# Patient Record
Sex: MALE | Race: BLACK OR AFRICAN AMERICAN | NOT HISPANIC OR LATINO | Employment: OTHER | URBAN - METROPOLITAN AREA
[De-identification: names, ages, dates, MRNs, and addresses within clinical notes are randomized per-mention and may not be internally consistent; named-entity substitution may affect disease eponyms.]

---

## 2018-07-30 PROBLEM — Z86.2 HISTORY OF ITP: Status: ACTIVE | Noted: 2017-04-10

## 2018-07-30 PROBLEM — M54.12 CERVICAL RADICULITIS: Status: ACTIVE | Noted: 2017-12-14

## 2018-07-31 ENCOUNTER — LAB VISIT (OUTPATIENT)
Dept: LAB | Facility: HOSPITAL | Age: 58
End: 2018-07-31
Attending: FAMILY MEDICINE
Payer: COMMERCIAL

## 2018-07-31 ENCOUNTER — OFFICE VISIT (OUTPATIENT)
Dept: INTERNAL MEDICINE | Facility: CLINIC | Age: 58
End: 2018-07-31
Payer: COMMERCIAL

## 2018-07-31 VITALS
SYSTOLIC BLOOD PRESSURE: 116 MMHG | WEIGHT: 138.69 LBS | OXYGEN SATURATION: 97 % | DIASTOLIC BLOOD PRESSURE: 84 MMHG | BODY MASS INDEX: 21.08 KG/M2 | TEMPERATURE: 96 F | RESPIRATION RATE: 18 BRPM | HEART RATE: 77 BPM

## 2018-07-31 DIAGNOSIS — E87.5 SERUM POTASSIUM ELEVATED: ICD-10-CM

## 2018-07-31 DIAGNOSIS — F17.200 SMOKING: ICD-10-CM

## 2018-07-31 DIAGNOSIS — K21.9 GASTROESOPHAGEAL REFLUX DISEASE, ESOPHAGITIS PRESENCE NOT SPECIFIED: Primary | ICD-10-CM

## 2018-07-31 DIAGNOSIS — Z12.2 ENCOUNTER FOR SCREENING FOR LUNG CANCER: ICD-10-CM

## 2018-07-31 DIAGNOSIS — J06.9 UPPER RESPIRATORY TRACT INFECTION, UNSPECIFIED TYPE: ICD-10-CM

## 2018-07-31 DIAGNOSIS — Z13.220 SCREENING FOR HYPERLIPIDEMIA: ICD-10-CM

## 2018-07-31 DIAGNOSIS — Z12.9 SCREENING FOR CANCER: ICD-10-CM

## 2018-07-31 DIAGNOSIS — G47.00 INSOMNIA, UNSPECIFIED TYPE: ICD-10-CM

## 2018-07-31 DIAGNOSIS — Z79.899 HIGH RISK MEDICATION USE: ICD-10-CM

## 2018-07-31 DIAGNOSIS — R25.2 MUSCLE CRAMPS: ICD-10-CM

## 2018-07-31 DIAGNOSIS — R63.4 WEIGHT LOSS: ICD-10-CM

## 2018-07-31 DIAGNOSIS — D69.3 CHRONIC ITP (IDIOPATHIC THROMBOCYTOPENIA): ICD-10-CM

## 2018-07-31 DIAGNOSIS — Z12.11 SCREENING FOR COLON CANCER: ICD-10-CM

## 2018-07-31 LAB
ALBUMIN SERPL BCP-MCNC: 4.2 G/DL
ALP SERPL-CCNC: 52 U/L
ALT SERPL W/O P-5'-P-CCNC: 23 U/L
ANION GAP SERPL CALC-SCNC: 7 MMOL/L
AST SERPL-CCNC: 24 U/L
BILIRUB SERPL-MCNC: 1.1 MG/DL
BUN SERPL-MCNC: 14 MG/DL
CALCIUM SERPL-MCNC: 10.2 MG/DL
CHLORIDE SERPL-SCNC: 106 MMOL/L
CHOLEST SERPL-MCNC: 215 MG/DL
CHOLEST/HDLC SERPL: 3.2 {RATIO}
CO2 SERPL-SCNC: 29 MMOL/L
CREAT SERPL-MCNC: 0.9 MG/DL
EST. GFR  (AFRICAN AMERICAN): >60 ML/MIN/1.73 M^2
EST. GFR  (NON AFRICAN AMERICAN): >60 ML/MIN/1.73 M^2
GLUCOSE SERPL-MCNC: 100 MG/DL
HDLC SERPL-MCNC: 67 MG/DL
HDLC SERPL: 31.2 %
LDLC SERPL CALC-MCNC: 125.4 MG/DL
NONHDLC SERPL-MCNC: 148 MG/DL
POTASSIUM SERPL-SCNC: 5.6 MMOL/L
PROT SERPL-MCNC: 7.8 G/DL
SODIUM SERPL-SCNC: 142 MMOL/L
TRIGL SERPL-MCNC: 113 MG/DL

## 2018-07-31 PROCEDURE — 36415 COLL VENOUS BLD VENIPUNCTURE: CPT

## 2018-07-31 PROCEDURE — 80061 LIPID PANEL: CPT

## 2018-07-31 PROCEDURE — 99204 OFFICE O/P NEW MOD 45 MIN: CPT | Mod: S$GLB,,, | Performed by: FAMILY MEDICINE

## 2018-07-31 PROCEDURE — 99999 PR PBB SHADOW E&M-EST. PATIENT-LVL V: CPT | Mod: PBBFAC,,, | Performed by: FAMILY MEDICINE

## 2018-07-31 PROCEDURE — 80053 COMPREHEN METABOLIC PANEL: CPT

## 2018-07-31 PROCEDURE — 99215 OFFICE O/P EST HI 40 MIN: CPT | Mod: PBBFAC | Performed by: FAMILY MEDICINE

## 2018-07-31 RX ORDER — ZOLPIDEM TARTRATE 5 MG/1
5 TABLET ORAL NIGHTLY PRN
COMMUNITY
End: 2019-08-28 | Stop reason: SDUPTHER

## 2018-07-31 RX ORDER — SODIUM, POTASSIUM,MAG SULFATES 17.5-3.13G
1 SOLUTION, RECONSTITUTED, ORAL ORAL DAILY
Qty: 1 KIT | Refills: 0 | Status: SHIPPED | OUTPATIENT
Start: 2018-07-31 | End: 2018-08-02

## 2018-07-31 RX ORDER — OXYCODONE AND ACETAMINOPHEN 10; 325 MG/1; MG/1
1 TABLET ORAL EVERY 4 HOURS PRN
COMMUNITY
End: 2021-08-02

## 2018-07-31 NOTE — PROGRESS NOTES
Subjective:       Patient ID: Jaxson Burch Jr. is a 58 y.o. male.    Chief Complaint: Sinusitis (pressure in head blowing nose); Heartburn; and Migraine (right side)    HPI     57 yo M    Presents to [facility] care.    Currently losing weight - non-intentional  Has night sweats - not nightly - randomly - for years.    PMH:    Chronic ITP - Looking to return back to Dr. Salazar.  Chronic Pain - Neck / Back - disc problems - sees pain Mngt - on              Opioid  Insomnia - takes Ambien - 2.5 years.      Previously seen at [facility] due to insurance change - but looking to return.    Has had insurance status changes.    Currently following Dr. Huber - sometimes wait is too long.     Takes Percocet -   Dr. Rodas - Pain Mngt      Reports having sinus issues.  2 weeks duration  Feels starts in lymph nodes.  Runny nose -   Seems like its trying to improve.  Coughing - can feel cold in his check  Having some headache.  No sinus pressure  Was having chills a few days ago.  Hasn't felt himself  No shortness of breath    Reports having indigestion issues -  Took rolaids - didn't help.  Started taking prilosec - seems to help him.    Did have episode of having hard time getting fluids down.     Has been smoking since age 16  Smokes 1/2 pack a day.  Pack a day and 1/2 for most of life.  Calculated 31.5 smoking pack years    Colonoscopy -         Review of Systems   Constitutional: Positive for chills and unexpected weight change. Negative for fever.   HENT: Positive for congestion. Negative for sore throat.    Eyes: Negative for pain and visual disturbance.   Respiratory: Positive for cough. Negative for shortness of breath.    Cardiovascular: Negative for chest pain and palpitations.   Gastrointestinal: Negative for constipation and diarrhea.   Genitourinary: Negative for difficulty urinating and dysuria.   Musculoskeletal: Positive for arthralgias and back pain. Negative for gait problem.   Neurological: Negative  for dizziness and light-headedness.   Psychiatric/Behavioral: Positive for sleep disturbance. Negative for confusion.       Objective:       Vitals:    07/31/18 0936   BP: 116/84   Pulse: 77   Resp: 18   Temp: 96 °F (35.6 °C)       Physical Exam   Constitutional: He is oriented to person, place, and time. He appears well-developed and well-nourished. No distress.   HENT:   Head: Normocephalic and atraumatic.   Right Ear: Hearing, tympanic membrane, external ear and ear canal normal.   Left Ear: Hearing, tympanic membrane, external ear and ear canal normal.   Nose: Nose normal. Right sinus exhibits no maxillary sinus tenderness and no frontal sinus tenderness. Left sinus exhibits no maxillary sinus tenderness and no frontal sinus tenderness.   Mouth/Throat: Uvula is midline, oropharynx is clear and moist and mucous membranes are normal.   Eyes: Conjunctivae are normal. Right eye exhibits no discharge. Left eye exhibits no discharge.   Neck: Trachea normal, normal range of motion and full passive range of motion without pain.   Cardiovascular: Normal rate, regular rhythm, normal heart sounds and intact distal pulses.    Pulmonary/Chest: Effort normal and breath sounds normal. No respiratory distress. He has no decreased breath sounds. He has no wheezes.   Abdominal: Soft. Normal appearance and bowel sounds are normal. He exhibits no distension and no mass. There is no tenderness. There is no guarding. No hernia.   Musculoskeletal: Normal range of motion. He exhibits no edema or deformity.   Lymphadenopathy:     He has no cervical adenopathy.   Neurological: He is alert and oriented to person, place, and time.   Skin: Skin is warm, dry and intact. No rash noted. No erythema. No pallor.   Psychiatric: He has a normal mood and affect. His speech is normal and behavior is normal. Thought content normal.       Assessment:       1. Gastroesophageal reflux disease, esophagitis presence not specified    2. Upper respiratory  tract infection, unspecified type    3. Weight loss    4. Screening for colon cancer    5. Encounter for screening for lung cancer    6. Screening for cancer    7. Smoking    8. Serum potassium elevated    9. Muscle cramps    10. Screening for hyperlipidemia    11. Chronic ITP (idiopathic thrombocytopenia)    12. Insomnia, unspecified type    13. High risk medication use        Plan:   Gastroesophageal reflux disease  Has had benefit with Prilosec  Encouraged continued use.  Extensive education reviewed with online resources imaging.    Upper respiratory tract infection, unspecified type  I suspect this is a viral illness.  Physical examination and vitals wnl.  No abx prescribed  Discussed cases in which they would be appropriate - failure to resolve or worsening after this period of improvement.      Smoking  Since age 16  cessation course ordered  Low dose CT scan given over 56 with a 31.5 pack year history  Evy given weight loss and night sweats    Colonoscopy to be obtained    Elevated K   patient reports cramping  Noted 6/18 K was 5.4  Checking that today.     Weight loss  Cancer screening    Chronic ITP  Looking to switch back to [facility] - Dr Salazar  Will set up for an appt.    High risk medication  Opioid and ambien    Insomnia  Discussed my concern with his daily use of ambien for 2.5 years.  Discussed underlying causes of sleep  Encouraged activity  Discussed I avoid prescribing for long term use.     F/u in 3 months.    Spent roughly 45 minutes with patient    Follow-up in about 3 months (around 10/31/2018).

## 2018-08-01 ENCOUNTER — TELEPHONE (OUTPATIENT)
Dept: INTERNAL MEDICINE | Facility: CLINIC | Age: 58
End: 2018-08-01

## 2018-08-01 ENCOUNTER — HOSPITAL ENCOUNTER (OUTPATIENT)
Dept: RADIOLOGY | Facility: HOSPITAL | Age: 58
Discharge: HOME | End: 2018-08-01
Attending: FAMILY MEDICINE
Payer: COMMERCIAL

## 2018-08-01 DIAGNOSIS — Z12.9 SCREENING FOR CANCER: ICD-10-CM

## 2018-08-01 DIAGNOSIS — E87.5 HYPERKALEMIA: Primary | ICD-10-CM

## 2018-08-01 RX ORDER — ATORVASTATIN CALCIUM 20 MG/1
20 TABLET, FILM COATED ORAL DAILY
Qty: 90 TABLET | Refills: 0 | Status: SHIPPED | OUTPATIENT
Start: 2018-08-01 | End: 2020-10-06

## 2018-08-01 NOTE — TELEPHONE ENCOUNTER
----- Message from Lizett Mcleod sent at 8/1/2018 12:06 PM CDT -----  Contact: pt  The pt request a return call, no additional info given, can be reached at [phone]///thxMW

## 2018-08-01 NOTE — TELEPHONE ENCOUNTER
----- Message from Phil Coyle MD sent at 8/1/2018 10:59 AM CDT -----  Please call and schedule a cmp for tomorrow or Friday.

## 2018-08-02 ENCOUNTER — TELEPHONE (OUTPATIENT)
Dept: SMOKING CESSATION | Facility: CLINIC | Age: 58
End: 2018-08-02

## 2018-08-02 ENCOUNTER — TELEPHONE (OUTPATIENT)
Dept: INTERNAL MEDICINE | Facility: CLINIC | Age: 58
End: 2018-08-02

## 2018-08-02 NOTE — TELEPHONE ENCOUNTER
Patient called to state that something has come up and he is unable to make his scheduled appointment for tonight. He states that he will call back on Monday to schedule. Return contact information provided.

## 2018-08-02 NOTE — TELEPHONE ENCOUNTER
----- Message from Sahara Valencia sent at 8/2/2018 10:08 AM CDT -----  Contact: Jaxson herron at [phone] regarding pt wanting to let Dr. Coyle office know that he had to reschedule ultrasound

## 2018-08-02 NOTE — TELEPHONE ENCOUNTER
Called to let us know that he was scheduled at the wrong place for CT Scan.  Will reschedule when payment is put back on his card. /sl/

## 2018-08-07 ENCOUNTER — HOSPITAL ENCOUNTER (OUTPATIENT)
Dept: RADIOLOGY | Facility: HOSPITAL | Age: 58
Discharge: HOME | End: 2018-08-07
Attending: FAMILY MEDICINE
Payer: COMMERCIAL

## 2018-08-07 ENCOUNTER — OFFICE VISIT (OUTPATIENT)
Dept: INTERNAL MEDICINE | Facility: CLINIC | Age: 58
End: 2018-08-07
Payer: COMMERCIAL

## 2018-08-07 ENCOUNTER — TELEPHONE (OUTPATIENT)
Dept: INTERNAL MEDICINE | Facility: CLINIC | Age: 58
End: 2018-08-07

## 2018-08-07 VITALS
OXYGEN SATURATION: 99 % | BODY MASS INDEX: 21.72 KG/M2 | DIASTOLIC BLOOD PRESSURE: 78 MMHG | WEIGHT: 142.88 LBS | SYSTOLIC BLOOD PRESSURE: 118 MMHG | RESPIRATION RATE: 18 BRPM | TEMPERATURE: 97 F | HEART RATE: 93 BPM

## 2018-08-07 DIAGNOSIS — E87.5 HYPERKALEMIA: ICD-10-CM

## 2018-08-07 DIAGNOSIS — S40.021A HEMATOMA OF ARM, RIGHT, INITIAL ENCOUNTER: Primary | ICD-10-CM

## 2018-08-07 DIAGNOSIS — D69.3 CHRONIC ITP (IDIOPATHIC THROMBOCYTOPENIA): ICD-10-CM

## 2018-08-07 PROCEDURE — G0297 LDCT FOR LUNG CA SCREEN: HCPCS | Mod: TC,PO

## 2018-08-07 PROCEDURE — 99213 OFFICE O/P EST LOW 20 MIN: CPT | Mod: PBBFAC,25 | Performed by: FAMILY MEDICINE

## 2018-08-07 PROCEDURE — 99999 PR PBB SHADOW E&M-EST. PATIENT-LVL III: CPT | Mod: PBBFAC,,, | Performed by: FAMILY MEDICINE

## 2018-08-07 PROCEDURE — 99214 OFFICE O/P EST MOD 30 MIN: CPT | Mod: S$GLB,,, | Performed by: FAMILY MEDICINE

## 2018-08-07 NOTE — TELEPHONE ENCOUNTER
----- Message from Edelmira Rueda sent at 8/7/2018 10:15 AM CDT -----  Pt needs call back rg lab results, will elaborate..[phone]

## 2018-08-07 NOTE — PROGRESS NOTES
Subjective:       Patient ID: Jaxson Burch Jr. is a 58 y.o. male.    Chief Complaint: red bump after blood draw    PCP: Dr. Coyle    Patient presents to clinic today reporting red bump on his arm after blood draw a few days ago.       Review of Systems   Constitutional: Negative for chills, fatigue, fever and unexpected weight change.   Eyes: Negative for visual disturbance.   Respiratory: Negative for shortness of breath.    Cardiovascular: Negative for chest pain.   Musculoskeletal: Negative for myalgias.   Neurological: Negative for headaches.       Objective:      Physical Exam   Constitutional: He is oriented to person, place, and time. He appears well-developed and well-nourished. No distress.   HENT:   Head: Normocephalic and atraumatic.   Eyes: Conjunctivae and EOM are normal. Pupils are equal, round, and reactive to light. No scleral icterus.   Pulmonary/Chest: Effort normal.   Neurological: He is alert and oriented to person, place, and time. No cranial nerve deficit. Gait normal.   Skin:        0.5 cm round bruise noted in right antecubital fossa, no mass, nontender   Psychiatric: He has a normal mood and affect.   Vitals reviewed.      Assessment:       1. Hematoma of arm, right, initial encounter    2. Chronic ITP (idiopathic thrombocytopenia)    3. Hyperkalemia        Plan:     Problem List Items Addressed This Visit     Chronic ITP (idiopathic thrombocytopenia)    Current Assessment & Plan     Denies bleeding other than recent hematoma after venupuncture; followed by Hem/Onc         Hyperkalemia    Current Assessment & Plan     Follow up lab per Dr. Coyle is pending; patient reports he d/c OTC vitamin with potassium           Other Visit Diagnoses     Hematoma of arm, right, initial encounter    -  Primary    from recent venupuncture, resolving, reassured patient

## 2018-08-07 NOTE — PATIENT INSTRUCTIONS
Hematoma  A hematoma is a collection of blood trapped outside of a blood vessel. It is what we think of as a bruise or a contusion. It is usually seen under the skin as a black and blue spot on your arm or leg, or a bump on your head after an injury. It can be almost anywhere on or in your body. It can also occur in an internal organ where it can be more serious.  A hematoma is caused by an injury with damage to small blood vessels. This causes blood to leak into the tissues. Blood forms a pocket under the skin that swells and looks like a purplish patch.  Gradually the blood in the hematoma is absorbed back into the body. The swelling and pain of the hematoma will go away. This takes from 1 to 4 weeks, depending on the size of the hematoma. The skin over the hematoma may turn bluish then brown and yellow as the blood is dissolved and absorbed. Usually, this only takes a couple of weeks but can last months.  Home care  · Limit motion of the joints near the hematoma. If the hematoma is large and painful, avoid sports and other vigorous physical activity until the swelling and pain goes away.  · Apply an ice pack (ice cubes in a plastic bag, wrapped in a thin towel or a frozen bag of peas) over the injured area for 20 minutes every 1 to 2 hours the first day. Continue with ice packs 3 to 4 times a day for the next 2 days. Continue the use of ice packs for relief of pain and swelling as needed.  · If you need anything for pain, you can take acetaminophen, unless you were given a different pain medicine to use. Talk with your doctor before using this medicine if you have chronic liver or kidney disease. Also talk with your doctor if you have had a stomach ulcer or digestive tract bleeding, or are taking blood-thinner medicines.  Follow-up care  Follow up with your healthcare provider, or as advised. If X-rays or a CT scan were done, you will be notified if there is a change in the reading, especially if it affects  treatment.  When to seek medical advice  Call your healthcare provider right away f any of the following occur:  · Redness around the hematoma  · Increase in pain or warmth in the hematoma  · Increase in size of the hematoma  · Fever of 100.4ºF (38ºC) or higher, or as directed by your healthcare provider  · If the hematoma is on the arm or leg, watch for:  ¨ Increased swelling or pain in the extremity  ¨ Numbness or tingling or blue color of the hand or foot  Date Last Reviewed: 11/5/2015 © 2000-2017 [employer]. [address] [place] [address]. All rights reserved. This information is not intended as a substitute for professional medical care. Always follow your healthcare professional's instructions.

## 2018-08-08 ENCOUNTER — TELEPHONE (OUTPATIENT)
Dept: INTERNAL MEDICINE | Facility: CLINIC | Age: 58
End: 2018-08-08

## 2018-08-08 NOTE — TELEPHONE ENCOUNTER
----- Message from Oleg Munoz sent at 8/8/2018  3:36 PM CDT -----  Contact: Pt   Pt requested his labs that was done on 7- be compared to labs that was done on 8-7-2018 with regards to cholesterol and potassium being up or down callback number is..[phone] (home)

## 2018-08-08 NOTE — TELEPHONE ENCOUNTER
Let patient know that Dr Corbett have not had a chance to review his results. As soon as she does I will call him and let him know.  He wants Dr Corbett to got back to the results from July and compare them to the current ones. /niharika/

## 2018-08-08 NOTE — TELEPHONE ENCOUNTER
----- Message from Jaxson Wright sent at 8/8/2018  2:05 PM CDT -----  Contact: pt   Pt calling to get test results would like callback.       ..[phone] (qmwa)

## 2018-08-09 ENCOUNTER — DOCUMENTATION ONLY (OUTPATIENT)
Dept: ENDOSCOPY | Facility: HOSPITAL | Age: 58
End: 2018-08-09

## 2018-08-09 NOTE — TELEPHONE ENCOUNTER
----- Message from Jaxson Wright sent at 8/9/2018  1:40 PM CDT -----  Contact: pt   Pt would like callback to discuss results of recent labwork.       ..[phone] (csxw) '

## 2018-08-09 NOTE — TELEPHONE ENCOUNTER
Called patient to inform him that both Dr Coyle and Dr Corbett is out of the office today and will be back next week. /sl/

## 2018-08-09 NOTE — TELEPHONE ENCOUNTER
Potassium is variable, and sometime even the lab draw can affect the number.  This is why we monitor it over time.  His current level is in normal range.  He is not on any medication that affects potassium currently.  If he follows a normal diet, he should be fine.     The cholesterol is reviewed.    His has elevated total cholesterol, goal is 200.  His HDL or good cholesterol is high, which is a good thing to have.    However with his smoking, he has a risk factor. Lipitor was started, and we plan to recheck his cholesterol within 3 months.    Dr. Corbett reviewed labs as well.

## 2018-08-13 ENCOUNTER — TELEPHONE (OUTPATIENT)
Dept: SMOKING CESSATION | Facility: CLINIC | Age: 58
End: 2018-08-13

## 2018-08-13 ENCOUNTER — INITIAL CONSULT (OUTPATIENT)
Dept: HEMATOLOGY/ONCOLOGY | Facility: CLINIC | Age: 58
End: 2018-08-13
Payer: COMMERCIAL

## 2018-08-13 VITALS
OXYGEN SATURATION: 98 % | HEIGHT: 67 IN | SYSTOLIC BLOOD PRESSURE: 123 MMHG | TEMPERATURE: 98 F | WEIGHT: 140.44 LBS | HEART RATE: 81 BPM | BODY MASS INDEX: 22.04 KG/M2 | DIASTOLIC BLOOD PRESSURE: 84 MMHG

## 2018-08-13 DIAGNOSIS — D69.3 CHRONIC ITP (IDIOPATHIC THROMBOCYTOPENIA): Primary | ICD-10-CM

## 2018-08-13 PROCEDURE — 99203 OFFICE O/P NEW LOW 30 MIN: CPT | Mod: S$GLB,,, | Performed by: INTERNAL MEDICINE

## 2018-08-13 PROCEDURE — 99213 OFFICE O/P EST LOW 20 MIN: CPT | Mod: PBBFAC | Performed by: INTERNAL MEDICINE

## 2018-08-13 PROCEDURE — 99999 PR PBB SHADOW E&M-EST. PATIENT-LVL III: CPT | Mod: PBBFAC,,, | Performed by: INTERNAL MEDICINE

## 2018-08-13 NOTE — PROGRESS NOTES
Reason for visit: Follow up for ITP    HPI:   The patient is a 58-year-old  male who presents to the hematology oncology clinic today to discuss further evaluation and management recommendations for immune mediated thrombocytopenia.  The patient had previously been followed by Dr. Marija Patel and Dr. Deven Norris with hematology oncology. The patient reports that he was diagnosed with immune mediated thrombocytopenia in 2012 when he noticed bruising on his lower extremities and sought medical attention for this. The patient reports that he did not tolerate high doses of prednisone very well. He has since been treated with chronic infusions of IVIG with good response.  He denies any melena, hematochezia, hematemesis, hemoptysis, hematuria or epistaxis. He denies any fever, chills or night sweats. He denies any bowel or urinary complaints.  The patient has completed 4 doses of weekly Rituxan on 2013 and tolerated this well. He denies any chest pain or shortness of breath. He denies any nausea or vomiting. I have reviewed all of his interval clinical history in EPIC.   His medical insurance now allows him to follow up back with me at [facility]. He has not required any treatment of his chronic ITP in the interim.    PAST MEDICAL HISTORY:   1. ITP  2. H. Pylori positive gastritis    SURGICAL HISTORY:   Denies    FAMILY HISTORY: The patient's mother  from complications related to pancreatic cancer at the age of 67. His brother  from complications related to lung cancer in his 40s. A paternal uncle  from complications related to throat cancer. A maternal uncle  from complications related to lung cancer. He denies any immediate family members with bleeding/clotting disorders.    SOCIAL HISTORY: He reports a 63+ pack year smoking history and is a current smoker. He states that he has moderated alcohol consumption. He has never used any recreational drugs. He works as a  . He is  and has 7 children. He lives in [place].    ALLERGIES: [NKDA]    MEDICATIONS: [Medcard has been reviewed and/or reconciled.]    REVIEW OF SYSTEMS:   GENERAL: [No fevers, chills or sweats. No fatigue, weight loss or loss of appetite.]  HEENT: [No blurred vision, tinnitus, nasal discharge, sorethroat or dysphagia.]  HEART: [No chest pain, palpitations or shortness of breath.]   LUNGS: [No cough, hemoptysis or breathing problems.]  ABDOMEN: [No abdominal pain, nausea, vomiting, diarrhea, constipation or melena.]  GENITOURINARY: [No dysuria, bleeding or malodorous discharge.]  NEURO: [No headache, dizziness or vertigo.]  HEMATOLOGY: [No easy bruising, spontaneous bleeding or blood clots in the past].  MUSCULOSKELETAL: [No arthralgias, myalgias or bone pains.]  SKIN: [No rashes or skin lesions.]  PSYCHIATRY: [No depression or anxiety.]    PHYSICAL EXAMINATION:   VS: Reviewed on nurse's notes.  APPEARANCE: The patient is a well-groomed  male who appears in no acute distress.   HEENT: No scleral icterus. Both external auditory canals clear. No oral ulcers, lesions. Throat clear  HEAD: No sinus tenderness.  NECK: Supple. No palpable lymphadenopathy. Thyroid non-tender, no palpable masses  CHEST: Breath sounds clear bilaterally. No rales. No rhonchi. Unlabored respirations.  CARDIOVASCULAR: Normal S1, S2. Normal rate. Regular rhythm.  ABDOMEN: Bowel sounds slightly hyperactive. No tenderness. No abdominal distention. No hepatomegaly. No splenomegaly.  LYMPHATIC: No palpable supraclavicular, axillary nodes  EXTREMITIES: No clubbing, cyanosis, edema  SKIN: No lesions. No petechiae. No ecchymoses. No induration or nodules  NEUROLOGIC: No focal findings. Alert & Oriented x 3. Mood appropriate to affect    LABS:   Reviewed    IMAGING:  Reviewed    IMPRESSION:  1. Immune mediated thrombocytopenia    PLAN:  1. I discussed the results of his recent labwork in detail with the patient. His  platelet count is overall stable. No indication for treatment. All other blood counts are unremarkable.  2. I have discussed with the patient that he should seek immediate medical attention for any signs/symptoms of bleeding. He expressed understanding of this.  3. I have recommended holding off on additional Rituxan at this time for treatment of his ITP because of the stability of his platelet count.  The patient has had an excellent response to this when given in 2013 and has been in remission with regard to his ITP since that time. Risks/benefits, and side effects were discussed in detail and he is agreeable to proceed with this when indicated/scheduled.      Followup in 3 months with labs. He knows to call sooner for any new problems or questions.    Rusty Salazar MD

## 2018-08-13 NOTE — LETTER
August 13, 2018      Phil Coyle MD  [address] [facility] [address]           [place] - Hematology Oncology  [address] [facility] [address]  Phone: [phone]  Fax: [phone]          Patient: Jaxson Burch Jr.   MR Number: [id number]   YOB: 1960   Date of Visit: 8/13/2018       Dear Dr. Phil Coyle:    Thank you for referring Jaxson Burch to me for evaluation. Attached you will find relevant portions of my assessment and plan of care.    If you have questions, please do not hesitate to call me. I look forward to following Jaxson Burch along with you.    Sincerely,    Rusty Salazar MD    Enclosure  CC:  No Recipients    If you would like to receive this communication electronically, please contact [email or web address] or [phone] to request more information on [employer] Link access.    For providers and/or their staff who would like to refer a patient to [facility], please contact us through our one-stop-shop provider referral line, [facility], at [phone].    If you feel you have received this communication in error or would no longer like to receive these types of communications, please e-mail [email or web address]

## 2018-08-13 NOTE — TELEPHONE ENCOUNTER
Attempted to contact patient regarding missed sessions, Left message with name Wilton Quinonez and return phone number [phone].

## 2018-08-15 ENCOUNTER — TELEPHONE (OUTPATIENT)
Dept: INTERNAL MEDICINE | Facility: CLINIC | Age: 58
End: 2018-08-15

## 2018-08-15 NOTE — TELEPHONE ENCOUNTER
Called patient to inform him that I will send a message to Dr Coyle to put in an order for the colonoscopy.  /sl/

## 2018-08-15 NOTE — TELEPHONE ENCOUNTER
----- Message from Serenity Calix sent at 8/15/2018  9:42 AM CDT -----  Pt at [phone]//states is calling regarding having a colonoscopy done//he and Dr Coyle had discussed this at  his last appt//please call//wily/[facility]

## 2018-08-20 DIAGNOSIS — Z12.11 COLON CANCER SCREENING: Primary | ICD-10-CM

## 2018-08-20 RX ORDER — SODIUM, POTASSIUM,MAG SULFATES 17.5-3.13G
1 SOLUTION, RECONSTITUTED, ORAL ORAL DAILY
Qty: 1 KIT | Refills: 0 | Status: SHIPPED | OUTPATIENT
Start: 2018-08-20 | End: 2018-08-22

## 2018-08-22 ENCOUNTER — DOCUMENTATION ONLY (OUTPATIENT)
Dept: ENDOSCOPY | Facility: HOSPITAL | Age: 58
End: 2018-08-22

## 2018-08-22 NOTE — PROGRESS NOTES
8.22.2018 Per [employer], Person pressed touch tone key to speak with an endoscopy . Scheduled and instructions mailed.

## 2018-10-01 ENCOUNTER — DOCUMENTATION ONLY (OUTPATIENT)
Dept: ENDOSCOPY | Facility: HOSPITAL | Age: 58
End: 2018-10-01

## 2018-10-01 NOTE — PROGRESS NOTES
10/01/18 Pt called to reschedule colonoscopy, now scheduled for 11/29/18. Already has Suprep. New instructions mailed.

## 2018-10-12 ENCOUNTER — LAB VISIT (OUTPATIENT)
Dept: LAB | Facility: HOSPITAL | Age: 58
End: 2018-10-12
Attending: INTERNAL MEDICINE
Payer: COMMERCIAL

## 2018-10-12 DIAGNOSIS — D69.3 CHRONIC ITP (IDIOPATHIC THROMBOCYTOPENIA): ICD-10-CM

## 2018-10-12 LAB
ALBUMIN SERPL BCP-MCNC: 3.9 G/DL
ALP SERPL-CCNC: 45 U/L
ALT SERPL W/O P-5'-P-CCNC: 13 U/L
ANION GAP SERPL CALC-SCNC: 8 MMOL/L
AST SERPL-CCNC: 22 U/L
BASOPHILS # BLD AUTO: 0.06 K/UL
BASOPHILS NFR BLD: 0.9 %
BILIRUB SERPL-MCNC: 0.8 MG/DL
BUN SERPL-MCNC: 9 MG/DL
CALCIUM SERPL-MCNC: 9.2 MG/DL
CHLORIDE SERPL-SCNC: 106 MMOL/L
CO2 SERPL-SCNC: 27 MMOL/L
CREAT SERPL-MCNC: 1 MG/DL
DIFFERENTIAL METHOD: ABNORMAL
EOSINOPHIL # BLD AUTO: 0.1 K/UL
EOSINOPHIL NFR BLD: 1.9 %
ERYTHROCYTE [DISTWIDTH] IN BLOOD BY AUTOMATED COUNT: 12.6 %
EST. GFR  (AFRICAN AMERICAN): >60 ML/MIN/1.73 M^2
EST. GFR  (NON AFRICAN AMERICAN): >60 ML/MIN/1.73 M^2
GLUCOSE SERPL-MCNC: 103 MG/DL
HCT VFR BLD AUTO: 42.6 %
HGB BLD-MCNC: 14.3 G/DL
LYMPHOCYTES # BLD AUTO: 1.6 K/UL
LYMPHOCYTES NFR BLD: 25.5 %
MCH RBC QN AUTO: 32.2 PG
MCHC RBC AUTO-ENTMCNC: 33.6 G/DL
MCV RBC AUTO: 96 FL
MONOCYTES # BLD AUTO: 0.6 K/UL
MONOCYTES NFR BLD: 9 %
NEUTROPHILS # BLD AUTO: 4 K/UL
NEUTROPHILS NFR BLD: 62.7 %
PLATELET # BLD AUTO: 71 K/UL
PMV BLD AUTO: 9.4 FL
POTASSIUM SERPL-SCNC: 4.3 MMOL/L
PROT SERPL-MCNC: 6.8 G/DL
RBC # BLD AUTO: 4.44 M/UL
SODIUM SERPL-SCNC: 141 MMOL/L
WBC # BLD AUTO: 6.35 K/UL

## 2018-10-12 PROCEDURE — 85025 COMPLETE CBC W/AUTO DIFF WBC: CPT

## 2018-10-12 PROCEDURE — 80053 COMPREHEN METABOLIC PANEL: CPT

## 2018-10-12 PROCEDURE — 36415 COLL VENOUS BLD VENIPUNCTURE: CPT

## 2018-10-30 ENCOUNTER — OFFICE VISIT (OUTPATIENT)
Dept: INTERNAL MEDICINE | Facility: CLINIC | Age: 58
End: 2018-10-30
Payer: MEDICARE

## 2018-10-30 VITALS
OXYGEN SATURATION: 98 % | TEMPERATURE: 97 F | DIASTOLIC BLOOD PRESSURE: 80 MMHG | WEIGHT: 142.88 LBS | RESPIRATION RATE: 20 BRPM | SYSTOLIC BLOOD PRESSURE: 122 MMHG | BODY MASS INDEX: 22.43 KG/M2 | HEIGHT: 67 IN | HEART RATE: 80 BPM

## 2018-10-30 DIAGNOSIS — Z91.89 10 YEAR RISK OF MI OR STROKE 7.5% OR GREATER: Primary | ICD-10-CM

## 2018-10-30 DIAGNOSIS — E78.5 HYPERLIPIDEMIA, UNSPECIFIED HYPERLIPIDEMIA TYPE: ICD-10-CM

## 2018-10-30 DIAGNOSIS — G89.29 OTHER CHRONIC PAIN: ICD-10-CM

## 2018-10-30 DIAGNOSIS — G47.00 INSOMNIA, UNSPECIFIED TYPE: ICD-10-CM

## 2018-10-30 PROCEDURE — 99999 PR PBB SHADOW E&M-EST. PATIENT-LVL III: CPT | Mod: PBBFAC,,, | Performed by: FAMILY MEDICINE

## 2018-10-30 PROCEDURE — 99213 OFFICE O/P EST LOW 20 MIN: CPT | Mod: S$GLB,,, | Performed by: FAMILY MEDICINE

## 2018-10-30 PROCEDURE — 99213 OFFICE O/P EST LOW 20 MIN: CPT | Mod: PBBFAC | Performed by: FAMILY MEDICINE

## 2018-10-30 RX ORDER — TRAZODONE HYDROCHLORIDE 50 MG/1
50 TABLET ORAL NIGHTLY
Qty: 90 TABLET | Refills: 0 | Status: SHIPPED | OUTPATIENT
Start: 2018-10-30 | End: 2019-11-01

## 2018-10-30 RX ORDER — TADALAFIL 20 MG/1
20 TABLET ORAL DAILY PRN
COMMUNITY
Start: 2016-07-06 | End: 2019-08-07

## 2018-10-30 NOTE — PROGRESS NOTES
Subjective:       Patient ID: Jaxson Burch Jr. is a 58 y.o. male.    Chief Complaint: Follow-up (3MO F/U)    HPI       57 yo M    Chronic ITP  Chronic Pain - sees Pain Mngt - neck / back pain  Insomnia -   GERD  Smoking  ED  Hip Replacement R -       Still smoking  Not ready to quit yet  At last visit CT  Negative Screening CT - repeat in 1 year - 8/7/18    Colonoscopy schedule for next month some time.     Patient stopped taking statin  Did not like way make him feel.    On Ambien  Takes nightly  Has been on ambien for about a year.        Review of Systems   Constitutional: Negative for activity change and unexpected weight change.   HENT: Negative for hearing loss, rhinorrhea and trouble swallowing.    Eyes: Negative for discharge and visual disturbance.   Respiratory: Negative for chest tightness and wheezing.    Cardiovascular: Negative for chest pain and palpitations.   Gastrointestinal: Negative for blood in stool, constipation, diarrhea and vomiting.   Endocrine: Negative for polydipsia and polyuria.   Genitourinary: Negative for difficulty urinating, hematuria and urgency.   Musculoskeletal: Positive for arthralgias. Negative for joint swelling and neck pain.   Neurological: Negative for weakness and headaches.   Psychiatric/Behavioral: Negative for confusion and dysphoric mood.       Objective:       Vitals:    10/30/18 0949   BP: 122/80   Pulse: 80   Resp: 20   Temp: 96.8 °F (36 °C)       Physical Exam   Constitutional: He is oriented to person, place, and time. He appears well-developed and well-nourished. No distress.   HENT:   Head: Normocephalic and atraumatic.   Right Ear: Hearing, tympanic membrane, external ear and ear canal normal.   Left Ear: Hearing, tympanic membrane, external ear and ear canal normal.   Nose: Nose normal. Right sinus exhibits no maxillary sinus tenderness and no frontal sinus tenderness. Left sinus exhibits no maxillary sinus tenderness and no frontal sinus tenderness.    Mouth/Throat: Uvula is midline, oropharynx is clear and moist and mucous membranes are normal.   Eyes: Conjunctivae are normal. Right eye exhibits no discharge. Left eye exhibits no discharge.   Neck: Trachea normal, normal range of motion and full passive range of motion without pain.   Cardiovascular: Normal rate, regular rhythm, normal heart sounds and intact distal pulses.   Pulmonary/Chest: Effort normal and breath sounds normal. No respiratory distress. He has no decreased breath sounds. He has no wheezes.   Abdominal: Soft. Normal appearance and bowel sounds are normal. He exhibits no distension and no mass. There is no tenderness. There is no guarding. No hernia.   Musculoskeletal: Normal range of motion. He exhibits no edema or deformity.   Lymphadenopathy:     He has no cervical adenopathy.   Neurological: He is alert and oriented to person, place, and time.   Skin: Skin is warm, dry and intact. No rash noted. No erythema. No pallor.   Psychiatric: He has a normal mood and affect. His speech is normal and behavior is normal. Thought content normal.       Assessment:       1. 10 year risk of MI or stroke 7.5% or greater    2. Hyperlipidemia, unspecified hyperlipidemia type    3. Insomnia, unspecified type    4. Other chronic pain        Plan:   10 year risk of MI or stroke 7.5% or greater    Risk higher given he is a smoker    Hyperlipidemia, unspecified hyperlipidemia type    Started on Lipitor  Should be on such until stops smoking.  Patient will start again     Insomnia  Ambien - nightly. Long term use  Discussed risk  Discussed sleep hygiene.  Discussed high risk  Will try trazodone as assess for sleep  Sleep hygiene recommended.    Chronic Pain  On opioid  Follows pain mgnt  On disability 2'2 pain    Disability  2'2' to joint pain / OA      F/u in 3 months    Had Flu shot at [facility]    No Follow-up on file.

## 2018-11-19 ENCOUNTER — LAB VISIT (OUTPATIENT)
Dept: LAB | Facility: HOSPITAL | Age: 58
End: 2018-11-19
Attending: INTERNAL MEDICINE
Payer: COMMERCIAL

## 2018-11-19 DIAGNOSIS — D69.3 CHRONIC ITP (IDIOPATHIC THROMBOCYTOPENIA): Primary | ICD-10-CM

## 2018-11-19 DIAGNOSIS — D69.3 CHRONIC ITP (IDIOPATHIC THROMBOCYTOPENIA): ICD-10-CM

## 2018-11-19 LAB
ALBUMIN SERPL BCP-MCNC: 4 G/DL
ALP SERPL-CCNC: 46 U/L
ALT SERPL W/O P-5'-P-CCNC: 11 U/L
ANION GAP SERPL CALC-SCNC: 7 MMOL/L
AST SERPL-CCNC: 25 U/L
BASOPHILS # BLD AUTO: 0.05 K/UL
BASOPHILS NFR BLD: 0.6 %
BILIRUB SERPL-MCNC: 0.9 MG/DL
BUN SERPL-MCNC: 13 MG/DL
CALCIUM SERPL-MCNC: 9.1 MG/DL
CHLORIDE SERPL-SCNC: 105 MMOL/L
CO2 SERPL-SCNC: 27 MMOL/L
CREAT SERPL-MCNC: 1 MG/DL
DIFFERENTIAL METHOD: ABNORMAL
EOSINOPHIL # BLD AUTO: 0.2 K/UL
EOSINOPHIL NFR BLD: 2.5 %
ERYTHROCYTE [DISTWIDTH] IN BLOOD BY AUTOMATED COUNT: 12.7 %
EST. GFR  (AFRICAN AMERICAN): >60 ML/MIN/1.73 M^2
EST. GFR  (NON AFRICAN AMERICAN): >60 ML/MIN/1.73 M^2
GLUCOSE SERPL-MCNC: 108 MG/DL
HCT VFR BLD AUTO: 45.3 %
HGB BLD-MCNC: 15.1 G/DL
LYMPHOCYTES # BLD AUTO: 2 K/UL
LYMPHOCYTES NFR BLD: 25 %
MCH RBC QN AUTO: 32.1 PG
MCHC RBC AUTO-ENTMCNC: 33.3 G/DL
MCV RBC AUTO: 96 FL
MONOCYTES # BLD AUTO: 0.6 K/UL
MONOCYTES NFR BLD: 7 %
NEUTROPHILS # BLD AUTO: 5.2 K/UL
NEUTROPHILS NFR BLD: 64.9 %
PLATELET # BLD AUTO: 401 K/UL
PMV BLD AUTO: 9.2 FL
POTASSIUM SERPL-SCNC: 4.5 MMOL/L
PROT SERPL-MCNC: 7.3 G/DL
RBC # BLD AUTO: 4.7 M/UL
SODIUM SERPL-SCNC: 139 MMOL/L
WBC # BLD AUTO: 7.96 K/UL

## 2018-11-19 PROCEDURE — 36415 COLL VENOUS BLD VENIPUNCTURE: CPT

## 2018-11-19 PROCEDURE — 80053 COMPREHEN METABOLIC PANEL: CPT

## 2018-11-19 PROCEDURE — 85025 COMPLETE CBC W/AUTO DIFF WBC: CPT

## 2018-11-29 ENCOUNTER — HOSPITAL ENCOUNTER (OUTPATIENT)
Facility: HOSPITAL | Age: 58
Discharge: HOME | End: 2018-11-29
Attending: SURGERY | Admitting: SURGERY
Payer: COMMERCIAL

## 2018-11-29 ENCOUNTER — ANESTHESIA EVENT (OUTPATIENT)
Dept: ENDOSCOPY | Facility: HOSPITAL | Age: 58
End: 2018-11-29
Payer: COMMERCIAL

## 2018-11-29 ENCOUNTER — ANESTHESIA (OUTPATIENT)
Dept: ENDOSCOPY | Facility: HOSPITAL | Age: 58
End: 2018-11-29
Payer: COMMERCIAL

## 2018-11-29 VITALS
HEART RATE: 67 BPM | OXYGEN SATURATION: 100 % | RESPIRATION RATE: 18 BRPM | SYSTOLIC BLOOD PRESSURE: 114 MMHG | DIASTOLIC BLOOD PRESSURE: 76 MMHG | TEMPERATURE: 98 F | WEIGHT: 140 LBS | BODY MASS INDEX: 21.97 KG/M2 | HEIGHT: 67 IN

## 2018-11-29 DIAGNOSIS — Z12.11 COLON CANCER SCREENING: ICD-10-CM

## 2018-11-29 PROCEDURE — 45380 COLONOSCOPY AND BIOPSY: CPT | Performed by: SURGERY

## 2018-11-29 PROCEDURE — 88305 TISSUE EXAM BY PATHOLOGIST: CPT | Mod: 26,,, | Performed by: PATHOLOGY

## 2018-11-29 PROCEDURE — 63600175 PHARM REV CODE 636 W HCPCS: Performed by: NURSE ANESTHETIST, CERTIFIED REGISTERED

## 2018-11-29 PROCEDURE — 37000009 HC ANESTHESIA EA ADD 15 MINS: Performed by: SURGERY

## 2018-11-29 PROCEDURE — 45380 COLONOSCOPY AND BIOPSY: CPT | Mod: 33,,, | Performed by: SURGERY

## 2018-11-29 PROCEDURE — 27201012 HC FORCEPS, HOT/COLD, DISP: Performed by: SURGERY

## 2018-11-29 PROCEDURE — 25000003 PHARM REV CODE 250: Performed by: NURSE ANESTHETIST, CERTIFIED REGISTERED

## 2018-11-29 PROCEDURE — 25000003 PHARM REV CODE 250: Performed by: SURGERY

## 2018-11-29 PROCEDURE — 88305 TISSUE EXAM BY PATHOLOGIST: CPT | Performed by: PATHOLOGY

## 2018-11-29 PROCEDURE — 37000008 HC ANESTHESIA 1ST 15 MINUTES: Performed by: SURGERY

## 2018-11-29 RX ORDER — SODIUM CHLORIDE 0.9 % (FLUSH) 0.9 %
3 SYRINGE (ML) INJECTION
Status: DISCONTINUED | OUTPATIENT
Start: 2018-11-29 | End: 2018-11-29 | Stop reason: HOSPADM

## 2018-11-29 RX ORDER — SODIUM CHLORIDE, SODIUM LACTATE, POTASSIUM CHLORIDE, CALCIUM CHLORIDE 600; 310; 30; 20 MG/100ML; MG/100ML; MG/100ML; MG/100ML
INJECTION, SOLUTION INTRAVENOUS CONTINUOUS PRN
Status: DISCONTINUED | OUTPATIENT
Start: 2018-11-29 | End: 2018-11-29

## 2018-11-29 RX ORDER — LIDOCAINE HCL/PF 100 MG/5ML
SYRINGE (ML) INTRAVENOUS
Status: DISCONTINUED | OUTPATIENT
Start: 2018-11-29 | End: 2018-11-29

## 2018-11-29 RX ORDER — PROPOFOL 10 MG/ML
INJECTION, EMULSION INTRAVENOUS
Status: DISCONTINUED | OUTPATIENT
Start: 2018-11-29 | End: 2018-11-29

## 2018-11-29 RX ORDER — SODIUM CHLORIDE, SODIUM LACTATE, POTASSIUM CHLORIDE, CALCIUM CHLORIDE 600; 310; 30; 20 MG/100ML; MG/100ML; MG/100ML; MG/100ML
INJECTION, SOLUTION INTRAVENOUS CONTINUOUS
Status: DISCONTINUED | OUTPATIENT
Start: 2018-11-29 | End: 2018-11-29 | Stop reason: HOSPADM

## 2018-11-29 RX ADMIN — PROPOFOL 20 MG: 10 INJECTION, EMULSION INTRAVENOUS at 10:11

## 2018-11-29 RX ADMIN — SODIUM CHLORIDE, SODIUM LACTATE, POTASSIUM CHLORIDE, AND CALCIUM CHLORIDE: .6; .31; .03; .02 INJECTION, SOLUTION INTRAVENOUS at 09:11

## 2018-11-29 RX ADMIN — PROPOFOL 100 MG: 10 INJECTION, EMULSION INTRAVENOUS at 10:11

## 2018-11-29 RX ADMIN — LIDOCAINE HYDROCHLORIDE 50 MG: 20 INJECTION, SOLUTION INTRAVENOUS at 10:11

## 2018-11-29 RX ADMIN — SODIUM CHLORIDE, SODIUM LACTATE, POTASSIUM CHLORIDE, AND CALCIUM CHLORIDE: .6; .31; .03; .02 INJECTION, SOLUTION INTRAVENOUS at 10:11

## 2018-11-29 NOTE — ANESTHESIA PREPROCEDURE EVALUATION
11/29/2018  Jaxson Burch Jr. is a 58 y.o., male.    Anesthesia Evaluation    I have reviewed the Patient Summary Reports.     I have reviewed the Medications.     Review of Systems  Anesthesia Hx:  No problems with previous Anesthesia  History of prior surgery of interest to airway management or planning: Previous anesthesia: General Denies Family Hx of Anesthesia complications.   Denies Personal Hx of Anesthesia complications.   Social:  Smoker    Hematology/Oncology:     Oncology Normal   Hematology Comments: ITP chronic   EENT/Dental:EENT/Dental Normal   Cardiovascular:     Normal sinus rhythm  Normal ECG  No previous ECGs available  Confirmed by BLAINE MINER MD ([id number]) on 9/21/2013 3:41:18 PM   Pulmonary:  Pulmonary Normal    Renal/:  Renal/ Normal     Hepatic/GI:   GERD, well controlled    Musculoskeletal:  Musculoskeletal Normal    Neurological:   Neuromuscular Disease,    Endocrine:  Endocrine Normal    Dermatological:  Skin Normal    Psych:  Psychiatric Normal           Physical Exam  General:  Well nourished    Airway/Jaw/Neck:  Airway Findings: Mouth Opening: Normal Tongue: Normal  General Airway Assessment: Adult  Mallampati: II  Improves to I with phonation.  TM Distance: Normal, at least 6 cm  Jaw/Neck Findings:  Neck ROM: Normal ROM      Dental:  Dental Findings: In tact         Mental Status:  Mental Status Findings:  Cooperative, Alert and Oriented         Anesthesia Plan  Type of Anesthesia, risks & benefits discussed:  Anesthesia Type:  MAC  Patient's Preference:   Intra-op Monitoring Plan:   Intra-op Monitoring Plan Comments:   Post Op Pain Control Plan:   Post Op Pain Control Plan Comments:   Induction:   IV  Beta Blocker:  Patient is not currently on a Beta-Blocker (No further documentation required).       Informed Consent: Patient understands risks and agrees with Anesthesia plan.   Questions answered. Anesthesia consent signed with patient.  ASA Score: 2     Day of Surgery Review of History & Physical: I have interviewed and examined the patient. I have reviewed the patient's H&P dated:  There are no significant changes.  H&P update referred to the provider.         Ready For Surgery From Anesthesia Perspective.

## 2018-11-29 NOTE — DISCHARGE INSTRUCTIONS
Understanding Colon and Rectal Polyps    The colon (also called the large intestine) is a muscular tube that forms the last part of the digestive tract. It absorbs water and stores food waste. The colon is about 4 to 6 feet long. The rectum is the last 6 inches of the colon. The colon and rectum have a smooth lining composed of millions of cells. Changes in these cells can lead to growths in the colon that can become cancerous and should be removed. Multiple tests are available to screen for colon cancer, but the colonoscopy is the most recommended test. During colonoscopy, these polyps can be removed. How often you need this test depends on many things including your condition, your family history, symptoms, and what the findings were at the previous colonoscopy.   When the colon lining changes  Changes that happen in the cells that line the colon or rectum can lead to growths called polyps. Over a period of years, polyps can turn cancerous. Removing polyps early may prevent cancer from ever forming.  Polyps  Polyps are fleshy clumps of tissue that form on the lining of the colon or rectum. Small polyps are usually benign (not cancerous). However, over time, cells in a polyp can change and become cancerous. Certain types of polyps known as adenomatous polyps are premalignant. The risk for invasive cancer increases with the size of the polyp and certain cell and gene features. This means that they can become cancerous if they're not removed. Hyperplastic polyps are benign. They can grow quite large and not turn cancerous.   Cancer  Almost all colorectal cancers start when polyp cells begin growing abnormally. As a cancerous tumor grows, it may involve more and more of the colon or rectum. In time, cancer can also grow beyond the colon or rectum and spread to nearby organs or to glands called lymph nodes. The cells can also travel to other parts of the body. This is known as metastasis. The earlier a cancerous  tumor is removed, the better the chance of preventing its spread.    Date Last Reviewed: 8/1/2016  © 0038-5766 [employer]. [address] [place] [address], [place] [address]. All rights reserved. This information is not intended as a substitute for professional medical care. Always follow your healthcare professional's instructions.        Colonoscopy     A camera attached to a flexible tube with a viewing lens is used to take video pictures.     Colonoscopy is a test to view the inside of your lower digestive tract (colon and rectum). Sometimes it can show the last part of the small intestine (ileum). During the test, small pieces of tissue may be removed for testing. This is called a biopsy. Small growths, such as polyps, may also be removed.   Why is colonoscopy done?  The test is done to help look for colon cancer. And it can help find the source of abdominal pain, bleeding, and changes in bowel habits. It may be needed once a year, depending on factors such as your:  · Age  · Health history  · Family health history  · Symptoms  · Results from any prior colonoscopy  Risks and possible complications  These include:  · Bleeding               · A puncture or tear in the colon   · Risks of anesthesia  · A cancer lesion not being seen  Getting ready   To prepare for the test:  · Talk with your healthcare provider about the risks of the test (see below). Also ask your healthcare provider about alternatives to the test.  · Tell your healthcare provider about any medicines you take. Also tell him or her about any health conditions you may have.  · Make sure your rectum and colon are empty for the test. Follow the diet and bowel prep instructions exactly. If you dont, the test may need to be rescheduled.  · Plan for a friend or family member to drive you home after the test.     Colonoscopy provides an inside view of the entire colon.     You may discuss the results with your doctor right away or at a future  visit.  During the test   The test is usually done in the hospital on an outpatient basis. This means you go home the same day. The procedure takes about 30 minutes. During that time:  · You are given relaxing (sedating) medicine through an IV line. You may be drowsy, or fully asleep.  · The healthcare provider will first give you a physical exam to check for anal and rectal problems.  · Then the anus is lubricated and the scope inserted.  · If you are awake, you may have a feeling similar to needing to have a bowel movement. You may also feel pressure as air is pumped into the colon. Its OK to pass gas during the procedure.  · Biopsy, polyp removal, or other treatments may be done during the test.  After the test   You may have gas right after the test. It can help to try to pass it to help prevent later bloating. Your healthcare provider may discuss the results with you right away. Or you may need to schedule a follow-up visit to talk about the results. After the test, you can go back to your normal eating and other activities. You may be tired from the sedation and need to rest for a few hours.  Date Last Reviewed: 11/1/2016  © 2605-5325 [employer]. [address] [place] [address], [place] [address]. All rights reserved. This information is not intended as a substitute for professional medical care. Always follow your healthcare professional's instructions.

## 2018-11-29 NOTE — [employer] PATIENT INSTRUCTIONS
Discharge Summary/Instructions after an Endoscopic Procedure  Patient Name: Jaxson Burch  Patient MRN: [id number]  Patient YOB: 1960 Thursday, November 29, 2018 Brett Townsend MD  RESTRICTIONS:  During your procedure today, you received medications for sedation.  These   medications may affect your judgment, balance and coordination.  Therefore,   for 24 hours, you have the following restrictions:   - DO NOT drive a car, operate machinery, make legal/financial decisions,   sign important papers or drink alcohol.    ACTIVITY:  Today: no heavy lifting, straining or running due to procedural   sedation/anesthesia.  The following day: return to full activity including work.  DIET:  Eat and drink normally unless instructed otherwise.     TREATMENT FOR COMMON SIDE EFFECTS:  - Mild abdominal pain, nausea, belching, bloating or excessive gas:  rest,   eat lightly and use a heating pad.  - Sore Throat: treat with throat lozenges and/or gargle with warm salt   water.  - Because air was used during the procedure, expelling large amounts of air   from your rectum or belching is normal.  - If a bowel prep was taken, you may not have a bowel movement for 1-3 days.    This is normal.  SYMPTOMS TO WATCH FOR AND REPORT TO YOUR PHYSICIAN:  1. Abdominal pain or bloating, other than gas cramps.  2. Chest pain.  3. Back pain.  4. Signs of infection such as: chills or fever occurring within 24 hours   after the procedure.  5. Rectal bleeding, which would show as bright red, maroon, or black stools.   (A tablespoon of blood from the rectum is not serious, especially if   hemorrhoids are present.)  6. Vomiting.  7. Weakness or dizziness.  GO DIRECTLY TO THE NEAREST EMERGENCY ROOM IF YOU HAVE ANY OF THE FOLLOWING:      Difficulty breathing              Chills and/or fever over 101 F   Persistent vomiting and/or vomiting blood   Severe abdominal pain   Severe chest pain   Black, tarry stools   Bleeding- more than one  tablespoon   Any other symptom or condition that you feel may need urgent attention  Your doctor recommends these additional instructions:  If any biopsies were taken, your doctors clinic will contact you in 1 to 2   weeks with any results.  - Patient has a contact number available for emergencies.  The signs and   symptoms of potential delayed complications were discussed with the   patient.  Return to normal activities tomorrow.  Written discharge   instructions were provided to the patient.   - Resume previous diet.   - Continue present medications.   - Await pathology results.   - Repeat colonoscopy in 5 years for surveillance.   - Return to referring physician as previously scheduled.   - Discharge patient to home.  For questions, problems or results please call your physician Brett Townsend MD at Work:  [phone]  If you have any questions about the above instructions, call the GI   department at [phone] or call the endoscopy unit at [phone]   from 7am until 3 pm.  [facility] [place], EMERGENCY ROOM PHONE NUMBER:   [phone]  IF A COMPLICATION OR EMERGENCY SITUATION ARISES AND YOU ARE UNABLE TO REACH   YOUR PHYSICIAN - GO DIRECTLY TO THE EMERGENCY ROOM.  I have read or have had read to me these discharge instructions for my   procedure and have received a written copy.  I understand these   instructions and will follow-up with my physician if I have any questions.     __________________________________       _____________________________________  Nurse Signature                                          Patient/Designated   Responsible Party Signature  Brett Townsend MD  11/29/2018 10:25:50 AM  This report has been verified and signed electronically.  [employer]

## 2018-11-29 NOTE — H&P
Endoscopy H&P    Procedure : Colonoscopy      asymptomatic screening exam and most recent endoscopic exam 9 years ago      Past Medical History:   Diagnosis Date    Arthritis     Gastritis     ITP (idiopathic thrombocytopenic purpura)        Past Surgical History:   Procedure Laterality Date    EGD (ESOPHAGOGASTRODUODENOSCOPY) N/A 8/2/2013    Performed by Roberto Chao MD at [facility]    JOINT REPLACEMENT Right     hip     No current facility-administered medications on file prior to encounter.      Current Outpatient Medications on File Prior to Encounter   Medication Sig Dispense Refill    atorvastatin (LIPITOR) 20 MG tablet Take 1 tablet (20 mg total) by mouth once daily. 90 tablet 0    indomethacin (INDOCIN) 50 MG capsule Take 50 mg by mouth 3 (three) times daily.      oxyCODONE-acetaminophen (PERCOCET)  mg per tablet Take 1 tablet by mouth every 4 (four) hours as needed for Pain.      zolpidem (AMBIEN) 5 MG Tab Take 5 mg by mouth nightly as needed.       Review of patient's allergies indicates:  No Known Allergies        Review of Systems -ROS:  GENERAL: No fever, chills, fatigability or weight loss.  CHEST: Denies BRAGA, cyanosis, wheezing, cough and sputum production.  CARDIOVASCULAR: Denies chest pain, PND, orthopnea or reduced exercise tolerance.   Musculoskeletal ROS: negative for - gait disturbance or joint pain  Neurological ROS: negative for - confusion or memory loss        Physical Exam:  General: well developed, well nourished, no distress  Head: normocephalic  Neck: supple, symmetrical, trachea midline  Lungs:  clear to auscultation bilaterally and normal respiratory effort  Heart: regular rate and rhythm, S1, S2 normal, no murmur, rub or gallop and regular rate and rhythm  Abdomen: soft, non-tender non-distented; bowel sounds normal; no masses,  no organomegaly  Extremities: no cyanosis or edema, or clubbing       Moderate Sedation (choice): Mallampati Score 1    ASA :  II    IMP: asymptomatic screening exam and most recent endoscopic exam 9 years ago    Plan: Colonoscopy with Moderate sedation.  I have explained the procedure including indications, alternatives, expected outcomes and potential complications. The patient appears to understand and gives informed consent. The patient is medically ready for surgery.

## 2018-11-29 NOTE — BRIEF OP NOTE
[facility] -   Brief Operative Note     SUMMARY     Surgery Date: 11/29/2018     Surgeon(s) and Role:     * Brett Townsend MD - Primary    Assisting Surgeon: None    Pre-op Diagnosis:  Colon cancer screening [Z12.11]    Post-op Diagnosis:  Post-Op Diagnosis Codes:     * Colon cancer screening [Z12.11]    Procedure(s) (LRB):  COLONOSCOPY (N/A)    Anesthesia: Choice    Description of the findings of the procedure: colonoscopy    Findings/Key Components: see op note    Estimated Blood Loss: * No values recorded between 11/29/2018 12:00 AM and 11/29/2018  9:53 AM *         Specimens:   Specimen (12h ago, onward)    None          Discharge Note    SUMMARY     Admit Date: 11/29/2018    Discharge Date and Time:  11/29/2018 9:53 AM    Hospital Course The patient underwent colonoscopy and was discharged post op    Final Diagnosis: Post-Op Diagnosis Codes:     * Colon cancer screening [Z12.11]    Disposition: Home or Self Care    Follow Up/Patient Instructions:     Medications:  Reconciled Home Medications:      Medication List      CONTINUE taking these medications    atorvastatin 20 MG tablet  Commonly known as:  LIPITOR  Take 1 tablet (20 mg total) by mouth once daily.     indomethacin 50 MG capsule  Commonly known as:  INDOCIN  Take 50 mg by mouth 3 (three) times daily.     PERCOCET  mg per tablet  Generic drug:  oxyCODONE-acetaminophen  Take 1 tablet by mouth every 4 (four) hours as needed for Pain.     tadalafil 20 MG Tab  Commonly known as:  CIALIS  Take 20 mg by mouth daily as needed.     traZODone 50 MG tablet  Commonly known as:  DESYREL  Take 1 tablet (50 mg total) by mouth every evening.     zolpidem 5 MG Tab  Commonly known as:  AMBIEN  Take 5 mg by mouth nightly as needed.          Discharge Procedure Orders   Notify your health care provider if you experience any of the following:  temperature >100.4     Notify your health care provider if you experience any of the following:  persistent  nausea and vomiting or diarrhea     Notify your health care provider if you experience any of the following:  severe uncontrolled pain     Notify your health care provider if you experience any of the following:  redness, tenderness, or signs of infection (pain, swelling, redness, odor or green/yellow discharge around incision site)     Notify your health care provider if you experience any of the following:  difficulty breathing or increased cough     Notify your health care provider if you experience any of the following:  severe persistent headache     Notify your health care provider if you experience any of the following:  worsening rash     Notify your health care provider if you experience any of the following:  persistent dizziness, light-headedness, or visual disturbances     Notify your health care provider if you experience any of the following:  increased confusion or weakness     Activity as tolerated     Follow-up Information     Brett Townsend MD.    Specialties:  General Surgery, Bariatrics  Contact information:  [address] [place] [address] DR Atul RIVERA [id number] [phone]

## 2018-12-06 ENCOUNTER — PATIENT MESSAGE (OUTPATIENT)
Dept: SURGERY | Facility: HOSPITAL | Age: 58
End: 2018-12-06

## 2018-12-27 NOTE — ANESTHESIA POSTPROCEDURE EVALUATION
"Anesthesia Post Evaluation    Patient: Jaxson Burch Jr.    Procedure(s) Performed: Procedure(s) (LRB):  COLONOSCOPY (N/A)    Final Anesthesia Type: MAC  Patient location during evaluation: PACU  Patient participation: Yes- Able to Participate  Level of consciousness: awake and alert  Post-procedure vital signs: reviewed and stable  Pain management: adequate  Airway patency: patent  PONV status at discharge: No PONV  Anesthetic complications: no      Cardiovascular status: stable  Respiratory status: room air  Hydration status: euvolemic  Follow-up not needed.        Visit Vitals  /76 (BP Location: Left arm, Patient Position: Lying)   Pulse 67   Temp 36.4 °C (97.5 °F) (Oral)   Resp 18   Ht 5' 7" (1.702 m)   Wt 63.5 kg (140 lb)   SpO2 100%   BMI 21.93 kg/m²       Pain/Boone Score: No Data Recorded      "

## 2019-02-14 ENCOUNTER — PATIENT MESSAGE (OUTPATIENT)
Dept: HEMATOLOGY/ONCOLOGY | Facility: CLINIC | Age: 59
End: 2019-02-14

## 2019-02-14 ENCOUNTER — LAB VISIT (OUTPATIENT)
Dept: LAB | Facility: HOSPITAL | Age: 59
End: 2019-02-14
Attending: INTERNAL MEDICINE
Payer: COMMERCIAL

## 2019-02-14 ENCOUNTER — OFFICE VISIT (OUTPATIENT)
Dept: HEMATOLOGY/ONCOLOGY | Facility: CLINIC | Age: 59
End: 2019-02-14
Payer: COMMERCIAL

## 2019-02-14 VITALS
TEMPERATURE: 98 F | HEIGHT: 67 IN | DIASTOLIC BLOOD PRESSURE: 80 MMHG | HEART RATE: 82 BPM | WEIGHT: 143.94 LBS | SYSTOLIC BLOOD PRESSURE: 123 MMHG | OXYGEN SATURATION: 98 % | BODY MASS INDEX: 22.59 KG/M2

## 2019-02-14 DIAGNOSIS — D69.3 CHRONIC ITP (IDIOPATHIC THROMBOCYTOPENIA): Primary | ICD-10-CM

## 2019-02-14 DIAGNOSIS — D69.3 CHRONIC ITP (IDIOPATHIC THROMBOCYTOPENIA): ICD-10-CM

## 2019-02-14 LAB
ALBUMIN SERPL BCP-MCNC: 4 G/DL
ALP SERPL-CCNC: 48 U/L
ALT SERPL W/O P-5'-P-CCNC: 13 U/L
ANION GAP SERPL CALC-SCNC: 8 MMOL/L
AST SERPL-CCNC: 25 U/L
BASOPHILS # BLD AUTO: 0.07 K/UL
BASOPHILS NFR BLD: 1 %
BILIRUB SERPL-MCNC: 0.7 MG/DL
BUN SERPL-MCNC: 10 MG/DL
CALCIUM SERPL-MCNC: 9.3 MG/DL
CHLORIDE SERPL-SCNC: 104 MMOL/L
CO2 SERPL-SCNC: 28 MMOL/L
CREAT SERPL-MCNC: 1 MG/DL
DIFFERENTIAL METHOD: ABNORMAL
EOSINOPHIL # BLD AUTO: 0.2 K/UL
EOSINOPHIL NFR BLD: 2.6 %
ERYTHROCYTE [DISTWIDTH] IN BLOOD BY AUTOMATED COUNT: 12.9 %
EST. GFR  (AFRICAN AMERICAN): >60 ML/MIN/1.73 M^2
EST. GFR  (NON AFRICAN AMERICAN): >60 ML/MIN/1.73 M^2
GLUCOSE SERPL-MCNC: 119 MG/DL
HCT VFR BLD AUTO: 42.2 %
HGB BLD-MCNC: 14.2 G/DL
LYMPHOCYTES # BLD AUTO: 2.7 K/UL
LYMPHOCYTES NFR BLD: 37.6 %
MCH RBC QN AUTO: 32.1 PG
MCHC RBC AUTO-ENTMCNC: 33.6 G/DL
MCV RBC AUTO: 95 FL
MONOCYTES # BLD AUTO: 0.5 K/UL
MONOCYTES NFR BLD: 7.1 %
NEUTROPHILS # BLD AUTO: 3.7 K/UL
NEUTROPHILS NFR BLD: 51.7 %
PLATELET # BLD AUTO: 22 K/UL
PLATELET BLD QL SMEAR: ABNORMAL
PMV BLD AUTO: 9.1 FL
POTASSIUM SERPL-SCNC: 3.8 MMOL/L
PROT SERPL-MCNC: 7 G/DL
RBC # BLD AUTO: 4.43 M/UL
SODIUM SERPL-SCNC: 140 MMOL/L
WBC # BLD AUTO: 7.18 K/UL

## 2019-02-14 PROCEDURE — 99214 OFFICE O/P EST MOD 30 MIN: CPT | Mod: S$GLB,,, | Performed by: INTERNAL MEDICINE

## 2019-02-14 PROCEDURE — 36415 COLL VENOUS BLD VENIPUNCTURE: CPT

## 2019-02-14 PROCEDURE — 99214 PR OFFICE/OUTPT VISIT, EST, LEVL IV, 30-39 MIN: ICD-10-PCS | Mod: S$GLB,,, | Performed by: INTERNAL MEDICINE

## 2019-02-14 PROCEDURE — 99999 PR PBB SHADOW E&M-EST. PATIENT-LVL III: CPT | Mod: PBBFAC,,, | Performed by: INTERNAL MEDICINE

## 2019-02-14 PROCEDURE — 80053 COMPREHEN METABOLIC PANEL: CPT

## 2019-02-14 PROCEDURE — 85025 COMPLETE CBC W/AUTO DIFF WBC: CPT

## 2019-02-14 PROCEDURE — 99999 PR PBB SHADOW E&M-EST. PATIENT-LVL III: ICD-10-PCS | Mod: PBBFAC,,, | Performed by: INTERNAL MEDICINE

## 2019-02-14 RX ORDER — MOMETASONE FUROATE 1 MG/G
OINTMENT TOPICAL
COMMUNITY
Start: 2019-02-11 | End: 2019-11-01

## 2019-02-14 NOTE — PROGRESS NOTES
Reason for visit: Follow up for ITP    HPI:   The patient is a 58-year-old  male who presents to the hematology oncology clinic today to discuss further evaluation and management recommendations for immune mediated thrombocytopenia.  The patient had previously been followed by Dr. Marija Patel and Dr. Deven Norris with hematology oncology. The patient reports that he was diagnosed with immune mediated thrombocytopenia in 2012 when he noticed bruising on his lower extremities and sought medical attention for this. The patient reports that he did not tolerate high doses of prednisone very well. He has since been treated with chronic infusions of IVIG with good response.  He denies any melena, hematochezia, hematemesis, hemoptysis, hematuria or epistaxis. He denies any fever, chills or night sweats. He denies any bowel or urinary complaints.  The patient has completed 4 doses of weekly Rituxan on 2013 and tolerated this well. He denies any chest pain or shortness of breath. He denies any nausea or vomiting. I have reviewed all of his interval clinical history in EPIC.   His medical insurance now allows him to follow up back with me at [facility]. He has not required any treatment of his chronic ITP in the interim.    PAST MEDICAL HISTORY:   1. ITP  2. H. Pylori positive gastritis    SURGICAL HISTORY:   Denies    FAMILY HISTORY: The patient's mother  from complications related to pancreatic cancer at the age of 67. His brother  from complications related to lung cancer in his 40s. A paternal uncle  from complications related to throat cancer. A maternal uncle  from complications related to lung cancer. He denies any immediate family members with bleeding/clotting disorders.    SOCIAL HISTORY: He reports a 63+ pack year smoking history and is a current smoker. He states that he has moderated alcohol consumption. He has never used any recreational drugs. He works as a  . He is  and has 7 children. He lives in [place].    ALLERGIES: [NKDA]    MEDICATIONS: [Medcard has been reviewed and/or reconciled.]    REVIEW OF SYSTEMS:   GENERAL: [No fevers, chills or sweats. No fatigue, weight loss or loss of appetite.]  HEENT: [No blurred vision, tinnitus, nasal discharge, sorethroat or dysphagia.]  HEART: [No chest pain, palpitations or shortness of breath.]   LUNGS: [No cough, hemoptysis or breathing problems.]  ABDOMEN: [No abdominal pain, nausea, vomiting, diarrhea, constipation or melena.]  GENITOURINARY: [No dysuria, bleeding or malodorous discharge.]  NEURO: [No headache, dizziness or vertigo.]  HEMATOLOGY: [No easy bruising, spontaneous bleeding or blood clots in the past].  MUSCULOSKELETAL: [No arthralgias, myalgias or bone pains.]  SKIN: [No rashes or skin lesions.]  PSYCHIATRY: [No depression or anxiety.]    PHYSICAL EXAMINATION:   VS: Reviewed on nurse's notes.  APPEARANCE: The patient is a well-groomed  male who appears in no acute distress.   HEENT: No scleral icterus. Both external auditory canals clear. No oral ulcers, lesions. Throat clear  HEAD: No sinus tenderness.  NECK: Supple. No palpable lymphadenopathy. Thyroid non-tender, no palpable masses  CHEST: Breath sounds clear bilaterally. No rales. No rhonchi. Unlabored respirations.  CARDIOVASCULAR: Normal S1, S2. Normal rate. Regular rhythm.  ABDOMEN: Bowel sounds slightly hyperactive. No tenderness. No abdominal distention. No hepatomegaly. No splenomegaly.  LYMPHATIC: No palpable supraclavicular, axillary nodes  EXTREMITIES: No clubbing, cyanosis, edema  SKIN: No lesions. No petechiae. No ecchymoses. No induration or nodules  NEUROLOGIC: No focal findings. Alert & Oriented x 3. Mood appropriate to affect    LABS:   Reviewed    IMAGING:  Reviewed    IMPRESSION:  1. Immune mediated thrombocytopenia    PLAN:  1.  I had a detailed discussion with the patient today with regard to his current  clinical situation.  Review of CBC from today shows interval significant decrease in his platelet count.  At this time I have recommended proceeding with 4 weekly doses of IV Rituxan for treatment of his acute on chronic ITP.  The patient had excellent response when this was last given in 2013.  Rationale for this was reviewed in detail and the patient expressed understanding and agreement.  The patient has previously not responded to corticosteroid therapy for his ITP.  He has also had complications related to avascular necrosis with corticosteroids in the past.  2. I have discussed with the patient that he should seek immediate medical attention for any signs/symptoms of bleeding. He expressed understanding of this.  He understands that he will most likely get IVIG as a bridge if he has any excessive bleeding before IV Rituxan can be administered as an outpatient.  He knows to present to the emergency room at [facility] [place] if this happens.  3.  I will check hepatitis and HIV serologies today.     Followup as above with labs for week 1/4 of IV Rituxan. He knows to call sooner for any new problems or questions.    Rusty Salazar MD

## 2019-02-22 ENCOUNTER — OFFICE VISIT (OUTPATIENT)
Dept: HEMATOLOGY/ONCOLOGY | Facility: CLINIC | Age: 59
End: 2019-02-22
Payer: COMMERCIAL

## 2019-02-22 ENCOUNTER — LAB VISIT (OUTPATIENT)
Dept: LAB | Facility: HOSPITAL | Age: 59
End: 2019-02-22
Attending: INTERNAL MEDICINE
Payer: COMMERCIAL

## 2019-02-22 VITALS
BODY MASS INDEX: 22.98 KG/M2 | RESPIRATION RATE: 18 BRPM | SYSTOLIC BLOOD PRESSURE: 120 MMHG | WEIGHT: 146.38 LBS | DIASTOLIC BLOOD PRESSURE: 80 MMHG | OXYGEN SATURATION: 100 % | HEART RATE: 82 BPM | TEMPERATURE: 98 F | HEIGHT: 67 IN

## 2019-02-22 DIAGNOSIS — D69.3 CHRONIC ITP (IDIOPATHIC THROMBOCYTOPENIA): Primary | ICD-10-CM

## 2019-02-22 DIAGNOSIS — D69.3 CHRONIC ITP (IDIOPATHIC THROMBOCYTOPENIA): ICD-10-CM

## 2019-02-22 LAB
ALBUMIN SERPL BCP-MCNC: 3.8 G/DL
ALP SERPL-CCNC: 50 U/L
ALT SERPL W/O P-5'-P-CCNC: 11 U/L
ANION GAP SERPL CALC-SCNC: 9 MMOL/L
AST SERPL-CCNC: 17 U/L
BASOPHILS # BLD AUTO: 0.03 K/UL
BASOPHILS NFR BLD: 0.2 %
BILIRUB SERPL-MCNC: 0.4 MG/DL
BUN SERPL-MCNC: 10 MG/DL
CALCIUM SERPL-MCNC: 9.6 MG/DL
CHLORIDE SERPL-SCNC: 108 MMOL/L
CO2 SERPL-SCNC: 24 MMOL/L
CREAT SERPL-MCNC: 0.9 MG/DL
DIFFERENTIAL METHOD: ABNORMAL
EOSINOPHIL # BLD AUTO: 0 K/UL
EOSINOPHIL NFR BLD: 0 %
ERYTHROCYTE [DISTWIDTH] IN BLOOD BY AUTOMATED COUNT: 12.7 %
EST. GFR  (AFRICAN AMERICAN): >60 ML/MIN/1.73 M^2
EST. GFR  (NON AFRICAN AMERICAN): >60 ML/MIN/1.73 M^2
GLUCOSE SERPL-MCNC: 141 MG/DL
HCT VFR BLD AUTO: 39.6 %
HGB BLD-MCNC: 13.1 G/DL
IMM GRANULOCYTES # BLD AUTO: 0.06 K/UL
IMM GRANULOCYTES NFR BLD AUTO: 0.5 %
LYMPHOCYTES # BLD AUTO: 1.3 K/UL
LYMPHOCYTES NFR BLD: 10.3 %
MCH RBC QN AUTO: 31.8 PG
MCHC RBC AUTO-ENTMCNC: 33.1 G/DL
MCV RBC AUTO: 96 FL
MONOCYTES # BLD AUTO: 0.7 K/UL
MONOCYTES NFR BLD: 5.5 %
NEUTROPHILS # BLD AUTO: 10.4 K/UL
NEUTROPHILS NFR BLD: 84 %
NRBC BLD-RTO: 0 /100 WBC
PLATELET # BLD AUTO: 362 K/UL
PMV BLD AUTO: 8.8 FL
POTASSIUM SERPL-SCNC: 5.1 MMOL/L
PROT SERPL-MCNC: 7.1 G/DL
RBC # BLD AUTO: 4.12 M/UL
SODIUM SERPL-SCNC: 141 MMOL/L
WBC # BLD AUTO: 12.4 K/UL

## 2019-02-22 PROCEDURE — 99999 PR PBB SHADOW E&M-EST. PATIENT-LVL III: ICD-10-PCS | Mod: PBBFAC,,, | Performed by: INTERNAL MEDICINE

## 2019-02-22 PROCEDURE — 36415 COLL VENOUS BLD VENIPUNCTURE: CPT

## 2019-02-22 PROCEDURE — 99999 PR PBB SHADOW E&M-EST. PATIENT-LVL III: CPT | Mod: PBBFAC,,, | Performed by: INTERNAL MEDICINE

## 2019-02-22 PROCEDURE — 80053 COMPREHEN METABOLIC PANEL: CPT

## 2019-02-22 PROCEDURE — 99214 OFFICE O/P EST MOD 30 MIN: CPT | Mod: S$GLB,,, | Performed by: INTERNAL MEDICINE

## 2019-02-22 PROCEDURE — 85025 COMPLETE CBC W/AUTO DIFF WBC: CPT

## 2019-02-22 PROCEDURE — 99214 PR OFFICE/OUTPT VISIT, EST, LEVL IV, 30-39 MIN: ICD-10-PCS | Mod: S$GLB,,, | Performed by: INTERNAL MEDICINE

## 2019-02-22 NOTE — PROGRESS NOTES
Reason for visit: Follow up for ITP    HPI:   The patient is a 58-year-old  male who presents to the hematology oncology clinic today to discuss further evaluation and management recommendations for immune mediated thrombocytopenia.  The patient had previously been followed by Dr. Marija Patel and Dr. Deven Norris with hematology oncology. The patient reports that he was diagnosed with immune mediated thrombocytopenia in 2012 when he noticed bruising on his lower extremities and sought medical attention for this. The patient reports that he did not tolerate high doses of prednisone very well. He has since been treated with chronic infusions of IVIG with good response.  He denies any melena, hematochezia, hematemesis, hemoptysis, hematuria or epistaxis. He denies any fever, chills or night sweats. He denies any bowel or urinary complaints.  The patient has completed 4 doses of weekly Rituxan on 2013 and tolerated this well. He denies any chest pain or shortness of breath. He denies any nausea or vomiting. I have reviewed all of his interval clinical history in EPIC.   His medical insurance now allows him to follow up back with me at [facility]. He has not required any treatment of his chronic ITP in the interim.    PAST MEDICAL HISTORY:   1. ITP  2. H. Pylori positive gastritis    SURGICAL HISTORY:   Denies    FAMILY HISTORY: The patient's mother  from complications related to pancreatic cancer at the age of 67. His brother  from complications related to lung cancer in his 40s. A paternal uncle  from complications related to throat cancer. A maternal uncle  from complications related to lung cancer. He denies any immediate family members with bleeding/clotting disorders.    SOCIAL HISTORY: He reports a 63+ pack year smoking history and is a current smoker. He states that he has moderated alcohol consumption. He has never used any recreational drugs. He works as a  . He is  and has 7 children. He lives in [place].    ALLERGIES: [NKDA]    MEDICATIONS: [Medcard has been reviewed and/or reconciled.]    REVIEW OF SYSTEMS:   GENERAL: [No fevers, chills or sweats. No fatigue, weight loss or loss of appetite.]  HEENT: [No blurred vision, tinnitus, nasal discharge, sorethroat or dysphagia.]  HEART: [No chest pain, palpitations or shortness of breath.]   LUNGS: [No cough, hemoptysis or breathing problems.]  ABDOMEN: [No abdominal pain, nausea, vomiting, diarrhea, constipation or melena.]  GENITOURINARY: [No dysuria, bleeding or malodorous discharge.]  NEURO: [No headache, dizziness or vertigo.]  HEMATOLOGY: [No easy bruising, spontaneous bleeding or blood clots in the past].  MUSCULOSKELETAL: [No arthralgias, myalgias or bone pains.]  SKIN: [No rashes or skin lesions.]  PSYCHIATRY: [No depression or anxiety.]    PHYSICAL EXAMINATION:   VS: Reviewed on nurse's notes.  APPEARANCE: The patient is a well-groomed  male who appears in no acute distress.   HEENT: No scleral icterus. Both external auditory canals clear. No oral ulcers, lesions. Throat clear  HEAD: No sinus tenderness.  NECK: Supple. No palpable lymphadenopathy. Thyroid non-tender, no palpable masses  CHEST: Breath sounds clear bilaterally. No rales. No rhonchi. Unlabored respirations.  CARDIOVASCULAR: Normal S1, S2. Normal rate. Regular rhythm.  ABDOMEN: Bowel sounds slightly hyperactive. No tenderness. No abdominal distention. No hepatomegaly. No splenomegaly.  LYMPHATIC: No palpable supraclavicular, axillary nodes  EXTREMITIES: No clubbing, cyanosis, edema  SKIN: No lesions. No petechiae. No ecchymoses. No induration or nodules  NEUROLOGIC: No focal findings. Alert & Oriented x 3. Mood appropriate to affect    LABS:   Reviewed    IMAGING:  Reviewed    IMPRESSION:  1. Immune mediated thrombocytopenia    PLAN:  1.  I had a detailed discussion with the patient today with regard to his current  clinical situation.  Review of CBC from today shows interval significant improvement in his platelet count.  At this time I have recommended holding off on any additional IV Rituxan for treatment of his ITP.  The patient had excellent response when this was last given in 2013.  Rationale for this was reviewed in detail and the patient expressed understanding and agreement.  The patient has previously not responded to corticosteroid therapy for his ITP.  He has also had complications related to avascular necrosis with corticosteroids in the past.  2. I have discussed with the patient that he should seek immediate medical attention for any signs/symptoms of bleeding. He expressed understanding of this.  He understands that he will most likely get IVIG as a bridge if he has any excessive bleeding before IV Rituxan can be administered as an outpatient.  He knows to present to the emergency room at [facility], [place] if this happens.  3.  I will evaluate the patient for platelet clumping if he has another episode of thrombocytopenia in the future.  I have discussed with the patient that the other possibility is that he might be having a slow relapse of his ITP with intermittent episodes of low platelet count and normal platelet count.  Regardless I will monitor this with CBC every 2 weeks to get a better understanding of his platelet count trend.  The rationale for this was reviewed in detail and he expressed understanding and agreement with this recommendation.     Followup as above. He knows to call sooner for any new problems or questions.    Rusty Salazar MD

## 2019-03-08 ENCOUNTER — LAB VISIT (OUTPATIENT)
Dept: LAB | Facility: HOSPITAL | Age: 59
End: 2019-03-08
Attending: INTERNAL MEDICINE
Payer: COMMERCIAL

## 2019-03-08 DIAGNOSIS — D69.3 CHRONIC ITP (IDIOPATHIC THROMBOCYTOPENIA): ICD-10-CM

## 2019-03-08 LAB
BASOPHILS # BLD AUTO: 0.04 K/UL
BASOPHILS NFR BLD: 0.5 %
DIFFERENTIAL METHOD: ABNORMAL
EOSINOPHIL # BLD AUTO: 0.1 K/UL
EOSINOPHIL NFR BLD: 0.8 %
ERYTHROCYTE [DISTWIDTH] IN BLOOD BY AUTOMATED COUNT: 13.2 %
HCT VFR BLD AUTO: 43.9 %
HGB BLD-MCNC: 14.6 G/DL
LYMPHOCYTES # BLD AUTO: 2.8 K/UL
LYMPHOCYTES NFR BLD: 37.7 %
MCH RBC QN AUTO: 31.9 PG
MCHC RBC AUTO-ENTMCNC: 33.3 G/DL
MCV RBC AUTO: 96 FL
MONOCYTES # BLD AUTO: 0.8 K/UL
MONOCYTES NFR BLD: 10.5 %
NEUTROPHILS # BLD AUTO: 3.8 K/UL
NEUTROPHILS NFR BLD: 50.5 %
PLATELET # BLD AUTO: 117 K/UL
PMV BLD AUTO: 9.1 FL
RBC # BLD AUTO: 4.57 M/UL
WBC # BLD AUTO: 7.5 K/UL

## 2019-03-08 PROCEDURE — 85025 COMPLETE CBC W/AUTO DIFF WBC: CPT

## 2019-03-08 PROCEDURE — 36415 COLL VENOUS BLD VENIPUNCTURE: CPT

## 2019-03-11 ENCOUNTER — TELEPHONE (OUTPATIENT)
Dept: HEMATOLOGY/ONCOLOGY | Facility: CLINIC | Age: 59
End: 2019-03-11

## 2019-03-11 NOTE — TELEPHONE ENCOUNTER
----- Message from Kaye Whitt sent at 3/11/2019  4:41 PM CDT -----  Contact: patient  Patient requesting a call back regarding appointments on 03/18/19. Please call back at [phone].      Thanks,  Kaye Whitt

## 2019-03-11 NOTE — TELEPHONE ENCOUNTER
Patient states he cannot make his doctors appointment or infusion appointment but will come get his lab that morning.  He appoligized but he didn't know he had another conflicting appointment.  He says that Dr. Salazar wanted to look at his blood work anyway, that he may not need the infusion. He would like  A call back with lab results. I acknowledged. Call ended well.

## 2019-03-20 ENCOUNTER — OFFICE VISIT (OUTPATIENT)
Dept: INTERNAL MEDICINE | Facility: CLINIC | Age: 59
End: 2019-03-20
Payer: COMMERCIAL

## 2019-03-20 VITALS
HEART RATE: 104 BPM | SYSTOLIC BLOOD PRESSURE: 106 MMHG | BODY MASS INDEX: 21.84 KG/M2 | WEIGHT: 139.13 LBS | HEIGHT: 67 IN | OXYGEN SATURATION: 98 % | DIASTOLIC BLOOD PRESSURE: 70 MMHG | TEMPERATURE: 98 F

## 2019-03-20 DIAGNOSIS — K21.9 GASTROESOPHAGEAL REFLUX DISEASE, ESOPHAGITIS PRESENCE NOT SPECIFIED: ICD-10-CM

## 2019-03-20 DIAGNOSIS — D69.3 CHRONIC ITP (IDIOPATHIC THROMBOCYTOPENIA): ICD-10-CM

## 2019-03-20 DIAGNOSIS — J30.9 ALLERGIC RHINITIS, UNSPECIFIED SEASONALITY, UNSPECIFIED TRIGGER: Primary | ICD-10-CM

## 2019-03-20 PROCEDURE — 99214 PR OFFICE/OUTPT VISIT, EST, LEVL IV, 30-39 MIN: ICD-10-PCS | Mod: S$GLB,,, | Performed by: FAMILY MEDICINE

## 2019-03-20 PROCEDURE — 99214 OFFICE O/P EST MOD 30 MIN: CPT | Mod: S$GLB,,, | Performed by: FAMILY MEDICINE

## 2019-03-20 PROCEDURE — 99999 PR PBB SHADOW E&M-EST. PATIENT-LVL III: CPT | Mod: PBBFAC,,, | Performed by: FAMILY MEDICINE

## 2019-03-20 PROCEDURE — 99999 PR PBB SHADOW E&M-EST. PATIENT-LVL III: ICD-10-PCS | Mod: PBBFAC,,, | Performed by: FAMILY MEDICINE

## 2019-03-20 RX ORDER — OMEPRAZOLE 20 MG/1
20 CAPSULE, DELAYED RELEASE ORAL DAILY
Qty: 30 CAPSULE | Refills: 11 | Status: SHIPPED | OUTPATIENT
Start: 2019-03-20 | End: 2019-11-01 | Stop reason: ALTCHOICE

## 2019-03-20 RX ORDER — PREDNISONE 20 MG/1
20 TABLET ORAL DAILY
Qty: 4 TABLET | Refills: 0 | Status: SHIPPED | OUTPATIENT
Start: 2019-03-20 | End: 2019-10-07

## 2019-03-20 RX ORDER — MONTELUKAST SODIUM 10 MG/1
10 TABLET ORAL NIGHTLY
Qty: 30 TABLET | Refills: 0 | Status: SHIPPED | OUTPATIENT
Start: 2019-03-20 | End: 2019-04-19

## 2019-03-20 NOTE — PROGRESS NOTES
Jaxson Burch .  03/20/2019  [id number]    Phil Coyle MD  Patient Care Team:  Phil Coyle MD as PCP - General (Family Medicine)  Margaux Martinez LPN as Care Coordinator (Internal Medicine)  Has the patient seen any provider outside of the [facility] network since the last visit? (no). If yes, HIPPA forms completed and records requested.      Chief Complaint:  Chief Complaint   Patient presents with    Nasal Congestion    Gastroesophageal Reflux       History of Present Illness:  Patient is a 58-year-old male with chronic ITP who presents clinic today complaining of upper respiratory symptoms and chronic GERD.    GERD:  -taking Prilosec, but out of the medication  -would like it to be prescribed today  -denies any nausea, vomiting, or changes in his stool    Allergies/Viral URI:  -started about a week ago  -wife was sick with similar symptoms  -taking over-the-counter Tylenol Cold-Sinus and Robitussin with minimal improvement  -endorses chest congestion and runny nose            History:  Past Medical History:   Diagnosis Date    Arthritis     Gastritis     ITP (idiopathic thrombocytopenic purpura)      Past Surgical History:   Procedure Laterality Date    COLONOSCOPY N/A 11/29/2018    Performed by Brett Townsend [facility] at [facility]    EGD (ESOPHAGOGASTRODUODENOSCOPY) N/A 8/2/2013    Performed by Roberto Chao MD at [facility] END[facility]    JOINT REPLACEMENT Right     hip     Family History   Problem Relation Age of Onset    Pancreatic cancer Mother     Colon cancer Neg Hx      Social History     Socioeconomic History    Marital status:      Spouse name: Not on file    Number of children: Not on file    Years of education: Not on file    Highest education level: Not on file   Social Needs    Financial resource strain: Not on file    Food insecurity - worry: Not on file    Food insecurity - inability: Not on file    Transportation needs - medical: Not on file    Transportation needs -  non-medical: Not on file   Occupational History    Not on file   Tobacco Use    Smoking status: Current Every Day Smoker     Packs/day: 0.50     Years: 40.00     Pack years: 20.00     Types: Cigarettes    Smokeless tobacco: Never Used   Substance and Sexual Activity    Alcohol use: Yes     Alcohol/week: 0.0 oz     Comment: socially    Drug use: No    Sexual activity: Yes     Partners: Female   Other Topics Concern    Not on file   Social History Narrative    Not on file     Patient Active Problem List   Diagnosis    Chronic ITP (idiopathic thrombocytopenia)    Cervical radiculitis    Erectile dysfunction    History of ITP    High risk medication use    Gastroesophageal reflux disease    Weight loss    Insomnia    Hyperkalemia    Colon cancer screening     Review of patient's allergies indicates:  No Known Allergies    The following were reviewed at this visit: active problem list, medication list, allergies, family history, social history, and health maintenance.    Medications:  Current Outpatient Medications on File Prior to Visit   Medication Sig Dispense Refill    atorvastatin (LIPITOR) 20 MG tablet Take 1 tablet (20 mg total) by mouth once daily. 90 tablet 0    mometasone (ELOCON) 0.1 % ointment       oxyCODONE-acetaminophen (PERCOCET)  mg per tablet Take 1 tablet by mouth every 4 (four) hours as needed for Pain.      tadalafil (CIALIS) 20 MG Tab Take 20 mg by mouth daily as needed.      zolpidem (AMBIEN) 5 MG Tab Take 5 mg by mouth nightly as needed.      indomethacin (INDOCIN) 50 MG capsule Take 50 mg by mouth 3 (three) times daily.      traZODone (DESYREL) 50 MG tablet Take 1 tablet (50 mg total) by mouth every evening. 90 tablet 0     No current facility-administered medications on file prior to visit.        Medications have been reviewed and reconciled with patient at this visit.  Barriers to medications present (no)    Adverse reactions to current medications (no)    Over  the counter medications reviewed (Yes ), and if needed added to active Medication list at this visit.     Exam:  Wt Readings from Last 3 Encounters:   03/20/19 63.1 kg (139 lb 1.8 oz)   02/22/19 66.4 kg (146 lb 6.2 oz)   02/14/19 65.3 kg (143 lb 15.4 oz)     Temp Readings from Last 3 Encounters:   03/20/19 97.5 °F (36.4 °C) (Tympanic)   02/22/19 98 °F (36.7 °C) (Oral)   02/14/19 98.1 °F (36.7 °C)     BP Readings from Last 3 Encounters:   03/20/19 106/70   02/22/19 120/80   02/14/19 123/80     Pulse Readings from Last 3 Encounters:   03/20/19 104   02/22/19 82   02/14/19 82     Body mass index is 21.79 kg/m².      Review of Systems   Constitutional: Negative for chills and fever.   HENT: Positive for congestion. Negative for hearing loss.    Eyes: Positive for redness.   Respiratory: Negative for cough.    Cardiovascular: Negative for leg swelling.   Gastrointestinal: Positive for heartburn. Negative for nausea and vomiting.   Musculoskeletal: Negative for back pain, falls, joint pain and myalgias.   Skin: Negative for rash.   Neurological: Negative for tingling, sensory change, focal weakness and weakness.     Physical Exam   Constitutional: He is oriented to person, place, and time. Vital signs are normal. He appears well-developed and well-nourished. He does not appear ill. No distress.   HENT:   Head: Normocephalic and atraumatic.   Right Ear: Hearing, tympanic membrane, external ear and ear canal normal.   Left Ear: Hearing, tympanic membrane, external ear and ear canal normal.   Nose: Rhinorrhea present. No mucosal edema. Right sinus exhibits no maxillary sinus tenderness and no frontal sinus tenderness. Left sinus exhibits no maxillary sinus tenderness and no frontal sinus tenderness.   Eyes: Conjunctivae are normal. No scleral icterus.   Cardiovascular: Normal rate, regular rhythm and normal heart sounds. Exam reveals no gallop and no friction rub.   No murmur heard.  Pulmonary/Chest: Effort normal and breath  sounds normal. No respiratory distress. He has no wheezes. He has no rales.   Neurological: He is alert and oriented to person, place, and time.   Skin: Skin is warm and dry. He is not diaphoretic.   Nursing note and vitals reviewed.      Laboratory Reviewed ({Yes)  Lab Results   Component Value Date    WBC 7.50 03/08/2019    HGB 14.6 03/08/2019    HCT 43.9 03/08/2019     (L) 03/08/2019    CHOL 215 (H) 07/31/2018    TRIG 113 07/31/2018    HDL 67 07/31/2018    ALT 11 02/22/2019    AST 17 02/22/2019     02/22/2019    K 5.1 02/22/2019     02/22/2019    CREATININE 0.9 02/22/2019    BUN 10 02/22/2019    CO2 24 02/22/2019    INR 1.0 05/05/2016       Jaxson was seen today for nasal congestion and gastroesophageal reflux.    Diagnoses and all orders for this visit:    Allergic rhinitis, unspecified seasonality, unspecified trigger  -     omeprazole (PRILOSEC) 20 MG capsule; Take 1 capsule (20 mg total) by mouth once daily.    Gastroesophageal reflux disease, esophagitis presence not specified  -     montelukast (SINGULAIR) 10 mg tablet; Take 1 tablet (10 mg total) by mouth every evening.  -     predniSONE (DELTASONE) 20 MG tablet; Take 1 tablet (20 mg total) by mouth once daily.    Chronic ITP (idiopathic thrombocytopenia)     Chronic ITP.  Managed by Oncology.  Has a follow-up with them tomorrow.    -Patient's lab results were reviewed and discussed with patient   -Treatment options and alternatives were discussed with the patient. Patient expressed understanding. Patient was given the opportunity to ask questions and be an active participant in their medical care. Patient had no further questions or concerns at this time.   -Patient is an overall moderate risk for health complications from their medical conditions.     Follow up: Follow-up if symptoms worsen or fail to improve.    After visit summary was printed and given to patient upon discharge today.  Patient goals and care plan are included in  After Visit Summary.

## 2019-03-21 ENCOUNTER — LAB VISIT (OUTPATIENT)
Dept: LAB | Facility: HOSPITAL | Age: 59
End: 2019-03-21
Attending: INTERNAL MEDICINE
Payer: COMMERCIAL

## 2019-03-21 ENCOUNTER — OFFICE VISIT (OUTPATIENT)
Dept: HEMATOLOGY/ONCOLOGY | Facility: CLINIC | Age: 59
End: 2019-03-21
Payer: COMMERCIAL

## 2019-03-21 VITALS
DIASTOLIC BLOOD PRESSURE: 80 MMHG | WEIGHT: 140.88 LBS | OXYGEN SATURATION: 98 % | RESPIRATION RATE: 18 BRPM | BODY MASS INDEX: 22.11 KG/M2 | HEIGHT: 67 IN | SYSTOLIC BLOOD PRESSURE: 110 MMHG | TEMPERATURE: 99 F | HEART RATE: 76 BPM

## 2019-03-21 DIAGNOSIS — D69.3 CHRONIC ITP (IDIOPATHIC THROMBOCYTOPENIA): Primary | ICD-10-CM

## 2019-03-21 DIAGNOSIS — D69.3 CHRONIC ITP (IDIOPATHIC THROMBOCYTOPENIA): ICD-10-CM

## 2019-03-21 LAB
BASOPHILS # BLD AUTO: 0.08 K/UL
BASOPHILS NFR BLD: 1 %
DIFFERENTIAL METHOD: ABNORMAL
EOSINOPHIL # BLD AUTO: 0.4 K/UL
EOSINOPHIL NFR BLD: 4.7 %
ERYTHROCYTE [DISTWIDTH] IN BLOOD BY AUTOMATED COUNT: 12.7 %
HCT VFR BLD AUTO: 41.6 %
HGB BLD-MCNC: 13.6 G/DL
IMM GRANULOCYTES # BLD AUTO: 0.01 K/UL
IMM GRANULOCYTES NFR BLD AUTO: 0.1 %
LYMPHOCYTES # BLD AUTO: 2.8 K/UL
LYMPHOCYTES NFR BLD: 36.2 %
MCH RBC QN AUTO: 31.6 PG
MCHC RBC AUTO-ENTMCNC: 32.7 G/DL
MCV RBC AUTO: 97 FL
MONOCYTES # BLD AUTO: 0.6 K/UL
MONOCYTES NFR BLD: 8.4 %
NEUTROPHILS # BLD AUTO: 3.8 K/UL
NEUTROPHILS NFR BLD: 49.7 %
NRBC BLD-RTO: 0 /100 WBC
PATH REV BLD -IMP: NORMAL
PATH REV BLD -IMP: NORMAL
PLATELET # BLD AUTO: 86 K/UL
PMV BLD AUTO: 9.3 FL
RBC # BLD AUTO: 4.31 M/UL
WBC # BLD AUTO: 7.62 K/UL

## 2019-03-21 PROCEDURE — 99214 OFFICE O/P EST MOD 30 MIN: CPT | Mod: S$GLB,,, | Performed by: INTERNAL MEDICINE

## 2019-03-21 PROCEDURE — 85025 COMPLETE CBC W/AUTO DIFF WBC: CPT

## 2019-03-21 PROCEDURE — 99999 PR PBB SHADOW E&M-EST. PATIENT-LVL IV: ICD-10-PCS | Mod: PBBFAC,,, | Performed by: INTERNAL MEDICINE

## 2019-03-21 PROCEDURE — 85060 PATHOLOGIST REVIEW: ICD-10-PCS | Mod: ,,, | Performed by: PATHOLOGY

## 2019-03-21 PROCEDURE — 99214 PR OFFICE/OUTPT VISIT, EST, LEVL IV, 30-39 MIN: ICD-10-PCS | Mod: S$GLB,,, | Performed by: INTERNAL MEDICINE

## 2019-03-21 PROCEDURE — 36415 COLL VENOUS BLD VENIPUNCTURE: CPT

## 2019-03-21 PROCEDURE — 85060 BLOOD SMEAR INTERPRETATION: CPT | Mod: ,,, | Performed by: PATHOLOGY

## 2019-03-21 PROCEDURE — 99999 PR PBB SHADOW E&M-EST. PATIENT-LVL IV: CPT | Mod: PBBFAC,,, | Performed by: INTERNAL MEDICINE

## 2019-03-21 RX ORDER — TADALAFIL 20 MG/1
20 TABLET ORAL DAILY PRN
COMMUNITY
Start: 2016-07-06 | End: 2019-08-07

## 2019-03-21 RX ORDER — ZOLPIDEM TARTRATE 5 MG/1
5 TABLET ORAL
COMMUNITY
Start: 2019-02-14 | End: 2020-01-24 | Stop reason: SDUPTHER

## 2019-03-21 RX ORDER — OXYCODONE AND ACETAMINOPHEN 10; 325 MG/1; MG/1
TABLET ORAL
COMMUNITY
Start: 2017-06-02 | End: 2019-08-28 | Stop reason: SDUPTHER

## 2019-03-21 NOTE — PROGRESS NOTES
Reason for visit: Follow up for ITP    HPI:   The patient is a 58-year-old  male who presents to the hematology oncology clinic today to discuss further evaluation and management recommendations for immune mediated thrombocytopenia.  The patient had previously been followed by Dr. Marija Patel and Dr. Deven Norris with hematology oncology. The patient reports that he was diagnosed with immune mediated thrombocytopenia in 2012 when he noticed bruising on his lower extremities and sought medical attention for this. The patient reports that he did not tolerate high doses of prednisone very well. He has since been treated with chronic infusions of IVIG with good response.  He denies any melena, hematochezia, hematemesis, hemoptysis, hematuria or epistaxis. He denies any fever, chills or night sweats. He denies any bowel or urinary complaints.  The patient has completed 4 doses of weekly Rituxan on 2013 and tolerated this well. He denies any chest pain or shortness of breath. He denies any nausea or vomiting. I have reviewed all of his interval clinical history in EPIC.   His medical insurance now allows him to follow up back with me at [facility]. He has not required any treatment of his chronic ITP in the interim.    PAST MEDICAL HISTORY:   1. ITP  2. H. Pylori positive gastritis    SURGICAL HISTORY:   Denies    FAMILY HISTORY: The patient's mother  from complications related to pancreatic cancer at the age of 67. His brother  from complications related to lung cancer in his 40s. A paternal uncle  from complications related to throat cancer. A maternal uncle  from complications related to lung cancer. He denies any immediate family members with bleeding/clotting disorders.    SOCIAL HISTORY: He reports a 63+ pack year smoking history and is a current smoker. He states that he has moderated alcohol consumption. He has never used any recreational drugs. He works as a  . He is  and has 7 children. He lives in [place].    ALLERGIES: [NKDA]    MEDICATIONS: [Medcard has been reviewed and/or reconciled.]    REVIEW OF SYSTEMS:   GENERAL: [No fevers, chills or sweats. No fatigue, weight loss or loss of appetite.]  HEENT: [No blurred vision, tinnitus, nasal discharge, sorethroat or dysphagia.]  HEART: [No chest pain, palpitations or shortness of breath.]   LUNGS: [No cough, hemoptysis or breathing problems.]  ABDOMEN: [No abdominal pain, nausea, vomiting, diarrhea, constipation or melena.]  GENITOURINARY: [No dysuria, bleeding or malodorous discharge.]  NEURO: [No headache, dizziness or vertigo.]  HEMATOLOGY: [No easy bruising, spontaneous bleeding or blood clots in the past].  MUSCULOSKELETAL: [No arthralgias, myalgias or bone pains.]  SKIN: [No rashes or skin lesions.]  PSYCHIATRY: [No depression or anxiety.]    PHYSICAL EXAMINATION:   VS: Reviewed on nurse's notes.  APPEARANCE: The patient is a well-groomed  male who appears in no acute distress.   HEENT: No scleral icterus. Both external auditory canals clear. No oral ulcers, lesions. Throat clear  HEAD: No sinus tenderness.  NECK: Supple. No palpable lymphadenopathy. Thyroid non-tender, no palpable masses  CHEST: Breath sounds clear bilaterally. No rales. No rhonchi. Unlabored respirations.  CARDIOVASCULAR: Normal S1, S2. Normal rate. Regular rhythm.  ABDOMEN: Bowel sounds slightly hyperactive. No tenderness. No abdominal distention. No hepatomegaly. No splenomegaly.  LYMPHATIC: No palpable supraclavicular, axillary nodes  EXTREMITIES: No clubbing, cyanosis, edema  SKIN: No lesions. No petechiae. No ecchymoses. No induration or nodules  NEUROLOGIC: No focal findings. Alert & Oriented x 3. Mood appropriate to affect    LABS:   Reviewed    IMAGING:  Reviewed    IMPRESSION:  1. Immune mediated thrombocytopenia    PLAN:  1.  I had a detailed discussion with the patient today with regard to his current  clinical situation.  Review of CBC from today shows interval significant improvement in his platelet count.  At this time I have recommended holding off on any additional IV Rituxan for treatment of his ITP.  The patient had excellent response when this was last given in 2013.  Rationale for this was reviewed in detail and the patient expressed understanding and agreement.  The patient has previously not responded to corticosteroid therapy for his ITP.  He has also had complications related to avascular necrosis with corticosteroids in the past.  2. I have discussed with the patient that he should seek immediate medical attention for any signs/symptoms of bleeding. He expressed understanding of this.  He understands that he will most likely get IVIG as a bridge if he has any excessive bleeding before IV Rituxan can be administered as an outpatient.  He knows to present to the emergency room at [facility], [place] if this happens.  3.  I will evaluate the patient for platelet clumping if he has another episode of thrombocytopenia in the future.  I have discussed with the patient that the other possibility is that he might be having a slow relapse of his ITP with intermittent episodes of low platelet count and normal platelet count.  Regardless I will monitor this with CBC every 2 weeks to get a better understanding of his platelet count trend.  The rationale for this was reviewed in detail and he expressed understanding and agreement with this recommendation.  4.  I will request our pathologist review his peripheral blood smear from today to evaluate for any evidence of platelet clumping.     Recheck CBC in 2 weeks.  I will also check his platelet count in a blue top tube in 2 weeks.  Follow-up with me in clinic in 4 weeks.  Possible Rituxan at follow-up depending on lab results.  He knows to call sooner for any new problems or questions.    Rusty Salazar MD

## 2019-04-18 ENCOUNTER — LAB VISIT (OUTPATIENT)
Dept: LAB | Facility: HOSPITAL | Age: 59
End: 2019-04-18
Attending: INTERNAL MEDICINE
Payer: COMMERCIAL

## 2019-04-18 ENCOUNTER — OFFICE VISIT (OUTPATIENT)
Dept: HEMATOLOGY/ONCOLOGY | Facility: CLINIC | Age: 59
End: 2019-04-18
Payer: COMMERCIAL

## 2019-04-18 ENCOUNTER — INFUSION (OUTPATIENT)
Dept: INFUSION THERAPY | Facility: HOSPITAL | Age: 59
End: 2019-04-18
Attending: INTERNAL MEDICINE
Payer: COMMERCIAL

## 2019-04-18 VITALS
BODY MASS INDEX: 22 KG/M2 | OXYGEN SATURATION: 97 % | RESPIRATION RATE: 18 BRPM | DIASTOLIC BLOOD PRESSURE: 86 MMHG | HEIGHT: 67 IN | SYSTOLIC BLOOD PRESSURE: 123 MMHG | HEART RATE: 85 BPM | WEIGHT: 140.19 LBS | TEMPERATURE: 98 F

## 2019-04-18 VITALS
OXYGEN SATURATION: 98 % | DIASTOLIC BLOOD PRESSURE: 78 MMHG | HEART RATE: 69 BPM | SYSTOLIC BLOOD PRESSURE: 122 MMHG | RESPIRATION RATE: 16 BRPM

## 2019-04-18 DIAGNOSIS — D69.3 CHRONIC ITP (IDIOPATHIC THROMBOCYTOPENIA): Primary | ICD-10-CM

## 2019-04-18 DIAGNOSIS — D69.3 CHRONIC ITP (IDIOPATHIC THROMBOCYTOPENIA): ICD-10-CM

## 2019-04-18 LAB
ALBUMIN SERPL BCP-MCNC: 3.8 G/DL (ref 3.5–5.2)
ALP SERPL-CCNC: 53 U/L (ref 55–135)
ALT SERPL W/O P-5'-P-CCNC: 14 U/L (ref 10–44)
ANION GAP SERPL CALC-SCNC: 9 MMOL/L (ref 8–16)
AST SERPL-CCNC: 25 U/L (ref 10–40)
BASOPHILS # BLD AUTO: 0.09 K/UL (ref 0–0.2)
BASOPHILS NFR BLD: 1.2 % (ref 0–1.9)
BILIRUB SERPL-MCNC: 0.7 MG/DL (ref 0.1–1)
BUN SERPL-MCNC: 13 MG/DL (ref 6–20)
CALCIUM SERPL-MCNC: 9 MG/DL (ref 8.7–10.5)
CHLORIDE SERPL-SCNC: 106 MMOL/L (ref 95–110)
CO2 SERPL-SCNC: 27 MMOL/L (ref 23–29)
CREAT SERPL-MCNC: 0.9 MG/DL (ref 0.5–1.4)
DIFFERENTIAL METHOD: ABNORMAL
EOSINOPHIL # BLD AUTO: 0.5 K/UL (ref 0–0.5)
EOSINOPHIL NFR BLD: 6.8 % (ref 0–8)
ERYTHROCYTE [DISTWIDTH] IN BLOOD BY AUTOMATED COUNT: 12.6 % (ref 11.5–14.5)
EST. GFR  (AFRICAN AMERICAN): >60 ML/MIN/1.73 M^2
EST. GFR  (NON AFRICAN AMERICAN): >60 ML/MIN/1.73 M^2
GLUCOSE SERPL-MCNC: 94 MG/DL (ref 70–110)
HCT VFR BLD AUTO: 43.6 % (ref 40–54)
HGB BLD-MCNC: 14 G/DL (ref 14–18)
IMM GRANULOCYTES # BLD AUTO: 0.01 K/UL (ref 0–0.04)
IMM GRANULOCYTES NFR BLD AUTO: 0.1 % (ref 0–0.5)
LYMPHOCYTES # BLD AUTO: 2.7 K/UL (ref 1–4.8)
LYMPHOCYTES NFR BLD: 35.3 % (ref 18–48)
MCH RBC QN AUTO: 31 PG (ref 27–31)
MCHC RBC AUTO-ENTMCNC: 32.1 G/DL (ref 32–36)
MCV RBC AUTO: 97 FL (ref 82–98)
MONOCYTES # BLD AUTO: 0.6 K/UL (ref 0.3–1)
MONOCYTES NFR BLD: 7.1 % (ref 4–15)
NEUTROPHILS # BLD AUTO: 3.9 K/UL (ref 1.8–7.7)
NEUTROPHILS NFR BLD: 49.6 % (ref 38–73)
NRBC BLD-RTO: 0 /100 WBC
PLATELET # BLD AUTO: 24 K/UL (ref 150–350)
PMV BLD AUTO: 9.5 FL (ref 9.2–12.9)
POTASSIUM SERPL-SCNC: 4.6 MMOL/L (ref 3.5–5.1)
PROT SERPL-MCNC: 6.9 G/DL (ref 6–8.4)
RBC # BLD AUTO: 4.51 M/UL (ref 4.6–6.2)
SODIUM SERPL-SCNC: 142 MMOL/L (ref 136–145)
WBC # BLD AUTO: 7.77 K/UL (ref 3.9–12.7)

## 2019-04-18 PROCEDURE — 96415 CHEMO IV INFUSION ADDL HR: CPT

## 2019-04-18 PROCEDURE — S0028 INJECTION, FAMOTIDINE, 20 MG: HCPCS | Performed by: INTERNAL MEDICINE

## 2019-04-18 PROCEDURE — 85025 COMPLETE CBC W/AUTO DIFF WBC: CPT

## 2019-04-18 PROCEDURE — 96413 CHEMO IV INFUSION 1 HR: CPT

## 2019-04-18 PROCEDURE — 36415 COLL VENOUS BLD VENIPUNCTURE: CPT

## 2019-04-18 PROCEDURE — 96367 TX/PROPH/DG ADDL SEQ IV INF: CPT

## 2019-04-18 PROCEDURE — 96368 THER/DIAG CONCURRENT INF: CPT

## 2019-04-18 PROCEDURE — 99215 PR OFFICE/OUTPT VISIT, EST, LEVL V, 40-54 MIN: ICD-10-PCS | Mod: 25,S$GLB,, | Performed by: INTERNAL MEDICINE

## 2019-04-18 PROCEDURE — 99999 PR PBB SHADOW E&M-EST. PATIENT-LVL IV: ICD-10-PCS | Mod: PBBFAC,,, | Performed by: INTERNAL MEDICINE

## 2019-04-18 PROCEDURE — 96375 TX/PRO/DX INJ NEW DRUG ADDON: CPT

## 2019-04-18 PROCEDURE — 99215 OFFICE O/P EST HI 40 MIN: CPT | Mod: 25,S$GLB,, | Performed by: INTERNAL MEDICINE

## 2019-04-18 PROCEDURE — 99999 PR PBB SHADOW E&M-EST. PATIENT-LVL IV: CPT | Mod: PBBFAC,,, | Performed by: INTERNAL MEDICINE

## 2019-04-18 PROCEDURE — 63600175 PHARM REV CODE 636 W HCPCS: Performed by: INTERNAL MEDICINE

## 2019-04-18 PROCEDURE — 25000003 PHARM REV CODE 250: Performed by: INTERNAL MEDICINE

## 2019-04-18 PROCEDURE — 80053 COMPREHEN METABOLIC PANEL: CPT

## 2019-04-18 RX ORDER — SODIUM CHLORIDE 0.9 % (FLUSH) 0.9 %
10 SYRINGE (ML) INJECTION
Status: CANCELLED | OUTPATIENT
Start: 2019-04-18

## 2019-04-18 RX ORDER — FAMOTIDINE 20 MG/50ML
20 INJECTION, SOLUTION INTRAVENOUS
Status: CANCELLED
Start: 2019-04-18

## 2019-04-18 RX ORDER — ACETAMINOPHEN 325 MG/1
650 TABLET ORAL
Status: CANCELLED | OUTPATIENT
Start: 2019-04-18

## 2019-04-18 RX ORDER — DEXAMETHASONE SODIUM PHOSPHATE 4 MG/ML
8 INJECTION, SOLUTION INTRA-ARTICULAR; INTRALESIONAL; INTRAMUSCULAR; INTRAVENOUS; SOFT TISSUE
Status: COMPLETED | OUTPATIENT
Start: 2019-04-18 | End: 2019-04-18

## 2019-04-18 RX ORDER — FAMOTIDINE 20 MG/50ML
20 INJECTION, SOLUTION INTRAVENOUS
Status: COMPLETED | OUTPATIENT
Start: 2019-04-18 | End: 2019-04-18

## 2019-04-18 RX ORDER — ACETAMINOPHEN 325 MG/1
650 TABLET ORAL
Status: COMPLETED | OUTPATIENT
Start: 2019-04-18 | End: 2019-04-18

## 2019-04-18 RX ORDER — HEPARIN 100 UNIT/ML
500 SYRINGE INTRAVENOUS
Status: CANCELLED | OUTPATIENT
Start: 2019-04-18

## 2019-04-18 RX ORDER — DEXAMETHASONE SODIUM PHOSPHATE 4 MG/ML
8 INJECTION, SOLUTION INTRA-ARTICULAR; INTRALESIONAL; INTRAMUSCULAR; INTRAVENOUS; SOFT TISSUE
Status: CANCELLED
Start: 2019-04-18

## 2019-04-18 RX ORDER — MEPERIDINE HYDROCHLORIDE 50 MG/ML
25 INJECTION INTRAMUSCULAR; INTRAVENOUS; SUBCUTANEOUS
Status: CANCELLED | OUTPATIENT
Start: 2019-04-18

## 2019-04-18 RX ADMIN — RITUXIMAB 649 MG: 10 INJECTION, SOLUTION INTRAVENOUS at 09:04

## 2019-04-18 RX ADMIN — FAMOTIDINE 20 MG: 20 INJECTION, SOLUTION INTRAVENOUS at 09:04

## 2019-04-18 RX ADMIN — ACETAMINOPHEN 650 MG: 325 TABLET ORAL at 09:04

## 2019-04-18 RX ADMIN — DEXAMETHASONE SODIUM PHOSPHATE 8 MG: 4 INJECTION, SOLUTION INTRA-ARTICULAR; INTRALESIONAL; INTRAMUSCULAR; INTRAVENOUS; SOFT TISSUE at 09:04

## 2019-04-18 RX ADMIN — DIPHENHYDRAMINE HYDROCHLORIDE 50 MG: 50 INJECTION, SOLUTION INTRAMUSCULAR; INTRAVENOUS at 09:04

## 2019-04-18 NOTE — DISCHARGE INSTRUCTIONS
[facility]  [id number]  [phone] phone     [phone] fax  Hours of Operation: Monday- Friday 8:00am- 5:00pm  After hours phone  [phone]  Hematology / Oncology Physicians on call      Dr. Willian Chisholm    Please call with any concerns regarding your appointment today.HOME CARE AFTER CHEMOTHERAPY   Meals   Many patients feel sick and lose their appetites during treatment. Eat small meals several times a day. Choose bland foods with little taste or smell if you have problems with nausea. Be sure to cook all food thoroughly. This kills bacteria and helps you avoid intestinal infection. Soft foods are easier to swallow and digest.   Activity   Exercise keeps you strong and keeps your heart and lungs active. Talk to your doctor about an appropriate exercise program for you.   Skin Care   To prevent a skin infection, bathe or shower once a day. Use a moisturizing soap and wash with warm water. Avoid very hot or cold water. Chemotherapy can make your skin dry . Apply moisturizing lotion to help relieve dry skin. Some drugs used in high doses can cause slight burns to appear (like sunburn). Ask for a special cream to help relieve the burn and protect your skin.   Prevent Mouth Sores   During chemotherapy, many people get mouth sores. Do the following to help prevent mouth sores or to ease discomfort.   Brush your teeth with a soft-bristle toothbrush after every meal.  Don't use dental floss if your platelet count is below 50,000. Your doctor or nurse will tell you if this is the case.  Use an oral swab or special soft toothbrush if your gums bleed during regular brushing.  Use mouthwash as directed. If you can't tolerate commercial mouthwash, use salt and baking soda to clean your mouth. Mix 1 teaspoon of salt and 1 teaspoon of baking soda into a glass of water. Swish and spit.  Call your doctor or  return to this facility if you develop any of the following:   Sore throat   White patches in the mouth or throat   Fever of 100.4ºF (38ºC) or higher, or as directed by your healthcare provider  © 2000-2011 [facility], [address] [place] [address]. All rights reserved. This information is not intended as a substitute for professional medical care. Always follow your healthcare professional's   FALL PREVENTION   Falls often occur due to slipping, tripping or losing your balance. Here are ways to reduce your risk of falling again.   Was there anything that caused your fall that can be fixed, removed or replaced?   Make your home safe by keeping walkways clear of objects you may trip over.   Use non-slip pads under rugs.   Do not walk in poorly lit areas.   Do not stand on chairs or wobbly ladders.   Use caution when reaching overhead or looking upward. This position can cause a loss of balance.   Be sure your shoes fit properly, have non-slip bottoms and are in good condition.   Be cautious when going up and down stairs, curbs, and when walking on uneven sidewalks.   If your balance is poor, consider using a cane or walker.   If your fall was related to alcohol use, stop or limit alcohol intake.   If your fall was related to use of sleeping medicines, talk to your doctor about this. You may need to reduce your dosage at bedtime if you awaken during the night to go to the bathroom.   To reduce the need for nighttime bathroom trips:   Avoid drinking fluids for several hours before going to bed   Empty your bladder before going to bed   Men can keep a urinal at the bedside   © 2000-2011 [facility], [address] [place] [address]45. All rights reserved. This information is not intended as a substitute for professional medical care. Always follow your healthcare professional's instructions.  WAYS TO HELP PREVENT INFECTION         WASH YOUR HANDS OFTEN DURING THE DAY, ESPECIALLY BEFORE YOU  EAT, AFTER USING THE BATHROOM, AND AFTER TOUCHING ANIMALS     STAY AWAY FROM PEOPLE WHO HAVE ILLNESSES YOU CAN CATCH; SUCH AS COLDS, FLU, CHICKEN POX     TRY TO AVOID CROWDS     STAY AWAY FROM CHILDREN WHO RECENTLY HAVE RECEIVED LIVE VIRUS VACCINES     MAINTAIN GOOD MOUTH CARE     DO NOT SQUEEZE OR SCRATCH PIMPLES     CLEAN CUTS & SCRAPES RIGHT AWAY AND DAILY UNTIL HEALED WITH WARM WATER, SOAP & AN ANTISEPTIC     AVOID CONTACT WITH LITTER BOXES, BIRD CAGES, & FISH TANKS     AVOID STANDING WATER, IE., BIRD BATHS, FLOWER POTS/VASES, OR HUMIDIFIERS     WEAR GLOVES WHEN GARDENING OR CLEANING UP AFTER OTHERS, ESPECIALLY BABIES & SMALL CHILDREN     DO NOT EAT RAW FISH, SEAFOOD, MEAT, OR EGGS

## 2019-04-18 NOTE — PLAN OF CARE
Problem: Thrombocytopenia Bleeding Risk (Chemotherapy Effects)  Goal: Absence of Bleeding  Outcome: Ongoing (interventions implemented as appropriate)  Patient has chronic ITP; receiving Rituxan

## 2019-04-18 NOTE — PLAN OF CARE
Problem: Neutropenia (Chemotherapy Effects)  Goal: Absence of Infection  Outcome: Ongoing (interventions implemented as appropriate)  Patient educated on signs and symptoms of infection

## 2019-04-18 NOTE — PROGRESS NOTES
Reason for visit: Follow up for ITP    HPI:   The patient is a 58-year-old  male who presents to the hematology oncology clinic today to discuss further evaluation and management recommendations for immune mediated thrombocytopenia.  The patient had previously been followed by Dr. Marija Patel and Dr. Deven Norris with hematology oncology. The patient reports that he was diagnosed with immune mediated thrombocytopenia in 2012 when he noticed bruising on his lower extremities and sought medical attention for this. The patient reports that he did not tolerate high doses of prednisone very well. He has since been treated with chronic infusions of IVIG with good response.  He denies any melena, hematochezia, hematemesis, hemoptysis, hematuria or epistaxis. He denies any fever, chills or night sweats. He denies any bowel or urinary complaints.  The patient has completed 4 doses of weekly Rituxan on 2013 and tolerated this well. He denies any chest pain or shortness of breath. He denies any nausea or vomiting. I have reviewed all of his interval clinical history in EPIC.   His medical insurance now allows him to follow up back with me at [facility]. He has not required any treatment of his chronic ITP in the interim.    PAST MEDICAL HISTORY:   1. ITP  2. H. Pylori positive gastritis    SURGICAL HISTORY:   Denies    FAMILY HISTORY: The patient's mother  from complications related to pancreatic cancer at the age of 67. His brother  from complications related to lung cancer in his 40s. A paternal uncle  from complications related to throat cancer. A maternal uncle  from complications related to lung cancer. He denies any immediate family members with bleeding/clotting disorders.    SOCIAL HISTORY: He reports a 63+ pack year smoking history and is a current smoker. He states that he has moderated alcohol consumption. He has never used any recreational drugs. He works as a  . He is  and has 7 children. He lives in [place].    ALLERGIES: [NKDA]    MEDICATIONS: [Medcard has been reviewed and/or reconciled.]    REVIEW OF SYSTEMS:   GENERAL: [No fevers, chills or sweats. No fatigue, weight loss or loss of appetite.]  HEENT: [No blurred vision, tinnitus, nasal discharge, sorethroat or dysphagia.]  HEART: [No chest pain, palpitations or shortness of breath.]   LUNGS: [No cough, hemoptysis or breathing problems.]  ABDOMEN: [No abdominal pain, nausea, vomiting, diarrhea, constipation or melena.]  GENITOURINARY: [No dysuria, bleeding or malodorous discharge.]  NEURO: [No headache, dizziness or vertigo.]  HEMATOLOGY: [No easy bruising, spontaneous bleeding or blood clots in the past].  MUSCULOSKELETAL: [No arthralgias, myalgias or bone pains.]  SKIN: [No rashes or skin lesions.]  PSYCHIATRY: [No depression or anxiety.]    PHYSICAL EXAMINATION:   VS: Reviewed on nurse's notes.  APPEARANCE: The patient is a well-groomed  male who appears in no acute distress.   HEENT: No scleral icterus. Both external auditory canals clear. No oral ulcers, lesions. Throat clear  HEAD: No sinus tenderness.  NECK: Supple. No palpable lymphadenopathy. Thyroid non-tender, no palpable masses  CHEST: Breath sounds clear bilaterally. No rales. No rhonchi. Unlabored respirations.  CARDIOVASCULAR: Normal S1, S2. Normal rate. Regular rhythm.  ABDOMEN: Bowel sounds slightly hyperactive. No tenderness. No abdominal distention. No hepatomegaly. No splenomegaly.  LYMPHATIC: No palpable supraclavicular, axillary nodes  EXTREMITIES: No clubbing, cyanosis, edema  SKIN: No lesions. No petechiae. No ecchymoses. No induration or nodules  NEUROLOGIC: No focal findings. Alert & Oriented x 3. Mood appropriate to affect    LABS:   Reviewed    IMAGING:  Reviewed    IMPRESSION:  1. Immune mediated thrombocytopenia    PLAN:  1.  I had a detailed discussion with the patient today with regard to his current  clinical situation.  Review of CBC from today shows interval significant decrease in his platelet count.  At this time I have recommended initiation of IV Rituxan for treatment of his ITP.  The patient had excellent response when this was last given in 2013.  Rationale for this was reviewed in detail and the patient expressed understanding and agreement.  The patient has previously not responded to corticosteroid therapy for his ITP.  He has also had complications related to avascular necrosis with corticosteroids in the past.  2. I have discussed with the patient that he should seek immediate medical attention for any signs/symptoms of bleeding. He expressed understanding of this. He knows to present to the emergency room at [facility], [place] if this happens.     Follow-up in 1 week with labs for week 2/4 of rituxan. I will initially plan for 4 weeks of therapy with extension upto 8 weeks depending on clinical response. He knows to call sooner for any new problems or questions.    Rusty Salazar MD    Distress Screening Results: Psychosocial Distress screening score of Distress Score: 0 noted and reviewed. No intervention indicated.

## 2019-04-18 NOTE — PLAN OF CARE
Problem: Infection  Goal: Infection Symptom Resolution  Outcome: Ongoing (interventions implemented as appropriate)  Rituxan patient

## 2019-04-18 NOTE — PLAN OF CARE
Problem: Fall Injury Risk  Goal: Absence of Fall and Fall-Related Injury  Outcome: Ongoing (interventions implemented as appropriate)  Rituxan patient

## 2019-04-25 ENCOUNTER — OFFICE VISIT (OUTPATIENT)
Dept: HEMATOLOGY/ONCOLOGY | Facility: CLINIC | Age: 59
End: 2019-04-25
Payer: COMMERCIAL

## 2019-04-25 ENCOUNTER — LAB VISIT (OUTPATIENT)
Dept: LAB | Facility: HOSPITAL | Age: 59
End: 2019-04-25
Attending: INTERNAL MEDICINE
Payer: COMMERCIAL

## 2019-04-25 ENCOUNTER — INFUSION (OUTPATIENT)
Dept: INFUSION THERAPY | Facility: HOSPITAL | Age: 59
End: 2019-04-25
Attending: INTERNAL MEDICINE
Payer: COMMERCIAL

## 2019-04-25 VITALS
BODY MASS INDEX: 22.15 KG/M2 | HEART RATE: 91 BPM | HEIGHT: 67 IN | SYSTOLIC BLOOD PRESSURE: 120 MMHG | DIASTOLIC BLOOD PRESSURE: 84 MMHG | WEIGHT: 141.13 LBS | RESPIRATION RATE: 18 BRPM

## 2019-04-25 VITALS
WEIGHT: 141.13 LBS | RESPIRATION RATE: 18 BRPM | SYSTOLIC BLOOD PRESSURE: 128 MMHG | BODY MASS INDEX: 22.15 KG/M2 | DIASTOLIC BLOOD PRESSURE: 83 MMHG | HEART RATE: 73 BPM | OXYGEN SATURATION: 99 % | HEIGHT: 67 IN | TEMPERATURE: 98 F

## 2019-04-25 DIAGNOSIS — D69.3 CHRONIC ITP (IDIOPATHIC THROMBOCYTOPENIA): ICD-10-CM

## 2019-04-25 DIAGNOSIS — D69.3 CHRONIC ITP (IDIOPATHIC THROMBOCYTOPENIA): Primary | ICD-10-CM

## 2019-04-25 DIAGNOSIS — H66.92 LEFT OTITIS MEDIA, UNSPECIFIED OTITIS MEDIA TYPE: ICD-10-CM

## 2019-04-25 LAB
ALBUMIN SERPL BCP-MCNC: 3.9 G/DL (ref 3.5–5.2)
ALP SERPL-CCNC: 50 U/L (ref 55–135)
ALT SERPL W/O P-5'-P-CCNC: 15 U/L (ref 10–44)
ANION GAP SERPL CALC-SCNC: 7 MMOL/L (ref 8–16)
AST SERPL-CCNC: 23 U/L (ref 10–40)
BASOPHILS # BLD AUTO: 0.09 K/UL (ref 0–0.2)
BASOPHILS NFR BLD: 1.3 % (ref 0–1.9)
BILIRUB SERPL-MCNC: 0.8 MG/DL (ref 0.1–1)
BUN SERPL-MCNC: 11 MG/DL (ref 6–20)
CALCIUM SERPL-MCNC: 9.5 MG/DL (ref 8.7–10.5)
CHLORIDE SERPL-SCNC: 108 MMOL/L (ref 95–110)
CO2 SERPL-SCNC: 27 MMOL/L (ref 23–29)
CREAT SERPL-MCNC: 0.9 MG/DL (ref 0.5–1.4)
DIFFERENTIAL METHOD: ABNORMAL
EOSINOPHIL # BLD AUTO: 0.5 K/UL (ref 0–0.5)
EOSINOPHIL NFR BLD: 7.8 % (ref 0–8)
ERYTHROCYTE [DISTWIDTH] IN BLOOD BY AUTOMATED COUNT: 12.7 % (ref 11.5–14.5)
EST. GFR  (AFRICAN AMERICAN): >60 ML/MIN/1.73 M^2
EST. GFR  (NON AFRICAN AMERICAN): >60 ML/MIN/1.73 M^2
GLUCOSE SERPL-MCNC: 109 MG/DL (ref 70–110)
HCT VFR BLD AUTO: 44.6 % (ref 40–54)
HGB BLD-MCNC: 14.6 G/DL (ref 14–18)
IMM GRANULOCYTES # BLD AUTO: 0.02 K/UL (ref 0–0.04)
IMM GRANULOCYTES NFR BLD AUTO: 0.3 % (ref 0–0.5)
LYMPHOCYTES # BLD AUTO: 1.9 K/UL (ref 1–4.8)
LYMPHOCYTES NFR BLD: 28.8 % (ref 18–48)
MCH RBC QN AUTO: 31.9 PG (ref 27–31)
MCHC RBC AUTO-ENTMCNC: 32.7 G/DL (ref 32–36)
MCV RBC AUTO: 98 FL (ref 82–98)
MONOCYTES # BLD AUTO: 0.6 K/UL (ref 0.3–1)
MONOCYTES NFR BLD: 9.6 % (ref 4–15)
NEUTROPHILS # BLD AUTO: 3.5 K/UL (ref 1.8–7.7)
NEUTROPHILS NFR BLD: 52.5 % (ref 38–73)
NRBC BLD-RTO: 0 /100 WBC
PLATELET # BLD AUTO: 311 K/UL (ref 150–350)
PMV BLD AUTO: 8.9 FL (ref 9.2–12.9)
POTASSIUM SERPL-SCNC: 5.1 MMOL/L (ref 3.5–5.1)
PROT SERPL-MCNC: 7.1 G/DL (ref 6–8.4)
RBC # BLD AUTO: 4.57 M/UL (ref 4.6–6.2)
SODIUM SERPL-SCNC: 142 MMOL/L (ref 136–145)
WBC # BLD AUTO: 6.67 K/UL (ref 3.9–12.7)

## 2019-04-25 PROCEDURE — 96367 TX/PROPH/DG ADDL SEQ IV INF: CPT

## 2019-04-25 PROCEDURE — S0028 INJECTION, FAMOTIDINE, 20 MG: HCPCS | Performed by: INTERNAL MEDICINE

## 2019-04-25 PROCEDURE — 63600175 PHARM REV CODE 636 W HCPCS: Performed by: INTERNAL MEDICINE

## 2019-04-25 PROCEDURE — 80053 COMPREHEN METABOLIC PANEL: CPT

## 2019-04-25 PROCEDURE — 96375 TX/PRO/DX INJ NEW DRUG ADDON: CPT

## 2019-04-25 PROCEDURE — 99999 PR PBB SHADOW E&M-EST. PATIENT-LVL III: CPT | Mod: PBBFAC,,, | Performed by: INTERNAL MEDICINE

## 2019-04-25 PROCEDURE — 99215 OFFICE O/P EST HI 40 MIN: CPT | Mod: 25,S$GLB,, | Performed by: INTERNAL MEDICINE

## 2019-04-25 PROCEDURE — 96368 THER/DIAG CONCURRENT INF: CPT

## 2019-04-25 PROCEDURE — 85025 COMPLETE CBC W/AUTO DIFF WBC: CPT

## 2019-04-25 PROCEDURE — 96413 CHEMO IV INFUSION 1 HR: CPT

## 2019-04-25 PROCEDURE — 99215 PR OFFICE/OUTPT VISIT, EST, LEVL V, 40-54 MIN: ICD-10-PCS | Mod: 25,S$GLB,, | Performed by: INTERNAL MEDICINE

## 2019-04-25 PROCEDURE — 36415 COLL VENOUS BLD VENIPUNCTURE: CPT

## 2019-04-25 PROCEDURE — 96417 CHEMO IV INFUS EACH ADDL SEQ: CPT

## 2019-04-25 PROCEDURE — 99999 PR PBB SHADOW E&M-EST. PATIENT-LVL III: ICD-10-PCS | Mod: PBBFAC,,, | Performed by: INTERNAL MEDICINE

## 2019-04-25 PROCEDURE — 25000003 PHARM REV CODE 250: Performed by: INTERNAL MEDICINE

## 2019-04-25 RX ORDER — SODIUM CHLORIDE 0.9 % (FLUSH) 0.9 %
10 SYRINGE (ML) INJECTION
Status: CANCELLED | OUTPATIENT
Start: 2019-04-25

## 2019-04-25 RX ORDER — ACETAMINOPHEN 325 MG/1
650 TABLET ORAL
Status: CANCELLED | OUTPATIENT
Start: 2019-04-25

## 2019-04-25 RX ORDER — HEPARIN 100 UNIT/ML
500 SYRINGE INTRAVENOUS
Status: CANCELLED | OUTPATIENT
Start: 2019-04-25

## 2019-04-25 RX ORDER — FAMOTIDINE 20 MG/50ML
20 INJECTION, SOLUTION INTRAVENOUS
Status: CANCELLED
Start: 2019-04-25

## 2019-04-25 RX ORDER — ACETAMINOPHEN 325 MG/1
650 TABLET ORAL
Status: COMPLETED | OUTPATIENT
Start: 2019-04-25 | End: 2019-04-25

## 2019-04-25 RX ORDER — DEXAMETHASONE SODIUM PHOSPHATE 4 MG/ML
8 INJECTION, SOLUTION INTRA-ARTICULAR; INTRALESIONAL; INTRAMUSCULAR; INTRAVENOUS; SOFT TISSUE
Status: CANCELLED
Start: 2019-04-25

## 2019-04-25 RX ORDER — DEXAMETHASONE SODIUM PHOSPHATE 4 MG/ML
8 INJECTION, SOLUTION INTRA-ARTICULAR; INTRALESIONAL; INTRAMUSCULAR; INTRAVENOUS; SOFT TISSUE
Status: COMPLETED | OUTPATIENT
Start: 2019-04-25 | End: 2019-04-25

## 2019-04-25 RX ORDER — AMOXICILLIN AND CLAVULANATE POTASSIUM 875; 125 MG/1; MG/1
1 TABLET, FILM COATED ORAL 2 TIMES DAILY
Qty: 14 TABLET | Refills: 0 | Status: SHIPPED | OUTPATIENT
Start: 2019-04-25 | End: 2019-05-02

## 2019-04-25 RX ORDER — FAMOTIDINE 20 MG/50ML
20 INJECTION, SOLUTION INTRAVENOUS
Status: COMPLETED | OUTPATIENT
Start: 2019-04-25 | End: 2019-04-25

## 2019-04-25 RX ORDER — MEPERIDINE HYDROCHLORIDE 50 MG/ML
25 INJECTION INTRAMUSCULAR; INTRAVENOUS; SUBCUTANEOUS
Status: CANCELLED | OUTPATIENT
Start: 2019-04-25

## 2019-04-25 RX ADMIN — SODIUM CHLORIDE: 9 INJECTION, SOLUTION INTRAVENOUS at 10:04

## 2019-04-25 RX ADMIN — ACETAMINOPHEN 650 MG: 325 TABLET ORAL at 10:04

## 2019-04-25 RX ADMIN — FAMOTIDINE 20 MG: 20 INJECTION, SOLUTION INTRAVENOUS at 10:04

## 2019-04-25 RX ADMIN — DEXAMETHASONE SODIUM PHOSPHATE 8 MG: 4 INJECTION, SOLUTION INTRA-ARTICULAR; INTRALESIONAL; INTRAMUSCULAR; INTRAVENOUS; SOFT TISSUE at 11:04

## 2019-04-25 RX ADMIN — RITUXIMAB 653 MG: 10 INJECTION, SOLUTION INTRAVENOUS at 11:04

## 2019-04-25 RX ADMIN — DIPHENHYDRAMINE HYDROCHLORIDE 50 MG: 50 INJECTION, SOLUTION INTRAMUSCULAR; INTRAVENOUS at 10:04

## 2019-04-25 NOTE — PROGRESS NOTES
Reason for visit: Follow up for ITP    HPI:   The patient is a 58-year-old  male who presents to the hematology oncology clinic today to discuss further evaluation and management recommendations for immune mediated thrombocytopenia.  The patient had previously been followed by Dr. Marija Patel and Dr. Deven Norris with hematology oncology. The patient reports that he was diagnosed with immune mediated thrombocytopenia in 2012 when he noticed bruising on his lower extremities and sought medical attention for this. The patient reports that he did not tolerate high doses of prednisone very well. He has since been treated with chronic infusions of IVIG with good response.  He denies any melena, hematochezia, hematemesis, hemoptysis, hematuria or epistaxis. He denies any fever, chills or night sweats. He denies any bowel or urinary complaints.  The patient has completed 4 doses of weekly Rituxan on 2013 and tolerated this well. He denies any chest pain or shortness of breath. He denies any nausea or vomiting. I have reviewed all of his interval clinical history in EPIC.   His medical insurance now allows him to follow up back with me at [facility]. He has not required any treatment of his chronic ITP in the interim.  He reports left ear pain for the past 1-2 days.    PAST MEDICAL HISTORY:   1. ITP  2. H. Pylori positive gastritis    SURGICAL HISTORY:   Denies    FAMILY HISTORY: The patient's mother  from complications related to pancreatic cancer at the age of 67. His brother  from complications related to lung cancer in his 40s. A paternal uncle  from complications related to throat cancer. A maternal uncle  from complications related to lung cancer. He denies any immediate family members with bleeding/clotting disorders.    SOCIAL HISTORY: He reports a 63+ pack year smoking history and is a current smoker. He states that he has moderated alcohol consumption. He has never  used any recreational drugs. He works as a . He is  and has 7 children. He lives in [place].    ALLERGIES: [NKDA]    MEDICATIONS: [Medcard has been reviewed and/or reconciled.]    REVIEW OF SYSTEMS:   GENERAL: [No fevers, chills or sweats. No fatigue, weight loss or loss of appetite.]  HEENT: [No blurred vision, tinnitus, nasal discharge, sorethroat or dysphagia.]  HEART: [No chest pain, palpitations or shortness of breath.]   LUNGS: [No cough, hemoptysis or breathing problems.]  ABDOMEN: [No abdominal pain, nausea, vomiting, diarrhea, constipation or melena.]  GENITOURINARY: [No dysuria, bleeding or malodorous discharge.]  NEURO: [No headache, dizziness or vertigo.]  HEMATOLOGY: [No easy bruising, spontaneous bleeding or blood clots in the past].  MUSCULOSKELETAL: [No arthralgias, myalgias or bone pains.]  SKIN: [No rashes or skin lesions.]  PSYCHIATRY: [No depression or anxiety.]    PHYSICAL EXAMINATION:   VS: Reviewed on nurse's notes.  APPEARANCE: The patient is a well-groomed  male who appears in no acute distress.   HEENT: No scleral icterus. Right external auditory canal clear. Left canal inflamed. No oral ulcers, lesions. Throat clear  HEAD: No sinus tenderness.  NECK: Supple. No palpable lymphadenopathy. Thyroid non-tender, no palpable masses  CHEST: Breath sounds clear bilaterally. No rales. No rhonchi. Unlabored respirations.  CARDIOVASCULAR: Normal S1, S2. Normal rate. Regular rhythm.  ABDOMEN: Bowel sounds slightly hyperactive. No tenderness. No abdominal distention. No hepatomegaly. No splenomegaly.  LYMPHATIC: No palpable supraclavicular, axillary nodes  EXTREMITIES: No clubbing, cyanosis, edema  SKIN: No lesions. No petechiae. No ecchymoses. No induration or nodules  NEUROLOGIC: No focal findings. Alert & Oriented x 3. Mood appropriate to affect    LABS:   Reviewed    IMAGING:  Reviewed    IMPRESSION:  1. Immune mediated thrombocytopenia  2. Left otitis  media    PLAN:  1.  I had a detailed discussion with the patient today with regard to his current clinical situation.  Review of CBC from today shows interval improvement in his platelet count.  At this time I have recommended initiation of IV Rituxan for treatment of his ITP.  The patient had excellent response when this was last given in 2013.  Rationale for this was reviewed in detail and the patient expressed understanding and agreement.  The patient has previously not responded to corticosteroid therapy for his ITP.  He has also had complications related to avascular necrosis with corticosteroids in the past.  2. I have discussed with the patient that he should seek immediate medical attention for any signs/symptoms of bleeding. He expressed understanding of this. He knows to present to the emergency room at [facility], [place] if this happens.     Follow-up in 1 week with labs for week 3/4 of rituxan. I will initially plan for 4 weeks of therapy with extension upto 8 weeks depending on clinical response. He knows to call sooner for any new problems or questions.    Rusty Salazar MD

## 2019-04-25 NOTE — PLAN OF CARE
Problem: Adult Inpatient Plan of Care  Goal: Plan of Care Review  Outcome: Ongoing (interventions implemented as appropriate)  Pt states feeling good today

## 2019-05-02 ENCOUNTER — INFUSION (OUTPATIENT)
Dept: INFUSION THERAPY | Facility: HOSPITAL | Age: 59
End: 2019-05-02
Attending: INTERNAL MEDICINE
Payer: COMMERCIAL

## 2019-05-02 ENCOUNTER — LAB VISIT (OUTPATIENT)
Dept: LAB | Facility: HOSPITAL | Age: 59
End: 2019-05-02
Attending: INTERNAL MEDICINE
Payer: COMMERCIAL

## 2019-05-02 ENCOUNTER — OFFICE VISIT (OUTPATIENT)
Dept: HEMATOLOGY/ONCOLOGY | Facility: CLINIC | Age: 59
End: 2019-05-02
Payer: COMMERCIAL

## 2019-05-02 ENCOUNTER — TELEPHONE (OUTPATIENT)
Dept: HEMATOLOGY/ONCOLOGY | Facility: CLINIC | Age: 59
End: 2019-05-02

## 2019-05-02 VITALS
HEART RATE: 97 BPM | BODY MASS INDEX: 22.45 KG/M2 | OXYGEN SATURATION: 100 % | TEMPERATURE: 98 F | HEIGHT: 67 IN | WEIGHT: 143.06 LBS | SYSTOLIC BLOOD PRESSURE: 122 MMHG | DIASTOLIC BLOOD PRESSURE: 80 MMHG

## 2019-05-02 VITALS — SYSTOLIC BLOOD PRESSURE: 108 MMHG | HEART RATE: 81 BPM | DIASTOLIC BLOOD PRESSURE: 70 MMHG

## 2019-05-02 DIAGNOSIS — D69.3 CHRONIC ITP (IDIOPATHIC THROMBOCYTOPENIA): Primary | ICD-10-CM

## 2019-05-02 DIAGNOSIS — D69.3 CHRONIC ITP (IDIOPATHIC THROMBOCYTOPENIA): ICD-10-CM

## 2019-05-02 LAB
ALBUMIN SERPL BCP-MCNC: 3.7 G/DL (ref 3.5–5.2)
ALP SERPL-CCNC: 45 U/L (ref 55–135)
ALT SERPL W/O P-5'-P-CCNC: 15 U/L (ref 10–44)
ANION GAP SERPL CALC-SCNC: 9 MMOL/L (ref 8–16)
AST SERPL-CCNC: 23 U/L (ref 10–40)
BASOPHILS # BLD AUTO: 0.07 K/UL (ref 0–0.2)
BASOPHILS NFR BLD: 1 % (ref 0–1.9)
BILIRUB SERPL-MCNC: 0.6 MG/DL (ref 0.1–1)
BUN SERPL-MCNC: 14 MG/DL (ref 6–20)
CALCIUM SERPL-MCNC: 9.1 MG/DL (ref 8.7–10.5)
CHLORIDE SERPL-SCNC: 105 MMOL/L (ref 95–110)
CO2 SERPL-SCNC: 25 MMOL/L (ref 23–29)
CREAT SERPL-MCNC: 1 MG/DL (ref 0.5–1.4)
DIFFERENTIAL METHOD: ABNORMAL
EOSINOPHIL # BLD AUTO: 0.4 K/UL (ref 0–0.5)
EOSINOPHIL NFR BLD: 5.9 % (ref 0–8)
ERYTHROCYTE [DISTWIDTH] IN BLOOD BY AUTOMATED COUNT: 13.4 % (ref 11.5–14.5)
EST. GFR  (AFRICAN AMERICAN): >60 ML/MIN/1.73 M^2
EST. GFR  (NON AFRICAN AMERICAN): >60 ML/MIN/1.73 M^2
GLUCOSE SERPL-MCNC: 140 MG/DL (ref 70–110)
HCT VFR BLD AUTO: 43.1 % (ref 40–54)
HGB BLD-MCNC: 14.1 G/DL (ref 14–18)
LYMPHOCYTES # BLD AUTO: 2.3 K/UL (ref 1–4.8)
LYMPHOCYTES NFR BLD: 33.2 % (ref 18–48)
MCH RBC QN AUTO: 32.1 PG (ref 27–31)
MCHC RBC AUTO-ENTMCNC: 32.7 G/DL (ref 32–36)
MCV RBC AUTO: 98 FL (ref 82–98)
MONOCYTES # BLD AUTO: 0.4 K/UL (ref 0.3–1)
MONOCYTES NFR BLD: 6 % (ref 4–15)
NEUTROPHILS # BLD AUTO: 3.7 K/UL (ref 1.8–7.7)
NEUTROPHILS NFR BLD: 53.9 % (ref 38–73)
PLATELET # BLD AUTO: 454 K/UL (ref 150–350)
PMV BLD AUTO: 9 FL (ref 9.2–12.9)
POTASSIUM SERPL-SCNC: 5 MMOL/L (ref 3.5–5.1)
PROT SERPL-MCNC: 6.8 G/DL (ref 6–8.4)
RBC # BLD AUTO: 4.39 M/UL (ref 4.6–6.2)
SODIUM SERPL-SCNC: 139 MMOL/L (ref 136–145)
WBC # BLD AUTO: 6.81 K/UL (ref 3.9–12.7)

## 2019-05-02 PROCEDURE — 96415 CHEMO IV INFUSION ADDL HR: CPT

## 2019-05-02 PROCEDURE — 99999 PR PBB SHADOW E&M-EST. PATIENT-LVL III: ICD-10-PCS | Mod: PBBFAC,,, | Performed by: INTERNAL MEDICINE

## 2019-05-02 PROCEDURE — 63600175 PHARM REV CODE 636 W HCPCS: Performed by: INTERNAL MEDICINE

## 2019-05-02 PROCEDURE — 96413 CHEMO IV INFUSION 1 HR: CPT

## 2019-05-02 PROCEDURE — 99215 OFFICE O/P EST HI 40 MIN: CPT | Mod: 25,S$GLB,, | Performed by: INTERNAL MEDICINE

## 2019-05-02 PROCEDURE — 99215 PR OFFICE/OUTPT VISIT, EST, LEVL V, 40-54 MIN: ICD-10-PCS | Mod: 25,S$GLB,, | Performed by: INTERNAL MEDICINE

## 2019-05-02 PROCEDURE — S0028 INJECTION, FAMOTIDINE, 20 MG: HCPCS | Performed by: INTERNAL MEDICINE

## 2019-05-02 PROCEDURE — 85025 COMPLETE CBC W/AUTO DIFF WBC: CPT

## 2019-05-02 PROCEDURE — 25000003 PHARM REV CODE 250: Performed by: INTERNAL MEDICINE

## 2019-05-02 PROCEDURE — 80053 COMPREHEN METABOLIC PANEL: CPT

## 2019-05-02 PROCEDURE — 96367 TX/PROPH/DG ADDL SEQ IV INF: CPT

## 2019-05-02 PROCEDURE — 99999 PR PBB SHADOW E&M-EST. PATIENT-LVL III: CPT | Mod: PBBFAC,,, | Performed by: INTERNAL MEDICINE

## 2019-05-02 PROCEDURE — 96375 TX/PRO/DX INJ NEW DRUG ADDON: CPT

## 2019-05-02 PROCEDURE — 36415 COLL VENOUS BLD VENIPUNCTURE: CPT

## 2019-05-02 RX ORDER — DEXAMETHASONE SODIUM PHOSPHATE 4 MG/ML
8 INJECTION, SOLUTION INTRA-ARTICULAR; INTRALESIONAL; INTRAMUSCULAR; INTRAVENOUS; SOFT TISSUE
Status: CANCELLED
Start: 2019-05-02

## 2019-05-02 RX ORDER — FAMOTIDINE 10 MG/ML
20 INJECTION INTRAVENOUS
Status: COMPLETED | OUTPATIENT
Start: 2019-05-02 | End: 2019-05-02

## 2019-05-02 RX ORDER — FAMOTIDINE 20 MG/50ML
20 INJECTION, SOLUTION INTRAVENOUS
Status: DISCONTINUED | OUTPATIENT
Start: 2019-05-02 | End: 2019-05-02

## 2019-05-02 RX ORDER — ACETAMINOPHEN 325 MG/1
650 TABLET ORAL
Status: COMPLETED | OUTPATIENT
Start: 2019-05-02 | End: 2019-05-02

## 2019-05-02 RX ORDER — FAMOTIDINE 10 MG/ML
20 INJECTION INTRAVENOUS 2 TIMES DAILY
Status: DISCONTINUED | OUTPATIENT
Start: 2019-05-02 | End: 2019-05-02

## 2019-05-02 RX ORDER — FAMOTIDINE 20 MG/50ML
20 INJECTION, SOLUTION INTRAVENOUS
Status: CANCELLED
Start: 2019-05-02

## 2019-05-02 RX ORDER — SODIUM CHLORIDE 0.9 % (FLUSH) 0.9 %
10 SYRINGE (ML) INJECTION
Status: CANCELLED | OUTPATIENT
Start: 2019-05-02

## 2019-05-02 RX ORDER — ACETAMINOPHEN 325 MG/1
650 TABLET ORAL
Status: CANCELLED | OUTPATIENT
Start: 2019-05-02

## 2019-05-02 RX ORDER — HEPARIN 100 UNIT/ML
500 SYRINGE INTRAVENOUS
Status: CANCELLED | OUTPATIENT
Start: 2019-05-02

## 2019-05-02 RX ORDER — DEXAMETHASONE SODIUM PHOSPHATE 4 MG/ML
8 INJECTION, SOLUTION INTRA-ARTICULAR; INTRALESIONAL; INTRAMUSCULAR; INTRAVENOUS; SOFT TISSUE
Status: COMPLETED | OUTPATIENT
Start: 2019-05-02 | End: 2019-05-02

## 2019-05-02 RX ORDER — MEPERIDINE HYDROCHLORIDE 50 MG/ML
25 INJECTION INTRAMUSCULAR; INTRAVENOUS; SUBCUTANEOUS
Status: CANCELLED | OUTPATIENT
Start: 2019-05-02

## 2019-05-02 RX ADMIN — ACETAMINOPHEN 650 MG: 325 TABLET ORAL at 10:05

## 2019-05-02 RX ADMIN — DEXAMETHASONE SODIUM PHOSPHATE 8 MG: 4 INJECTION, SOLUTION INTRA-ARTICULAR; INTRALESIONAL; INTRAMUSCULAR; INTRAVENOUS; SOFT TISSUE at 10:05

## 2019-05-02 RX ADMIN — FAMOTIDINE 20 MG: 10 INJECTION, SOLUTION INTRAVENOUS at 10:05

## 2019-05-02 RX ADMIN — RITUXIMAB 656 MG: 10 INJECTION, SOLUTION INTRAVENOUS at 11:05

## 2019-05-02 RX ADMIN — DIPHENHYDRAMINE HYDROCHLORIDE 50 MG: 50 INJECTION, SOLUTION INTRAMUSCULAR; INTRAVENOUS at 10:05

## 2019-05-02 NOTE — TELEPHONE ENCOUNTER
----- Message from Rusty Salazar MD sent at 5/2/2019  4:17 PM CDT -----  Contact: Pt   I am not sure we what might be going on here.  If it does not get better by tomorrow please have him call us.  ----- Message -----  From: Crissy Warner MA  Sent: 5/2/2019   4:12 PM  To: Rusty Salazar MD     Please advise of numbness in finger  ----- Message -----  From: Génesis Johnson  Sent: 5/2/2019   3:24 PM  To: [facility] Staff    Pt is calling .Type:  Needs Medical Advice    Who Called:  Pt   Symptoms (please be specific): right pinky finger is numb   How long has patient had these symptoms:  Pt state that after receiving infusion today 10AM-2PM pt states that he just wanted doctor to be aware    Pharmacy name and phone #:    Would the patient rather a call back or a response via [facility]?call back   Best Call Back Number: [phone]         .Thank You  Zenaida Johnson

## 2019-05-02 NOTE — DISCHARGE INSTRUCTIONS
"[facility]  [address] [address]  or  [address]  [phone] phone     [phone] fax  Hours of Operation: Monday- Friday 8:00am- 5:00pm  After hours phone  [phone]  Hematology / Oncology Physicians on call      Dr. Willian Chisholm      Please call with any concerns regarding your appointment today.    Support Groups/Classes    Support groups and classes are being offered at the   [facility] and [employer]!!    "Cooking with Cancer" (Nutrition Class):  Second Wednesday of each month   at noon at the Cancer Center.  Metastatic Support Group:  Third Tuesday of each month   at noon at the [facility].  Next Steps Class/Group: Second and fourth Thursday of each month at noon at the [facility].  [facility] (Breast Cancer Support Group): First Tuesday of each month   at 5:30pm at the [facility] location.  [employer] Mobile: [facility]: Second and third Tuesday of each month from 7:30am - 2pm.  [facility]: First and fourth Tuesday of each month from 7:30am - 2pm    If you are interested in attending or would like more information please ask our social workers or your nurse!WAYS TO HELP PREVENT INFECTION         WASH YOUR HANDS OFTEN DURING THE DAY, ESPECIALLY BEFORE YOU EAT, AFTER USING THE BATHROOM, AND AFTER TOUCHING ANIMALS     STAY AWAY FROM PEOPLE WHO HAVE ILLNESSES YOU CAN CATCH; SUCH AS COLDS, FLU, CHICKEN POX     TRY TO AVOID CROWDS     STAY AWAY FROM CHILDREN WHO RECENTLY HAVE RECEIVED LIVE VIRUS VACCINES     MAINTAIN GOOD MOUTH CARE     DO NOT SQUEEZE OR SCRATCH PIMPLES     CLEAN CUTS & SCRAPES RIGHT AWAY AND DAILY UNTIL HEALED WITH WARM WATER, SOAP & AN ANTISEPTIC     AVOID CONTACT WITH LITTER BOXES, BIRD CAGES, & FISH TANKS     AVOID STANDING WATER, IE., BIRD BATHS, FLOWER POTS/VASES, OR HUMIDIFIERS     WEAR GLOVES WHEN GARDENING OR CLEANING UP AFTER OTHERS, ESPECIALLY BABIES & SMALL " CHILDREN    DO NOT EAT RAW FISH, SEAFOOD, MEAT, OR EGGS

## 2019-05-02 NOTE — PLAN OF CARE
Problem: Adult Inpatient Plan of Care  Goal: Plan of Care Review  Outcome: Ongoing (interventions implemented as appropriate)  Pt stated feeling great today and no complaints

## 2019-05-02 NOTE — PROGRESS NOTES
Reason for visit: Follow up for ITP    HPI:   The patient is a 58-year-old  male who presents to the hematology oncology clinic today to discuss further evaluation and management recommendations for immune mediated thrombocytopenia.  The patient had previously been followed by Dr. Marija Patel and Dr. Deven Norris with hematology oncology. The patient reports that he was diagnosed with immune mediated thrombocytopenia in 2012 when he noticed bruising on his lower extremities and sought medical attention for this. The patient reports that he did not tolerate high doses of prednisone very well. He has since been treated with chronic infusions of IVIG with good response.  He denies any melena, hematochezia, hematemesis, hemoptysis, hematuria or epistaxis. He denies any fever, chills or night sweats. He denies any bowel or urinary complaints.  The patient has completed 4 doses of weekly Rituxan on 2013 and tolerated this well. He denies any chest pain or shortness of breath. He denies any nausea or vomiting. I have reviewed all of his interval clinical history in EPIC.   His medical insurance now allows him to follow up back with me at [facility]. He has not required any treatment of his chronic ITP in the interim.    PAST MEDICAL HISTORY:   1. ITP  2. H. Pylori positive gastritis    SURGICAL HISTORY:   Denies    FAMILY HISTORY: The patient's mother  from complications related to pancreatic cancer at the age of 67. His brother  from complications related to lung cancer in his 40s. A paternal uncle  from complications related to throat cancer. A maternal uncle  from complications related to lung cancer. He denies any immediate family members with bleeding/clotting disorders.    SOCIAL HISTORY: He reports a 63+ pack year smoking history and is a current smoker. He states that he has moderated alcohol consumption. He has never used any recreational drugs. He works as a  . He is  and has 7 children. He lives in [place].    ALLERGIES: [NKDA]    MEDICATIONS: [Medcard has been reviewed and/or reconciled.]    REVIEW OF SYSTEMS:   GENERAL: [No fevers, chills or sweats. No fatigue, weight loss or loss of appetite.]  HEENT: [No blurred vision, tinnitus, nasal discharge, sorethroat or dysphagia.]  HEART: [No chest pain, palpitations or shortness of breath.]   LUNGS: [No cough, hemoptysis or breathing problems.]  ABDOMEN: [No abdominal pain, nausea, vomiting, diarrhea, constipation or melena.]  GENITOURINARY: [No dysuria, bleeding or malodorous discharge.]  NEURO: [No headache, dizziness or vertigo.]  HEMATOLOGY: [No easy bruising, spontaneous bleeding or blood clots in the past].  MUSCULOSKELETAL: [No arthralgias, myalgias or bone pains.]  SKIN: [No rashes or skin lesions.]  PSYCHIATRY: [No depression or anxiety.]    PHYSICAL EXAMINATION:   VS: Reviewed on nurse's notes.  APPEARANCE: The patient is a well-groomed  male who appears in no acute distress.   HEENT: No scleral icterus. Right external auditory canal clear. Left canal inflamed. No oral ulcers, lesions. Throat clear  HEAD: No sinus tenderness.  NECK: Supple. No palpable lymphadenopathy. Thyroid non-tender, no palpable masses  CHEST: Breath sounds clear bilaterally. No rales. No rhonchi. Unlabored respirations.  CARDIOVASCULAR: Normal S1, S2. Normal rate. Regular rhythm.  ABDOMEN: Bowel sounds slightly hyperactive. No tenderness. No abdominal distention. No hepatomegaly. No splenomegaly.  LYMPHATIC: No palpable supraclavicular, axillary nodes  EXTREMITIES: No clubbing, cyanosis, edema  SKIN: No lesions. No petechiae. No ecchymoses. No induration or nodules  NEUROLOGIC: No focal findings. Alert & Oriented x 3. Mood appropriate to affect    LABS:   Reviewed    IMAGING:  Reviewed    IMPRESSION:  1. Immune mediated thrombocytopenia    PLAN:  1.  I had a detailed discussion with the patient today with  regard to his current clinical situation.  Review of CBC from today shows interval improvement in his platelet count.  At this time I have recommended initiation of IV Rituxan for treatment of his ITP.  The patient had excellent response when this was last given in 2013.  Rationale for this was reviewed in detail and the patient expressed understanding and agreement.  The patient has previously not responded to corticosteroid therapy for his ITP.  He has also had complications related to avascular necrosis with corticosteroids in the past.  2. I have discussed with the patient that he should seek immediate medical attention for any signs/symptoms of bleeding. He expressed understanding of this. He knows to present to the emergency room at [facility], [place] if this happens.     Follow-up in 1 week with labs for week 4/4 of rituxan. I will initially plan for 4 weeks of therapy with extension upto 8 weeks depending on clinical response. He knows to call sooner for any new problems or questions.    Rusty Salazar MD

## 2019-05-09 ENCOUNTER — OFFICE VISIT (OUTPATIENT)
Dept: HEMATOLOGY/ONCOLOGY | Facility: CLINIC | Age: 59
End: 2019-05-09
Payer: COMMERCIAL

## 2019-05-09 ENCOUNTER — LAB VISIT (OUTPATIENT)
Dept: LAB | Facility: HOSPITAL | Age: 59
End: 2019-05-09
Attending: INTERNAL MEDICINE
Payer: COMMERCIAL

## 2019-05-09 ENCOUNTER — INFUSION (OUTPATIENT)
Dept: INFUSION THERAPY | Facility: HOSPITAL | Age: 59
End: 2019-05-09
Attending: INTERNAL MEDICINE
Payer: COMMERCIAL

## 2019-05-09 VITALS — SYSTOLIC BLOOD PRESSURE: 120 MMHG | HEART RATE: 79 BPM | DIASTOLIC BLOOD PRESSURE: 76 MMHG

## 2019-05-09 VITALS
BODY MASS INDEX: 21.97 KG/M2 | DIASTOLIC BLOOD PRESSURE: 76 MMHG | HEART RATE: 99 BPM | SYSTOLIC BLOOD PRESSURE: 114 MMHG | OXYGEN SATURATION: 98 % | TEMPERATURE: 98 F | HEIGHT: 67 IN | WEIGHT: 140 LBS

## 2019-05-09 DIAGNOSIS — D69.3 CHRONIC ITP (IDIOPATHIC THROMBOCYTOPENIA): Primary | ICD-10-CM

## 2019-05-09 DIAGNOSIS — D69.3 CHRONIC ITP (IDIOPATHIC THROMBOCYTOPENIA): ICD-10-CM

## 2019-05-09 LAB
ALBUMIN SERPL BCP-MCNC: 4 G/DL (ref 3.5–5.2)
ALP SERPL-CCNC: 51 U/L (ref 55–135)
ALT SERPL W/O P-5'-P-CCNC: 24 U/L (ref 10–44)
ANION GAP SERPL CALC-SCNC: 11 MMOL/L (ref 8–16)
AST SERPL-CCNC: 31 U/L (ref 10–40)
BASOPHILS # BLD AUTO: 0.09 K/UL (ref 0–0.2)
BASOPHILS NFR BLD: 1.3 % (ref 0–1.9)
BILIRUB SERPL-MCNC: 0.7 MG/DL (ref 0.1–1)
BUN SERPL-MCNC: 16 MG/DL (ref 6–20)
CALCIUM SERPL-MCNC: 9.5 MG/DL (ref 8.7–10.5)
CHLORIDE SERPL-SCNC: 104 MMOL/L (ref 95–110)
CO2 SERPL-SCNC: 24 MMOL/L (ref 23–29)
CREAT SERPL-MCNC: 1 MG/DL (ref 0.5–1.4)
DIFFERENTIAL METHOD: ABNORMAL
EOSINOPHIL # BLD AUTO: 0.4 K/UL (ref 0–0.5)
EOSINOPHIL NFR BLD: 6.2 % (ref 0–8)
ERYTHROCYTE [DISTWIDTH] IN BLOOD BY AUTOMATED COUNT: 12.9 % (ref 11.5–14.5)
EST. GFR  (AFRICAN AMERICAN): >60 ML/MIN/1.73 M^2
EST. GFR  (NON AFRICAN AMERICAN): >60 ML/MIN/1.73 M^2
GLUCOSE SERPL-MCNC: 115 MG/DL (ref 70–110)
HCT VFR BLD AUTO: 44 % (ref 40–54)
HGB BLD-MCNC: 14.8 G/DL (ref 14–18)
LYMPHOCYTES # BLD AUTO: 2.3 K/UL (ref 1–4.8)
LYMPHOCYTES NFR BLD: 33.5 % (ref 18–48)
MCH RBC QN AUTO: 32.5 PG (ref 27–31)
MCHC RBC AUTO-ENTMCNC: 33.6 G/DL (ref 32–36)
MCV RBC AUTO: 97 FL (ref 82–98)
MONOCYTES # BLD AUTO: 0.4 K/UL (ref 0.3–1)
MONOCYTES NFR BLD: 5.8 % (ref 4–15)
NEUTROPHILS # BLD AUTO: 3.6 K/UL (ref 1.8–7.7)
NEUTROPHILS NFR BLD: 53.5 % (ref 38–73)
PLATELET # BLD AUTO: 191 K/UL (ref 150–350)
PLATELET BLD QL SMEAR: ABNORMAL
PMV BLD AUTO: 9.2 FL (ref 9.2–12.9)
POTASSIUM SERPL-SCNC: 4.8 MMOL/L (ref 3.5–5.1)
PROT SERPL-MCNC: 7.4 G/DL (ref 6–8.4)
RBC # BLD AUTO: 4.56 M/UL (ref 4.6–6.2)
SODIUM SERPL-SCNC: 139 MMOL/L (ref 136–145)
WBC # BLD AUTO: 6.74 K/UL (ref 3.9–12.7)

## 2019-05-09 PROCEDURE — 99999 PR PBB SHADOW E&M-EST. PATIENT-LVL III: ICD-10-PCS | Mod: PBBFAC,,, | Performed by: INTERNAL MEDICINE

## 2019-05-09 PROCEDURE — 36415 COLL VENOUS BLD VENIPUNCTURE: CPT

## 2019-05-09 PROCEDURE — 96375 TX/PRO/DX INJ NEW DRUG ADDON: CPT

## 2019-05-09 PROCEDURE — 96413 CHEMO IV INFUSION 1 HR: CPT

## 2019-05-09 PROCEDURE — 99999 PR PBB SHADOW E&M-EST. PATIENT-LVL III: CPT | Mod: PBBFAC,,, | Performed by: INTERNAL MEDICINE

## 2019-05-09 PROCEDURE — 85025 COMPLETE CBC W/AUTO DIFF WBC: CPT

## 2019-05-09 PROCEDURE — 25000003 PHARM REV CODE 250: Performed by: INTERNAL MEDICINE

## 2019-05-09 PROCEDURE — 99215 PR OFFICE/OUTPT VISIT, EST, LEVL V, 40-54 MIN: ICD-10-PCS | Mod: 25,S$GLB,, | Performed by: INTERNAL MEDICINE

## 2019-05-09 PROCEDURE — 99215 OFFICE O/P EST HI 40 MIN: CPT | Mod: 25,S$GLB,, | Performed by: INTERNAL MEDICINE

## 2019-05-09 PROCEDURE — 96415 CHEMO IV INFUSION ADDL HR: CPT

## 2019-05-09 PROCEDURE — 96367 TX/PROPH/DG ADDL SEQ IV INF: CPT

## 2019-05-09 PROCEDURE — 80053 COMPREHEN METABOLIC PANEL: CPT

## 2019-05-09 PROCEDURE — 63600175 PHARM REV CODE 636 W HCPCS: Mod: JW,JG | Performed by: INTERNAL MEDICINE

## 2019-05-09 PROCEDURE — S0028 INJECTION, FAMOTIDINE, 20 MG: HCPCS | Performed by: INTERNAL MEDICINE

## 2019-05-09 RX ORDER — DEXAMETHASONE SODIUM PHOSPHATE 4 MG/ML
8 INJECTION, SOLUTION INTRA-ARTICULAR; INTRALESIONAL; INTRAMUSCULAR; INTRAVENOUS; SOFT TISSUE
Status: COMPLETED | OUTPATIENT
Start: 2019-05-09 | End: 2019-05-09

## 2019-05-09 RX ORDER — ACETAMINOPHEN 325 MG/1
650 TABLET ORAL
Status: COMPLETED | OUTPATIENT
Start: 2019-05-09 | End: 2019-05-09

## 2019-05-09 RX ORDER — SODIUM CHLORIDE 0.9 % (FLUSH) 0.9 %
10 SYRINGE (ML) INJECTION
Status: CANCELLED | OUTPATIENT
Start: 2019-05-09

## 2019-05-09 RX ORDER — MEPERIDINE HYDROCHLORIDE 25 MG/ML
25 INJECTION INTRAMUSCULAR; INTRAVENOUS; SUBCUTANEOUS
Status: DISCONTINUED | OUTPATIENT
Start: 2019-05-09 | End: 2019-05-09 | Stop reason: HOSPADM

## 2019-05-09 RX ORDER — HEPARIN 100 UNIT/ML
500 SYRINGE INTRAVENOUS
Status: CANCELLED | OUTPATIENT
Start: 2019-05-09

## 2019-05-09 RX ORDER — MEPERIDINE HYDROCHLORIDE 50 MG/ML
25 INJECTION INTRAMUSCULAR; INTRAVENOUS; SUBCUTANEOUS
Status: CANCELLED | OUTPATIENT
Start: 2019-05-09

## 2019-05-09 RX ORDER — ACETAMINOPHEN 325 MG/1
650 TABLET ORAL
Status: CANCELLED | OUTPATIENT
Start: 2019-05-09

## 2019-05-09 RX ORDER — FAMOTIDINE 10 MG/ML
20 INJECTION INTRAVENOUS
Status: CANCELLED
Start: 2019-05-09

## 2019-05-09 RX ORDER — DEXAMETHASONE SODIUM PHOSPHATE 4 MG/ML
8 INJECTION, SOLUTION INTRA-ARTICULAR; INTRALESIONAL; INTRAMUSCULAR; INTRAVENOUS; SOFT TISSUE
Status: CANCELLED
Start: 2019-05-09

## 2019-05-09 RX ORDER — FAMOTIDINE 10 MG/ML
20 INJECTION INTRAVENOUS
Status: COMPLETED | OUTPATIENT
Start: 2019-05-09 | End: 2019-05-09

## 2019-05-09 RX ADMIN — DIPHENHYDRAMINE HYDROCHLORIDE 50 MG: 50 INJECTION, SOLUTION INTRAMUSCULAR; INTRAVENOUS at 10:05

## 2019-05-09 RX ADMIN — FAMOTIDINE 20 MG: 10 INJECTION, SOLUTION INTRAVENOUS at 10:05

## 2019-05-09 RX ADMIN — RITUXIMAB 649 MG: 10 INJECTION, SOLUTION INTRAVENOUS at 10:05

## 2019-05-09 RX ADMIN — ACETAMINOPHEN 650 MG: 325 TABLET ORAL at 10:05

## 2019-05-09 RX ADMIN — DEXAMETHASONE SODIUM PHOSPHATE 8 MG: 4 INJECTION, SOLUTION INTRA-ARTICULAR; INTRALESIONAL; INTRAMUSCULAR; INTRAVENOUS; SOFT TISSUE at 10:05

## 2019-05-09 RX ADMIN — SODIUM CHLORIDE: 0.9 INJECTION, SOLUTION INTRAVENOUS at 10:05

## 2019-05-09 NOTE — DISCHARGE INSTRUCTIONS
[facility]  [address] [facility]  [address]  [phone] phone     [phone] fax  Hours of Operation: Monday- Friday 8:00am- 5:00pm  After hours phone  [phone]  Hematology / Oncology Physicians on call      Dr. Willian Chisholm      Please call with any concerns regarding your appointment today.    FALL PREVENTION   Falls often occur due to slipping, tripping or losing your balance. Here are ways to reduce your risk of falling again.   Was there anything that caused your fall that can be fixed, removed or replaced?   Make your home safe by keeping walkways clear of objects you may trip over.   Use non-slip pads under rugs.   Do not walk in poorly lit areas.   Do not stand on chairs or wobbly ladders.   Use caution when reaching overhead or looking upward. This position can cause a loss of balance.   Be sure your shoes fit properly, have non-slip bottoms and are in good condition.   Be cautious when going up and down stairs, curbs, and when walking on uneven sidewalks.   If your balance is poor, consider using a cane or walker.   If your fall was related to alcohol use, stop or limit alcohol intake.   If your fall was related to use of sleeping medicines, talk to your doctor about this. You may need to reduce your dosage at bedtime if you awaken during the night to go to the bathroom.   To reduce the need for nighttime bathroom trips:   Avoid drinking fluids for several hours before going to bed   Empty your bladder before going to bed   Men can keep a urinal at the bedside   © 2308-6426 Jacki Medeiros, [address] [place] [id number]. All rights reserved. This information is not intended as a substitute for professional medical care. Always follow your healthcare professional's instructions.  WAYS TO HELP PREVENT INFECTION         WASH YOUR HANDS OFTEN DURING THE DAY, ESPECIALLY BEFORE YOU EAT, AFTER  "USING THE BATHROOM, AND AFTER TOUCHING ANIMALS     STAY AWAY FROM PEOPLE WHO HAVE ILLNESSES YOU CAN CATCH; SUCH AS COLDS, FLU, CHICKEN POX     TRY TO AVOID CROWDS     STAY AWAY FROM CHILDREN WHO RECENTLY HAVE RECEIVED LIVE VIRUS VACCINES     MAINTAIN GOOD MOUTH CARE     DO NOT SQUEEZE OR SCRATCH PIMPLES     CLEAN CUTS & SCRAPES RIGHT AWAY AND DAILY UNTIL HEALED WITH WARM WATER, SOAP & AN ANTISEPTIC     AVOID CONTACT WITH LITTER BOXES, BIRD CAGES, & FISH TANKS     AVOID STANDING WATER, IE., BIRD BATHS, FLOWER POTS/VASES, OR HUMIDIFIERS     WEAR GLOVES WHEN GARDENING OR CLEANING UP AFTER OTHERS, ESPECIALLY BABIES & SMALL CHILDREN    DO NOT EAT RAW FISH, SEAFOOD, MEAT, OR EGGSSupport Groups/Classes    Support groups and classes are being offered at the   [facility] and better.!!    "Cooking with Cancer" (Nutrition Class):  Second Wednesday of each month   at noon at the [facility].  Metastatic Support Group:  Third Tuesday of each month   at noon at the [facility].  Next Steps Class/Group: Second and fourth Thursday of each month at noon at the [facility].  [facility] (Breast Cancer Support Group): First Tuesday of each month   at 5:30pm at the [facility] location.  [employer]: [facility]: Second and third Tuesday of each month from 7:30am - 2pm.  [facility]: First and fourth Tuesday of each month from 7:30am - 2pm     If you are interested in attending or would like more information please ask our social workers or your nurse!  "

## 2019-05-09 NOTE — PLAN OF CARE
Problem: Adult Inpatient Plan of Care  Goal: Plan of Care Review  Outcome: Ongoing (interventions implemented as appropriate)  Pt stated feeling great and no complaints

## 2019-05-09 NOTE — PROGRESS NOTES
Subjective:       Patient ID: Jaxson Burch Jr. is a 58 y.o. male.    Chief Complaint: Chronic ITP (idiopathic thrombocytopenia) [D69.3]  HPI: We have an opportunity to see Mr. Jaxson Burch Jr. in Hematology Oncology clinic at [facility] on 05/09/2019.  Mr. Jaxson Burch Jr. is a 58 y.o. gentleman with ITP on weekly ritoxan with response.     No history exists.     Past Medical History:   Diagnosis Date    Arthritis     Gastritis     ITP (idiopathic thrombocytopenic purpura)      Family History   Problem Relation Age of Onset    Pancreatic cancer Mother     Colon cancer Neg Hx      Social History     Socioeconomic History    Marital status:      Spouse name: Not on file    Number of children: Not on file    Years of education: Not on file    Highest education level: Not on file   Occupational History    Not on file   Social Needs    Financial resource strain: Not on file    Food insecurity:     Worry: Not on file     Inability: Not on file    Transportation needs:     Medical: Not on file     Non-medical: Not on file   Tobacco Use    Smoking status: Current Every Day Smoker     Packs/day: 0.50     Years: 40.00     Pack years: 20.00     Types: Cigarettes    Smokeless tobacco: Never Used   Substance and Sexual Activity    Alcohol use: Yes     Alcohol/week: 0.0 oz     Comment: socially    Drug use: No    Sexual activity: Yes     Partners: Female   Lifestyle    Physical activity:     Days per week: Not on file     Minutes per session: Not on file    Stress: Not on file   Relationships    Social connections:     Talks on phone: Not on file     Gets together: Not on file     Attends Synagogue service: Not on file     Active member of club or organization: Not on file     Attends meetings of clubs or organizations: Not on file     Relationship status: Not on file   Other Topics Concern    Not on file   Social History Narrative    Not on file     Past Surgical History:   Procedure  Laterality Date    COLONOSCOPY N/A 11/29/2018    Performed by Brett Townsend MD at [facility]    EGD (ESOPHAGOGASTRODUODENOSCOPY) N/A 8/2/2013    Performed by Roberto Chao MD at [facility] END[facility]    JOINT REPLACEMENT Right     hip     Current Outpatient Medications   Medication Sig Dispense Refill    atorvastatin (LIPITOR) 20 MG tablet Take 1 tablet (20 mg total) by mouth once daily. 90 tablet 0    indomethacin (INDOCIN) 50 MG capsule Take 50 mg by mouth 3 (three) times daily.      mometasone (ELOCON) 0.1 % ointment       omeprazole (PRILOSEC) 20 MG capsule Take 1 capsule (20 mg total) by mouth once daily. 30 capsule 11    oxyCODONE-acetaminophen (PERCOCET)  mg per tablet Take 1 tablet by mouth every 4 (four) hours as needed for Pain.      oxyCODONE-acetaminophen (PERCOCET)  mg per tablet       predniSONE (DELTASONE) 20 MG tablet Take 1 tablet (20 mg total) by mouth once daily. 4 tablet 0    tadalafil (CIALIS) 20 MG Tab Take 20 mg by mouth daily as needed.      tadalafil (CIALIS) 20 MG Tab Take 20 mg by mouth daily as needed.      traZODone (DESYREL) 50 MG tablet Take 1 tablet (50 mg total) by mouth every evening. 90 tablet 0    zolpidem (AMBIEN) 5 MG Tab Take 5 mg by mouth nightly as needed.      zolpidem (AMBIEN) 5 MG Tab Take 5 mg by mouth.       No current facility-administered medications for this visit.        Labs:  Lab Results   Component Value Date    WBC 6.74 05/09/2019    HGB 14.8 05/09/2019    HCT 44.0 05/09/2019    MCV 97 05/09/2019     05/09/2019     BMP  Lab Results   Component Value Date     05/09/2019    K 4.8 05/09/2019     05/09/2019    CO2 24 05/09/2019    BUN 16 05/09/2019    CREATININE 1.0 05/09/2019    CALCIUM 9.5 05/09/2019    ANIONGAP 11 05/09/2019    ESTGFRAFRICA >60 05/09/2019    EGFRNONAA >60 05/09/2019     Lab Results   Component Value Date    ALT 24 05/09/2019    AST 31 05/09/2019    ALKPHOS 51 (L) 05/09/2019    BILITOT 0.7 05/09/2019       No  results found for: IRON, TIBC, FERRITIN, SATURATEDIRO  No results found for: [id number]  No results found for: FOLATE  No results found for: TSH    I have reviewed the radiology reports and examined the scan/xray images.    Review of Systems   Constitutional: Negative.    HENT: Negative.    Eyes: Negative.    Respiratory: Negative.    Cardiovascular: Negative.    Gastrointestinal: Negative.    Endocrine: Negative.    Genitourinary: Negative.    Musculoskeletal: Negative.    Skin: Negative.    Allergic/Immunologic: Negative.    Neurological: Negative.    Hematological: Negative.    Psychiatric/Behavioral: Negative.      ECOG SCORE    0 - Fully active-able to carry on all pre-disease performance without restriction            Objective:     Vitals:    05/09/19 0846   BP: 114/76   Pulse: 99   Temp: 97.7 °F (36.5 °C)   Body mass index is 21.93 kg/m².  Physical Exam   Constitutional: He is oriented to person, place, and time. He appears well-developed and well-nourished.   HENT:   Head: Normocephalic and atraumatic.   Eyes: Conjunctivae and EOM are normal.   Neck: Normal range of motion. Neck supple.   Cardiovascular: Normal rate and regular rhythm.   Pulmonary/Chest: Effort normal and breath sounds normal.   Abdominal: Soft. Bowel sounds are normal.   Musculoskeletal: Normal range of motion.   Neurological: He is alert and oriented to person, place, and time.   Skin: Skin is warm and dry.   Psychiatric: He has a normal mood and affect. His behavior is normal. Judgment and thought content normal.   Nursing note and vitals reviewed.        Assessment:      1. Chronic ITP (idiopathic thrombocytopenia)           Plan:     Chronic ITP (idiopathic thrombocytopenia)      Will give 4 th treatment, reassess next week with Dr. Salazar. May need more treatment pending platelets.

## 2019-05-16 ENCOUNTER — OFFICE VISIT (OUTPATIENT)
Dept: HEMATOLOGY/ONCOLOGY | Facility: CLINIC | Age: 59
End: 2019-05-16
Payer: COMMERCIAL

## 2019-05-16 ENCOUNTER — INFUSION (OUTPATIENT)
Dept: INFUSION THERAPY | Facility: HOSPITAL | Age: 59
End: 2019-05-16
Attending: INTERNAL MEDICINE
Payer: COMMERCIAL

## 2019-05-16 VITALS
RESPIRATION RATE: 16 BRPM | HEIGHT: 67 IN | HEART RATE: 75 BPM | BODY MASS INDEX: 21.87 KG/M2 | SYSTOLIC BLOOD PRESSURE: 114 MMHG | DIASTOLIC BLOOD PRESSURE: 76 MMHG | WEIGHT: 139.31 LBS

## 2019-05-16 VITALS
SYSTOLIC BLOOD PRESSURE: 114 MMHG | BODY MASS INDEX: 21.87 KG/M2 | WEIGHT: 139.31 LBS | OXYGEN SATURATION: 97 % | HEART RATE: 73 BPM | HEIGHT: 67 IN | TEMPERATURE: 97 F | DIASTOLIC BLOOD PRESSURE: 78 MMHG

## 2019-05-16 DIAGNOSIS — D69.3 CHRONIC ITP (IDIOPATHIC THROMBOCYTOPENIA): Primary | ICD-10-CM

## 2019-05-16 PROCEDURE — 99215 OFFICE O/P EST HI 40 MIN: CPT | Mod: 25,S$GLB,, | Performed by: INTERNAL MEDICINE

## 2019-05-16 PROCEDURE — 25000003 PHARM REV CODE 250: Performed by: INTERNAL MEDICINE

## 2019-05-16 PROCEDURE — 96375 TX/PRO/DX INJ NEW DRUG ADDON: CPT

## 2019-05-16 PROCEDURE — 99999 PR PBB SHADOW E&M-EST. PATIENT-LVL III: ICD-10-PCS | Mod: PBBFAC,,, | Performed by: INTERNAL MEDICINE

## 2019-05-16 PROCEDURE — 99999 PR PBB SHADOW E&M-EST. PATIENT-LVL III: CPT | Mod: PBBFAC,,, | Performed by: INTERNAL MEDICINE

## 2019-05-16 PROCEDURE — 96415 CHEMO IV INFUSION ADDL HR: CPT

## 2019-05-16 PROCEDURE — 63600175 PHARM REV CODE 636 W HCPCS: Mod: JG | Performed by: INTERNAL MEDICINE

## 2019-05-16 PROCEDURE — 96368 THER/DIAG CONCURRENT INF: CPT

## 2019-05-16 PROCEDURE — S0028 INJECTION, FAMOTIDINE, 20 MG: HCPCS | Performed by: INTERNAL MEDICINE

## 2019-05-16 PROCEDURE — 99215 PR OFFICE/OUTPT VISIT, EST, LEVL V, 40-54 MIN: ICD-10-PCS | Mod: 25,S$GLB,, | Performed by: INTERNAL MEDICINE

## 2019-05-16 PROCEDURE — 96413 CHEMO IV INFUSION 1 HR: CPT

## 2019-05-16 PROCEDURE — 96367 TX/PROPH/DG ADDL SEQ IV INF: CPT

## 2019-05-16 RX ORDER — MEPERIDINE HYDROCHLORIDE 25 MG/ML
25 INJECTION INTRAMUSCULAR; INTRAVENOUS; SUBCUTANEOUS
Status: CANCELLED | OUTPATIENT
Start: 2019-05-16

## 2019-05-16 RX ORDER — FAMOTIDINE 10 MG/ML
20 INJECTION INTRAVENOUS
Status: CANCELLED | OUTPATIENT
Start: 2019-05-16

## 2019-05-16 RX ORDER — DEXAMETHASONE SODIUM PHOSPHATE 4 MG/ML
8 INJECTION, SOLUTION INTRA-ARTICULAR; INTRALESIONAL; INTRAMUSCULAR; INTRAVENOUS; SOFT TISSUE
Status: COMPLETED | OUTPATIENT
Start: 2019-05-16 | End: 2019-05-16

## 2019-05-16 RX ORDER — HEPARIN 100 UNIT/ML
500 SYRINGE INTRAVENOUS
Status: CANCELLED | OUTPATIENT
Start: 2019-05-16

## 2019-05-16 RX ORDER — ACETAMINOPHEN 325 MG/1
650 TABLET ORAL
Status: CANCELLED | OUTPATIENT
Start: 2019-05-16

## 2019-05-16 RX ORDER — FAMOTIDINE 20 MG/50ML
20 INJECTION, SOLUTION INTRAVENOUS
Status: COMPLETED | OUTPATIENT
Start: 2019-05-16 | End: 2019-05-16

## 2019-05-16 RX ORDER — FAMOTIDINE 10 MG/ML
20 INJECTION INTRAVENOUS
Status: DISCONTINUED | OUTPATIENT
Start: 2019-05-16 | End: 2019-05-16

## 2019-05-16 RX ORDER — SODIUM CHLORIDE 0.9 % (FLUSH) 0.9 %
10 SYRINGE (ML) INJECTION
Status: DISCONTINUED | OUTPATIENT
Start: 2019-05-16 | End: 2019-05-16 | Stop reason: HOSPADM

## 2019-05-16 RX ORDER — SODIUM CHLORIDE 0.9 % (FLUSH) 0.9 %
10 SYRINGE (ML) INJECTION
Status: CANCELLED | OUTPATIENT
Start: 2019-05-16

## 2019-05-16 RX ORDER — ACETAMINOPHEN 325 MG/1
650 TABLET ORAL
Status: COMPLETED | OUTPATIENT
Start: 2019-05-16 | End: 2019-05-16

## 2019-05-16 RX ORDER — DEXAMETHASONE SODIUM PHOSPHATE 4 MG/ML
8 INJECTION, SOLUTION INTRA-ARTICULAR; INTRALESIONAL; INTRAMUSCULAR; INTRAVENOUS; SOFT TISSUE
Status: CANCELLED | OUTPATIENT
Start: 2019-05-16

## 2019-05-16 RX ADMIN — ACETAMINOPHEN 650 MG: 325 TABLET ORAL at 09:05

## 2019-05-16 RX ADMIN — DEXAMETHASONE SODIUM PHOSPHATE 8 MG: 4 INJECTION, SOLUTION INTRA-ARTICULAR; INTRALESIONAL; INTRAMUSCULAR; INTRAVENOUS; SOFT TISSUE at 09:05

## 2019-05-16 RX ADMIN — DIPHENHYDRAMINE HYDROCHLORIDE 50 MG: 50 INJECTION, SOLUTION INTRAMUSCULAR; INTRAVENOUS at 10:05

## 2019-05-16 RX ADMIN — RITUXIMAB 649 MG: 10 INJECTION, SOLUTION INTRAVENOUS at 10:05

## 2019-05-16 RX ADMIN — FAMOTIDINE 20 MG: 20 INJECTION, SOLUTION INTRAVENOUS at 10:05

## 2019-05-16 NOTE — PROGRESS NOTES
Reason for visit: Follow up for ITP    HPI:   The patient is a 58-year-old  male who presents to the hematology oncology clinic today to discuss further evaluation and management recommendations for immune mediated thrombocytopenia.  The patient had previously been followed by Dr. Marija Patel and Dr. Deven Norris with hematology oncology. The patient reports that he was diagnosed with immune mediated thrombocytopenia in 2012 when he noticed bruising on his lower extremities and sought medical attention for this. The patient reports that he did not tolerate high doses of prednisone very well. He has since been treated with chronic infusions of IVIG with good response.  He denies any melena, hematochezia, hematemesis, hemoptysis, hematuria or epistaxis. He denies any fever, chills or night sweats. He denies any bowel or urinary complaints.  The patient has completed 4 doses of weekly Rituxan on 2013 and tolerated this well. He denies any chest pain or shortness of breath. He denies any nausea or vomiting. I have reviewed all of his interval clinical history in EPIC.   His medical insurance now allows him to follow up back with me at [facility]. He has not required any treatment of his chronic ITP in the interim.    PAST MEDICAL HISTORY:   1. ITP  2. H. Pylori positive gastritis    SURGICAL HISTORY:   Denies    FAMILY HISTORY: The patient's mother  from complications related to pancreatic cancer at the age of 67. His brother  from complications related to lung cancer in his 40s. A paternal uncle  from complications related to throat cancer. A maternal uncle  from complications related to lung cancer. He denies any immediate family members with bleeding/clotting disorders.    SOCIAL HISTORY: He reports a 63+ pack year smoking history and is a current smoker. He states that he has moderated alcohol consumption. He has never used any recreational drugs. He works as a  . He is  and has 7 children. He lives in [place].    ALLERGIES: [NKDA]    MEDICATIONS: [Medcard has been reviewed and/or reconciled.]    REVIEW OF SYSTEMS:   GENERAL: [No fevers, chills or sweats. No fatigue, weight loss or loss of appetite.]  HEENT: [No blurred vision, tinnitus, nasal discharge, sorethroat or dysphagia.]  HEART: [No chest pain, palpitations or shortness of breath.]   LUNGS: [No cough, hemoptysis or breathing problems.]  ABDOMEN: [No abdominal pain, nausea, vomiting, diarrhea, constipation or melena.]  GENITOURINARY: [No dysuria, bleeding or malodorous discharge.]  NEURO: [No headache, dizziness or vertigo.]  HEMATOLOGY: [No easy bruising, spontaneous bleeding or blood clots in the past].  MUSCULOSKELETAL: [No arthralgias, myalgias or bone pains.]  SKIN: [No rashes or skin lesions.]  PSYCHIATRY: [No depression or anxiety.]    PHYSICAL EXAMINATION:   VS: Reviewed on nurse's notes.  APPEARANCE: The patient is a well-groomed  male who appears in no acute distress.   HEENT: No scleral icterus. Right external auditory canal clear. Left canal inflamed. No oral ulcers, lesions. Throat clear  HEAD: No sinus tenderness.  NECK: Supple. No palpable lymphadenopathy. Thyroid non-tender, no palpable masses  CHEST: Breath sounds clear bilaterally. No rales. No rhonchi. Unlabored respirations.  CARDIOVASCULAR: Normal S1, S2. Normal rate. Regular rhythm.  ABDOMEN: Bowel sounds slightly hyperactive. No tenderness. No abdominal distention. No hepatomegaly. No splenomegaly.  LYMPHATIC: No palpable supraclavicular, axillary nodes  EXTREMITIES: No clubbing, cyanosis, edema  SKIN: No lesions. No petechiae. No ecchymoses. No induration or nodules  NEUROLOGIC: No focal findings. Alert & Oriented x 3. Mood appropriate to affect    LABS:   Reviewed    IMAGING:  Reviewed    IMPRESSION:  1. Immune mediated thrombocytopenia    PLAN:  1.  I had a detailed discussion with the patient today with  regard to his current clinical situation.  Review of CBC from today shows interva ldecrease in his platelet count.  At this time I have recommended proceeding with a total of 8 cycles of IV Rituxan for treatment of his ITP.  The patient had excellent response when this was last given in 2013.  Rationale for this was reviewed in detail and the patient expressed understanding and agreement.  The patient has previously not responded to corticosteroid therapy for his ITP.  He has also had complications related to avascular necrosis with corticosteroids in the past.  2. I have discussed with the patient that he should seek immediate medical attention for any signs/symptoms of bleeding. He expressed understanding of this. He knows to present to the emergency room at [facility], [place] if this happens.  3. Proceed with cycle 5/8 of rituxan     Follow-up in 1 week with labs for week 6/8 of rituxan. He knows to call sooner for any new problems or questions.    Rusty Salazar MD

## 2019-05-16 NOTE — PLAN OF CARE
Problem: Adult Inpatient Plan of Care  Goal: Plan of Care Review  Outcome: Ongoing (interventions implemented as appropriate)  Pt states he feels good

## 2019-05-23 ENCOUNTER — OFFICE VISIT (OUTPATIENT)
Dept: HEMATOLOGY/ONCOLOGY | Facility: CLINIC | Age: 59
End: 2019-05-23
Payer: COMMERCIAL

## 2019-05-23 ENCOUNTER — INFUSION (OUTPATIENT)
Dept: INFUSION THERAPY | Facility: HOSPITAL | Age: 59
End: 2019-05-23
Attending: INTERNAL MEDICINE
Payer: COMMERCIAL

## 2019-05-23 VITALS
RESPIRATION RATE: 18 BRPM | DIASTOLIC BLOOD PRESSURE: 75 MMHG | BODY MASS INDEX: 22.13 KG/M2 | WEIGHT: 141 LBS | HEART RATE: 68 BPM | HEIGHT: 67 IN | SYSTOLIC BLOOD PRESSURE: 118 MMHG

## 2019-05-23 VITALS
OXYGEN SATURATION: 98 % | WEIGHT: 142 LBS | HEART RATE: 70 BPM | DIASTOLIC BLOOD PRESSURE: 75 MMHG | RESPIRATION RATE: 18 BRPM | HEIGHT: 67 IN | BODY MASS INDEX: 22.29 KG/M2 | SYSTOLIC BLOOD PRESSURE: 119 MMHG | TEMPERATURE: 98 F

## 2019-05-23 DIAGNOSIS — D69.3 CHRONIC ITP (IDIOPATHIC THROMBOCYTOPENIA): Primary | ICD-10-CM

## 2019-05-23 PROCEDURE — 99999 PR PBB SHADOW E&M-EST. PATIENT-LVL III: CPT | Mod: PBBFAC,,, | Performed by: INTERNAL MEDICINE

## 2019-05-23 PROCEDURE — 25000003 PHARM REV CODE 250: Performed by: INTERNAL MEDICINE

## 2019-05-23 PROCEDURE — 99999 PR PBB SHADOW E&M-EST. PATIENT-LVL III: ICD-10-PCS | Mod: PBBFAC,,, | Performed by: INTERNAL MEDICINE

## 2019-05-23 PROCEDURE — 96375 TX/PRO/DX INJ NEW DRUG ADDON: CPT

## 2019-05-23 PROCEDURE — S0028 INJECTION, FAMOTIDINE, 20 MG: HCPCS | Performed by: INTERNAL MEDICINE

## 2019-05-23 PROCEDURE — 96415 CHEMO IV INFUSION ADDL HR: CPT

## 2019-05-23 PROCEDURE — 96367 TX/PROPH/DG ADDL SEQ IV INF: CPT

## 2019-05-23 PROCEDURE — 99215 PR OFFICE/OUTPT VISIT, EST, LEVL V, 40-54 MIN: ICD-10-PCS | Mod: 25,S$GLB,, | Performed by: INTERNAL MEDICINE

## 2019-05-23 PROCEDURE — 63600175 PHARM REV CODE 636 W HCPCS: Mod: JW,JG | Performed by: INTERNAL MEDICINE

## 2019-05-23 PROCEDURE — 96413 CHEMO IV INFUSION 1 HR: CPT

## 2019-05-23 PROCEDURE — 99215 OFFICE O/P EST HI 40 MIN: CPT | Mod: 25,S$GLB,, | Performed by: INTERNAL MEDICINE

## 2019-05-23 PROCEDURE — 96368 THER/DIAG CONCURRENT INF: CPT

## 2019-05-23 RX ORDER — FAMOTIDINE 10 MG/ML
20 INJECTION INTRAVENOUS
Status: CANCELLED | OUTPATIENT
Start: 2019-05-23

## 2019-05-23 RX ORDER — ACETAMINOPHEN 325 MG/1
650 TABLET ORAL
Status: CANCELLED | OUTPATIENT
Start: 2019-05-23

## 2019-05-23 RX ORDER — MEPERIDINE HYDROCHLORIDE 25 MG/ML
25 INJECTION INTRAMUSCULAR; INTRAVENOUS; SUBCUTANEOUS
Status: CANCELLED | OUTPATIENT
Start: 2019-05-23

## 2019-05-23 RX ORDER — SODIUM CHLORIDE 0.9 % (FLUSH) 0.9 %
10 SYRINGE (ML) INJECTION
Status: CANCELLED | OUTPATIENT
Start: 2019-05-23

## 2019-05-23 RX ORDER — FAMOTIDINE 20 MG/50ML
20 INJECTION, SOLUTION INTRAVENOUS
Status: COMPLETED | OUTPATIENT
Start: 2019-05-23 | End: 2019-05-23

## 2019-05-23 RX ORDER — ACETAMINOPHEN 325 MG/1
650 TABLET ORAL
Status: COMPLETED | OUTPATIENT
Start: 2019-05-23 | End: 2019-05-23

## 2019-05-23 RX ORDER — DEXAMETHASONE SODIUM PHOSPHATE 4 MG/ML
8 INJECTION, SOLUTION INTRA-ARTICULAR; INTRALESIONAL; INTRAMUSCULAR; INTRAVENOUS; SOFT TISSUE
Status: CANCELLED | OUTPATIENT
Start: 2019-05-23

## 2019-05-23 RX ORDER — DEXAMETHASONE SODIUM PHOSPHATE 4 MG/ML
8 INJECTION, SOLUTION INTRA-ARTICULAR; INTRALESIONAL; INTRAMUSCULAR; INTRAVENOUS; SOFT TISSUE
Status: COMPLETED | OUTPATIENT
Start: 2019-05-23 | End: 2019-05-23

## 2019-05-23 RX ORDER — HEPARIN 100 UNIT/ML
500 SYRINGE INTRAVENOUS
Status: CANCELLED | OUTPATIENT
Start: 2019-05-23

## 2019-05-23 RX ADMIN — RITUXIMAB 653 MG: 10 INJECTION, SOLUTION INTRAVENOUS at 11:05

## 2019-05-23 RX ADMIN — DEXAMETHASONE SODIUM PHOSPHATE 8 MG: 4 INJECTION, SOLUTION INTRA-ARTICULAR; INTRALESIONAL; INTRAMUSCULAR; INTRAVENOUS; SOFT TISSUE at 11:05

## 2019-05-23 RX ADMIN — FAMOTIDINE 20 MG: 20 INJECTION, SOLUTION INTRAVENOUS at 11:05

## 2019-05-23 RX ADMIN — DIPHENHYDRAMINE HYDROCHLORIDE 50 MG: 50 INJECTION, SOLUTION INTRAMUSCULAR; INTRAVENOUS at 11:05

## 2019-05-23 RX ADMIN — ACETAMINOPHEN 650 MG: 325 TABLET ORAL at 11:05

## 2019-05-23 NOTE — PLAN OF CARE
"Problem: Adult Inpatient Plan of Care  Goal: Plan of Care Review  Outcome: Ongoing (interventions implemented as appropriate)  Pt states, " I feel fine"      "

## 2019-05-23 NOTE — PROGRESS NOTES
Reason for visit: Follow up for ITP    HPI:   The patient is a 58-year-old  male who presents to the hematology oncology clinic today to discuss further evaluation and management recommendations for immune mediated thrombocytopenia.  The patient had previously been followed by Dr. Marija Patel and Dr. Deven Norris with hematology oncology. The patient reports that he was diagnosed with immune mediated thrombocytopenia in 2012 when he noticed bruising on his lower extremities and sought medical attention for this. The patient reports that he did not tolerate high doses of prednisone very well. He has since been treated with chronic infusions of IVIG with good response.  He denies any melena, hematochezia, hematemesis, hemoptysis, hematuria or epistaxis. He denies any fever, chills or night sweats. He denies any bowel or urinary complaints.  The patient has completed 4 doses of weekly Rituxan on 2013 and tolerated this well. He denies any chest pain or shortness of breath. He denies any nausea or vomiting. I have reviewed all of his interval clinical history in EPIC.   His medical insurance now allows him to follow up back with me at [facility]. He has not required any treatment of his chronic ITP in the interim.    PAST MEDICAL HISTORY:   1. ITP  2. H. Pylori positive gastritis    SURGICAL HISTORY:   Denies    FAMILY HISTORY: The patient's mother  from complications related to pancreatic cancer at the age of 67. His brother  from complications related to lung cancer in his 40s. A paternal uncle  from complications related to throat cancer. A maternal uncle  from complications related to lung cancer. He denies any immediate family members with bleeding/clotting disorders.    SOCIAL HISTORY: He reports a 63+ pack year smoking history and is a current smoker. He states that he has moderated alcohol consumption. He has never used any recreational drugs. He works as a  . He is  and has 7 children. He lives in [place].    ALLERGIES: [NKDA]    MEDICATIONS: [Medcard has been reviewed and/or reconciled.]    REVIEW OF SYSTEMS:   GENERAL: [No fevers, chills or sweats. No fatigue, weight loss or loss of appetite.]  HEENT: [No blurred vision, tinnitus, nasal discharge, sorethroat or dysphagia.]  HEART: [No chest pain, palpitations or shortness of breath.]   LUNGS: [No cough, hemoptysis or breathing problems.]  ABDOMEN: [No abdominal pain, nausea, vomiting, diarrhea, constipation or melena.]  GENITOURINARY: [No dysuria, bleeding or malodorous discharge.]  NEURO: [No headache, dizziness or vertigo.]  HEMATOLOGY: [No easy bruising, spontaneous bleeding or blood clots in the past].  MUSCULOSKELETAL: [No arthralgias, myalgias or bone pains.]  SKIN: [No rashes or skin lesions.]  PSYCHIATRY: [No depression or anxiety.]    PHYSICAL EXAMINATION:   VS: Reviewed on nurse's notes.  APPEARANCE: The patient is a well-groomed  male who appears in no acute distress.   HEENT: No scleral icterus. Right external auditory canal clear. Left canal inflamed. No oral ulcers, lesions. Throat clear  HEAD: No sinus tenderness.  NECK: Supple. No palpable lymphadenopathy. Thyroid non-tender, no palpable masses  CHEST: Breath sounds clear bilaterally. No rales. No rhonchi. Unlabored respirations.  CARDIOVASCULAR: Normal S1, S2. Normal rate. Regular rhythm.  ABDOMEN: Bowel sounds slightly hyperactive. No tenderness. No abdominal distention. No hepatomegaly. No splenomegaly.  LYMPHATIC: No palpable supraclavicular, axillary nodes  EXTREMITIES: No clubbing, cyanosis, edema  SKIN: No lesions. No petechiae. No ecchymoses. No induration or nodules  NEUROLOGIC: No focal findings. Alert & Oriented x 3. Mood appropriate to affect    LABS:   Reviewed    IMAGING:  Reviewed    IMPRESSION:  1. Immune mediated thrombocytopenia    PLAN:  1.  I had a detailed discussion with the patient today with  regard to his current clinical situation.  Review of CBC from today shows interva ldecrease in his platelet count.  At this time I have recommended proceeding with a total of 8 cycles of IV Rituxan for treatment of his ITP.  The patient had excellent response when this was last given in 2013.  Rationale for this was reviewed in detail and the patient expressed understanding and agreement.  The patient has previously not responded to corticosteroid therapy for his ITP.  He has also had complications related to avascular necrosis with corticosteroids in the past.  2. I have discussed with the patient that he should seek immediate medical attention for any signs/symptoms of bleeding. He expressed understanding of this. He knows to present to the emergency room at [facility], [place] if this happens.  3. Proceed with cycle 6/8 of rituxan     Follow-up in 1 week with labs for week 7/8 of rituxan. He knows to call sooner for any new problems or questions.    Rusty Salazar MD

## 2019-05-23 NOTE — PLAN OF CARE
Problem: Neutropenia (Chemotherapy Effects)  Goal: Absence of Infection    Intervention: Prevent Infection and Maximize Resistance  Pt aware of infection prevention and importance of monitoring for fever

## 2019-05-23 NOTE — DISCHARGE INSTRUCTIONS
.  [facility]  [address] [facility]  [address] [address] [facility] [address]  [phone] phone     [phone] fax  Hours of Operation: Monday- Friday 8:00am- 5:00pm  After hours phone  [phone]  Hematology / Oncology Physicians on call      Dr. Willian Chisholm    Please call with any concerns regarding your appointment today.  .WAYS TO HELP PREVENT INFECTION         WASH YOUR HANDS OFTEN DURING THE DAY, ESPECIALLY BEFORE YOU EAT, AFTER USING THE BATHROOM, AND AFTER TOUCHING ANIMALS     STAY AWAY FROM PEOPLE WHO HAVE ILLNESSES YOU CAN CATCH; SUCH AS COLDS, FLU, CHICKEN POX     TRY TO AVOID CROWDS     STAY AWAY FROM CHILDREN WHO RECENTLY HAVE RECEIVED LIVE VIRUS VACCINES     MAINTAIN GOOD MOUTH CARE     DO NOT SQUEEZE OR SCRATCH PIMPLES     CLEAN CUTS & SCRAPES RIGHT AWAY AND DAILY UNTIL HEALED WITH WARM WATER, SOAP & AN ANTISEPTIC     AVOID CONTACT WITH LITTER BOXES, BIRD CAGES, & FISH TANKS     AVOID STANDING WATER, IE., BIRD BATHS, FLOWER POTS/VASES, OR HUMIDIFIERS     WEAR GLOVES WHEN GARDENING OR CLEANING UP AFTER OTHERS, ESPECIALLY BABIES & SMALL CHILDREN     DO NOT EAT RAW FISH, SEAFOOD, MEAT, OR EGGS  .HOME CARE AFTER CHEMOTHERAPY   Meals   Many patients feel sick and lose their appetites during treatment. Eat small meals several times a day. Choose bland foods with little taste or smell if you have problems with nausea. Be sure to cook all food thoroughly. This kills bacteria and helps you avoid intestinal infection. Soft foods are easier to swallow and digest.   Activity   Exercise keeps you strong and keeps your heart and lungs active. Talk to your doctor about an appropriate exercise program for you.   Skin Care   To prevent a skin infection, bathe or shower once a day. Use a moisturizing soap and wash with warm water. Avoid very hot or cold water. Chemotherapy can make your skin dry . Apply moisturizing lotion to help relieve  "dry skin. Some drugs used in high doses can cause slight burns to appear (like sunburn). Ask for a special cream to help relieve the burn and protect your skin.   Prevent Mouth Sores   During chemotherapy, many people get mouth sores. Do the following to help prevent mouth sores or to ease discomfort.   Brush your teeth with a soft-bristle toothbrush after every meal.  Don't use dental floss if your platelet count is below 50,000. Your doctor or nurse will tell you if this is the case.  Use an oral swab or special soft toothbrush if your gums bleed during regular brushing.  Use mouthwash as directed. If you can't tolerate commercial mouthwash, use salt and baking soda to clean your mouth. Mix 1 teaspoon of salt and 1 teaspoon of baking soda into a glass of water. Swish and spit.  Call your doctor or return to this facility if you develop any of the following:   Sore throat   White patches in the mouth or throat   Fever of 100.4ºF (38ºC) or higher, or as directed by your healthcare provider  © 5157-1358 [facility], [address] [place] [address]. All rights reserved. This information is not intended as a substitute for professional medical care. Always follow your healthcare professional's     .Support Groups/Classes    Support groups and classes are being offered at the   [facility] and [place]!!    "Cooking with Cancer" (Nutrition Class):  Second Wednesday of each month   at noon at the [facility].  Metastatic Support Group:  Third Tuesday of each month   at noon at the [facility].  Next Steps Class/Group: Second and fourth Thursday of each month at noon at the [facility].  Hope Chest (Breast Cancer Support Group): First Tuesday of each month   at 5:30pm at the [facility] location.  [employer]: [facility]: Second and third Tuesday of each month from 7:30am - 2pm.  [facility]: First and fourth Tuesday of each month from 7:30am - 2pm    If you are interested in " attending or would like more information please ask our social workers or your nurse!  .FALL PREVENTION   Falls often occur due to slipping, tripping or losing your balance. Here are ways to reduce your risk of falling again.   Was there anything that caused your fall that can be fixed, removed or replaced?   Make your home safe by keeping walkways clear of objects you may trip over.   Use non-slip pads under rugs.   Do not walk in poorly lit areas.   Do not stand on chairs or wobbly ladders.   Use caution when reaching overhead or looking upward. This position can cause a loss of balance.   Be sure your shoes fit properly, have non-slip bottoms and are in good condition.   Be cautious when going up and down stairs, curbs, and when walking on uneven sidewalks.   If your balance is poor, consider using a cane or walker.   If your fall was related to alcohol use, stop or limit alcohol intake.   If your fall was related to use of sleeping medicines, talk to your doctor about this. You may need to reduce your dosage at bedtime if you awaken during the night to go to the bathroom.   To reduce the need for nighttime bathroom trips:   Avoid drinking fluids for several hours before going to bed   Empty your bladder before going to bed   Men can keep a urinal at the bedside   © 3665-9468 [facility], [address] [place] [address], [place] [address]. All rights reserved. This information is not intended as a substitute for professional medical care. Always follow your healthcare professional's instructions.

## 2019-05-23 NOTE — PLAN OF CARE
Problem: Adult Inpatient Plan of Care  Goal: Patient-Specific Goal (Individualization)  Outcome: Ongoing (interventions implemented as appropriate)  Pt likes luis

## 2019-05-23 NOTE — PLAN OF CARE
Problem: Thrombocytopenia Bleeding Risk (Chemotherapy Effects)  Goal: Absence of Bleeding    Intervention: Minimize Bleeding Risk  Reviewed Rituxan protocol with pt

## 2019-05-30 ENCOUNTER — OFFICE VISIT (OUTPATIENT)
Dept: HEMATOLOGY/ONCOLOGY | Facility: CLINIC | Age: 59
End: 2019-05-30
Payer: COMMERCIAL

## 2019-05-30 VITALS
DIASTOLIC BLOOD PRESSURE: 79 MMHG | TEMPERATURE: 98 F | WEIGHT: 140.88 LBS | OXYGEN SATURATION: 99 % | HEIGHT: 67 IN | BODY MASS INDEX: 22.11 KG/M2 | SYSTOLIC BLOOD PRESSURE: 125 MMHG | HEART RATE: 95 BPM

## 2019-05-30 DIAGNOSIS — D69.3 CHRONIC ITP (IDIOPATHIC THROMBOCYTOPENIA): Primary | ICD-10-CM

## 2019-05-30 PROCEDURE — 99999 PR PBB SHADOW E&M-EST. PATIENT-LVL III: CPT | Mod: PBBFAC,,, | Performed by: INTERNAL MEDICINE

## 2019-05-30 PROCEDURE — 99999 PR PBB SHADOW E&M-EST. PATIENT-LVL III: ICD-10-PCS | Mod: PBBFAC,,, | Performed by: INTERNAL MEDICINE

## 2019-05-30 PROCEDURE — 99215 OFFICE O/P EST HI 40 MIN: CPT | Mod: S$GLB,,, | Performed by: INTERNAL MEDICINE

## 2019-05-30 PROCEDURE — 99215 PR OFFICE/OUTPT VISIT, EST, LEVL V, 40-54 MIN: ICD-10-PCS | Mod: S$GLB,,, | Performed by: INTERNAL MEDICINE

## 2019-05-30 RX ORDER — DEXAMETHASONE SODIUM PHOSPHATE 4 MG/ML
8 INJECTION, SOLUTION INTRA-ARTICULAR; INTRALESIONAL; INTRAMUSCULAR; INTRAVENOUS; SOFT TISSUE
Status: CANCELLED | OUTPATIENT
Start: 2019-05-30

## 2019-05-30 RX ORDER — MEPERIDINE HYDROCHLORIDE 25 MG/ML
25 INJECTION INTRAMUSCULAR; INTRAVENOUS; SUBCUTANEOUS
Status: CANCELLED | OUTPATIENT
Start: 2019-05-30

## 2019-05-30 RX ORDER — SODIUM CHLORIDE 0.9 % (FLUSH) 0.9 %
10 SYRINGE (ML) INJECTION
Status: CANCELLED | OUTPATIENT
Start: 2019-05-30

## 2019-05-30 RX ORDER — FAMOTIDINE 10 MG/ML
20 INJECTION INTRAVENOUS
Status: CANCELLED | OUTPATIENT
Start: 2019-05-30

## 2019-05-30 RX ORDER — HEPARIN 100 UNIT/ML
500 SYRINGE INTRAVENOUS
Status: CANCELLED | OUTPATIENT
Start: 2019-05-30

## 2019-05-30 RX ORDER — ACETAMINOPHEN 325 MG/1
650 TABLET ORAL
Status: CANCELLED | OUTPATIENT
Start: 2019-05-30

## 2019-05-30 NOTE — PROGRESS NOTES
Hematology/Oncology Office Note    CC:  ITP    Referred by:  No ref. provider found    Diagnosis:  ITP    Treatment:  Rituxan    Surveillance:  History of present illness:  58-year-old gentleman followed by Dr. Salazar in the Hematology/Oncology Clinic for chronic ITP.  He is currently on weekly Rituxan and presents for next dose today.  He has no new complaints today.  He specifically denies excessive bleeding/bruising.      Past Medical History:   Diagnosis Date    Arthritis     Gastritis     ITP (idiopathic thrombocytopenic purpura)          Social History:      Family History: family history includes Pancreatic cancer in his mother.        HPI  Review of Systems   Constitutional: Negative for activity change, appetite change, fatigue, fever and unexpected weight change.   HENT: Negative for congestion, dental problem, drooling, ear pain, facial swelling, nosebleeds, rhinorrhea and sinus pressure.    Eyes: Negative.    Respiratory: Negative for apnea, cough, choking, chest tightness and shortness of breath.    Cardiovascular: Negative for chest pain, palpitations and leg swelling.   Gastrointestinal: Negative for abdominal distention, blood in stool, constipation, diarrhea, nausea and vomiting.   Endocrine: Negative.    Genitourinary: Negative for decreased urine volume, difficulty urinating, frequency, genital sores, hematuria, testicular pain and urgency.   Musculoskeletal: Negative for arthralgias, gait problem, myalgias, neck pain and neck stiffness.   Skin: Negative for color change, pallor, rash and wound.   Allergic/Immunologic: Positive for immunocompromised state.   Neurological: Negative for dizziness, tremors, syncope, speech difficulty, weakness, light-headedness, numbness and headaches.   Hematological: Negative for adenopathy. Does not bruise/bleed easily.   Psychiatric/Behavioral: Negative for agitation, behavioral problems, confusion, decreased concentration, dysphoric mood and  hallucinations. The patient is not nervous/anxious and is not hyperactive.        Objective:      Physical Exam   Constitutional: He is oriented to person, place, and time. He appears well-developed and well-nourished. No distress.   HENT:   Head: Normocephalic and atraumatic.   Nose: Nose normal.   Mouth/Throat: Oropharynx is clear and moist. No oropharyngeal exudate.   Eyes: Pupils are equal, round, and reactive to light. Conjunctivae and EOM are normal. No scleral icterus.   Neck: Normal range of motion. Neck supple. No tracheal deviation present. No thyromegaly present.   Cardiovascular: Normal rate and regular rhythm. Exam reveals no gallop.   Pulmonary/Chest: Effort normal and breath sounds normal. No stridor. No respiratory distress. He has no wheezes. He has no rales. He exhibits no tenderness.   Abdominal: Soft. Bowel sounds are normal. He exhibits no distension and no mass. There is no tenderness. There is no rebound.   Musculoskeletal: Normal range of motion. He exhibits no edema or tenderness.   Neurological: He is alert and oriented to person, place, and time. He displays normal reflexes. No cranial nerve deficit. He exhibits normal muscle tone. Coordination normal.   Skin: Skin is warm and dry. Capillary refill takes less than 2 seconds. No abrasion, no bruising, no ecchymosis and no rash noted. He is not diaphoretic. No cyanosis. No pallor. Nails show no clubbing.   Psychiatric: He has a normal mood and affect. Thought content normal.   Vitals reviewed.      Assessment:       58-year-old gentleman with chronic ITP followed by Dr. Salazar in the Hematology class oncology clinic.  I am seeing the patient for the 1st time today and Dr. Salazar is absence.    He presents today for next dose of weekly Rituxan.  We will proceed with weekly Rituxan as scheduled have the patient follow up in 1 week for final dose Rituxan.  Platelet count noted to be 409 today.      Chronic ITP:  --proceed with weekly  Rituxan  --follow-up in 1 week for final dose of Rituxan

## 2019-06-06 ENCOUNTER — LAB VISIT (OUTPATIENT)
Dept: LAB | Facility: HOSPITAL | Age: 59
End: 2019-06-06
Attending: EMERGENCY MEDICINE
Payer: COMMERCIAL

## 2019-06-06 ENCOUNTER — INFUSION (OUTPATIENT)
Dept: INFUSION THERAPY | Facility: HOSPITAL | Age: 59
End: 2019-06-06
Attending: EMERGENCY MEDICINE
Payer: COMMERCIAL

## 2019-06-06 ENCOUNTER — OFFICE VISIT (OUTPATIENT)
Dept: HEMATOLOGY/ONCOLOGY | Facility: CLINIC | Age: 59
End: 2019-06-06
Payer: COMMERCIAL

## 2019-06-06 VITALS
DIASTOLIC BLOOD PRESSURE: 84 MMHG | RESPIRATION RATE: 18 BRPM | TEMPERATURE: 98 F | HEART RATE: 79 BPM | OXYGEN SATURATION: 99 % | SYSTOLIC BLOOD PRESSURE: 121 MMHG | WEIGHT: 139 LBS | BODY MASS INDEX: 21.82 KG/M2 | HEIGHT: 67 IN

## 2019-06-06 VITALS
HEIGHT: 67 IN | DIASTOLIC BLOOD PRESSURE: 76 MMHG | HEART RATE: 75 BPM | RESPIRATION RATE: 18 BRPM | WEIGHT: 140 LBS | OXYGEN SATURATION: 99 % | SYSTOLIC BLOOD PRESSURE: 114 MMHG | BODY MASS INDEX: 21.97 KG/M2 | TEMPERATURE: 98 F

## 2019-06-06 DIAGNOSIS — D69.3 CHRONIC ITP (IDIOPATHIC THROMBOCYTOPENIA): ICD-10-CM

## 2019-06-06 DIAGNOSIS — D69.3 CHRONIC ITP (IDIOPATHIC THROMBOCYTOPENIA): Primary | ICD-10-CM

## 2019-06-06 LAB
ALBUMIN SERPL BCP-MCNC: 3.7 G/DL (ref 3.5–5.2)
ALP SERPL-CCNC: 48 U/L (ref 55–135)
ALT SERPL W/O P-5'-P-CCNC: 15 U/L (ref 10–44)
ANION GAP SERPL CALC-SCNC: 9 MMOL/L (ref 8–16)
AST SERPL-CCNC: 22 U/L (ref 10–40)
BASOPHILS # BLD AUTO: 0.06 K/UL (ref 0–0.2)
BASOPHILS NFR BLD: 1.2 % (ref 0–1.9)
BILIRUB SERPL-MCNC: 0.7 MG/DL (ref 0.1–1)
BUN SERPL-MCNC: 10 MG/DL (ref 6–20)
CALCIUM SERPL-MCNC: 9.5 MG/DL (ref 8.7–10.5)
CHLORIDE SERPL-SCNC: 104 MMOL/L (ref 95–110)
CO2 SERPL-SCNC: 28 MMOL/L (ref 23–29)
CREAT SERPL-MCNC: 1 MG/DL (ref 0.5–1.4)
DIFFERENTIAL METHOD: ABNORMAL
EOSINOPHIL # BLD AUTO: 0.3 K/UL (ref 0–0.5)
EOSINOPHIL NFR BLD: 6.2 % (ref 0–8)
ERYTHROCYTE [DISTWIDTH] IN BLOOD BY AUTOMATED COUNT: 11.9 % (ref 11.5–14.5)
EST. GFR  (AFRICAN AMERICAN): >60 ML/MIN/1.73 M^2
EST. GFR  (NON AFRICAN AMERICAN): >60 ML/MIN/1.73 M^2
GLUCOSE SERPL-MCNC: 121 MG/DL (ref 70–110)
HCT VFR BLD AUTO: 45 % (ref 40–54)
HGB BLD-MCNC: 14.2 G/DL (ref 14–18)
IMM GRANULOCYTES # BLD AUTO: 0.02 K/UL (ref 0–0.04)
IMM GRANULOCYTES NFR BLD AUTO: 0.4 % (ref 0–0.5)
LDH SERPL L TO P-CCNC: 280 U/L (ref 110–260)
LYMPHOCYTES # BLD AUTO: 1.8 K/UL (ref 1–4.8)
LYMPHOCYTES NFR BLD: 35.1 % (ref 18–48)
MCH RBC QN AUTO: 30.9 PG (ref 27–31)
MCHC RBC AUTO-ENTMCNC: 31.6 G/DL (ref 32–36)
MCV RBC AUTO: 98 FL (ref 82–98)
MONOCYTES # BLD AUTO: 0.4 K/UL (ref 0.3–1)
MONOCYTES NFR BLD: 7.3 % (ref 4–15)
NEUTROPHILS # BLD AUTO: 2.6 K/UL (ref 1.8–7.7)
NEUTROPHILS NFR BLD: 50.2 % (ref 38–73)
NRBC BLD-RTO: 0 /100 WBC
PLATELET # BLD AUTO: 316 K/UL (ref 150–350)
PMV BLD AUTO: 9.2 FL (ref 9.2–12.9)
POTASSIUM SERPL-SCNC: 4.3 MMOL/L (ref 3.5–5.1)
PROT SERPL-MCNC: 7 G/DL (ref 6–8.4)
RBC # BLD AUTO: 4.59 M/UL (ref 4.6–6.2)
SODIUM SERPL-SCNC: 141 MMOL/L (ref 136–145)
WBC # BLD AUTO: 5.18 K/UL (ref 3.9–12.7)

## 2019-06-06 PROCEDURE — 63600175 PHARM REV CODE 636 W HCPCS: Performed by: INTERNAL MEDICINE

## 2019-06-06 PROCEDURE — 36415 COLL VENOUS BLD VENIPUNCTURE: CPT

## 2019-06-06 PROCEDURE — 25000003 PHARM REV CODE 250: Performed by: INTERNAL MEDICINE

## 2019-06-06 PROCEDURE — 96413 CHEMO IV INFUSION 1 HR: CPT

## 2019-06-06 PROCEDURE — 99999 PR PBB SHADOW E&M-EST. PATIENT-LVL IV: ICD-10-PCS | Mod: PBBFAC,,, | Performed by: INTERNAL MEDICINE

## 2019-06-06 PROCEDURE — 99214 OFFICE O/P EST MOD 30 MIN: CPT | Mod: 25,S$GLB,, | Performed by: INTERNAL MEDICINE

## 2019-06-06 PROCEDURE — 96367 TX/PROPH/DG ADDL SEQ IV INF: CPT

## 2019-06-06 PROCEDURE — 96415 CHEMO IV INFUSION ADDL HR: CPT

## 2019-06-06 PROCEDURE — S0028 INJECTION, FAMOTIDINE, 20 MG: HCPCS | Performed by: INTERNAL MEDICINE

## 2019-06-06 PROCEDURE — 96368 THER/DIAG CONCURRENT INF: CPT

## 2019-06-06 PROCEDURE — 85025 COMPLETE CBC W/AUTO DIFF WBC: CPT

## 2019-06-06 PROCEDURE — 63600175 PHARM REV CODE 636 W HCPCS: Mod: JW,JG | Performed by: INTERNAL MEDICINE

## 2019-06-06 PROCEDURE — 99999 PR PBB SHADOW E&M-EST. PATIENT-LVL IV: CPT | Mod: PBBFAC,,, | Performed by: INTERNAL MEDICINE

## 2019-06-06 PROCEDURE — 99214 PR OFFICE/OUTPT VISIT, EST, LEVL IV, 30-39 MIN: ICD-10-PCS | Mod: 25,S$GLB,, | Performed by: INTERNAL MEDICINE

## 2019-06-06 PROCEDURE — 80053 COMPREHEN METABOLIC PANEL: CPT

## 2019-06-06 PROCEDURE — 96375 TX/PRO/DX INJ NEW DRUG ADDON: CPT

## 2019-06-06 PROCEDURE — 83615 LACTATE (LD) (LDH) ENZYME: CPT

## 2019-06-06 RX ORDER — ACETAMINOPHEN 325 MG/1
650 TABLET ORAL
Status: CANCELLED | OUTPATIENT
Start: 2019-06-14

## 2019-06-06 RX ORDER — MEPERIDINE HYDROCHLORIDE 25 MG/ML
25 INJECTION INTRAMUSCULAR; INTRAVENOUS; SUBCUTANEOUS
Status: CANCELLED | OUTPATIENT
Start: 2019-06-14

## 2019-06-06 RX ORDER — ACETAMINOPHEN 325 MG/1
650 TABLET ORAL
Status: COMPLETED | OUTPATIENT
Start: 2019-06-06 | End: 2019-06-06

## 2019-06-06 RX ORDER — HEPARIN 100 UNIT/ML
500 SYRINGE INTRAVENOUS
Status: CANCELLED | OUTPATIENT
Start: 2019-06-14

## 2019-06-06 RX ORDER — FAMOTIDINE 20 MG/50ML
20 INJECTION, SOLUTION INTRAVENOUS
Status: CANCELLED | OUTPATIENT
Start: 2019-06-14

## 2019-06-06 RX ORDER — DEXAMETHASONE SODIUM PHOSPHATE 4 MG/ML
8 INJECTION, SOLUTION INTRA-ARTICULAR; INTRALESIONAL; INTRAMUSCULAR; INTRAVENOUS; SOFT TISSUE
Status: CANCELLED | OUTPATIENT
Start: 2019-06-14

## 2019-06-06 RX ORDER — DEXAMETHASONE SODIUM PHOSPHATE 4 MG/ML
8 INJECTION, SOLUTION INTRA-ARTICULAR; INTRALESIONAL; INTRAMUSCULAR; INTRAVENOUS; SOFT TISSUE
Status: COMPLETED | OUTPATIENT
Start: 2019-06-06 | End: 2019-06-06

## 2019-06-06 RX ORDER — SODIUM CHLORIDE 0.9 % (FLUSH) 0.9 %
10 SYRINGE (ML) INJECTION
Status: CANCELLED | OUTPATIENT
Start: 2019-06-14

## 2019-06-06 RX ORDER — FAMOTIDINE 20 MG/50ML
20 INJECTION, SOLUTION INTRAVENOUS
Status: COMPLETED | OUTPATIENT
Start: 2019-06-06 | End: 2019-06-06

## 2019-06-06 RX ADMIN — DEXAMETHASONE SODIUM PHOSPHATE 8 MG: 4 INJECTION, SOLUTION INTRA-ARTICULAR; INTRALESIONAL; INTRAMUSCULAR; INTRAVENOUS; SOFT TISSUE at 09:06

## 2019-06-06 RX ADMIN — ACETAMINOPHEN 650 MG: 325 TABLET ORAL at 09:06

## 2019-06-06 RX ADMIN — RITUXIMAB 649 MG: 10 INJECTION, SOLUTION INTRAVENOUS at 10:06

## 2019-06-06 RX ADMIN — DIPHENHYDRAMINE HYDROCHLORIDE 50 MG: 50 INJECTION, SOLUTION INTRAMUSCULAR; INTRAVENOUS at 10:06

## 2019-06-06 RX ADMIN — FAMOTIDINE 20 MG: 20 INJECTION, SOLUTION INTRAVENOUS at 10:06

## 2019-06-06 NOTE — DISCHARGE INSTRUCTIONS
.  [facility]  [address] [facility]  [address] [address]  [phone] phone     [phone] fax  Hours of Operation: Monday- Friday 8:00am- 5:00pm  After hours phone  [phone]  Hematology / Oncology Physicians on call      Dr. Willian Chisholm    Please call with any concerns regarding your appointment today.  .FALL PREVENTION   Falls often occur due to slipping, tripping or losing your balance. Here are ways to reduce your risk of falling again.   Was there anything that caused your fall that can be fixed, removed or replaced?   Make your home safe by keeping walkways clear of objects you may trip over.   Use non-slip pads under rugs.   Do not walk in poorly lit areas.   Do not stand on chairs or wobbly ladders.   Use caution when reaching overhead or looking upward. This position can cause a loss of balance.   Be sure your shoes fit properly, have non-slip bottoms and are in good condition.   Be cautious when going up and down stairs, curbs, and when walking on uneven sidewalks.   If your balance is poor, consider using a cane or walker.   If your fall was related to alcohol use, stop or limit alcohol intake.   If your fall was related to use of sleeping medicines, talk to your doctor about this. You may need to reduce your dosage at bedtime if you awaken during the night to go to the bathroom.   To reduce the need for nighttime bathroom trips:   Avoid drinking fluids for several hours before going to bed   Empty your bladder before going to bed   Men can keep a urinal at the bedside   © 5019-1955 [facility]ei[facility], [address] [place] [address]. All rights reserved. This information is not intended as a substitute for professional medical care. Always follow your healthcare professional's instructions.    .WAYS TO HELP PREVENT INFECTION         WASH YOUR HANDS OFTEN DURING THE DAY, ESPECIALLY BEFORE YOU EAT, AFTER USING  THE BATHROOM, AND AFTER TOUCHING ANIMALS     STAY AWAY FROM PEOPLE WHO HAVE ILLNESSES YOU CAN CATCH; SUCH AS COLDS, FLU, CHICKEN POX     TRY TO AVOID CROWDS     STAY AWAY FROM CHILDREN WHO RECENTLY HAVE RECEIVED LIVE VIRUS VACCINES     MAINTAIN GOOD MOUTH CARE     DO NOT SQUEEZE OR SCRATCH PIMPLES     CLEAN CUTS & SCRAPES RIGHT AWAY AND DAILY UNTIL HEALED WITH WARM WATER, SOAP & AN ANTISEPTIC     AVOID CONTACT WITH LITTER BOXES, BIRD CAGES, & FISH TANKS     AVOID STANDING WATER, IE., BIRD BATHS, FLOWER POTS/VASES, OR HUMIDIFIERS     WEAR GLOVES WHEN GARDENING OR CLEANING UP AFTER OTHERS, ESPECIALLY BABIES & SMALL CHILDREN     DO NOT EAT RAW FISH, SEAFOOD, MEAT, OR EGGS  .HOME CARE AFTER CHEMOTHERAPY   Meals   Many patients feel sick and lose their appetites during treatment. Eat small meals several times a day. Choose bland foods with little taste or smell if you have problems with nausea. Be sure to cook all food thoroughly. This kills bacteria and helps you avoid intestinal infection. Soft foods are easier to swallow and digest.   Activity   Exercise keeps you strong and keeps your heart and lungs active. Talk to your doctor about an appropriate exercise program for you.   Skin Care   To prevent a skin infection, bathe or shower once a day. Use a moisturizing soap and wash with warm water. Avoid very hot or cold water. Chemotherapy can make your skin dry . Apply moisturizing lotion to help relieve dry skin. Some drugs used in high doses can cause slight burns to appear (like sunburn). Ask for a special cream to help relieve the burn and protect your skin.   Prevent Mouth Sores   During chemotherapy, many people get mouth sores. Do the following to help prevent mouth sores or to ease discomfort.   Brush your teeth with a soft-bristle toothbrush after every meal.  Don't use dental floss if your platelet count is below 50,000. Your doctor or nurse will tell you if this is the case.  Use an oral swab or  "special soft toothbrush if your gums bleed during regular brushing.  Use mouthwash as directed. If you can't tolerate commercial mouthwash, use salt and baking soda to clean your mouth. Mix 1 teaspoon of salt and 1 teaspoon of baking soda into a glass of water. Swish and spit.  Call your doctor or return to this facility if you develop any of the following:   Sore throat   White patches in the mouth or throat   Fever of 100.4ºF (38ºC) or higher, or as directed by your healthcare provider  © 1444-0241 [facility], [address] [place] [address], [place] [address]. All rights reserved. This information is not intended as a substitute for professional medical care. Always follow your healthcare professional's     .Support Groups/Classes    Support groups and classes are being offered at the   [facility] and [facility]!!    "Cooking with Cancer" (Nutrition Class):  Second Wednesday of each month   at noon at the [facility].  Metastatic Support Group:  Third Tuesday of each month   at noon at the [facility].  Next Steps Class/Group: Second and fourth Thursday of each month at noon at the [facility].  Hope Chest (Breast Cancer Support Group): First Tuesday of each month   at 5:30pm at the [facility] location.  [employer] Mobile: [facility]: Second and third Tuesday of each month from 7:30am - 2pm.  [facility]: First and fourth Tuesday of each month from 7:30am - 2pm    If you are interested in attending or would like more information please ask our social workers or your nurse!    "

## 2019-06-06 NOTE — PLAN OF CARE
Problem: Thrombocytopenia Bleeding Risk (Chemotherapy Effects)  Goal: Absence of Bleeding    Intervention: Minimize Bleeding Risk  Reviewed iv Rituxan protocol with pt

## 2019-06-06 NOTE — PROGRESS NOTES
Subjective:       Patient ID: Jaxson Burch Jr. is a 59 y.o. male.    Chief Complaint: Follow-up    HPI The patient is a 59-year-old  male who presents to the hematology oncology clinic today to discuss further evaluation and management recommendations for immune mediated thrombocytopenia.  The patient is followed in our clinic by dr Salazar and I am seeing him today for the first time,.  He has had good responses in the past to IV Rituxan. He relapsed a few weeks back and has restarted Rituxan with good results again. He comes in for week 7 of the scheduled 8 treatments. He is doing well.    PAST MEDICAL HISTORY:   1. ITP  2. H. Pylori positive gastritis    SURGICAL HISTORY:   Denies    FAMILY HISTORY: The patient's mother  from complications related to pancreatic cancer at the age of 67. His brother  from complications related to lung cancer in his 40s. A paternal uncle  from complications related to throat cancer. A maternal uncle  from complications related to lung cancer. He denies any immediate family members with bleeding/clotting disorders.    SOCIAL HISTORY: He reports a 63+ pack year smoking history and is a current smoker. He states that he has moderated alcohol consumption. He has never used any recreational drugs. He works as a . He is  and has 7 children. He lives in [place].    ALLERGIES: [NKDA]    MEDICATIONS: [Medcard has been reviewed and/or reconciled.]   Review of Systems   Constitutional: Negative.  Negative for fatigue.   Eyes: Negative.    Cardiovascular: Negative.  Negative for chest pain.   Gastrointestinal: Negative for abdominal pain and nausea.   Genitourinary: Negative.  Negative for hematuria.   Musculoskeletal: Negative.    Skin: Negative.    Neurological: Negative.    Psychiatric/Behavioral: Negative.        Objective:      Physical Exam   Constitutional: He is oriented to person, place, and time. He appears well-developed and  well-nourished.   HENT:   Head: Normocephalic.   Mouth/Throat: No oropharyngeal exudate.   Eyes: Pupils are equal, round, and reactive to light.   Neck: No thyromegaly present.   Cardiovascular: Normal rate, regular rhythm and normal heart sounds. Exam reveals no gallop.   No murmur heard.  Pulmonary/Chest: No respiratory distress. He has no wheezes. He has no rales.   Abdominal: Soft. Bowel sounds are normal. He exhibits no distension and no mass. There is no rebound and no guarding.   Musculoskeletal: Normal range of motion. He exhibits no edema.   Lymphadenopathy:     He has no cervical adenopathy.   Neurological: He is alert and oriented to person, place, and time.   Skin: Skin is warm and dry.   Psychiatric: He has a normal mood and affect. His behavior is normal.       Wt Readings from Last 3 Encounters:   06/06/19 63 kg (139 lb)   05/30/19 63.9 kg (140 lb 14 oz)   05/23/19 64.4 kg (141 lb 15.6 oz)     Temp Readings from Last 3 Encounters:   06/06/19 98.1 °F (36.7 °C) (Oral)   05/30/19 98.2 °F (36.8 °C) (Oral)   05/23/19 98.1 °F (36.7 °C) (Oral)     BP Readings from Last 3 Encounters:   06/06/19 121/84   05/30/19 125/79   05/23/19 119/75     Pulse Readings from Last 3 Encounters:   06/06/19 79   05/30/19 95   05/23/19 70       Assessment:       1. Chronic ITP (idiopathic thrombocytopenia)        Plan:       Lab Results   Component Value Date    WBC 5.18 06/06/2019    HGB 14.2 06/06/2019    HCT 45.0 06/06/2019    MCV 98 06/06/2019     06/06/2019       Platelet count within normal limits. Receive week 8 as scheduled. Follow up with Dr Salazar in 7 days for longitudinal follow up

## 2019-06-06 NOTE — PLAN OF CARE
"Problem: Adult Inpatient Plan of Care  Goal: Plan of Care Review  Outcome: Ongoing (interventions implemented as appropriate)  Pt states, " I have no complaints today"      "

## 2019-06-06 NOTE — PLAN OF CARE
Problem: Neutropenia (Chemotherapy Effects)  Goal: Absence of Infection    Intervention: Prevent Infection and Maximize Resistance  Reviewed risk for infection

## 2019-06-12 ENCOUNTER — OFFICE VISIT (OUTPATIENT)
Dept: INTERNAL MEDICINE | Facility: CLINIC | Age: 59
End: 2019-06-12
Payer: COMMERCIAL

## 2019-06-12 VITALS
TEMPERATURE: 97 F | OXYGEN SATURATION: 98 % | WEIGHT: 139.56 LBS | BODY MASS INDEX: 21.86 KG/M2 | SYSTOLIC BLOOD PRESSURE: 106 MMHG | HEART RATE: 82 BPM | DIASTOLIC BLOOD PRESSURE: 80 MMHG

## 2019-06-12 DIAGNOSIS — R20.0 NUMBNESS OF LEFT FOOT: ICD-10-CM

## 2019-06-12 PROCEDURE — 99213 OFFICE O/P EST LOW 20 MIN: CPT | Mod: S$GLB,,, | Performed by: FAMILY MEDICINE

## 2019-06-12 PROCEDURE — 99999 PR PBB SHADOW E&M-EST. PATIENT-LVL III: CPT | Mod: PBBFAC,,, | Performed by: FAMILY MEDICINE

## 2019-06-12 PROCEDURE — 99213 PR OFFICE/OUTPT VISIT, EST, LEVL III, 20-29 MIN: ICD-10-PCS | Mod: S$GLB,,, | Performed by: FAMILY MEDICINE

## 2019-06-12 PROCEDURE — 99999 PR PBB SHADOW E&M-EST. PATIENT-LVL III: ICD-10-PCS | Mod: PBBFAC,,, | Performed by: FAMILY MEDICINE

## 2019-06-12 NOTE — ASSESSMENT & PLAN NOTE
Left lateral foot consistent with S1 dermatome; does not seem to be vascular in origin; patient sees Dr. Rodas this week who follows him for cervical disc disease; he will discuss with Dr. Rodas, if not thought to be related to spine advised to contact office for podiatry appointment; if worse/persistent follow up. Patient expressed understanding.

## 2019-06-12 NOTE — PROGRESS NOTES
Subjective:       Patient ID: Jaxson Burch Jr. is a 59 y.o. male.    Chief Complaint: foot numbness (Left foot for 3 days)    PCP: Dr. Coyle    Patient presents to clinic reporting left foot numb x 3 days. Reports decreased sensation to left lateral foot x 3 days. Reports occasional back pain, but not currently. Denies leg pain. Reports history of cervical disc disease. No temperature or color change to foot.     Review of Systems   Constitutional: Positive for activity change. Negative for unexpected weight change.   HENT: Negative for hearing loss, rhinorrhea and trouble swallowing.    Eyes: Positive for visual disturbance. Negative for discharge.   Respiratory: Negative for chest tightness and wheezing.    Cardiovascular: Negative for chest pain and palpitations.   Gastrointestinal: Negative for blood in stool, constipation, diarrhea and vomiting.   Endocrine: Negative for polydipsia and polyuria.   Genitourinary: Negative for difficulty urinating, hematuria and urgency.   Musculoskeletal: Negative for arthralgias, joint swelling and neck pain.   Neurological: Negative for weakness and headaches.   Psychiatric/Behavioral: Negative for confusion and dysphoric mood.       Objective:      Physical Exam   Constitutional: He is oriented to person, place, and time. He appears well-developed and well-nourished. No distress.   HENT:   Head: Normocephalic and atraumatic.   Eyes: Pupils are equal, round, and reactive to light. Conjunctivae and EOM are normal. No scleral icterus.   Cardiovascular:   Pulses:       Dorsalis pedis pulses are 2+ on the left side.        Posterior tibial pulses are 2+ on the left side.   Pulmonary/Chest: Effort normal.   Musculoskeletal:        Lumbar back: He exhibits normal range of motion, no tenderness and no bony tenderness.        Left foot: There is normal range of motion, no tenderness, no bony tenderness, no swelling, normal capillary refill, no crepitus, no deformity and no  laceration.        Feet:    Area marked in red is area of decreased sensation; no color change, normal temperature   Neurological: He is alert and oriented to person, place, and time. No cranial nerve deficit. Gait normal.   Psychiatric: He has a normal mood and affect.   Vitals reviewed.      Assessment:       1. Numbness of left foot        Plan:     Problem List Items Addressed This Visit     Numbness of left foot    Current Assessment & Plan     Left lateral foot consistent with S1 dermatome; does not seem to be vascular in origin; patient sees Dr. Rodas this week who follows him for cervical disc disease; he will discuss with Dr. Rodas, if not thought to be related to spine advised to contact office for podiatry appointment; if worse/persistent follow up. Patient expressed understanding.

## 2019-06-12 NOTE — Clinical Note
FYI - he is smoker with hyperlipidemia and chronic ITP, but doesn't seem to be vasular in origin. Seems more nerve.

## 2019-06-13 ENCOUNTER — OFFICE VISIT (OUTPATIENT)
Dept: HEMATOLOGY/ONCOLOGY | Facility: CLINIC | Age: 59
End: 2019-06-13
Payer: COMMERCIAL

## 2019-06-13 ENCOUNTER — PATIENT MESSAGE (OUTPATIENT)
Dept: INTERNAL MEDICINE | Facility: CLINIC | Age: 59
End: 2019-06-13

## 2019-06-13 ENCOUNTER — LAB VISIT (OUTPATIENT)
Dept: LAB | Facility: HOSPITAL | Age: 59
End: 2019-06-13
Attending: INTERNAL MEDICINE
Payer: COMMERCIAL

## 2019-06-13 VITALS
BODY MASS INDEX: 21.97 KG/M2 | DIASTOLIC BLOOD PRESSURE: 81 MMHG | TEMPERATURE: 98 F | OXYGEN SATURATION: 98 % | SYSTOLIC BLOOD PRESSURE: 126 MMHG | HEIGHT: 67 IN | WEIGHT: 140 LBS | HEART RATE: 91 BPM

## 2019-06-13 DIAGNOSIS — R73.9 ELEVATED BLOOD SUGAR: ICD-10-CM

## 2019-06-13 DIAGNOSIS — R73.9 ELEVATED BLOOD SUGAR: Primary | ICD-10-CM

## 2019-06-13 DIAGNOSIS — D69.3 CHRONIC ITP (IDIOPATHIC THROMBOCYTOPENIA): Primary | ICD-10-CM

## 2019-06-13 DIAGNOSIS — D69.3 CHRONIC ITP (IDIOPATHIC THROMBOCYTOPENIA): ICD-10-CM

## 2019-06-13 LAB
BASOPHILS # BLD AUTO: 0.05 K/UL (ref 0–0.2)
BASOPHILS NFR BLD: 0.9 % (ref 0–1.9)
DIFFERENTIAL METHOD: ABNORMAL
EOSINOPHIL # BLD AUTO: 0.2 K/UL (ref 0–0.5)
EOSINOPHIL NFR BLD: 3.2 % (ref 0–8)
ERYTHROCYTE [DISTWIDTH] IN BLOOD BY AUTOMATED COUNT: 12.5 % (ref 11.5–14.5)
ESTIMATED AVG GLUCOSE: 88 MG/DL (ref 68–131)
HBA1C MFR BLD HPLC: 4.7 % (ref 4–5.6)
HCT VFR BLD AUTO: 42.6 % (ref 40–54)
HGB BLD-MCNC: 14.2 G/DL (ref 14–18)
LYMPHOCYTES # BLD AUTO: 2.2 K/UL (ref 1–4.8)
LYMPHOCYTES NFR BLD: 39.2 % (ref 18–48)
MCH RBC QN AUTO: 31.8 PG (ref 27–31)
MCHC RBC AUTO-ENTMCNC: 33.3 G/DL (ref 32–36)
MCV RBC AUTO: 96 FL (ref 82–98)
MONOCYTES # BLD AUTO: 0.5 K/UL (ref 0.3–1)
MONOCYTES NFR BLD: 8.3 % (ref 4–15)
NEUTROPHILS # BLD AUTO: 2.8 K/UL (ref 1.8–7.7)
NEUTROPHILS NFR BLD: 48.4 % (ref 38–73)
PLATELET # BLD AUTO: 106 K/UL (ref 150–350)
PMV BLD AUTO: 9 FL (ref 9.2–12.9)
RBC # BLD AUTO: 4.46 M/UL (ref 4.6–6.2)
WBC # BLD AUTO: 5.67 K/UL (ref 3.9–12.7)

## 2019-06-13 PROCEDURE — 85025 COMPLETE CBC W/AUTO DIFF WBC: CPT

## 2019-06-13 PROCEDURE — 99999 PR PBB SHADOW E&M-EST. PATIENT-LVL III: CPT | Mod: PBBFAC,,, | Performed by: INTERNAL MEDICINE

## 2019-06-13 PROCEDURE — 99214 OFFICE O/P EST MOD 30 MIN: CPT | Mod: S$GLB,,, | Performed by: INTERNAL MEDICINE

## 2019-06-13 PROCEDURE — 83036 HEMOGLOBIN GLYCOSYLATED A1C: CPT

## 2019-06-13 PROCEDURE — 99214 PR OFFICE/OUTPT VISIT, EST, LEVL IV, 30-39 MIN: ICD-10-PCS | Mod: S$GLB,,, | Performed by: INTERNAL MEDICINE

## 2019-06-13 PROCEDURE — 99999 PR PBB SHADOW E&M-EST. PATIENT-LVL III: ICD-10-PCS | Mod: PBBFAC,,, | Performed by: INTERNAL MEDICINE

## 2019-06-13 PROCEDURE — 36415 COLL VENOUS BLD VENIPUNCTURE: CPT

## 2019-06-13 NOTE — PROGRESS NOTES
Reason for visit: Follow up for ITP    HPI:   The patient is a 59-year-old  male who presents to the hematology oncology clinic today to discuss further evaluation and management recommendations for immune mediated thrombocytopenia.  The patient had previously been followed by Dr. Marija Patel and Dr. Deven Norris with hematology oncology. The patient reports that he was diagnosed with immune mediated thrombocytopenia in 2012 when he noticed bruising on his lower extremities and sought medical attention for this. The patient reports that he did not tolerate high doses of prednisone very well. He has since been treated with chronic infusions of IVIG with good response.  He denies any melena, hematochezia, hematemesis, hemoptysis, hematuria or epistaxis. He denies any fever, chills or night sweats. He denies any bowel or urinary complaints.  The patient has completed 4 doses of weekly Rituxan on 2013 and tolerated this well. He denies any chest pain or shortness of breath. He denies any nausea or vomiting. I have reviewed all of his interval clinical history in EPIC.   His medical insurance now allows him to follow up back with me at [facility]. He has not required any treatment of his chronic ITP in the interim.    PAST MEDICAL HISTORY:   1. ITP  2. H. Pylori positive gastritis    SURGICAL HISTORY:   Denies    FAMILY HISTORY: The patient's mother  from complications related to pancreatic cancer at the age of 67. His brother  from complications related to lung cancer in his 40s. A paternal uncle  from complications related to throat cancer. A maternal uncle  from complications related to lung cancer. He denies any immediate family members with bleeding/clotting disorders.    SOCIAL HISTORY: He reports a 63+ pack year smoking history and is a current smoker. He states that he has moderated alcohol consumption. He has never used any recreational drugs. He works as a  . He is  and has 7 children. He lives in [place].    ALLERGIES: [NKDA]    MEDICATIONS: [Medcard has been reviewed and/or reconciled.]    REVIEW OF SYSTEMS:   GENERAL: [No fevers, chills or sweats. No fatigue, weight loss or loss of appetite.]  HEENT: [No blurred vision, tinnitus, nasal discharge, sorethroat or dysphagia.]  HEART: [No chest pain, palpitations or shortness of breath.]   LUNGS: [No cough, hemoptysis or breathing problems.]  ABDOMEN: [No abdominal pain, nausea, vomiting, diarrhea, constipation or melena.]  GENITOURINARY: [No dysuria, bleeding or malodorous discharge.]  NEURO: [No headache, dizziness or vertigo.]  HEMATOLOGY: [No easy bruising, spontaneous bleeding or blood clots in the past].  MUSCULOSKELETAL: [No arthralgias, myalgias or bone pains.]  SKIN: [No rashes or skin lesions.]  PSYCHIATRY: [No depression or anxiety.]    PHYSICAL EXAMINATION:   VS: Reviewed on nurse's notes.  APPEARANCE: The patient is a well-groomed  male who appears in no acute distress.   HEENT: No scleral icterus. Right external auditory canal clear. Left canal inflamed. No oral ulcers, lesions. Throat clear  HEAD: No sinus tenderness.  NECK: Supple. No palpable lymphadenopathy. Thyroid non-tender, no palpable masses  CHEST: Breath sounds clear bilaterally. No rales. No rhonchi. Unlabored respirations.  CARDIOVASCULAR: Normal S1, S2. Normal rate. Regular rhythm.  ABDOMEN: Bowel sounds slightly hyperactive. No tenderness. No abdominal distention. No hepatomegaly. No splenomegaly.  LYMPHATIC: No palpable supraclavicular, axillary nodes  EXTREMITIES: No clubbing, cyanosis, edema  SKIN: No lesions. No petechiae. No ecchymoses. No induration or nodules  NEUROLOGIC: No focal findings. Alert & Oriented x 3. Mood appropriate to affect    LABS:   Reviewed    IMAGING:  Reviewed    IMPRESSION:  1. Immune mediated thrombocytopenia    PLAN:  1.  I had a detailed discussion with the patient today with  regard to his current clinical situation.  Review of CBC from today shows interva ldecrease in his platelet count.  At this time I have recommended proceeding with a total of 8 cycles of IV Rituxan for treatment of his ITP.  The patient had excellent response when this was last given in 2013.  Rationale for this was reviewed in detail and the patient expressed understanding and agreement.  The patient has previously not responded to corticosteroid therapy for his ITP.  He has also had complications related to avascular necrosis with corticosteroids in the past.  2. I have discussed with the patient that he should seek immediate medical attention for any signs/symptoms of bleeding. He expressed understanding of this. He knows to present to the emergency room at [facility], [place] if this happens.  3. Proceed with cycle 8/8 of rituxan tomorrow if possible.  Review of the chemotherapy record shows that the patient has received a total of 7 weekly doses so far.     Follow-up in 2 weeks with labs. He knows to call sooner for any new problems or questions.    Rusty Salazar MD

## 2019-06-14 ENCOUNTER — INFUSION (OUTPATIENT)
Dept: INFUSION THERAPY | Facility: HOSPITAL | Age: 59
End: 2019-06-14
Attending: INTERNAL MEDICINE
Payer: COMMERCIAL

## 2019-06-14 VITALS
SYSTOLIC BLOOD PRESSURE: 120 MMHG | OXYGEN SATURATION: 99 % | RESPIRATION RATE: 18 BRPM | TEMPERATURE: 98 F | DIASTOLIC BLOOD PRESSURE: 74 MMHG | HEART RATE: 77 BPM

## 2019-06-14 DIAGNOSIS — D69.3 CHRONIC ITP (IDIOPATHIC THROMBOCYTOPENIA): Primary | ICD-10-CM

## 2019-06-14 PROCEDURE — 63600175 PHARM REV CODE 636 W HCPCS: Performed by: INTERNAL MEDICINE

## 2019-06-14 PROCEDURE — 96415 CHEMO IV INFUSION ADDL HR: CPT

## 2019-06-14 PROCEDURE — 96367 TX/PROPH/DG ADDL SEQ IV INF: CPT

## 2019-06-14 PROCEDURE — S0028 INJECTION, FAMOTIDINE, 20 MG: HCPCS | Performed by: INTERNAL MEDICINE

## 2019-06-14 PROCEDURE — 96375 TX/PRO/DX INJ NEW DRUG ADDON: CPT

## 2019-06-14 PROCEDURE — 96413 CHEMO IV INFUSION 1 HR: CPT

## 2019-06-14 PROCEDURE — 25000003 PHARM REV CODE 250: Performed by: INTERNAL MEDICINE

## 2019-06-14 RX ORDER — FAMOTIDINE 20 MG/50ML
20 INJECTION, SOLUTION INTRAVENOUS
Status: DISCONTINUED | OUTPATIENT
Start: 2019-06-14 | End: 2019-06-14

## 2019-06-14 RX ORDER — ACETAMINOPHEN 325 MG/1
650 TABLET ORAL
Status: COMPLETED | OUTPATIENT
Start: 2019-06-14 | End: 2019-06-14

## 2019-06-14 RX ORDER — FAMOTIDINE 10 MG/ML
20 INJECTION INTRAVENOUS
Status: COMPLETED | OUTPATIENT
Start: 2019-06-14 | End: 2019-06-14

## 2019-06-14 RX ORDER — FAMOTIDINE 10 MG/ML
20 INJECTION INTRAVENOUS
Status: CANCELLED
Start: 2019-06-14

## 2019-06-14 RX ORDER — DEXAMETHASONE SODIUM PHOSPHATE 4 MG/ML
8 INJECTION, SOLUTION INTRA-ARTICULAR; INTRALESIONAL; INTRAMUSCULAR; INTRAVENOUS; SOFT TISSUE
Status: COMPLETED | OUTPATIENT
Start: 2019-06-14 | End: 2019-06-14

## 2019-06-14 RX ADMIN — RITUXIMAB 649 MG: 10 INJECTION, SOLUTION INTRAVENOUS at 12:06

## 2019-06-14 RX ADMIN — DIPHENHYDRAMINE HYDROCHLORIDE 50 MG: 50 INJECTION, SOLUTION INTRAMUSCULAR; INTRAVENOUS at 11:06

## 2019-06-14 RX ADMIN — ACETAMINOPHEN 650 MG: 325 TABLET ORAL at 11:06

## 2019-06-14 RX ADMIN — DEXAMETHASONE SODIUM PHOSPHATE 8 MG: 4 INJECTION, SOLUTION INTRA-ARTICULAR; INTRALESIONAL; INTRAMUSCULAR; INTRAVENOUS; SOFT TISSUE at 11:06

## 2019-06-14 RX ADMIN — FAMOTIDINE 20 MG: 10 INJECTION, SOLUTION INTRAVENOUS at 11:06

## 2019-06-14 NOTE — PLAN OF CARE
Problem: Adult Inpatient Plan of Care  Goal: Plan of Care Review  Outcome: Ongoing (interventions implemented as appropriate)  Pt states feels well no complaints

## 2019-06-14 NOTE — DISCHARGE INSTRUCTIONS
[facility]  [address] [facility]  [address]  [phone] phone     [phone] fax  Hours of Operation: Monday- Friday 8:00am- 5:00pm  After hours phone  [phone]  Hematology / Oncology Physicians on call      Dr. Willian Chisholm      Please call with any concerns regarding your appointment today.    HOME CARE AFTER CHEMOTHERAPY   Meals   Many patients feel sick and lose their appetites during treatment. Eat small meals several times a day. Choose bland foods with little taste or smell if you have problems with nausea. Be sure to cook all food thoroughly. This kills bacteria and helps you avoid intestinal infection. Soft foods are easier to swallow and digest.   Activity   Exercise keeps you strong and keeps your heart and lungs active. Talk to your doctor about an appropriate exercise program for you.   Skin Care   To prevent a skin infection, bathe or shower once a day. Use a moisturizing soap and wash with warm water. Avoid very hot or cold water. Chemotherapy can make your skin dry . Apply moisturizing lotion to help relieve dry skin. Some drugs used in high doses can cause slight burns to appear (like sunburn). Ask for a special cream to help relieve the burn and protect your skin.   Prevent Mouth Sores   During chemotherapy, many people get mouth sores. Do the following to help prevent mouth sores or to ease discomfort.   Brush your teeth with a soft-bristle toothbrush after every meal.  Don't use dental floss if your platelet count is below 50,000. Your doctor or nurse will tell you if this is the case.  Use an oral swab or special soft toothbrush if your gums bleed during regular brushing.  Use mouthwash as directed. If you can't tolerate commercial mouthwash, use salt and baking soda to clean your mouth. Mix 1 teaspoon of salt and 1 teaspoon of baking soda into a glass of water. Swish and spit.  Call your  doctor or return to this facility if you develop any of the following:   Sore throat   White patches in the mouth or throat   Fever of 100.4ºF (38ºC) or higher, or as directed by your healthcare provider  © 2000-2011 [facility], [address] [place] [address]. All rights reserved. This information is not intended as a substitute for professional medical care. Always follow your healthcare professional's   FALL PREVENTION   Falls often occur due to slipping, tripping or losing your balance. Here are ways to reduce your risk of falling again.   Was there anything that caused your fall that can be fixed, removed or replaced?   Make your home safe by keeping walkways clear of objects you may trip over.   Use non-slip pads under rugs.   Do not walk in poorly lit areas.   Do not stand on chairs or wobbly ladders.   Use caution when reaching overhead or looking upward. This position can cause a loss of balance.   Be sure your shoes fit properly, have non-slip bottoms and are in good condition.   Be cautious when going up and down stairs, curbs, and when walking on uneven sidewalks.   If your balance is poor, consider using a cane or walker.   If your fall was related to alcohol use, stop or limit alcohol intake.   If your fall was related to use of sleeping medicines, talk to your doctor about this. You may need to reduce your dosage at bedtime if you awaken during the night to go to the bathroom.   To reduce the need for nighttime bathroom trips:   Avoid drinking fluids for several hours before going to bed   Empty your bladder before going to bed   Men can keep a urinal at the bedside   © 2000-2011 [facility], [address] [place] [address]. All rights reserved. This information is not intended as a substitute for professional medical care. Always follow your healthcare professional's instructions.  WAYS TO HELP PREVENT INFECTION         WASH YOUR HANDS OFTEN DURING THE DAY, ESPECIALLY  BEFORE YOU EAT, AFTER USING THE BATHROOM, AND AFTER TOUCHING ANIMALS     STAY AWAY FROM PEOPLE WHO HAVE ILLNESSES YOU CAN CATCH; SUCH AS COLDS, FLU, CHICKEN POX     TRY TO AVOID CROWDS     STAY AWAY FROM CHILDREN WHO RECENTLY HAVE RECEIVED LIVE VIRUS VACCINES     MAINTAIN GOOD MOUTH CARE     DO NOT SQUEEZE OR SCRATCH PIMPLES     CLEAN CUTS & SCRAPES RIGHT AWAY AND DAILY UNTIL HEALED WITH WARM WATER, SOAP & AN ANTISEPTIC     AVOID CONTACT WITH LITTER BOXES, BIRD CAGES, & FISH TANKS     AVOID STANDING WATER, IE., BIRD BATHS, FLOWER POTS/VASES, OR HUMIDIFIERS     WEAR GLOVES WHEN GARDENING OR CLEANING UP AFTER OTHERS, ESPECIALLY BABIES & SMALL CHILDREN     DO NOT EAT RAW FISH, SEAFOOD, MEAT, OR EGGS

## 2019-06-27 ENCOUNTER — OFFICE VISIT (OUTPATIENT)
Dept: HEMATOLOGY/ONCOLOGY | Facility: CLINIC | Age: 59
End: 2019-06-27
Payer: COMMERCIAL

## 2019-06-27 ENCOUNTER — LAB VISIT (OUTPATIENT)
Dept: LAB | Facility: HOSPITAL | Age: 59
End: 2019-06-27
Attending: INTERNAL MEDICINE
Payer: COMMERCIAL

## 2019-06-27 VITALS
SYSTOLIC BLOOD PRESSURE: 117 MMHG | BODY MASS INDEX: 21.93 KG/M2 | WEIGHT: 139.75 LBS | TEMPERATURE: 97 F | DIASTOLIC BLOOD PRESSURE: 77 MMHG | HEIGHT: 67 IN | OXYGEN SATURATION: 98 % | HEART RATE: 73 BPM

## 2019-06-27 DIAGNOSIS — D69.3 CHRONIC ITP (IDIOPATHIC THROMBOCYTOPENIA): Primary | ICD-10-CM

## 2019-06-27 DIAGNOSIS — D69.3 CHRONIC ITP (IDIOPATHIC THROMBOCYTOPENIA): ICD-10-CM

## 2019-06-27 LAB
ALBUMIN SERPL BCP-MCNC: 3.9 G/DL (ref 3.5–5.2)
ALP SERPL-CCNC: 46 U/L (ref 55–135)
ALT SERPL W/O P-5'-P-CCNC: 9 U/L (ref 10–44)
ANION GAP SERPL CALC-SCNC: 7 MMOL/L (ref 8–16)
AST SERPL-CCNC: 23 U/L (ref 10–40)
BASOPHILS # BLD AUTO: 0.07 K/UL (ref 0–0.2)
BASOPHILS NFR BLD: 1.1 % (ref 0–1.9)
BILIRUB SERPL-MCNC: 0.7 MG/DL (ref 0.1–1)
BUN SERPL-MCNC: 9 MG/DL (ref 6–20)
CALCIUM SERPL-MCNC: 8.9 MG/DL (ref 8.7–10.5)
CHLORIDE SERPL-SCNC: 106 MMOL/L (ref 95–110)
CO2 SERPL-SCNC: 27 MMOL/L (ref 23–29)
CREAT SERPL-MCNC: 0.8 MG/DL (ref 0.5–1.4)
DIFFERENTIAL METHOD: ABNORMAL
EOSINOPHIL # BLD AUTO: 0.2 K/UL (ref 0–0.5)
EOSINOPHIL NFR BLD: 3.2 % (ref 0–8)
ERYTHROCYTE [DISTWIDTH] IN BLOOD BY AUTOMATED COUNT: 12.8 % (ref 11.5–14.5)
EST. GFR  (AFRICAN AMERICAN): >60 ML/MIN/1.73 M^2
EST. GFR  (NON AFRICAN AMERICAN): >60 ML/MIN/1.73 M^2
GLUCOSE SERPL-MCNC: 87 MG/DL (ref 70–110)
HCT VFR BLD AUTO: 42.4 % (ref 40–54)
HGB BLD-MCNC: 14.3 G/DL (ref 14–18)
LYMPHOCYTES # BLD AUTO: 2.1 K/UL (ref 1–4.8)
LYMPHOCYTES NFR BLD: 31.7 % (ref 18–48)
MCH RBC QN AUTO: 32.1 PG (ref 27–31)
MCHC RBC AUTO-ENTMCNC: 33.7 G/DL (ref 32–36)
MCV RBC AUTO: 95 FL (ref 82–98)
MONOCYTES # BLD AUTO: 0.5 K/UL (ref 0.3–1)
MONOCYTES NFR BLD: 6.9 % (ref 4–15)
NEUTROPHILS # BLD AUTO: 3.8 K/UL (ref 1.8–7.7)
NEUTROPHILS NFR BLD: 57.3 % (ref 38–73)
PLATELET # BLD AUTO: 313 K/UL (ref 150–350)
PMV BLD AUTO: 9.3 FL (ref 9.2–12.9)
POTASSIUM SERPL-SCNC: 4.4 MMOL/L (ref 3.5–5.1)
PROT SERPL-MCNC: 6.8 G/DL (ref 6–8.4)
RBC # BLD AUTO: 4.45 M/UL (ref 4.6–6.2)
SODIUM SERPL-SCNC: 140 MMOL/L (ref 136–145)
WBC # BLD AUTO: 6.66 K/UL (ref 3.9–12.7)

## 2019-06-27 PROCEDURE — 99999 PR PBB SHADOW E&M-EST. PATIENT-LVL III: CPT | Mod: PBBFAC,,, | Performed by: INTERNAL MEDICINE

## 2019-06-27 PROCEDURE — 99999 PR PBB SHADOW E&M-EST. PATIENT-LVL III: ICD-10-PCS | Mod: PBBFAC,,, | Performed by: INTERNAL MEDICINE

## 2019-06-27 PROCEDURE — 80053 COMPREHEN METABOLIC PANEL: CPT

## 2019-06-27 PROCEDURE — 99214 PR OFFICE/OUTPT VISIT, EST, LEVL IV, 30-39 MIN: ICD-10-PCS | Mod: S$GLB,,, | Performed by: INTERNAL MEDICINE

## 2019-06-27 PROCEDURE — 36415 COLL VENOUS BLD VENIPUNCTURE: CPT

## 2019-06-27 PROCEDURE — 85025 COMPLETE CBC W/AUTO DIFF WBC: CPT

## 2019-06-27 PROCEDURE — 99214 OFFICE O/P EST MOD 30 MIN: CPT | Mod: S$GLB,,, | Performed by: INTERNAL MEDICINE

## 2019-06-27 NOTE — PROGRESS NOTES
Reason for visit: Follow up for ITP    HPI:   The patient is a 59-year-old  male who presents to the hematology oncology clinic today to discuss further evaluation and management recommendations for immune mediated thrombocytopenia.  The patient had previously been followed by Dr. Marija Patel and Dr. Deven Norris with hematology oncology. The patient reports that he was diagnosed with immune mediated thrombocytopenia in 2012 when he noticed bruising on his lower extremities and sought medical attention for this. The patient reports that he did not tolerate high doses of prednisone very well. He has since been treated with chronic infusions of IVIG with good response.  He denies any melena, hematochezia, hematemesis, hemoptysis, hematuria or epistaxis. He denies any fever, chills or night sweats. He denies any bowel or urinary complaints.  The patient has completed 4 doses of weekly Rituxan on 2013 and tolerated this well. He denies any chest pain or shortness of breath. He denies any nausea or vomiting. I have reviewed all of his interval clinical history in EPIC.   His medical insurance now allows him to follow up back with me at [facility]. He has not required any treatment of his chronic ITP in the interim.  He was subsequently noted to have relapse of his chronic ITP and has completed 8 weekly doses of Rituxan in 2019.    PAST MEDICAL HISTORY:   1. ITP  2. H. Pylori positive gastritis    SURGICAL HISTORY:   Denies    FAMILY HISTORY: The patient's mother  from complications related to pancreatic cancer at the age of 67. His brother  from complications related to lung cancer in his 40s. A paternal uncle  from complications related to throat cancer. A maternal uncle  from complications related to lung cancer. He denies any immediate family members with bleeding/clotting disorders.    SOCIAL HISTORY: He reports a 63+ pack year smoking history and is a current  smoker. He states that he has moderated alcohol consumption. He has never used any recreational drugs. He works as a . He is  and has 7 children. He lives in [place].    ALLERGIES: [NKDA]    MEDICATIONS: [Medcard has been reviewed and/or reconciled.]    REVIEW OF SYSTEMS:   GENERAL: [No fevers, chills or sweats. No fatigue, weight loss or loss of appetite.]  HEENT: [No blurred vision, tinnitus, nasal discharge, sorethroat or dysphagia.]  HEART: [No chest pain, palpitations or shortness of breath.]   LUNGS: [No cough, hemoptysis or breathing problems.]  ABDOMEN: [No abdominal pain, nausea, vomiting, diarrhea, constipation or melena.]  GENITOURINARY: [No dysuria, bleeding or malodorous discharge.]  NEURO: [No headache, dizziness or vertigo.]  HEMATOLOGY: [No easy bruising, spontaneous bleeding or blood clots in the past].  MUSCULOSKELETAL: [No arthralgias, myalgias or bone pains.]  SKIN: [No rashes or skin lesions.]  PSYCHIATRY: [No depression or anxiety.]    PHYSICAL EXAMINATION:   VS: Reviewed on nurse's notes.  APPEARANCE: The patient is a well-groomed  male who appears in no acute distress.   HEENT: No scleral icterus. Right external auditory canal clear. Left canal inflamed. No oral ulcers, lesions. Throat clear  HEAD: No sinus tenderness.  NECK: Supple. No palpable lymphadenopathy. Thyroid non-tender, no palpable masses  CHEST: Breath sounds clear bilaterally. No rales. No rhonchi. Unlabored respirations.  CARDIOVASCULAR: Normal S1, S2. Normal rate. Regular rhythm.  ABDOMEN: Bowel sounds slightly hyperactive. No tenderness. No abdominal distention. No hepatomegaly. No splenomegaly.  LYMPHATIC: No palpable supraclavicular, axillary nodes  EXTREMITIES: No clubbing, cyanosis, edema  SKIN: No lesions. No petechiae. No ecchymoses. No induration or nodules  NEUROLOGIC: No focal findings. Alert & Oriented x 3. Mood appropriate to affect    LABS:    Reviewed    IMAGING:  Reviewed    IMPRESSION:  1. Immune mediated thrombocytopenia    PLAN:  Weeks  1.  I had a detailed discussion with the patient today with regard to his current clinical situation.  Review of CBC from today shows normal platelet count.  The patient has completed a total of 8  of IV Rituxan for treatment of his ITP in June 2019.  The patient previously had an excellent response when this was last given in 2013.  Rationale for this was reviewed in detail and the patient expressed understanding and agreement.  The patient has previously not responded to corticosteroid therapy for his ITP.  He has also had complications related to avascular necrosis with corticosteroids in the past.  2. I have discussed with the patient that he should seek immediate medical attention for any signs/symptoms of bleeding. He expressed understanding of this. He knows to present to the emergency room at [facility], [place] if this happens.     I will recheck the patient's lab work in 1 month and contact him with results at which time further follow-up will be determined. He knows to call sooner for any new problems or questions.    Rusty Salazar MD

## 2019-07-11 ENCOUNTER — LAB VISIT (OUTPATIENT)
Dept: LAB | Facility: HOSPITAL | Age: 59
End: 2019-07-11
Attending: INTERNAL MEDICINE
Payer: COMMERCIAL

## 2019-07-11 ENCOUNTER — PATIENT MESSAGE (OUTPATIENT)
Dept: HEMATOLOGY/ONCOLOGY | Facility: CLINIC | Age: 59
End: 2019-07-11

## 2019-07-11 DIAGNOSIS — D69.3 CHRONIC ITP (IDIOPATHIC THROMBOCYTOPENIA): ICD-10-CM

## 2019-07-11 LAB
BASOPHILS # BLD AUTO: 0.07 K/UL (ref 0–0.2)
BASOPHILS NFR BLD: 1.3 % (ref 0–1.9)
DIFFERENTIAL METHOD: ABNORMAL
EOSINOPHIL # BLD AUTO: 0.2 K/UL (ref 0–0.5)
EOSINOPHIL NFR BLD: 2.8 % (ref 0–8)
ERYTHROCYTE [DISTWIDTH] IN BLOOD BY AUTOMATED COUNT: 11.6 % (ref 11.5–14.5)
HCT VFR BLD AUTO: 40.8 % (ref 40–54)
HGB BLD-MCNC: 13.7 G/DL (ref 14–18)
LYMPHOCYTES # BLD AUTO: 2.2 K/UL (ref 1–4.8)
LYMPHOCYTES NFR BLD: 40.8 % (ref 18–48)
MCH RBC QN AUTO: 31.9 PG (ref 27–31)
MCHC RBC AUTO-ENTMCNC: 33.6 G/DL (ref 32–36)
MCV RBC AUTO: 95 FL (ref 82–98)
MONOCYTES # BLD AUTO: 0.5 K/UL (ref 0.3–1)
MONOCYTES NFR BLD: 9.1 % (ref 4–15)
NEUTROPHILS # BLD AUTO: 2.4 K/UL (ref 1.8–7.7)
NEUTROPHILS NFR BLD: 46 % (ref 38–73)
PLATELET # BLD AUTO: 220 K/UL (ref 150–350)
PMV BLD AUTO: 9.1 FL (ref 9.2–12.9)
RBC # BLD AUTO: 4.29 M/UL (ref 4.6–6.2)
WBC # BLD AUTO: 5.27 K/UL (ref 3.9–12.7)

## 2019-07-11 PROCEDURE — 85025 COMPLETE CBC W/AUTO DIFF WBC: CPT

## 2019-07-11 PROCEDURE — 36415 COLL VENOUS BLD VENIPUNCTURE: CPT

## 2019-07-12 ENCOUNTER — DOCUMENTATION ONLY (OUTPATIENT)
Dept: HEMATOLOGY/ONCOLOGY | Facility: CLINIC | Age: 59
End: 2019-07-12

## 2019-07-12 NOTE — PROGRESS NOTES
Jaxson Burch Jr., Sreekanth, MD 8 hours ago (10:24 PM)         I have a question about CBC W/ AUTO DIFFERENTIAL resulted on 7/11/19, 11:14 AM.     Thanxs Doc      Mr. Burch stated that he already had his question answered regarding platelets and does not have any further questions. ap

## 2019-07-24 ENCOUNTER — OFFICE VISIT (OUTPATIENT)
Dept: INTERNAL MEDICINE | Facility: CLINIC | Age: 59
End: 2019-07-24
Payer: COMMERCIAL

## 2019-07-24 VITALS
SYSTOLIC BLOOD PRESSURE: 118 MMHG | DIASTOLIC BLOOD PRESSURE: 84 MMHG | BODY MASS INDEX: 22.39 KG/M2 | TEMPERATURE: 97 F | OXYGEN SATURATION: 97 % | WEIGHT: 142.63 LBS | HEART RATE: 95 BPM | HEIGHT: 67 IN

## 2019-07-24 DIAGNOSIS — K21.9 GASTROESOPHAGEAL REFLUX DISEASE, ESOPHAGITIS PRESENCE NOT SPECIFIED: ICD-10-CM

## 2019-07-24 DIAGNOSIS — D69.3 CHRONIC ITP (IDIOPATHIC THROMBOCYTOPENIA): ICD-10-CM

## 2019-07-24 DIAGNOSIS — G25.89 SCAPULAR DYSKINESIS: Primary | ICD-10-CM

## 2019-07-24 PROCEDURE — 99214 OFFICE O/P EST MOD 30 MIN: CPT | Mod: S$GLB,,, | Performed by: FAMILY MEDICINE

## 2019-07-24 PROCEDURE — 99999 PR PBB SHADOW E&M-EST. PATIENT-LVL III: CPT | Mod: PBBFAC,,, | Performed by: FAMILY MEDICINE

## 2019-07-24 PROCEDURE — 99999 PR PBB SHADOW E&M-EST. PATIENT-LVL III: ICD-10-PCS | Mod: PBBFAC,,, | Performed by: FAMILY MEDICINE

## 2019-07-24 PROCEDURE — 99214 PR OFFICE/OUTPT VISIT, EST, LEVL IV, 30-39 MIN: ICD-10-PCS | Mod: S$GLB,,, | Performed by: FAMILY MEDICINE

## 2019-07-24 RX ORDER — MELOXICAM 15 MG/1
15 TABLET ORAL DAILY
COMMUNITY
Start: 2019-07-22 | End: 2019-10-07

## 2019-07-24 NOTE — PROGRESS NOTES
Jaxson Burch Jr.  07/24/2019  [id number]    Vijay Cox MD  Patient Care Team:  Vijay Cox MD as PCP - General (Family Medicine)  Margaux Martinez LPN as Care Coordinator (Internal Medicine)  Has the patient seen any provider outside of the [facility] network since the last visit? (no). If yes, HIPPA forms completed and records requested.      Chief Complaint:  Chief Complaint   Patient presents with    Shoulder Pain     left       History of Present Illness:  Patient is a 59-year-old male with chronic ITP who presents clinic today complaining of left shoulder pain for the past 4 days.  Patient denies any injuries or falls.  Patient states that he has been taking Mobic without significant improvement.  Denies any previous injuries, surgeries, injections, MRIs, physical therapy, or similar problems in the past.  Patient localizes pain to his posterior and medial scapula.          History:  Past Medical History:   Diagnosis Date    Arthritis     Gastritis     ITP (idiopathic thrombocytopenic purpura)      Past Surgical History:   Procedure Laterality Date    COLONOSCOPY N/A 11/29/2018    Performed by [facility]send [facility] at [facility]    EGD (ESOPHAGOGASTRODUODENOSCOPY) N/A 8/2/2013    Performed by Roberto Chao MD at [facility]    JOINT REPLACEMENT Right     hip     Family History   Problem Relation Age of Onset    Pancreatic cancer Mother     Colon cancer Neg Hx      Social History     Socioeconomic History    Marital status:      Spouse name: Not on file    Number of children: Not on file    Years of education: Not on file    Highest education level: Not on file   Occupational History    Not on file   Social Needs    Financial resource strain: Not on file    Food insecurity:     Worry: Not on file     Inability: Not on file    Transportation needs:     Medical: Not on file     Non-medical: Not on file   Tobacco Use    Smoking status: Current Every Day Smoker      Packs/day: 0.50     Years: 40.00     Pack years: 20.00     Types: Cigarettes    Smokeless tobacco: Never Used   Substance and Sexual Activity    Alcohol use: Yes     Alcohol/week: 0.0 oz     Comment: socially    Drug use: No    Sexual activity: Yes     Partners: Female   Lifestyle    Physical activity:     Days per week: Not on file     Minutes per session: Not on file    Stress: Not on file   Relationships    Social connections:     Talks on phone: Not on file     Gets together: Not on file     Attends Buddhist service: Not on file     Active member of club or organization: Not on file     Attends meetings of clubs or organizations: Not on file     Relationship status: Not on file   Other Topics Concern    Not on file   Social History Narrative    Not on file     Patient Active Problem List   Diagnosis    Chronic ITP (idiopathic thrombocytopenia)    Cervical radiculitis    Erectile dysfunction    History of ITP    High risk medication use    Gastroesophageal reflux disease    Weight loss    Insomnia    Hyperkalemia    Colon cancer screening    Numbness of left foot     Review of patient's allergies indicates:  No Known Allergies    The following were reviewed at this visit: active problem list, medication list, allergies, family history, social history, and health maintenance.    Medications:  Current Outpatient Medications on File Prior to Visit   Medication Sig Dispense Refill    meloxicam (MOBIC) 15 MG tablet Take 15 mg by mouth once daily.      oxyCODONE-acetaminophen (PERCOCET)  mg per tablet Take 1 tablet by mouth every 4 (four) hours as needed for Pain.      tadalafil (CIALIS) 20 MG Tab Take 20 mg by mouth daily as needed.      tadalafil (CIALIS) 20 MG Tab Take 20 mg by mouth daily as needed.      zolpidem (AMBIEN) 5 MG Tab Take 5 mg by mouth nightly as needed.      zolpidem (AMBIEN) 5 MG Tab Take 5 mg by mouth.      atorvastatin (LIPITOR) 20 MG tablet Take 1 tablet (20 mg  total) by mouth once daily. 90 tablet 0    indomethacin (INDOCIN) 50 MG capsule Take 50 mg by mouth 3 (three) times daily.      mometasone (ELOCON) 0.1 % ointment       omeprazole (PRILOSEC) 20 MG capsule Take 1 capsule (20 mg total) by mouth once daily. 30 capsule 11    oxyCODONE-acetaminophen (PERCOCET)  mg per tablet       predniSONE (DELTASONE) 20 MG tablet Take 1 tablet (20 mg total) by mouth once daily. 4 tablet 0    traZODone (DESYREL) 50 MG tablet Take 1 tablet (50 mg total) by mouth every evening. 90 tablet 0     No current facility-administered medications on file prior to visit.        Medications have been reviewed and reconciled with patient at this visit.  Barriers to medications present (no)    Adverse reactions to current medications (no)    Over the counter medications reviewed (Yes ), and if needed added to active Medication list at this visit.     Exam:  Wt Readings from Last 3 Encounters:   07/24/19 64.7 kg (142 lb 10.2 oz)   06/27/19 63.4 kg (139 lb 12.4 oz)   06/13/19 63.5 kg (139 lb 15.9 oz)     Temp Readings from Last 3 Encounters:   07/24/19 96.9 °F (36.1 °C) (Tympanic)   06/27/19 97.2 °F (36.2 °C) (Oral)   06/14/19 97.5 °F (36.4 °C)     BP Readings from Last 3 Encounters:   07/24/19 118/84   06/27/19 117/77   06/14/19 120/74     Pulse Readings from Last 3 Encounters:   07/24/19 95   06/27/19 73   06/14/19 77     Body mass index is 22.34 kg/m².      Review of Systems   Constitutional: Negative for chills and fever.   HENT: Negative for congestion and hearing loss.    Eyes: Negative for redness.   Respiratory: Negative for cough.    Cardiovascular: Negative for leg swelling.   Gastrointestinal: Negative for heartburn, nausea and vomiting.   Musculoskeletal: Positive for joint pain and myalgias. Negative for back pain and falls.   Skin: Negative for rash.   Neurological: Negative for tingling, sensory change, focal weakness and weakness.     Physical Exam   Constitutional: He is  oriented to person, place, and time. He appears well-developed and well-nourished. No distress.   HENT:   Head: Normocephalic and atraumatic.   Eyes: Conjunctivae are normal. No scleral icterus.   Pulmonary/Chest: Effort normal.   Musculoskeletal:        Right shoulder: He exhibits tenderness. He exhibits normal range of motion, no bony tenderness, no deformity and no laceration.   Tenderness to palpation along medial scapula; full passive and active range of motion; full strength; negative Reyes, Neers, and Jobes   Neurological: He is alert and oriented to person, place, and time.   Skin: Skin is warm and dry. Capillary refill takes less than 2 seconds. He is not diaphoretic.   Psychiatric: He has a normal mood and affect. His behavior is normal.   Nursing note and vitals reviewed.      Laboratory Reviewed ({Yes)  Lab Results   Component Value Date    WBC 5.27 07/11/2019    HGB 13.7 (L) 07/11/2019    HCT 40.8 07/11/2019     07/11/2019    CHOL 215 (H) 07/31/2018    TRIG 113 07/31/2018    HDL 67 07/31/2018    ALT 9 (L) 06/27/2019    AST 23 06/27/2019     06/27/2019    K 4.4 06/27/2019     06/27/2019    CREATININE 0.8 06/27/2019    BUN 9 06/27/2019    CO2 27 06/27/2019    INR 1.0 05/05/2016    HGBA1C 4.7 06/13/2019       Jaxson was seen today for shoulder pain.    Diagnoses and all orders for this visit:    Scapular dyskinesis    Gastroesophageal reflux disease, esophagitis presence not specified    Chronic ITP (idiopathic thrombocytopenia)       -discuss clinical course and nature of scapular dyskinesia with patient.  At this time, patient would like to conservative approach with home physical therapy, heat, OTC topical cream, and NSAIDs/Tylenol.  If patient does not get significant relief, would recommend formal physical therapy.      Chronic ITP.  Managed by Oncology.  Has a follow-up with them tomorrow.  -Chronic GERD.  Managed by patient's PCP.    -Patient's lab results were reviewed and  discussed with patient   -Treatment options and alternatives were discussed with the patient. Patient expressed understanding. Patient was given the opportunity to ask questions and be an active participant in their medical care. Patient had no further questions or concerns at this time.   -Patient is an overall moderate risk for health complications from their medical conditions.     Follow up: Follow up if symptoms worsen or fail to improve.    After visit summary was printed and given to patient upon discharge today.  Patient goals and care plan are included in After Visit Summary.

## 2019-07-25 ENCOUNTER — PATIENT MESSAGE (OUTPATIENT)
Dept: HEMATOLOGY/ONCOLOGY | Facility: CLINIC | Age: 59
End: 2019-07-25

## 2019-07-25 ENCOUNTER — LAB VISIT (OUTPATIENT)
Dept: LAB | Facility: HOSPITAL | Age: 59
End: 2019-07-25
Attending: INTERNAL MEDICINE
Payer: COMMERCIAL

## 2019-07-25 DIAGNOSIS — D69.3 CHRONIC ITP (IDIOPATHIC THROMBOCYTOPENIA): ICD-10-CM

## 2019-07-25 LAB
BASOPHILS # BLD AUTO: 0.08 K/UL (ref 0–0.2)
BASOPHILS NFR BLD: 1.1 % (ref 0–1.9)
DIFFERENTIAL METHOD: ABNORMAL
EOSINOPHIL # BLD AUTO: 0.3 K/UL (ref 0–0.5)
EOSINOPHIL NFR BLD: 4 % (ref 0–8)
ERYTHROCYTE [DISTWIDTH] IN BLOOD BY AUTOMATED COUNT: 12.6 % (ref 11.5–14.5)
HCT VFR BLD AUTO: 43.6 % (ref 40–54)
HGB BLD-MCNC: 14.8 G/DL (ref 14–18)
LYMPHOCYTES # BLD AUTO: 2.7 K/UL (ref 1–4.8)
LYMPHOCYTES NFR BLD: 37.7 % (ref 18–48)
MCH RBC QN AUTO: 31.9 PG (ref 27–31)
MCHC RBC AUTO-ENTMCNC: 33.9 G/DL (ref 32–36)
MCV RBC AUTO: 94 FL (ref 82–98)
MONOCYTES # BLD AUTO: 0.6 K/UL (ref 0.3–1)
MONOCYTES NFR BLD: 8.9 % (ref 4–15)
NEUTROPHILS # BLD AUTO: 3.5 K/UL (ref 1.8–7.7)
NEUTROPHILS NFR BLD: 48.6 % (ref 38–73)
PLATELET # BLD AUTO: 56 K/UL (ref 150–350)
PMV BLD AUTO: 10 FL (ref 9.2–12.9)
RBC # BLD AUTO: 4.64 M/UL (ref 4.6–6.2)
WBC # BLD AUTO: 7.22 K/UL (ref 3.9–12.7)

## 2019-07-25 PROCEDURE — 36415 COLL VENOUS BLD VENIPUNCTURE: CPT

## 2019-07-25 PROCEDURE — 85025 COMPLETE CBC W/AUTO DIFF WBC: CPT

## 2019-07-26 DIAGNOSIS — D69.3 CHRONIC ITP (IDIOPATHIC THROMBOCYTOPENIA): Primary | ICD-10-CM

## 2019-08-02 ENCOUNTER — LAB VISIT (OUTPATIENT)
Dept: LAB | Facility: HOSPITAL | Age: 59
End: 2019-08-02
Attending: INTERNAL MEDICINE
Payer: COMMERCIAL

## 2019-08-02 ENCOUNTER — OFFICE VISIT (OUTPATIENT)
Dept: HEMATOLOGY/ONCOLOGY | Facility: CLINIC | Age: 59
End: 2019-08-02
Payer: COMMERCIAL

## 2019-08-02 VITALS
HEIGHT: 67 IN | SYSTOLIC BLOOD PRESSURE: 112 MMHG | TEMPERATURE: 98 F | BODY MASS INDEX: 22.88 KG/M2 | RESPIRATION RATE: 17 BRPM | HEART RATE: 83 BPM | WEIGHT: 145.75 LBS | OXYGEN SATURATION: 98 % | DIASTOLIC BLOOD PRESSURE: 69 MMHG

## 2019-08-02 DIAGNOSIS — D69.3 CHRONIC ITP (IDIOPATHIC THROMBOCYTOPENIA): ICD-10-CM

## 2019-08-02 DIAGNOSIS — D69.3 CHRONIC ITP (IDIOPATHIC THROMBOCYTOPENIA): Primary | ICD-10-CM

## 2019-08-02 LAB
BASOPHILS # BLD AUTO: 0.1 K/UL (ref 0–0.2)
BASOPHILS NFR BLD: 1.5 % (ref 0–1.9)
DIFFERENTIAL METHOD: ABNORMAL
EOSINOPHIL # BLD AUTO: 0.3 K/UL (ref 0–0.5)
EOSINOPHIL NFR BLD: 4.6 % (ref 0–8)
ERYTHROCYTE [DISTWIDTH] IN BLOOD BY AUTOMATED COUNT: 12.3 % (ref 11.5–14.5)
HCT VFR BLD AUTO: 39.9 % (ref 40–54)
HGB BLD-MCNC: 12.9 G/DL (ref 14–18)
IMM GRANULOCYTES # BLD AUTO: 0.03 K/UL (ref 0–0.04)
IMM GRANULOCYTES NFR BLD AUTO: 0.4 % (ref 0–0.5)
LYMPHOCYTES # BLD AUTO: 2.1 K/UL (ref 1–4.8)
LYMPHOCYTES NFR BLD: 31.5 % (ref 18–48)
MCH RBC QN AUTO: 31 PG (ref 27–31)
MCHC RBC AUTO-ENTMCNC: 32.3 G/DL (ref 32–36)
MCV RBC AUTO: 96 FL (ref 82–98)
MONOCYTES # BLD AUTO: 0.6 K/UL (ref 0.3–1)
MONOCYTES NFR BLD: 8.5 % (ref 4–15)
NEUTROPHILS # BLD AUTO: 3.6 K/UL (ref 1.8–7.7)
NEUTROPHILS NFR BLD: 53.9 % (ref 38–73)
NRBC BLD-RTO: 0 /100 WBC
PLATELET # BLD AUTO: 440 K/UL (ref 150–350)
PMV BLD AUTO: 8.3 FL (ref 9.2–12.9)
RBC # BLD AUTO: 4.16 M/UL (ref 4.6–6.2)
WBC # BLD AUTO: 6.67 K/UL (ref 3.9–12.7)

## 2019-08-02 PROCEDURE — 99214 OFFICE O/P EST MOD 30 MIN: CPT | Mod: S$GLB,,, | Performed by: INTERNAL MEDICINE

## 2019-08-02 PROCEDURE — 99214 PR OFFICE/OUTPT VISIT, EST, LEVL IV, 30-39 MIN: ICD-10-PCS | Mod: S$GLB,,, | Performed by: INTERNAL MEDICINE

## 2019-08-02 PROCEDURE — 36415 COLL VENOUS BLD VENIPUNCTURE: CPT

## 2019-08-02 PROCEDURE — 99999 PR PBB SHADOW E&M-EST. PATIENT-LVL IV: CPT | Mod: PBBFAC,,, | Performed by: INTERNAL MEDICINE

## 2019-08-02 PROCEDURE — 99999 PR PBB SHADOW E&M-EST. PATIENT-LVL IV: ICD-10-PCS | Mod: PBBFAC,,, | Performed by: INTERNAL MEDICINE

## 2019-08-02 PROCEDURE — 85025 COMPLETE CBC W/AUTO DIFF WBC: CPT

## 2019-08-06 NOTE — PLAN OF CARE
Problem: Adult Inpatient Plan of Care  Goal: Plan of Care Review  Outcome: Ongoing (interventions implemented as appropriate)  I feel fine today. I was surprised I needed the infusion.        [Dear  ___] : Dear  [unfilled], [Consult Letter:] : I had the pleasure of evaluating your patient, [unfilled]. [Consult Closing:] : Thank you very much for allowing me to participate in the care of this patient.  If you have any questions, please do not hesitate to contact me. [Please see my note below.] : Please see my note below. [Sincerely,] : Sincerely, [Michael Gaitan MD] : Michael Gaitan MD [Otolaryngology] : Otolaryngology [[facility]] : [facility] [[facility]] : [facility] [FreeTextEntry1] : Dear Dr. FABIENNE HUBER \par I had the pleasure of evaluating your patient RHYS CRUZ  thank you for allowing us to participate in their care. please see full note detailing our visit below.\par If you have any questions, please do not hesitate to call me and I would be happy to discuss further. \par \par Michael Gaitan M.D.\par Attending Physician,  \par Department of Otolaryngology - Head and Neck Surgery\par [facility] \par Office: [phone]\par Fax: [phone]\par

## 2019-08-07 ENCOUNTER — PATIENT MESSAGE (OUTPATIENT)
Dept: INTERNAL MEDICINE | Facility: CLINIC | Age: 59
End: 2019-08-07

## 2019-08-07 DIAGNOSIS — N52.9 ERECTILE DYSFUNCTION, UNSPECIFIED ERECTILE DYSFUNCTION TYPE: Primary | ICD-10-CM

## 2019-08-07 RX ORDER — TADALAFIL 20 MG/1
20 TABLET ORAL DAILY PRN
Qty: 12 TABLET | Refills: 3 | Status: SHIPPED | OUTPATIENT
Start: 2019-08-07 | End: 2020-10-06

## 2019-08-27 ENCOUNTER — TELEPHONE (OUTPATIENT)
Dept: HEMATOLOGY/ONCOLOGY | Facility: CLINIC | Age: 59
End: 2019-08-27

## 2019-08-27 ENCOUNTER — LAB VISIT (OUTPATIENT)
Dept: LAB | Facility: HOSPITAL | Age: 59
End: 2019-08-27
Attending: INTERNAL MEDICINE
Payer: COMMERCIAL

## 2019-08-27 DIAGNOSIS — D69.3 CHRONIC ITP (IDIOPATHIC THROMBOCYTOPENIA): ICD-10-CM

## 2019-08-27 LAB
ALBUMIN SERPL BCP-MCNC: 3.9 G/DL (ref 3.5–5.2)
ALP SERPL-CCNC: 47 U/L (ref 55–135)
ALT SERPL W/O P-5'-P-CCNC: 15 U/L (ref 10–44)
ANION GAP SERPL CALC-SCNC: 11 MMOL/L (ref 8–16)
AST SERPL-CCNC: 24 U/L (ref 10–40)
BASOPHILS # BLD AUTO: 0.08 K/UL (ref 0–0.2)
BASOPHILS NFR BLD: 1.4 % (ref 0–1.9)
BILIRUB SERPL-MCNC: 0.9 MG/DL (ref 0.1–1)
BUN SERPL-MCNC: 9 MG/DL (ref 6–20)
CALCIUM SERPL-MCNC: 9.2 MG/DL (ref 8.7–10.5)
CHLORIDE SERPL-SCNC: 105 MMOL/L (ref 95–110)
CO2 SERPL-SCNC: 25 MMOL/L (ref 23–29)
CREAT SERPL-MCNC: 0.9 MG/DL (ref 0.5–1.4)
DIFFERENTIAL METHOD: ABNORMAL
EOSINOPHIL # BLD AUTO: 0.2 K/UL (ref 0–0.5)
EOSINOPHIL NFR BLD: 3.7 % (ref 0–8)
ERYTHROCYTE [DISTWIDTH] IN BLOOD BY AUTOMATED COUNT: 12.8 % (ref 11.5–14.5)
EST. GFR  (AFRICAN AMERICAN): >60 ML/MIN/1.73 M^2
EST. GFR  (NON AFRICAN AMERICAN): >60 ML/MIN/1.73 M^2
GLUCOSE SERPL-MCNC: 93 MG/DL (ref 70–110)
HCT VFR BLD AUTO: 42.7 % (ref 40–54)
HGB BLD-MCNC: 14.2 G/DL (ref 14–18)
LYMPHOCYTES # BLD AUTO: 2.4 K/UL (ref 1–4.8)
LYMPHOCYTES NFR BLD: 42.6 % (ref 18–48)
MCH RBC QN AUTO: 31.2 PG (ref 27–31)
MCHC RBC AUTO-ENTMCNC: 33.3 G/DL (ref 32–36)
MCV RBC AUTO: 94 FL (ref 82–98)
MONOCYTES # BLD AUTO: 0.5 K/UL (ref 0.3–1)
MONOCYTES NFR BLD: 8.4 % (ref 4–15)
NEUTROPHILS # BLD AUTO: 2.5 K/UL (ref 1.8–7.7)
NEUTROPHILS NFR BLD: 44.3 % (ref 38–73)
PLATELET # BLD AUTO: 32 K/UL (ref 150–350)
PMV BLD AUTO: 10.2 FL (ref 9.2–12.9)
POTASSIUM SERPL-SCNC: 4.5 MMOL/L (ref 3.5–5.1)
PROT SERPL-MCNC: 6.8 G/DL (ref 6–8.4)
RBC # BLD AUTO: 4.55 M/UL (ref 4.6–6.2)
SODIUM SERPL-SCNC: 141 MMOL/L (ref 136–145)
WBC # BLD AUTO: 5.61 K/UL (ref 3.9–12.7)

## 2019-08-27 PROCEDURE — 85025 COMPLETE CBC W/AUTO DIFF WBC: CPT

## 2019-08-27 PROCEDURE — 80053 COMPREHEN METABOLIC PANEL: CPT

## 2019-08-27 PROCEDURE — 36415 COLL VENOUS BLD VENIPUNCTURE: CPT

## 2019-08-28 ENCOUNTER — OFFICE VISIT (OUTPATIENT)
Dept: HEMATOLOGY/ONCOLOGY | Facility: CLINIC | Age: 59
End: 2019-08-28
Payer: COMMERCIAL

## 2019-08-28 VITALS
DIASTOLIC BLOOD PRESSURE: 76 MMHG | HEART RATE: 96 BPM | SYSTOLIC BLOOD PRESSURE: 119 MMHG | BODY MASS INDEX: 22.63 KG/M2 | OXYGEN SATURATION: 96 % | HEIGHT: 67 IN | WEIGHT: 144.19 LBS | TEMPERATURE: 98 F

## 2019-08-28 DIAGNOSIS — D69.3 CHRONIC ITP (IDIOPATHIC THROMBOCYTOPENIA): Primary | ICD-10-CM

## 2019-08-28 PROCEDURE — 99214 PR OFFICE/OUTPT VISIT, EST, LEVL IV, 30-39 MIN: ICD-10-PCS | Mod: S$GLB,,, | Performed by: INTERNAL MEDICINE

## 2019-08-28 PROCEDURE — 99999 PR PBB SHADOW E&M-EST. PATIENT-LVL III: ICD-10-PCS | Mod: PBBFAC,,, | Performed by: INTERNAL MEDICINE

## 2019-08-28 PROCEDURE — 99999 PR PBB SHADOW E&M-EST. PATIENT-LVL III: CPT | Mod: PBBFAC,,, | Performed by: INTERNAL MEDICINE

## 2019-08-28 PROCEDURE — 99214 OFFICE O/P EST MOD 30 MIN: CPT | Mod: S$GLB,,, | Performed by: INTERNAL MEDICINE

## 2019-08-28 NOTE — PROGRESS NOTES
Reason for visit: Follow up for ITP    HPI:   The patient is a 59-year-old  male who presents to the hematology oncology clinic today to discuss further evaluation and management recommendations for immune mediated thrombocytopenia.  The patient had previously been followed by Dr. Marija Patel and Dr. Deven Norris with hematology oncology. The patient reports that he was diagnosed with immune mediated thrombocytopenia in 2012 when he noticed bruising on his lower extremities and sought medical attention for this. The patient reports that he did not tolerate high doses of prednisone very well. He has since been treated with chronic infusions of IVIG with good response.  He denies any melena, hematochezia, hematemesis, hemoptysis, hematuria or epistaxis. He denies any fever, chills or night sweats. He denies any bowel or urinary complaints.  The patient has completed 4 doses of weekly Rituxan on 2013 and tolerated this well. He denies any chest pain or shortness of breath. He denies any nausea or vomiting. I have reviewed all of his interval clinical history in EPIC.   His medical insurance now allows him to follow up back with me at [facility]. He has not required any treatment of his chronic ITP in the interim.  He was subsequently noted to have relapse of his chronic ITP and has completed 8 weekly doses of Rituxan in 2019.    PAST MEDICAL HISTORY:   1. ITP  2. H. Pylori positive gastritis    SURGICAL HISTORY:   Denies    FAMILY HISTORY: The patient's mother  from complications related to pancreatic cancer at the age of 67. His brother  from complications related to lung cancer in his 40s. A paternal uncle  from complications related to throat cancer. A maternal uncle  from complications related to lung cancer. He denies any immediate family members with bleeding/clotting disorders.    SOCIAL HISTORY: He reports a 63+ pack year smoking history and is a current  smoker. He states that he has moderated alcohol consumption. He has never used any recreational drugs. He works as a . He is  and has 7 children. He lives in [place].    ALLERGIES: [NKDA]    MEDICATIONS: [Medcard has been reviewed and/or reconciled.]    REVIEW OF SYSTEMS:   GENERAL: [No fevers, chills or sweats. No fatigue, weight loss or loss of appetite.]  HEENT: [No blurred vision, tinnitus, nasal discharge, sorethroat or dysphagia.]  HEART: [No chest pain, palpitations or shortness of breath.]   LUNGS: [No cough, hemoptysis or breathing problems.]  ABDOMEN: [No abdominal pain, nausea, vomiting, diarrhea, constipation or melena.]  GENITOURINARY: [No dysuria, bleeding or malodorous discharge.]  NEURO: [No headache, dizziness or vertigo.]  HEMATOLOGY: [No easy bruising, spontaneous bleeding or blood clots in the past].  MUSCULOSKELETAL: [No arthralgias, myalgias or bone pains.]  SKIN: [No rashes or skin lesions.]  PSYCHIATRY: [No depression or anxiety.]    PHYSICAL EXAMINATION:   VS: Reviewed on nurse's notes.  APPEARANCE: The patient is a well-groomed  male who appears in no acute distress.   HEENT: No scleral icterus. Right external auditory canal clear. Left canal inflamed. No oral ulcers, lesions. Throat clear  HEAD: No sinus tenderness.  NECK: Supple. No palpable lymphadenopathy. Thyroid non-tender, no palpable masses  CHEST: Breath sounds clear bilaterally. No rales. No rhonchi. Unlabored respirations.  CARDIOVASCULAR: Normal S1, S2. Normal rate. Regular rhythm.  ABDOMEN: Bowel sounds slightly hyperactive. No tenderness. No abdominal distention. No hepatomegaly. No splenomegaly.  LYMPHATIC: No palpable supraclavicular, axillary nodes  EXTREMITIES: No clubbing, cyanosis, edema  SKIN: No lesions. No petechiae. No ecchymoses. No induration or nodules  NEUROLOGIC: No focal findings. Alert & Oriented x 3. Mood appropriate to affect    LABS:    Reviewed    IMAGING:  Reviewed    IMPRESSION:  1. Immune mediated thrombocytopenia    PLAN:  Weeks  1.  I had a detailed discussion with the patient today with regard to his current clinical situation.  Review of CBC from yesterday shows decreased  platelet count compared to last check.  The patient has completed a total of 8  of IV Rituxan for treatment of his ITP in June 2019.  The patient previously had an excellent response when this was last given in 2013.  Rationale for this was reviewed in detail and the patient expressed understanding and agreement.  The patient has previously not responded to corticosteroid therapy for his ITP.  He has also had complications related to avascular necrosis with corticosteroids in the past.  2. I have discussed with the patient that he should seek immediate medical attention for any signs/symptoms of bleeding. He expressed understanding of this. He knows to present to the emergency room at [facility], [place] if this happens.  3.  At this time the patient's platelet count continues to be greater than 30,000 and he is clinically asymptomatic.  Therefore we will continue with close observation.  I will plan on rechecking his CBC in 10 days.  I will also check his vaccination status and vaccinate him for pneumococcus, Haemophilus influenzae and meningitis at that time as we discussed that the next best step in his treatment whenever he needed this would be splenectomy.  He will likely need IVIG before his splenectomy to temporarily increase the platelet count for the surgery when this is needed.     Follow-up will be determined after above. He knows to call sooner for any new problems or questions.    Rusty Salazar MD

## 2019-08-29 ENCOUNTER — TELEPHONE (OUTPATIENT)
Dept: HEMATOLOGY/ONCOLOGY | Facility: CLINIC | Age: 59
End: 2019-08-29

## 2019-08-29 NOTE — TELEPHONE ENCOUNTER
Spoke with patient regarding vaccine appts. .  All appts are agreeable to patient , day, time and location reviewed. He will contact dr shrestha with any concerns.

## 2019-09-09 ENCOUNTER — INFUSION (OUTPATIENT)
Dept: INFUSION THERAPY | Facility: HOSPITAL | Age: 59
End: 2019-09-09
Attending: INTERNAL MEDICINE
Payer: COMMERCIAL

## 2019-09-09 ENCOUNTER — LAB VISIT (OUTPATIENT)
Dept: LAB | Facility: HOSPITAL | Age: 59
End: 2019-09-09
Attending: INTERNAL MEDICINE
Payer: COMMERCIAL

## 2019-09-09 DIAGNOSIS — D69.3 CHRONIC ITP (IDIOPATHIC THROMBOCYTOPENIA): ICD-10-CM

## 2019-09-09 LAB
BASOPHILS # BLD AUTO: 0.06 K/UL (ref 0–0.2)
BASOPHILS NFR BLD: 0.6 % (ref 0–1.9)
DIFFERENTIAL METHOD: ABNORMAL
EOSINOPHIL # BLD AUTO: 0 K/UL (ref 0–0.5)
EOSINOPHIL NFR BLD: 0.2 % (ref 0–8)
ERYTHROCYTE [DISTWIDTH] IN BLOOD BY AUTOMATED COUNT: 12.4 % (ref 11.5–14.5)
HCT VFR BLD AUTO: 40.9 % (ref 40–54)
HGB BLD-MCNC: 13.5 G/DL (ref 14–18)
IMM GRANULOCYTES # BLD AUTO: 0.16 K/UL (ref 0–0.04)
IMM GRANULOCYTES NFR BLD AUTO: 1.5 % (ref 0–0.5)
LYMPHOCYTES # BLD AUTO: 2.2 K/UL (ref 1–4.8)
LYMPHOCYTES NFR BLD: 20.6 % (ref 18–48)
MCH RBC QN AUTO: 31.1 PG (ref 27–31)
MCHC RBC AUTO-ENTMCNC: 33 G/DL (ref 32–36)
MCV RBC AUTO: 94 FL (ref 82–98)
MONOCYTES # BLD AUTO: 0.8 K/UL (ref 0.3–1)
MONOCYTES NFR BLD: 7.8 % (ref 4–15)
NEUTROPHILS # BLD AUTO: 7.4 K/UL (ref 1.8–7.7)
NEUTROPHILS NFR BLD: 70.8 % (ref 38–73)
NRBC BLD-RTO: 0 /100 WBC
PLATELET # BLD AUTO: 506 K/UL (ref 150–350)
PMV BLD AUTO: 8.7 FL (ref 9.2–12.9)
RBC # BLD AUTO: 4.34 M/UL (ref 4.6–6.2)
WBC # BLD AUTO: 10.47 K/UL (ref 3.9–12.7)

## 2019-09-09 PROCEDURE — 90472 MENINGOCOCCAL CONJUGATE VACCINE 4-VALENT IM (MENACTRA): ICD-10-PCS | Mod: ,,, | Performed by: INTERNAL MEDICINE

## 2019-09-09 PROCEDURE — 85025 COMPLETE CBC W/AUTO DIFF WBC: CPT

## 2019-09-09 PROCEDURE — 90471 IMMUNIZATION ADMIN: CPT | Mod: ,,, | Performed by: INTERNAL MEDICINE

## 2019-09-09 PROCEDURE — 90670 PCV13 VACCINE IM: CPT | Mod: ,,, | Performed by: INTERNAL MEDICINE

## 2019-09-09 PROCEDURE — 90734 MENACWYD/MENACWYCRM VACC IM: CPT | Mod: ,,, | Performed by: INTERNAL MEDICINE

## 2019-09-09 PROCEDURE — 36415 COLL VENOUS BLD VENIPUNCTURE: CPT

## 2019-09-09 PROCEDURE — 90471 PNEUMOCOCCAL CONJUGATE VACCINE 13-VALENT LESS THAN 5YO & GREATER THAN: ICD-10-PCS | Mod: ,,, | Performed by: INTERNAL MEDICINE

## 2019-09-09 PROCEDURE — 90670 PNEUMOCOCCAL CONJUGATE VACCINE 13-VALENT LESS THAN 5YO & GREATER THAN: ICD-10-PCS | Mod: ,,, | Performed by: INTERNAL MEDICINE

## 2019-09-09 PROCEDURE — 90734 MENINGOCOCCAL CONJUGATE VACCINE 4-VALENT IM (MENACTRA): ICD-10-PCS | Mod: ,,, | Performed by: INTERNAL MEDICINE

## 2019-09-09 PROCEDURE — 90472 IMMUNIZATION ADMIN EACH ADD: CPT | Mod: ,,, | Performed by: INTERNAL MEDICINE

## 2019-09-09 NOTE — NURSING
Injections given without difficulties. Bandaids applied. Patient instructed to stay in the clinic for 15 minutes. Patient verbalized understanding and will notify nurse with any complaints.

## 2019-09-09 NOTE — DISCHARGE INSTRUCTIONS
[facility]  [address] [address]  [phone] phone     [phone] fax  Hours of Operation: Monday- Friday 8:00am- 5:00pm  After hours phone  [phone]  Hematology / Oncology Physicians on call      Dr. Willian Chisholm    Please call with any concerns regarding your appointment today.

## 2019-09-10 ENCOUNTER — PATIENT MESSAGE (OUTPATIENT)
Dept: HEMATOLOGY/ONCOLOGY | Facility: CLINIC | Age: 59
End: 2019-09-10

## 2019-09-16 DIAGNOSIS — D69.3 CHRONIC ITP (IDIOPATHIC THROMBOCYTOPENIA): Primary | ICD-10-CM

## 2019-10-03 ENCOUNTER — TELEPHONE (OUTPATIENT)
Dept: HEMATOLOGY/ONCOLOGY | Facility: CLINIC | Age: 59
End: 2019-10-03

## 2019-10-03 NOTE — TELEPHONE ENCOUNTER
Patient called in today to reschedule his appointment to  Monday 10/7/2019. Was able to reschedule his appointment date and time for him. Patient seemed confused as to whom/ which  He wanted to see.

## 2019-10-07 ENCOUNTER — OFFICE VISIT (OUTPATIENT)
Dept: HEMATOLOGY/ONCOLOGY | Facility: CLINIC | Age: 59
End: 2019-10-07
Payer: COMMERCIAL

## 2019-10-07 ENCOUNTER — LAB VISIT (OUTPATIENT)
Dept: LAB | Facility: HOSPITAL | Age: 59
End: 2019-10-07
Attending: INTERNAL MEDICINE
Payer: COMMERCIAL

## 2019-10-07 VITALS
HEART RATE: 80 BPM | WEIGHT: 141.31 LBS | SYSTOLIC BLOOD PRESSURE: 120 MMHG | BODY MASS INDEX: 22.18 KG/M2 | HEIGHT: 67 IN | RESPIRATION RATE: 16 BRPM | DIASTOLIC BLOOD PRESSURE: 81 MMHG | OXYGEN SATURATION: 95 % | TEMPERATURE: 98 F

## 2019-10-07 DIAGNOSIS — D69.3 CHRONIC ITP (IDIOPATHIC THROMBOCYTOPENIA): ICD-10-CM

## 2019-10-07 DIAGNOSIS — D69.3 CHRONIC ITP (IDIOPATHIC THROMBOCYTOPENIA): Primary | ICD-10-CM

## 2019-10-07 LAB
BASOPHILS # BLD AUTO: 0.1 K/UL (ref 0–0.2)
BASOPHILS NFR BLD: 1.6 % (ref 0–1.9)
DIFFERENTIAL METHOD: ABNORMAL
EOSINOPHIL # BLD AUTO: 0.1 K/UL (ref 0–0.5)
EOSINOPHIL NFR BLD: 1.3 % (ref 0–8)
ERYTHROCYTE [DISTWIDTH] IN BLOOD BY AUTOMATED COUNT: 12.4 % (ref 11.5–14.5)
HCT VFR BLD AUTO: 41.9 % (ref 40–54)
HGB BLD-MCNC: 14 G/DL (ref 14–18)
IMM GRANULOCYTES # BLD AUTO: 0.05 K/UL (ref 0–0.04)
IMM GRANULOCYTES NFR BLD AUTO: 0.8 % (ref 0–0.5)
LYMPHOCYTES # BLD AUTO: 2.2 K/UL (ref 1–4.8)
LYMPHOCYTES NFR BLD: 35.4 % (ref 18–48)
MCH RBC QN AUTO: 31.8 PG (ref 27–31)
MCHC RBC AUTO-ENTMCNC: 33.4 G/DL (ref 32–36)
MCV RBC AUTO: 95 FL (ref 82–98)
MONOCYTES # BLD AUTO: 0.5 K/UL (ref 0.3–1)
MONOCYTES NFR BLD: 8.3 % (ref 4–15)
NEUTROPHILS # BLD AUTO: 3.3 K/UL (ref 1.8–7.7)
NEUTROPHILS NFR BLD: 53.4 % (ref 38–73)
NRBC BLD-RTO: 0 /100 WBC
PLATELET # BLD AUTO: 437 K/UL (ref 150–350)
PMV BLD AUTO: 8.6 FL (ref 9.2–12.9)
RBC # BLD AUTO: 4.4 M/UL (ref 4.6–6.2)
WBC # BLD AUTO: 6.15 K/UL (ref 3.9–12.7)

## 2019-10-07 PROCEDURE — 99999 PR PBB SHADOW E&M-EST. PATIENT-LVL III: ICD-10-PCS | Mod: PBBFAC,,, | Performed by: INTERNAL MEDICINE

## 2019-10-07 PROCEDURE — 36415 COLL VENOUS BLD VENIPUNCTURE: CPT

## 2019-10-07 PROCEDURE — 85025 COMPLETE CBC W/AUTO DIFF WBC: CPT

## 2019-10-07 PROCEDURE — 99213 OFFICE O/P EST LOW 20 MIN: CPT | Mod: S$GLB,,, | Performed by: INTERNAL MEDICINE

## 2019-10-07 PROCEDURE — 99213 PR OFFICE/OUTPT VISIT, EST, LEVL III, 20-29 MIN: ICD-10-PCS | Mod: S$GLB,,, | Performed by: INTERNAL MEDICINE

## 2019-10-07 PROCEDURE — 99999 PR PBB SHADOW E&M-EST. PATIENT-LVL III: CPT | Mod: PBBFAC,,, | Performed by: INTERNAL MEDICINE

## 2019-10-07 RX ORDER — SILDENAFIL 100 MG/1
100 TABLET, FILM COATED ORAL EVERY OTHER DAY
Refills: 6 | COMMUNITY
Start: 2019-10-01

## 2019-10-07 RX ORDER — NALOXONE HYDROCHLORIDE 4 MG/.1ML
SPRAY NASAL
COMMUNITY
Start: 2019-09-06 | End: 2022-02-07

## 2019-10-07 NOTE — PROGRESS NOTES
Subjective:       Patient ID: Jaxson Burch Jr. is a 59 y.o. male.    Chief Complaint: Chronic ITP (idiopathic thrombocytopenia) [D69.3]  HPI: We have an opportunity to see Mr. Jaxson Burch Jr. in Hematology Oncology clinic at [facility] on 10/07/2019.  Mr. Jaxson Burch Jr. is a 59 y.o. gentleman with ITP completed weekly ritoxan x 8 treatments in June 2019.  Has had CBC monitored every 3 weeks, with couple times dropped to 30-50.       No history exists.     Past Medical History:   Diagnosis Date    Arthritis     Gastritis     ITP (idiopathic thrombocytopenic purpura)      Family History   Problem Relation Age of Onset    Pancreatic cancer Mother     Colon cancer Neg Hx      Social History     Socioeconomic History    Marital status:      Spouse name: Not on file    Number of children: Not on file    Years of education: Not on file    Highest education level: Not on file   Occupational History    Not on file   Social Needs    Financial resource strain: Not on file    Food insecurity:     Worry: Not on file     Inability: Not on file    Transportation needs:     Medical: Not on file     Non-medical: Not on file   Tobacco Use    Smoking status: Current Every Day Smoker     Packs/day: 0.50     Years: 40.00     Pack years: 20.00     Types: Cigarettes    Smokeless tobacco: Never Used   Substance and Sexual Activity    Alcohol use: Yes     Alcohol/week: 0.0 standard drinks     Comment: socially    Drug use: No    Sexual activity: Yes     Partners: Female   Lifestyle    Physical activity:     Days per week: Not on file     Minutes per session: Not on file    Stress: Not on file   Relationships    Social connections:     Talks on phone: Not on file     Gets together: Not on file     Attends Hindu service: Not on file     Active member of club or organization: Not on file     Attends meetings of clubs or organizations: Not on file     Relationship status: Not on file   Other  Topics Concern    Not on file   Social History Narrative    Not on file     Past Surgical History:   Procedure Laterality Date    COLONOSCOPY N/A 11/29/2018    Procedure: COLONOSCOPY;  Surgeon: Brett Townsend MD;  Location: [facility];  Service: Endoscopy;  Laterality: N/A;    JOINT REPLACEMENT Right     hip     Current Outpatient Medications   Medication Sig Dispense Refill    atorvastatin (LIPITOR) 20 MG tablet Take 1 tablet (20 mg total) by mouth once daily. 90 tablet 0    indomethacin (INDOCIN) 50 MG capsule Take 50 mg by mouth 3 (three) times daily.      meloxicam (MOBIC) 15 MG tablet Take 15 mg by mouth once daily.      mometasone (ELOCON) 0.1 % ointment       omeprazole (PRILOSEC) 20 MG capsule Take 1 capsule (20 mg total) by mouth once daily. 30 capsule 11    oxyCODONE-acetaminophen (PERCOCET)  mg per tablet Take 1 tablet by mouth every 4 (four) hours as needed for Pain.      predniSONE (DELTASONE) 20 MG tablet Take 1 tablet (20 mg total) by mouth once daily. 4 tablet 0    tadalafil (CIALIS) 20 MG Tab Take 1 tablet (20 mg total) by mouth daily as needed. 12 tablet 3    traZODone (DESYREL) 50 MG tablet Take 1 tablet (50 mg total) by mouth every evening. 90 tablet 0    zolpidem (AMBIEN) 5 MG Tab Take 5 mg by mouth.       No current facility-administered medications for this visit.        Labs:  Lab Results   Component Value Date    WBC 10.47 09/09/2019    HGB 13.5 (L) 09/09/2019    HCT 40.9 09/09/2019    MCV 94 09/09/2019     (H) 09/09/2019     BMP  Lab Results   Component Value Date     08/27/2019    K 4.5 08/27/2019     08/27/2019    CO2 25 08/27/2019    BUN 9 08/27/2019    CREATININE 0.9 08/27/2019    CALCIUM 9.2 08/27/2019    ANIONGAP 11 08/27/2019    ESTGFRAFRICA >60 08/27/2019    EGFRNONAA >60 08/27/2019     Lab Results   Component Value Date    ALT 15 08/27/2019    AST 24 08/27/2019    ALKPHOS 47 (L) 08/27/2019    BILITOT 0.9 08/27/2019       No results found for:  IRON, TIBC, FERRITIN, SATURATEDIRO  No results found for: [id number]  No results found for: FOLATE  No results found for: TSH    I have reviewed the radiology reports and examined the scan/xray images.    Review of Systems   Constitutional: Negative.    HENT: Negative.    Eyes: Negative.    Respiratory: Negative.    Cardiovascular: Negative.    Gastrointestinal: Negative.    Endocrine: Negative.    Genitourinary: Negative.    Musculoskeletal: Negative.    Skin: Negative.    Allergic/Immunologic: Negative.    Neurological: Negative.    Hematological: Negative.    Psychiatric/Behavioral: Negative.      ECOG SCORE    0 - Fully active-able to carry on all pre-disease performance without restriction            Objective:   There were no vitals filed for this visit.There is no height or weight on file to calculate BMI.  Physical Exam   Constitutional: He is oriented to person, place, and time. He appears well-developed and well-nourished.   HENT:   Head: Normocephalic and atraumatic.   Eyes: Conjunctivae and EOM are normal.   Neck: Normal range of motion. Neck supple.   Cardiovascular: Normal rate and regular rhythm.   Pulmonary/Chest: Effort normal and breath sounds normal.   Abdominal: Soft. Bowel sounds are normal.   Musculoskeletal: Normal range of motion.   Neurological: He is alert and oriented to person, place, and time.   Skin: Skin is warm and dry.   Psychiatric: He has a normal mood and affect. His behavior is normal. Judgment and thought content normal.   Nursing note and vitals reviewed.        Assessment:      1. Chronic ITP (idiopathic thrombocytopenia)           Plan:     Chronic ITP (idiopathic thrombocytopenia)    We discussed that it is possible has drug induced ITP, Mr. Burch reports he takes NSAID with aleve, advil about one per month.  He takes tylenol percocet every day.    Will monitor CBC every 3 weeks, stay off NSAIDS.

## 2019-10-09 ENCOUNTER — PATIENT MESSAGE (OUTPATIENT)
Dept: ADMINISTRATIVE | Facility: OTHER | Age: 59
End: 2019-10-09

## 2019-10-28 ENCOUNTER — TELEPHONE (OUTPATIENT)
Dept: HEMATOLOGY/ONCOLOGY | Facility: CLINIC | Age: 59
End: 2019-10-28

## 2019-10-28 ENCOUNTER — LAB VISIT (OUTPATIENT)
Dept: LAB | Facility: HOSPITAL | Age: 59
End: 2019-10-28
Attending: INTERNAL MEDICINE
Payer: COMMERCIAL

## 2019-10-28 DIAGNOSIS — D69.3 CHRONIC ITP (IDIOPATHIC THROMBOCYTOPENIA): Primary | ICD-10-CM

## 2019-10-28 DIAGNOSIS — D69.3 CHRONIC ITP (IDIOPATHIC THROMBOCYTOPENIA): ICD-10-CM

## 2019-10-28 LAB
BASOPHILS # BLD AUTO: 0.1 K/UL (ref 0–0.2)
BASOPHILS NFR BLD: 1.6 % (ref 0–1.9)
DIFFERENTIAL METHOD: ABNORMAL
EOSINOPHIL # BLD AUTO: 0.4 K/UL (ref 0–0.5)
EOSINOPHIL NFR BLD: 6.4 % (ref 0–8)
ERYTHROCYTE [DISTWIDTH] IN BLOOD BY AUTOMATED COUNT: 12.2 % (ref 11.5–14.5)
HCT VFR BLD AUTO: 44 % (ref 40–54)
HGB BLD-MCNC: 14 G/DL (ref 14–18)
IMM GRANULOCYTES # BLD AUTO: 0.02 K/UL (ref 0–0.04)
IMM GRANULOCYTES NFR BLD AUTO: 0.3 % (ref 0–0.5)
LYMPHOCYTES # BLD AUTO: 2.2 K/UL (ref 1–4.8)
LYMPHOCYTES NFR BLD: 34.5 % (ref 18–48)
MCH RBC QN AUTO: 31 PG (ref 27–31)
MCHC RBC AUTO-ENTMCNC: 31.8 G/DL (ref 32–36)
MCV RBC AUTO: 98 FL (ref 82–98)
MONOCYTES # BLD AUTO: 0.7 K/UL (ref 0.3–1)
MONOCYTES NFR BLD: 10.9 % (ref 4–15)
NEUTROPHILS # BLD AUTO: 2.9 K/UL (ref 1.8–7.7)
NEUTROPHILS NFR BLD: 46.3 % (ref 38–73)
NRBC BLD-RTO: 0 /100 WBC
PLATELET # BLD AUTO: 34 K/UL (ref 150–350)
PMV BLD AUTO: 9.3 FL (ref 9.2–12.9)
RBC # BLD AUTO: 4.51 M/UL (ref 4.6–6.2)
WBC # BLD AUTO: 6.26 K/UL (ref 3.9–12.7)

## 2019-10-28 PROCEDURE — 36415 COLL VENOUS BLD VENIPUNCTURE: CPT

## 2019-10-28 NOTE — TELEPHONE ENCOUNTER
Critical Platelet count called to provider and documented in his flow chart for critical labs values.   Platelet count 34   Reported to Dr Chisholm, at the [facility].

## 2019-10-29 ENCOUNTER — PATIENT MESSAGE (OUTPATIENT)
Dept: HEMATOLOGY/ONCOLOGY | Facility: CLINIC | Age: 59
End: 2019-10-29

## 2019-10-30 ENCOUNTER — PATIENT MESSAGE (OUTPATIENT)
Dept: HEMATOLOGY/ONCOLOGY | Facility: CLINIC | Age: 59
End: 2019-10-30

## 2019-11-01 ENCOUNTER — OFFICE VISIT (OUTPATIENT)
Dept: GASTROENTEROLOGY | Facility: CLINIC | Age: 59
End: 2019-11-01
Payer: COMMERCIAL

## 2019-11-01 ENCOUNTER — TELEPHONE (OUTPATIENT)
Dept: GASTROENTEROLOGY | Facility: CLINIC | Age: 59
End: 2019-11-01

## 2019-11-01 VITALS
BODY MASS INDEX: 22.63 KG/M2 | HEART RATE: 66 BPM | WEIGHT: 144.19 LBS | SYSTOLIC BLOOD PRESSURE: 112 MMHG | DIASTOLIC BLOOD PRESSURE: 76 MMHG | HEIGHT: 67 IN

## 2019-11-01 DIAGNOSIS — R10.13 DYSPEPSIA: ICD-10-CM

## 2019-11-01 DIAGNOSIS — K21.9 GASTROESOPHAGEAL REFLUX DISEASE, ESOPHAGITIS PRESENCE NOT SPECIFIED: Primary | ICD-10-CM

## 2019-11-01 PROCEDURE — 99203 OFFICE O/P NEW LOW 30 MIN: CPT | Mod: S$GLB,,, | Performed by: PHYSICIAN ASSISTANT

## 2019-11-01 PROCEDURE — 99999 PR PBB SHADOW E&M-EST. PATIENT-LVL IV: ICD-10-PCS | Mod: PBBFAC,,, | Performed by: PHYSICIAN ASSISTANT

## 2019-11-01 PROCEDURE — 99203 PR OFFICE/OUTPT VISIT, NEW, LEVL III, 30-44 MIN: ICD-10-PCS | Mod: S$GLB,,, | Performed by: PHYSICIAN ASSISTANT

## 2019-11-01 PROCEDURE — 99999 PR PBB SHADOW E&M-EST. PATIENT-LVL IV: CPT | Mod: PBBFAC,,, | Performed by: PHYSICIAN ASSISTANT

## 2019-11-01 RX ORDER — PANTOPRAZOLE SODIUM 40 MG/1
40 TABLET, DELAYED RELEASE ORAL DAILY
Qty: 30 TABLET | Refills: 11 | Status: SHIPPED | OUTPATIENT
Start: 2019-11-01 | End: 2019-11-01 | Stop reason: ALTCHOICE

## 2019-11-01 RX ORDER — OMEPRAZOLE 20 MG/1
20 CAPSULE, DELAYED RELEASE ORAL DAILY
Qty: 30 CAPSULE | Refills: 11 | Status: SHIPPED | OUTPATIENT
Start: 2019-11-01 | End: 2020-11-17

## 2019-11-01 RX ORDER — MELOXICAM 15 MG/1
TABLET ORAL
COMMUNITY
Start: 2019-10-11 | End: 2020-10-06

## 2019-11-01 NOTE — PROGRESS NOTES
Clinic Consult:  [facility] Consultation Note    Reason for Consult:  The primary encounter diagnosis was Gastroesophageal reflux disease, esophagitis presence not specified. A diagnosis of Dyspepsia was also pertinent to this visit.    PCP: Vijay Cox   No address on file    CC: Abdominal Pain (epigastric )      HPI:  This is a 59 y.o. male with history of GERD, gastritis and ITP here for evaluation of increase in gurgling and bloating of his abdomen in addition to abdominal pulsations. PAtient reports that symptoms began this past Tuesday after taking 2 shots of alcohol. He describes the symptoms as bubbling, growling, gurgling to his upper abdomen. He denies pain. He also has had increase in gas. Patient just getting over diarrheal stomach virus - last symptoms were approximately 1 week ago. Stools are now back to normal. Denies fevers, diarrhea, constipation, blood in the stool, nausea, vomiting, abdominal pain. He took one dose of Omeprazole over the counter with some improvement.He has a prescription for Omeprazole but was not aware and has not been taking it. While he denies abdominal pain, he does report having an increase in abdominal pulsations that are causing him concern. Patient is a 40 yr smoker.      Review of Systems   Constitutional: Negative for appetite change, chills and fever.   HENT: Negative for sore throat and trouble swallowing.    Eyes: Negative for photophobia and visual disturbance.   Respiratory: Negative for choking, chest tightness and shortness of breath.    Cardiovascular: Negative for chest pain.   Gastrointestinal: Negative for abdominal pain, blood in stool, constipation, diarrhea, nausea and vomiting.   Genitourinary: Negative for dysuria and hematuria.   Musculoskeletal: Negative for back pain.   Skin: Negative for rash.   Neurological: Negative for dizziness, weakness, light-headedness and numbness.   Psychiatric/Behavioral: Negative for agitation and  confusion. The patient is not nervous/anxious.         Medical History:   Past Medical History:   Diagnosis Date    Arthritis     Gastritis     ITP (idiopathic thrombocytopenic purpura)        Surgical History:   Past Surgical History:   Procedure Laterality Date    COLONOSCOPY N/A 11/29/2018    Procedure: COLONOSCOPY;  Surgeon: Brett Townsend MD;  Location: [facility];  Service: Endoscopy;  Laterality: N/A;    JOINT REPLACEMENT Right     hip       Family History:    Family History   Problem Relation Age of Onset    Pancreatic cancer Mother     Colon cancer Neg Hx        Social History:   Social History     Socioeconomic History    Marital status:      Spouse name: Not on file    Number of children: Not on file    Years of education: Not on file    Highest education level: Not on file   Occupational History    Not on file   Social Needs    Financial resource strain: Not on file    Food insecurity:     Worry: Not on file     Inability: Not on file    Transportation needs:     Medical: Not on file     Non-medical: Not on file   Tobacco Use    Smoking status: Current Every Day Smoker     Packs/day: 0.50     Years: 40.00     Pack years: 20.00     Types: Cigarettes    Smokeless tobacco: Never Used   Substance and Sexual Activity    Alcohol use: Yes     Alcohol/week: 0.0 standard drinks     Comment: socially    Drug use: No    Sexual activity: Yes     Partners: Female   Lifestyle    Physical activity:     Days per week: Not on file     Minutes per session: Not on file    Stress: Not on file   Relationships    Social connections:     Talks on phone: Not on file     Gets together: Not on file     Attends Jain service: Not on file     Active member of club or organization: Not on file     Attends meetings of clubs or organizations: Not on file     Relationship status: Not on file   Other Topics Concern    Not on file   Social History Narrative    Not on file       Allergies:   Review of  "patient's allergies indicates:  No Known Allergies    Home Medications:   Current Outpatient Medications on File Prior to Visit   Medication Sig Dispense Refill    meloxicam (MOBIC) 15 MG tablet       oxyCODONE-acetaminophen (PERCOCET)  mg per tablet Take 1 tablet by mouth every 4 (four) hours as needed for Pain.      sildenafil (VIAGRA) 100 MG tablet Take 100 mg by mouth every other day.  6    tadalafil (CIALIS) 20 MG Tab Take 1 tablet (20 mg total) by mouth daily as needed. 12 tablet 3    zolpidem (AMBIEN) 5 MG Tab Take 5 mg by mouth.      atorvastatin (LIPITOR) 20 MG tablet Take 1 tablet (20 mg total) by mouth once daily. 90 tablet 0    NARCAN 4 mg/actuation Spry       [DISCONTINUED] mometasone (ELOCON) 0.1 % ointment       [DISCONTINUED] omeprazole (PRILOSEC) 20 MG capsule Take 1 capsule (20 mg total) by mouth once daily. (Patient not taking: Reported on 11/1/2019) 30 capsule 11    [DISCONTINUED] traZODone (DESYREL) 50 MG tablet Take 1 tablet (50 mg total) by mouth every evening. 90 tablet 0     No current facility-administered medications on file prior to visit.        Physical Exam:  /76   Pulse 66   Ht 5' 7" (1.702 m)   Wt 65.4 kg (144 lb 2.9 oz)   BMI 22.58 kg/m²   Body mass index is 22.58 kg/m².  Physical Exam   Constitutional: He is oriented to person, place, and time. He appears well-developed and well-nourished. No distress.   HENT:   Head: Normocephalic and atraumatic.   Eyes: Pupils are equal, round, and reactive to light. EOM are normal. No scleral icterus.   Neck: Normal range of motion.   Cardiovascular: Normal rate, regular rhythm and normal heart sounds.   Pulmonary/Chest: Effort normal and breath sounds normal. No respiratory distress.   Abdominal: Soft. Bowel sounds are normal. He exhibits no distension. There is no tenderness.   Musculoskeletal: Normal range of motion. He exhibits no tenderness or deformity.   Neurological: He is alert and oriented to person, place, " and time.   Skin: Skin is warm and dry. He is not diaphoretic.   Psychiatric: He has a normal mood and affect. His behavior is normal. Judgment and thought content normal.         Labs: Pertinent labs reviewed.  Endoscopy: 2013  CRC Screenin2018 - repeat 5 yrs  Anticoagulation: none    Assessment:  1. Gastroesophageal reflux disease, esophagitis presence not specified    2. Dyspepsia         Recommendations:  -Begin PPI daily - discussed how to take medication  -Gas X as needed for gas and bloating  -Abd aortic US for pulsations  -Fu 2 months    Gastroesophageal reflux disease, esophagitis presence not specified  -     Discontinue: pantoprazole (PROTONIX) 40 MG tablet; Take 1 tablet (40 mg total) by mouth once daily.  Dispense: 30 tablet; Refill: 11  -     omeprazole (PRILOSEC) 20 MG capsule; Take 1 capsule (20 mg total) by mouth once daily.  Dispense: 30 capsule; Refill: 11    Dyspepsia  -     Discontinue: pantoprazole (PROTONIX) 40 MG tablet; Take 1 tablet (40 mg total) by mouth once daily.  Dispense: 30 tablet; Refill: 11  -     Cancel: US Abdominal Aorta; Future; Expected date: 2019  -     US Abdominal Aorta; Future; Expected date: 2019  -     omeprazole (PRILOSEC) 20 MG capsule; Take 1 capsule (20 mg total) by mouth once daily.  Dispense: 30 capsule; Refill: 11    *Patient's insurance did not cover Protonix. Switched to Omeprazole.    Follow up in about 2 months (around 2020).    Thank you so much for allowing me to participate in the care of Jaxson Elder PA-C

## 2019-11-01 NOTE — TELEPHONE ENCOUNTER
----- Message from Malik Sanders sent at 11/1/2019  7:25 AM CDT -----  Pt states he may be 10 to 15 mins late. please call pt back at [phone]

## 2019-11-01 NOTE — PATIENT INSTRUCTIONS
Protonix once per day. 30 mins-1hr before meal.  Gas X as needed for bloating, grumbling, increase in gas.  Follow up in 2 months.

## 2019-11-01 NOTE — TELEPHONE ENCOUNTER
----- Message from Yarelis Guillory sent at 11/1/2019  9:09 AM CDT -----  Contact: pt  Would like to consult with nurse regarding the prescription she prescribe today is not cover by insurance wants to know can it get switched to something else. Please give a call back at [phone].        Thanks,  Yarelis ROMAN

## 2019-11-01 NOTE — TELEPHONE ENCOUNTER
Patient stated that his insurance is not covering his PPI, patient notified that his provider will call something else in.

## 2019-11-04 ENCOUNTER — INFUSION (OUTPATIENT)
Dept: INFUSION THERAPY | Facility: HOSPITAL | Age: 59
End: 2019-11-04
Attending: INTERNAL MEDICINE
Payer: COMMERCIAL

## 2019-11-04 ENCOUNTER — LAB VISIT (OUTPATIENT)
Dept: LAB | Facility: HOSPITAL | Age: 59
End: 2019-11-04
Attending: INTERNAL MEDICINE
Payer: COMMERCIAL

## 2019-11-04 DIAGNOSIS — D69.3 CHRONIC ITP (IDIOPATHIC THROMBOCYTOPENIA): ICD-10-CM

## 2019-11-04 LAB
BASOPHILS # BLD AUTO: 0.11 K/UL (ref 0–0.2)
BASOPHILS NFR BLD: 1.4 % (ref 0–1.9)
DIFFERENTIAL METHOD: ABNORMAL
EOSINOPHIL # BLD AUTO: 0.4 K/UL (ref 0–0.5)
EOSINOPHIL NFR BLD: 4.5 % (ref 0–8)
ERYTHROCYTE [DISTWIDTH] IN BLOOD BY AUTOMATED COUNT: 12.6 % (ref 11.5–14.5)
HCT VFR BLD AUTO: 42.3 % (ref 40–54)
HGB BLD-MCNC: 13.9 G/DL (ref 14–18)
IMM GRANULOCYTES # BLD AUTO: 0.03 K/UL (ref 0–0.04)
IMM GRANULOCYTES NFR BLD AUTO: 0.4 % (ref 0–0.5)
LYMPHOCYTES # BLD AUTO: 2.7 K/UL (ref 1–4.8)
LYMPHOCYTES NFR BLD: 33.3 % (ref 18–48)
MCH RBC QN AUTO: 31.6 PG (ref 27–31)
MCHC RBC AUTO-ENTMCNC: 32.9 G/DL (ref 32–36)
MCV RBC AUTO: 96 FL (ref 82–98)
MONOCYTES # BLD AUTO: 0.6 K/UL (ref 0.3–1)
MONOCYTES NFR BLD: 7.4 % (ref 4–15)
MPV, BLUE TOP: 9.4 FL (ref 9.2–12.9)
NEUTROPHILS # BLD AUTO: 4.3 K/UL (ref 1.8–7.7)
NEUTROPHILS NFR BLD: 53.4 % (ref 38–73)
NRBC BLD-RTO: 0 /100 WBC
PLATELET # BLD AUTO: 146 K/UL (ref 150–350)
PLATELET, BLUE TOP: 155 K/UL (ref 150–350)
PMV BLD AUTO: 9 FL (ref 9.2–12.9)
RBC # BLD AUTO: 4.4 M/UL (ref 4.6–6.2)
WBC # BLD AUTO: 8 K/UL (ref 3.9–12.7)

## 2019-11-04 PROCEDURE — 90471 IMMUNIZATION ADMIN: CPT | Mod: ,,, | Performed by: INTERNAL MEDICINE

## 2019-11-04 PROCEDURE — 90734 MENINGOCOCCAL CONJUGATE VACCINE 4-VALENT IM (MENACTRA): ICD-10-PCS | Mod: ,,, | Performed by: INTERNAL MEDICINE

## 2019-11-04 PROCEDURE — 90471 MENINGOCOCCAL CONJUGATE VACCINE 4-VALENT IM (MENACTRA): ICD-10-PCS | Mod: ,,, | Performed by: INTERNAL MEDICINE

## 2019-11-04 PROCEDURE — 90734 MENACWYD/MENACWYCRM VACC IM: CPT | Mod: ,,, | Performed by: INTERNAL MEDICINE

## 2019-11-04 PROCEDURE — 85025 COMPLETE CBC W/AUTO DIFF WBC: CPT

## 2019-11-04 PROCEDURE — 36415 COLL VENOUS BLD VENIPUNCTURE: CPT

## 2019-11-04 PROCEDURE — 85049 AUTOMATED PLATELET COUNT: CPT | Mod: 59

## 2019-11-07 ENCOUNTER — HOSPITAL ENCOUNTER (OUTPATIENT)
Dept: RADIOLOGY | Facility: HOSPITAL | Age: 59
Discharge: HOME | End: 2019-11-07
Attending: PHYSICIAN ASSISTANT
Payer: COMMERCIAL

## 2019-11-07 ENCOUNTER — PATIENT MESSAGE (OUTPATIENT)
Dept: GASTROENTEROLOGY | Facility: CLINIC | Age: 59
End: 2019-11-07

## 2019-11-07 DIAGNOSIS — R10.13 DYSPEPSIA: ICD-10-CM

## 2019-11-07 PROCEDURE — 76775 US EXAM ABDO BACK WALL LIM: CPT | Mod: TC

## 2019-11-18 ENCOUNTER — LAB VISIT (OUTPATIENT)
Dept: LAB | Facility: HOSPITAL | Age: 59
End: 2019-11-18
Attending: INTERNAL MEDICINE
Payer: COMMERCIAL

## 2019-11-18 DIAGNOSIS — D69.3 CHRONIC ITP (IDIOPATHIC THROMBOCYTOPENIA): ICD-10-CM

## 2019-11-18 LAB
BASOPHILS # BLD AUTO: 0.1 K/UL (ref 0–0.2)
BASOPHILS NFR BLD: 1.8 % (ref 0–1.9)
DIFFERENTIAL METHOD: ABNORMAL
EOSINOPHIL # BLD AUTO: 0.2 K/UL (ref 0–0.5)
EOSINOPHIL NFR BLD: 4.4 % (ref 0–8)
ERYTHROCYTE [DISTWIDTH] IN BLOOD BY AUTOMATED COUNT: 12.3 % (ref 11.5–14.5)
HCT VFR BLD AUTO: 43.5 % (ref 40–54)
HGB BLD-MCNC: 14.3 G/DL (ref 14–18)
IMM GRANULOCYTES # BLD AUTO: 0.02 K/UL (ref 0–0.04)
IMM GRANULOCYTES NFR BLD AUTO: 0.4 % (ref 0–0.5)
LYMPHOCYTES # BLD AUTO: 1.7 K/UL (ref 1–4.8)
LYMPHOCYTES NFR BLD: 31.2 % (ref 18–48)
MCH RBC QN AUTO: 31.5 PG (ref 27–31)
MCHC RBC AUTO-ENTMCNC: 32.9 G/DL (ref 32–36)
MCV RBC AUTO: 96 FL (ref 82–98)
MONOCYTES # BLD AUTO: 0.6 K/UL (ref 0.3–1)
MONOCYTES NFR BLD: 10.1 % (ref 4–15)
NEUTROPHILS # BLD AUTO: 2.8 K/UL (ref 1.8–7.7)
NEUTROPHILS NFR BLD: 52.1 % (ref 38–73)
NRBC BLD-RTO: 0 /100 WBC
PLATELET # BLD AUTO: 346 K/UL (ref 150–350)
PMV BLD AUTO: 9 FL (ref 9.2–12.9)
RBC # BLD AUTO: 4.54 M/UL (ref 4.6–6.2)
WBC # BLD AUTO: 5.42 K/UL (ref 3.9–12.7)

## 2019-11-18 PROCEDURE — 36415 COLL VENOUS BLD VENIPUNCTURE: CPT

## 2019-11-20 ENCOUNTER — PATIENT MESSAGE (OUTPATIENT)
Dept: HEMATOLOGY/ONCOLOGY | Facility: CLINIC | Age: 59
End: 2019-11-20

## 2019-12-09 ENCOUNTER — LAB VISIT (OUTPATIENT)
Dept: LAB | Facility: HOSPITAL | Age: 59
End: 2019-12-09
Attending: INTERNAL MEDICINE
Payer: COMMERCIAL

## 2019-12-09 DIAGNOSIS — D69.3 CHRONIC ITP (IDIOPATHIC THROMBOCYTOPENIA): ICD-10-CM

## 2019-12-09 LAB
BASOPHILS # BLD AUTO: 0.1 K/UL (ref 0–0.2)
BASOPHILS NFR BLD: 1.2 % (ref 0–1.9)
DIFFERENTIAL METHOD: ABNORMAL
EOSINOPHIL # BLD AUTO: 0.2 K/UL (ref 0–0.5)
EOSINOPHIL NFR BLD: 2.5 % (ref 0–8)
ERYTHROCYTE [DISTWIDTH] IN BLOOD BY AUTOMATED COUNT: 12.3 % (ref 11.5–14.5)
HCT VFR BLD AUTO: 43.4 % (ref 40–54)
HGB BLD-MCNC: 13.9 G/DL (ref 14–18)
IMM GRANULOCYTES # BLD AUTO: 0.04 K/UL (ref 0–0.04)
IMM GRANULOCYTES NFR BLD AUTO: 0.5 % (ref 0–0.5)
LYMPHOCYTES # BLD AUTO: 1.1 K/UL (ref 1–4.8)
LYMPHOCYTES NFR BLD: 13.2 % (ref 18–48)
MCH RBC QN AUTO: 31.3 PG (ref 27–31)
MCHC RBC AUTO-ENTMCNC: 32 G/DL (ref 32–36)
MCV RBC AUTO: 98 FL (ref 82–98)
MONOCYTES # BLD AUTO: 0.7 K/UL (ref 0.3–1)
MONOCYTES NFR BLD: 8.3 % (ref 4–15)
NEUTROPHILS # BLD AUTO: 6.3 K/UL (ref 1.8–7.7)
NEUTROPHILS NFR BLD: 74.3 % (ref 38–73)
NRBC BLD-RTO: 0 /100 WBC
PLATELET # BLD AUTO: 256 K/UL (ref 150–350)
PMV BLD AUTO: 8.8 FL (ref 9.2–12.9)
RBC # BLD AUTO: 4.44 M/UL (ref 4.6–6.2)
WBC # BLD AUTO: 8.46 K/UL (ref 3.9–12.7)

## 2019-12-09 PROCEDURE — 36415 COLL VENOUS BLD VENIPUNCTURE: CPT

## 2019-12-09 PROCEDURE — 85025 COMPLETE CBC W/AUTO DIFF WBC: CPT

## 2019-12-10 ENCOUNTER — HOSPITAL ENCOUNTER (OUTPATIENT)
Dept: RADIOLOGY | Facility: HOSPITAL | Age: 59
Discharge: HOME | End: 2019-12-10
Attending: FAMILY MEDICINE
Payer: COMMERCIAL

## 2019-12-10 ENCOUNTER — PATIENT MESSAGE (OUTPATIENT)
Dept: HEMATOLOGY/ONCOLOGY | Facility: CLINIC | Age: 59
End: 2019-12-10

## 2019-12-10 ENCOUNTER — PATIENT MESSAGE (OUTPATIENT)
Dept: INTERNAL MEDICINE | Facility: CLINIC | Age: 59
End: 2019-12-10

## 2019-12-10 ENCOUNTER — OFFICE VISIT (OUTPATIENT)
Dept: INTERNAL MEDICINE | Facility: CLINIC | Age: 59
End: 2019-12-10
Payer: COMMERCIAL

## 2019-12-10 VITALS
WEIGHT: 142.88 LBS | TEMPERATURE: 100 F | HEART RATE: 111 BPM | HEIGHT: 67 IN | BODY MASS INDEX: 22.43 KG/M2 | DIASTOLIC BLOOD PRESSURE: 60 MMHG | SYSTOLIC BLOOD PRESSURE: 108 MMHG | OXYGEN SATURATION: 96 %

## 2019-12-10 DIAGNOSIS — D69.3 CHRONIC ITP (IDIOPATHIC THROMBOCYTOPENIA): ICD-10-CM

## 2019-12-10 DIAGNOSIS — R50.9 FEVER, UNSPECIFIED FEVER CAUSE: Primary | ICD-10-CM

## 2019-12-10 DIAGNOSIS — J06.9 VIRAL UPPER RESPIRATORY TRACT INFECTION: ICD-10-CM

## 2019-12-10 LAB
INFLUENZA A, MOLECULAR: POSITIVE
INFLUENZA B, MOLECULAR: NEGATIVE
SPECIMEN SOURCE: ABNORMAL

## 2019-12-10 PROCEDURE — 96372 THER/PROPH/DIAG INJ SC/IM: CPT | Mod: S$GLB,,, | Performed by: FAMILY MEDICINE

## 2019-12-10 PROCEDURE — 87502 INFLUENZA DNA AMP PROBE: CPT

## 2019-12-10 PROCEDURE — 99999 PR PBB SHADOW E&M-EST. PATIENT-LVL IV: ICD-10-PCS | Mod: PBBFAC,,, | Performed by: FAMILY MEDICINE

## 2019-12-10 PROCEDURE — 71046 XR CHEST PA AND LATERAL: ICD-10-PCS | Mod: 26,,, | Performed by: RADIOLOGY

## 2019-12-10 PROCEDURE — 96372 PR INJECTION,THERAP/PROPH/DIAG2ST, IM OR SUBCUT: ICD-10-PCS | Mod: S$GLB,,, | Performed by: FAMILY MEDICINE

## 2019-12-10 PROCEDURE — 71046 X-RAY EXAM CHEST 2 VIEWS: CPT | Mod: 26,,, | Performed by: RADIOLOGY

## 2019-12-10 PROCEDURE — 71046 X-RAY EXAM CHEST 2 VIEWS: CPT | Mod: TC

## 2019-12-10 PROCEDURE — 99214 OFFICE O/P EST MOD 30 MIN: CPT | Mod: 25,S$GLB,, | Performed by: FAMILY MEDICINE

## 2019-12-10 PROCEDURE — 99999 PR PBB SHADOW E&M-EST. PATIENT-LVL IV: CPT | Mod: PBBFAC,,, | Performed by: FAMILY MEDICINE

## 2019-12-10 PROCEDURE — 99214 PR OFFICE/OUTPT VISIT, EST, LEVL IV, 30-39 MIN: ICD-10-PCS | Mod: 25,S$GLB,, | Performed by: FAMILY MEDICINE

## 2019-12-10 RX ORDER — OSELTAMIVIR PHOSPHATE 75 MG/1
75 CAPSULE ORAL 2 TIMES DAILY
Qty: 10 CAPSULE | Refills: 0 | Status: SHIPPED | OUTPATIENT
Start: 2019-12-10 | End: 2019-12-15

## 2019-12-10 RX ORDER — BETAMETHASONE SODIUM PHOSPHATE AND BETAMETHASONE ACETATE 3; 3 MG/ML; MG/ML
6 INJECTION, SUSPENSION INTRA-ARTICULAR; INTRALESIONAL; INTRAMUSCULAR; SOFT TISSUE
Status: COMPLETED | OUTPATIENT
Start: 2019-12-10 | End: 2019-12-10

## 2019-12-10 RX ADMIN — BETAMETHASONE SODIUM PHOSPHATE AND BETAMETHASONE ACETATE 6 MG: 3; 3 INJECTION, SUSPENSION INTRA-ARTICULAR; INTRALESIONAL; INTRAMUSCULAR; SOFT TISSUE at 10:12

## 2019-12-10 NOTE — PATIENT INSTRUCTIONS
Allegra daily from over-the-counter for 1-2 weeks as needed for postnasal drip sinus drainage    Plenty of fluids and rest    Stay and orders if fever over 100° or more    Check with his hematologist to had a time about adding any medications

## 2019-12-11 ENCOUNTER — PATIENT MESSAGE (OUTPATIENT)
Dept: HEMATOLOGY/ONCOLOGY | Facility: CLINIC | Age: 59
End: 2019-12-11

## 2019-12-12 ENCOUNTER — PATIENT MESSAGE (OUTPATIENT)
Dept: HEMATOLOGY/ONCOLOGY | Facility: CLINIC | Age: 59
End: 2019-12-12

## 2019-12-12 NOTE — PROGRESS NOTES
Jaxson Burch Jr.  12/12/2019  [id number]    Vijay Cox MD  Patient Care Team:  Vijay Cox MD as PCP - General (Family Medicine)  Phil Coyle MD as PCP - [facility]  Margaux Martinez LPN as Care Coordinator (Internal Medicine)        Chief Complaint:  Chief Complaint   Patient presents with    Fever    Generalized Body Aches    Chills    Cough       History of Present Illness:   Jaxson Burch Jr. 59 y.o. male  with 2 day complaint of fever approximately 100° and in clinic today is 100.3.    The patient has had mild headache along with nasal congestion and scratchy throat feeling, and mildly productive cough.    He has a medical history of chronic idiopathic thrombocytopenia.  He is also experienced some achiness in his arms and legs.      History:  Past Medical History:   Diagnosis Date    Arthritis     Gastritis     ITP (idiopathic thrombocytopenic purpura)      Past Surgical History:   Procedure Laterality Date    COLONOSCOPY N/A 11/29/2018    Procedure: COLONOSCOPY;  Surgeon: Brett Townsend MD;  Location: [facility];  Service: Endoscopy;  Laterality: N/A;    JOINT REPLACEMENT Right     hip     Family History   Problem Relation Age of Onset    Pancreatic cancer Mother     Colon cancer Neg Hx      Social History     Socioeconomic History    Marital status:      Spouse name: Not on file    Number of children: Not on file    Years of education: Not on file    Highest education level: Not on file   Occupational History    Not on file   Social Needs    Financial resource strain: Not on file    Food insecurity:     Worry: Not on file     Inability: Not on file    Transportation needs:     Medical: Not on file     Non-medical: Not on file   Tobacco Use    Smoking status: Current Every Day Smoker     Packs/day: 0.50     Years: 40.00     Pack years: 20.00     Types: Cigarettes    Smokeless tobacco: Never Used   Substance and Sexual Activity    Alcohol use: Yes      Alcohol/week: 0.0 standard drinks     Comment: socially    Drug use: No    Sexual activity: Yes     Partners: Female   Lifestyle    Physical activity:     Days per week: Not on file     Minutes per session: Not on file    Stress: Not on file   Relationships    Social connections:     Talks on phone: Not on file     Gets together: Not on file     Attends Religion service: Not on file     Active member of club or organization: Not on file     Attends meetings of clubs or organizations: Not on file     Relationship status: Not on file   Other Topics Concern    Not on file   Social History Narrative    Not on file     Patient Active Problem List   Diagnosis    Chronic ITP (idiopathic thrombocytopenia)    Cervical radiculitis    Erectile dysfunction    History of ITP    High risk medication use    Gastroesophageal reflux disease    Weight loss    Insomnia    Hyperkalemia    Colon cancer screening    Numbness of left foot    Fever    Viral upper respiratory tract infection     Review of patient's allergies indicates:  No Known Allergies    The following were reviewed at this visit: active problem list, medication list, allergies, family history, social history, and health maintenance.    Medications:  Current Outpatient Medications on File Prior to Visit   Medication Sig Dispense Refill    atorvastatin (LIPITOR) 20 MG tablet Take 1 tablet (20 mg total) by mouth once daily. 90 tablet 0    meloxicam (MOBIC) 15 MG tablet       NARCAN 4 mg/actuation Spry       omeprazole (PRILOSEC) 20 MG capsule Take 1 capsule (20 mg total) by mouth once daily. 30 capsule 11    oxyCODONE-acetaminophen (PERCOCET)  mg per tablet Take 1 tablet by mouth every 4 (four) hours as needed for Pain.      sildenafil (VIAGRA) 100 MG tablet Take 100 mg by mouth every other day.  6    tadalafil (CIALIS) 20 MG Tab Take 1 tablet (20 mg total) by mouth daily as needed. 12 tablet 3    zolpidem (AMBIEN) 5 MG Tab Take 5 mg  by mouth.       No current facility-administered medications on file prior to visit.        Medications have been reviewed and reconciled with patient at this visit.      Exam:  Wt Readings from Last 3 Encounters:   12/10/19 64.8 kg (142 lb 13.7 oz)   11/01/19 65.4 kg (144 lb 2.9 oz)   10/07/19 64.1 kg (141 lb 5 oz)     Temp Readings from Last 3 Encounters:   12/10/19 100.3 °F (37.9 °C) (Tympanic)   10/07/19 98 °F (36.7 °C) (Oral)   08/28/19 98.3 °F (36.8 °C)     BP Readings from Last 3 Encounters:   12/10/19 108/60   11/01/19 112/76   10/07/19 120/81     Pulse Readings from Last 3 Encounters:   12/10/19 (!) 111   11/01/19 66   10/07/19 80     Body mass index is 22.37 kg/m².      Review of Systems   Constitutional: Negative for chills, fever and weight loss.   Respiratory: Negative for shortness of breath.    Cardiovascular: Negative for chest pain and palpitations.     Physical Exam  WNWD, A&O ambulatory but appears fatigued but in no acute distress    HEENT ear canals clear  Pharynx with postnasal drip  No nasal discharge    Heart regular rate and rhythm  Lungs clear  Psychiatric good affect cognition      Jaxson was seen today for fever, generalized body aches, chills and cough.    Diagnoses and all orders for this visit:    Fever, unspecified fever cause  -     Influenza A & B by Molecular  -     X-Ray Chest PA And Lateral    Viral upper respiratory tract infection    Chronic ITP (idiopathic thrombocytopenia)    Other orders  -     betamethasone acetate-betamethasone sodium phosphate injection 6 mg        The patient verbalized good understanding of the medical issues discussed today and expressed appreciation for the care provided.  Patient was given the opportunity to ask questions and be an active participant in their medical care. Patient had no further questions or concerns at this time.   The patient was encouraged to participate in appropriate physical activity.    After visit summary was printed and  given to patient upon discharge today.  Patient goals and care plan are included in After Visit Summary.    This note was produced with voice recognition software and may have sound a like errors

## 2019-12-16 ENCOUNTER — TELEPHONE (OUTPATIENT)
Dept: INTERNAL MEDICINE | Facility: CLINIC | Age: 59
End: 2019-12-16

## 2019-12-16 ENCOUNTER — OFFICE VISIT (OUTPATIENT)
Dept: INTERNAL MEDICINE | Facility: CLINIC | Age: 59
End: 2019-12-16
Payer: COMMERCIAL

## 2019-12-16 VITALS
WEIGHT: 141.25 LBS | TEMPERATURE: 100 F | HEART RATE: 115 BPM | OXYGEN SATURATION: 96 % | BODY MASS INDEX: 22.17 KG/M2 | HEIGHT: 67 IN | SYSTOLIC BLOOD PRESSURE: 119 MMHG | DIASTOLIC BLOOD PRESSURE: 74 MMHG

## 2019-12-16 DIAGNOSIS — R05.9 COUGH: ICD-10-CM

## 2019-12-16 DIAGNOSIS — R53.83 FATIGUE, UNSPECIFIED TYPE: ICD-10-CM

## 2019-12-16 DIAGNOSIS — D69.3 CHRONIC ITP (IDIOPATHIC THROMBOCYTOPENIA): ICD-10-CM

## 2019-12-16 DIAGNOSIS — R50.9 FEVER, UNSPECIFIED FEVER CAUSE: ICD-10-CM

## 2019-12-16 DIAGNOSIS — J10.1 INFLUENZA A: Primary | ICD-10-CM

## 2019-12-16 PROCEDURE — 99999 PR PBB SHADOW E&M-EST. PATIENT-LVL III: CPT | Mod: PBBFAC,,, | Performed by: FAMILY MEDICINE

## 2019-12-16 PROCEDURE — 80053 COMPREHEN METABOLIC PANEL: CPT

## 2019-12-16 PROCEDURE — 99213 OFFICE O/P EST LOW 20 MIN: CPT | Mod: S$GLB,,, | Performed by: FAMILY MEDICINE

## 2019-12-16 PROCEDURE — 99999 PR PBB SHADOW E&M-EST. PATIENT-LVL III: ICD-10-PCS | Mod: PBBFAC,,, | Performed by: FAMILY MEDICINE

## 2019-12-16 PROCEDURE — 86140 C-REACTIVE PROTEIN: CPT

## 2019-12-16 PROCEDURE — 99213 PR OFFICE/OUTPT VISIT, EST, LEVL III, 20-29 MIN: ICD-10-PCS | Mod: S$GLB,,, | Performed by: FAMILY MEDICINE

## 2019-12-16 RX ORDER — PROMETHAZINE HYDROCHLORIDE AND DEXTROMETHORPHAN HYDROBROMIDE 6.25; 15 MG/5ML; MG/5ML
5 SYRUP ORAL
Qty: 180 ML | Refills: 0 | Status: SHIPPED | OUTPATIENT
Start: 2019-12-16 | End: 2019-12-26

## 2019-12-16 NOTE — PATIENT INSTRUCTIONS
The Flu (Influenza)     The virus that causes the flu spreads through the air in droplets when someone who has the flu coughs, sneezes, laughs, or talks.   The flu (influenza) is an infection that affects your respiratory tract. This tract is made up of your mouth, nose, and lungs, and the passages between them. Unlike a cold, the flu can make you very ill. And it can lead to pneumonia, a serious lung infection. The flu can have serious complications and even cause death.  Who is at risk for the flu?  Anyone can get the flu. But you are more likely to become infected if you:  · Have a weakened immune system  · Work in a healthcare setting where you may be exposed to flu germs  · Live or work with someone who has the flu  · Havent had an annual flu shot  How does the flu spread?  The flu is caused by a virus. The virus spreads through the air in droplets when someone who has the flu coughs, sneezes, laughs, or talks. You can become infected when you inhale these viruses directly. You can also become infected when you touch a surface on which the droplets have landed and then transfer the germs to your eyes, nose, or mouth. Touching used tissues, or sharing utensils, drinking glasses, or a toothbrush from an infected person can expose you to flu viruses, too.  What are the symptoms of the flu?  Flu symptoms tend to come on quickly and may last a few days to a few weeks. They include:  · Fever usually higher than 100.4°F  (38°C) and chills  · Sore throat and headache  · Dry cough  · Runny nose  · Tiredness and weakness  · Muscle aches  Who is at risk for flu complications?  For some people, the flu can be very serious. The risk for complications is greater for:  · Children younger than age 5  · Adults ages 65 and older  · People with a chronic illness such as diabetes or heart, kidney, or lung disease  · People who live in a nursing home or long-term care facility   How is the flu treated?  The flu usually gets  better after 7 days or so. In some cases, your healthcare provider may prescribe an antiviral medicine. This may help you get well a little sooner. For the medicine to help, you need to take it as soon as possible (ideally within 48 hours) after your symptoms start. If you develop pneumonia or other serious illness, you may need to stay in the hospital.  Easing flu symptoms  · Drink lots of fluids such as water, juice, and warm soup. A good rule is to drink enough so that you urinate your normal amount.  · Get plenty of rest.  · Ask your healthcare provider what to take for fever and pain.  · Call your provider if your fever is 100.4°F (38°C) or higher, or you become dizzy, lightheaded, or short of breath.  Taking steps to protect others  · Wash your hands often, especially after coughing or sneezing. Or clean your hands with an alcohol-based hand  containing at least 60% alcohol.  · Cough or sneeze into a tissue. Then throw the tissue away and wash your hands. If you dont have a tissue, cough and sneeze into your elbow.  · Stay home until at least 24 hours after you no longer have a fever or chills. Be sure the fever isnt being hidden by fever-reducing medicine.  · Dont share food, utensils, drinking glasses, or a toothbrush with others.  · Ask your healthcare provider if others in your household should get antiviral medicine to help them avoid infection.  How can the flu be prevented?  · One of the best ways to avoid the flu is to get a flu vaccine each year. The virus that causes the flu changes from year to year. For that reason, healthcare providers recommend getting the flu vaccine each year, as soon as it's available in your area. The vaccine is given as a shot. Your healthcare provider can tell you which vaccine is right for you. A nasal spray is also available but is not recommended for the 3773-6081 flu season. The CDC says this is because the nasal spray did not seem to protect against the flu  over the last several flu seasons. In the past, it was meant for people ages 2 to 49.  · Wash your hands often. Frequent handwashing is a proven way to help prevent infection.  · Carry an alcohol-based hand gel containing at least 60% alcohol. Use it when you can't use soap and water. Then wash your hands as soon as you can.  · Avoid touching your eyes, nose, and mouth.  · At home and work, clean phones, computer keyboards, and toys often with disinfectant wipes.  · If possible, avoid close contact with others who have the flu or symptoms of the flu.  Handwashing tips  Handwashing is one of the best ways to prevent many common infections. If you are caring for or visiting someone with the flu, wash your hands each time you enter and leave the room. Follow these steps:  · Use warm water and plenty of soap. Rub your hands together well.  · Clean the whole hand, including under your nails, between your fingers, and up the wrists.  · Wash for at least 15 seconds.  · Rinse, letting the water run down your fingers, not up your wrists.  · Dry your hands well. Use a paper towel to turn off the faucet and open the door.  Using alcohol-based hand   Alcohol-based hand  are also a good choice. Use them when you can't use soap and water. Follow these steps:  · Squeeze about a tablespoon of gel into the palm of one hand.  · Rub your hands together briskly, cleaning the backs of your hands, the palms, between your fingers, and up the wrists.  · Rub until the gel is gone and your hands are completely dry.  Preventing the flu in healthcare settings  The flu is a special concern for people in hospitals and long-term care facilities. To help prevent the spread of flu, many hospitals and nursing homes take these steps:  · Healthcare providers wash their hands or use an alcohol-based hand  before and after treating each patient.  · People with the flu have private rooms and bathrooms or share a room with someone  with the same infection.  · People who are at high risk for the flu but don't have it are encouraged to get the flu and pneumonia vaccines.  · All healthcare workers are encouraged or required to get flu shots.   Date Last Reviewed: 12/1/2016 © 2000-2017 [employer]. [address] [place] [address]. All rights reserved. This information is not intended as a substitute for professional medical care. Always follow your healthcare professional's instructions.        Influenza (Adult)    Influenza is also called the flu. It is a viral illness that affects the air passages of your lungs. It is different from the common cold. The flu can easily be passed from one to person to another. It may be spread through the air by coughing and sneezing. Or it can be spread by touching the sick person and then touching your own eyes, nose, or mouth.  The flu starts 1 to 3 days after you are exposed to the flu virus. It may last for 1 to 2 weeks but many people feel tired or fatigued for many weeks afterward. You usually dont need to take antibiotics unless you have a complication. This might be an ear or sinus infection or pneumonia.  Symptoms of the flu may be mild or severe. They can include extreme tiredness (wanting to stay in bed all day), chills, fevers, muscle aches, soreness with eye movement, headache, and a dry, hacking cough.  Home care  Follow these guidelines when caring for yourself at home:  · Avoid being around cigarette smoke, whether yours or other peoples.  · Acetaminophen or ibuprofen will help ease your fever, muscle aches, and headache. Dont give aspirin to anyone younger than 18 who has the flu. Aspirin can harm the liver.  · Nausea and loss of appetite are common with the flu. Eat light meals. Drink 6 to 8 glasses of liquids every day. Good choices are water, sport drinks, soft drinks without caffeine, juices, tea, and soup. Extra fluids will also help loosen secretions in your nose  and lungs.  · Over-the-counter cold medicines will not make the flu go away faster. But the medicines may help with coughing, sore throat, and congestion in your nose and sinuses. Dont use a decongestant if you have high blood pressure.  · Stay home until your fever has been gone for at least 24 hours without using medicine to reduce fever.  Follow-up care  Follow up with your healthcare provider, or as advised, if you are not getting better over the next week.  If you are age 65 or older, talk with your provider about getting a pneumococcal vaccine every 5 years. You should also get this vaccine if you have chronic asthma or COPD. All adults should get a flu vaccine every fall. Ask your provider about this.  When to seek medical advice  Call your healthcare provider right away if any of these occur:  · Cough with lots of colored mucus (sputum) or blood in your mucus  · Chest pain, shortness of breath, wheezing, or trouble breathing  · Severe headache, or face, neck, or ear pain  · New rash with fever  · Fever of 100.4°F (38°C) or higher, or as directed by your healthcare provider  · Confusion, behavior change, or seizure  · Severe weakness or dizziness  · You get a new fever or cough after getting better for a few days  Date Last Reviewed: 1/1/2017  © 8678-4803 [employer]. [address] [place] [address], [place] [address]. All rights reserved. This information is not intended as a substitute for professional medical care. Always follow your healthcare professional's instructions.

## 2019-12-16 NOTE — PROGRESS NOTES
Subjective:       Patient ID: Jaxson Burch Jr. is a 59 y.o. male.    Chief Complaint: Influenza (tested Tuesday)    Patient with a history of chronic ITP, cervical radiculitis here for re-evaluation.  He is a new patient to me.  He was seen 12/10/2019 with flu-like symptoms and had a positive influenza a test.  He was given an IM steroid plus Tamiflu.  He states he started to feel better initially and then much worse.  Lab Results      Component                Value               Date                      WBC                      8.46                12/09/2019                HGB                      13.9 (L)            12/09/2019                HCT                      43.4                12/09/2019                MCV                      98                  12/09/2019                PLT                      256                 12/09/2019              States felt better after his 12/10/19 visit, then 12/14/19 started to feel worse again.  Note  and temp 100.2.   States he is aching all over, back ache, HA with cough only, jts ache. Chest pain with coughing - occas sputum. Not SOB.  Nausea today is new. No diarrhea - but looser than usual stools today. Having chills , no shakes. Taking 2 tylenol 1-2 times daily. Taking his percocet 10 3-4 times daily. It does modify his pain a little.    Influenza   This is a new problem. The current episode started in the past 7 days. The problem occurs constantly. Associated symptoms include abdominal pain (queasiness, nausea today), anorexia, arthralgias, chest pain, chills, coughing, diaphoresis (some), fatigue, a fever, headaches, myalgias, nausea and vertigo (occas). Pertinent negatives include no change in bowel habit, congestion, joint swelling, rash, sore throat, urinary symptoms or vomiting. The symptoms are aggravated by coughing. He has tried acetaminophen for the symptoms. The treatment provided no relief.     Review of Systems   Constitutional: Positive for  chills, diaphoresis (some), fatigue and fever.   HENT: Negative for congestion and sore throat.    Respiratory: Positive for cough.    Cardiovascular: Positive for chest pain.   Gastrointestinal: Positive for abdominal pain (queasiness, nausea today), anorexia and nausea. Negative for change in bowel habit and vomiting.   Musculoskeletal: Positive for arthralgias and myalgias. Negative for joint swelling.   Skin: Negative for rash.   Neurological: Positive for vertigo (occas) and headaches.       Objective:      Physical Exam   Constitutional: He is oriented to person, place, and time. He appears well-developed and well-nourished.   HENT:   Head: Normocephalic and atraumatic.   Right Ear: Tympanic membrane, external ear and ear canal normal.   Left Ear: Tympanic membrane, external ear and ear canal normal.   Nose: Nose normal.   Mouth/Throat: Oropharynx is clear and moist. No oropharyngeal exudate.   Eyes: Conjunctivae and EOM are normal.   Neck: Neck supple. No thyromegaly present.   Cardiovascular: Normal rate, regular rhythm and normal heart sounds.   Pulmonary/Chest: Effort normal and breath sounds normal.   Abdominal: Soft. He exhibits no distension. There is no tenderness.   Lymphadenopathy:     He has no cervical adenopathy.   Neurological: He is alert and oriented to person, place, and time.   Skin: Skin is warm and dry.   Psychiatric: He has a normal mood and affect. His behavior is normal.         Assessment/Plan:     1. Influenza A     2. Fatigue, unspecified type  Comprehensive metabolic panel    CANCELED: CBC auto differential   3. Chronic ITP (idiopathic thrombocytopenia)     4. Fever, unspecified fever cause  C-reactive protein   5. Cough  promethazine-dextromethorphan (PROMETHAZINE-DM) 6.25-15 mg/5 mL Syrp     Benign exam.  We will check some labs.  Reviewed information about flu and the fact that he could feel tired for a couple weeks.  Reviewed that the steroid course may have helped him initially  to feel better but he is still fighting a virus.    12/16/19 Addendum: CBC could not be run. Needs to be recollected.

## 2019-12-17 ENCOUNTER — PATIENT MESSAGE (OUTPATIENT)
Dept: INTERNAL MEDICINE | Facility: CLINIC | Age: 59
End: 2019-12-17

## 2019-12-17 ENCOUNTER — TELEPHONE (OUTPATIENT)
Dept: INTERNAL MEDICINE | Facility: CLINIC | Age: 59
End: 2019-12-17

## 2019-12-17 ENCOUNTER — CLINICAL SUPPORT (OUTPATIENT)
Dept: INTERNAL MEDICINE | Facility: CLINIC | Age: 59
End: 2019-12-17
Payer: COMMERCIAL

## 2019-12-17 DIAGNOSIS — R53.83 FATIGUE, UNSPECIFIED TYPE: ICD-10-CM

## 2019-12-17 DIAGNOSIS — D69.3 CHRONIC ITP (IDIOPATHIC THROMBOCYTOPENIA): ICD-10-CM

## 2019-12-17 DIAGNOSIS — J10.1 INFLUENZA A: ICD-10-CM

## 2019-12-17 DIAGNOSIS — R79.82 ELEVATED C-REACTIVE PROTEIN (CRP): ICD-10-CM

## 2019-12-17 DIAGNOSIS — D72.829 LEUKOCYTOSIS, UNSPECIFIED TYPE: Primary | ICD-10-CM

## 2019-12-17 LAB
ALBUMIN SERPL BCP-MCNC: 3.8 G/DL (ref 3.5–5.2)
ALP SERPL-CCNC: 56 U/L (ref 55–135)
ALT SERPL W/O P-5'-P-CCNC: 26 U/L (ref 10–44)
ANION GAP SERPL CALC-SCNC: 9 MMOL/L (ref 8–16)
AST SERPL-CCNC: 25 U/L (ref 10–40)
BASOPHILS # BLD AUTO: 0.05 K/UL (ref 0–0.2)
BASOPHILS NFR BLD: 0.3 % (ref 0–1.9)
BILIRUB SERPL-MCNC: 0.5 MG/DL (ref 0.1–1)
BUN SERPL-MCNC: 10 MG/DL (ref 6–20)
CALCIUM SERPL-MCNC: 9.8 MG/DL (ref 8.7–10.5)
CHLORIDE SERPL-SCNC: 102 MMOL/L (ref 95–110)
CO2 SERPL-SCNC: 28 MMOL/L (ref 23–29)
CREAT SERPL-MCNC: 1 MG/DL (ref 0.5–1.4)
CRP SERPL-MCNC: 97.5 MG/L (ref 0–8.2)
DIFFERENTIAL METHOD: ABNORMAL
EOSINOPHIL # BLD AUTO: 0 K/UL (ref 0–0.5)
EOSINOPHIL NFR BLD: 0.1 % (ref 0–8)
ERYTHROCYTE [DISTWIDTH] IN BLOOD BY AUTOMATED COUNT: 11.9 % (ref 11.5–14.5)
EST. GFR  (AFRICAN AMERICAN): >60 ML/MIN/1.73 M^2
EST. GFR  (NON AFRICAN AMERICAN): >60 ML/MIN/1.73 M^2
GLUCOSE SERPL-MCNC: 99 MG/DL (ref 70–110)
HCT VFR BLD AUTO: 42.5 % (ref 40–54)
HGB BLD-MCNC: 13.7 G/DL (ref 14–18)
IMM GRANULOCYTES # BLD AUTO: 0.06 K/UL (ref 0–0.04)
IMM GRANULOCYTES NFR BLD AUTO: 0.4 % (ref 0–0.5)
LYMPHOCYTES # BLD AUTO: 2.3 K/UL (ref 1–4.8)
LYMPHOCYTES NFR BLD: 13.8 % (ref 18–48)
MCH RBC QN AUTO: 30.9 PG (ref 27–31)
MCHC RBC AUTO-ENTMCNC: 32.2 G/DL (ref 32–36)
MCV RBC AUTO: 96 FL (ref 82–98)
MONOCYTES # BLD AUTO: 1.7 K/UL (ref 0.3–1)
MONOCYTES NFR BLD: 10 % (ref 4–15)
NEUTROPHILS # BLD AUTO: 12.4 K/UL (ref 1.8–7.7)
NEUTROPHILS NFR BLD: 75.4 % (ref 38–73)
NRBC BLD-RTO: 0 /100 WBC
PLATELET # BLD AUTO: 521 K/UL (ref 150–350)
PMV BLD AUTO: 9.9 FL (ref 9.2–12.9)
POTASSIUM SERPL-SCNC: 4.9 MMOL/L (ref 3.5–5.1)
PROT SERPL-MCNC: 7.8 G/DL (ref 6–8.4)
RBC # BLD AUTO: 4.43 M/UL (ref 4.6–6.2)
SODIUM SERPL-SCNC: 139 MMOL/L (ref 136–145)
WBC # BLD AUTO: 16.49 K/UL (ref 3.9–12.7)

## 2019-12-17 PROCEDURE — 85025 COMPLETE CBC W/AUTO DIFF WBC: CPT

## 2019-12-17 NOTE — TELEPHONE ENCOUNTER
Spoke with the patient. Patient informed there is a lab order and needed to schedule. Patient verbally understood. Patient lab appointment scheduled.

## 2019-12-17 NOTE — TELEPHONE ENCOUNTER
Spoke to the lab and was advised that the CBC that was drawn on the patient was discontinued.  Specimen was received unlabeled.

## 2019-12-17 NOTE — TELEPHONE ENCOUNTER
His labs have now been resulted.  We will know more after get his CBC.  The high CRP could just be from his current flu.

## 2019-12-17 NOTE — TELEPHONE ENCOUNTER
Please contact patient.  The CBC that was collected at his visit could not be run.  It needs to be re-collected.  A new order has been placed.  You can schedule to be drawn at his convenience.

## 2019-12-17 NOTE — TELEPHONE ENCOUNTER
----- Message from Sadia Grey sent at 12/17/2019  9:20 AM CST -----  Regarding: [facility]  Contact: [phone]  Hi my name is Sadia I work in the Lab [facility]. We had a problem with some lab work on this patient. If someone from your office could call us at [phone] or ext [phone] that would be great. Anyone in my department can help. Thank you

## 2019-12-18 ENCOUNTER — TELEPHONE (OUTPATIENT)
Dept: INTERNAL MEDICINE | Facility: CLINIC | Age: 59
End: 2019-12-18

## 2019-12-18 NOTE — TELEPHONE ENCOUNTER
----- Message from Miguelito Verdugo sent at 12/18/2019  8:43 AM CST -----  Contact: Pt   Is calling to schedule his labs / received a call to schedule and is looking to come in on 12/24/2019 and can be reached at [phone]//thanks/dbw

## 2019-12-18 NOTE — TELEPHONE ENCOUNTER
Spoke to the patient and was advised that he needed to schedule his nurse visit for 12/24/2019 for blood work.  Scheduled patient appointment.

## 2019-12-23 ENCOUNTER — TELEPHONE (OUTPATIENT)
Dept: INTERNAL MEDICINE | Facility: CLINIC | Age: 59
End: 2019-12-23

## 2019-12-23 NOTE — TELEPHONE ENCOUNTER
Prescription change request from the pharmacy. PROMETHAZINE DM SYRUP is currently on backorder. Please advise.

## 2019-12-24 ENCOUNTER — CLINICAL SUPPORT (OUTPATIENT)
Dept: INTERNAL MEDICINE | Facility: CLINIC | Age: 59
End: 2019-12-24
Payer: COMMERCIAL

## 2019-12-24 DIAGNOSIS — R79.82 ELEVATED C-REACTIVE PROTEIN (CRP): ICD-10-CM

## 2019-12-24 DIAGNOSIS — D72.829 LEUKOCYTOSIS, UNSPECIFIED TYPE: ICD-10-CM

## 2019-12-24 LAB
BASOPHILS # BLD AUTO: 0.08 K/UL (ref 0–0.2)
BASOPHILS NFR BLD: 1.5 % (ref 0–1.9)
CRP SERPL-MCNC: 22.2 MG/L (ref 0–8.2)
DIFFERENTIAL METHOD: ABNORMAL
EOSINOPHIL # BLD AUTO: 0.1 K/UL (ref 0–0.5)
EOSINOPHIL NFR BLD: 1.1 % (ref 0–8)
ERYTHROCYTE [DISTWIDTH] IN BLOOD BY AUTOMATED COUNT: 11.9 % (ref 11.5–14.5)
ERYTHROCYTE [SEDIMENTATION RATE] IN BLOOD BY WESTERGREN METHOD: 80 MM/HR (ref 0–23)
HCT VFR BLD AUTO: 40.1 % (ref 40–54)
HGB BLD-MCNC: 12.6 G/DL (ref 14–18)
IMM GRANULOCYTES # BLD AUTO: 0.02 K/UL (ref 0–0.04)
IMM GRANULOCYTES NFR BLD AUTO: 0.4 % (ref 0–0.5)
LYMPHOCYTES # BLD AUTO: 2.3 K/UL (ref 1–4.8)
LYMPHOCYTES NFR BLD: 41.3 % (ref 18–48)
MCH RBC QN AUTO: 30.6 PG (ref 27–31)
MCHC RBC AUTO-ENTMCNC: 31.4 G/DL (ref 32–36)
MCV RBC AUTO: 97 FL (ref 82–98)
MONOCYTES # BLD AUTO: 0.6 K/UL (ref 0.3–1)
MONOCYTES NFR BLD: 11.4 % (ref 4–15)
NEUTROPHILS # BLD AUTO: 2.4 K/UL (ref 1.8–7.7)
NEUTROPHILS NFR BLD: 44.3 % (ref 38–73)
NRBC BLD-RTO: 0 /100 WBC
PLATELET # BLD AUTO: 136 K/UL (ref 150–350)
PMV BLD AUTO: 10.4 FL (ref 9.2–12.9)
RBC # BLD AUTO: 4.12 M/UL (ref 4.6–6.2)
WBC # BLD AUTO: 5.45 K/UL (ref 3.9–12.7)

## 2019-12-24 PROCEDURE — 85652 RBC SED RATE AUTOMATED: CPT

## 2019-12-24 PROCEDURE — 86140 C-REACTIVE PROTEIN: CPT

## 2019-12-24 PROCEDURE — 36415 PR COLLECTION VENOUS BLOOD,VENIPUNCTURE: ICD-10-PCS | Mod: S$GLB,,, | Performed by: FAMILY MEDICINE

## 2019-12-24 PROCEDURE — 36415 COLL VENOUS BLD VENIPUNCTURE: CPT | Mod: S$GLB,,, | Performed by: FAMILY MEDICINE

## 2019-12-24 PROCEDURE — 85025 COMPLETE CBC W/AUTO DIFF WBC: CPT

## 2019-12-25 ENCOUNTER — PATIENT MESSAGE (OUTPATIENT)
Dept: INTERNAL MEDICINE | Facility: CLINIC | Age: 59
End: 2019-12-25

## 2019-12-25 DIAGNOSIS — R79.82 ELEVATED C-REACTIVE PROTEIN (CRP): Primary | ICD-10-CM

## 2019-12-25 DIAGNOSIS — R70.0 ELEVATED ERYTHROCYTE SEDIMENTATION RATE: ICD-10-CM

## 2019-12-26 ENCOUNTER — TELEPHONE (OUTPATIENT)
Dept: INTERNAL MEDICINE | Facility: CLINIC | Age: 59
End: 2019-12-26

## 2019-12-26 ENCOUNTER — PATIENT MESSAGE (OUTPATIENT)
Dept: INTERNAL MEDICINE | Facility: CLINIC | Age: 59
End: 2019-12-26

## 2019-12-26 NOTE — TELEPHONE ENCOUNTER
----- Message from Domi Meadows sent at 12/26/2019 10:03 AM CST -----  Contact: pt   Type:  Patient Returning Call    Who Called: pt   Who Left Message for Patient:Dr Basurto office  Does the patient know what this is regarding?:  Would the patient rather a call back or a response via [employer]? Call back  Best Call Back Number: [phone]  Additional Information:

## 2019-12-26 NOTE — TELEPHONE ENCOUNTER
It is recommended to get the flu vaccine. I would suggest feeling 100% better before getting the vaccine.

## 2019-12-30 ENCOUNTER — LAB VISIT (OUTPATIENT)
Dept: LAB | Facility: HOSPITAL | Age: 59
End: 2019-12-30
Attending: INTERNAL MEDICINE
Payer: COMMERCIAL

## 2019-12-30 ENCOUNTER — IMMUNIZATION (OUTPATIENT)
Dept: INTERNAL MEDICINE | Facility: CLINIC | Age: 59
End: 2019-12-30
Payer: COMMERCIAL

## 2019-12-30 ENCOUNTER — TELEPHONE (OUTPATIENT)
Dept: HEMATOLOGY/ONCOLOGY | Facility: CLINIC | Age: 59
End: 2019-12-30

## 2019-12-30 DIAGNOSIS — D69.3 CHRONIC ITP (IDIOPATHIC THROMBOCYTOPENIA): ICD-10-CM

## 2019-12-30 LAB
BASOPHILS # BLD AUTO: 0.08 K/UL (ref 0–0.2)
BASOPHILS NFR BLD: 1.1 % (ref 0–1.9)
DIFFERENTIAL METHOD: ABNORMAL
EOSINOPHIL # BLD AUTO: 0.2 K/UL (ref 0–0.5)
EOSINOPHIL NFR BLD: 2 % (ref 0–8)
ERYTHROCYTE [DISTWIDTH] IN BLOOD BY AUTOMATED COUNT: 12.6 % (ref 11.5–14.5)
HCT VFR BLD AUTO: 38.6 % (ref 40–54)
HGB BLD-MCNC: 12.5 G/DL (ref 14–18)
IMM GRANULOCYTES # BLD AUTO: 0.03 K/UL (ref 0–0.04)
IMM GRANULOCYTES NFR BLD AUTO: 0.4 % (ref 0–0.5)
LYMPHOCYTES # BLD AUTO: 2.5 K/UL (ref 1–4.8)
LYMPHOCYTES NFR BLD: 32.9 % (ref 18–48)
MCH RBC QN AUTO: 30.9 PG (ref 27–31)
MCHC RBC AUTO-ENTMCNC: 32.4 G/DL (ref 32–36)
MCV RBC AUTO: 96 FL (ref 82–98)
MONOCYTES # BLD AUTO: 0.8 K/UL (ref 0.3–1)
MONOCYTES NFR BLD: 9.9 % (ref 4–15)
NEUTROPHILS # BLD AUTO: 4.1 K/UL (ref 1.8–7.7)
NEUTROPHILS NFR BLD: 53.7 % (ref 38–73)
NRBC BLD-RTO: 0 /100 WBC
PLATELET # BLD AUTO: 30 K/UL (ref 150–350)
PMV BLD AUTO: 9.9 FL (ref 9.2–12.9)
RBC # BLD AUTO: 4.04 M/UL (ref 4.6–6.2)
WBC # BLD AUTO: 7.57 K/UL (ref 3.9–12.7)

## 2019-12-30 PROCEDURE — 90471 IMMUNIZATION ADMIN: CPT | Mod: S$GLB,,, | Performed by: FAMILY MEDICINE

## 2019-12-30 PROCEDURE — 36415 COLL VENOUS BLD VENIPUNCTURE: CPT

## 2019-12-30 PROCEDURE — 90686 FLU VACCINE (QUAD) GREATER THAN OR EQUAL TO 3YO PRESERVATIVE FREE IM: ICD-10-PCS | Mod: S$GLB,,, | Performed by: FAMILY MEDICINE

## 2019-12-30 PROCEDURE — 99999 PR PBB SHADOW E&M-EST. PATIENT-LVL I: CPT | Mod: PBBFAC,,,

## 2019-12-30 PROCEDURE — 90471 FLU VACCINE (QUAD) GREATER THAN OR EQUAL TO 3YO PRESERVATIVE FREE IM: ICD-10-PCS | Mod: S$GLB,,, | Performed by: FAMILY MEDICINE

## 2019-12-30 PROCEDURE — 90686 IIV4 VACC NO PRSV 0.5 ML IM: CPT | Mod: S$GLB,,, | Performed by: FAMILY MEDICINE

## 2019-12-30 PROCEDURE — 99999 PR PBB SHADOW E&M-EST. PATIENT-LVL I: ICD-10-PCS | Mod: PBBFAC,,,

## 2019-12-30 NOTE — PROGRESS NOTES
Administered flu shot to left deltoid.  VIS given.  See immunization record.  Pt tolerated well.  Advised to wait at least 15 minutes to monitor for adverse reactions.  Pt verbalizes understanding.    Ndc  83164-295-35  Lot   [id number]  Exp  6-

## 2019-12-31 ENCOUNTER — LAB VISIT (OUTPATIENT)
Dept: LAB | Facility: HOSPITAL | Age: 59
End: 2019-12-31
Attending: INTERNAL MEDICINE
Payer: COMMERCIAL

## 2019-12-31 ENCOUNTER — TELEPHONE (OUTPATIENT)
Dept: HEMATOLOGY/ONCOLOGY | Facility: CLINIC | Age: 59
End: 2019-12-31

## 2019-12-31 ENCOUNTER — OFFICE VISIT (OUTPATIENT)
Dept: HEMATOLOGY/ONCOLOGY | Facility: CLINIC | Age: 59
End: 2019-12-31
Payer: MEDICARE

## 2019-12-31 VITALS
OXYGEN SATURATION: 98 % | TEMPERATURE: 97 F | SYSTOLIC BLOOD PRESSURE: 112 MMHG | BODY MASS INDEX: 22.39 KG/M2 | DIASTOLIC BLOOD PRESSURE: 79 MMHG | RESPIRATION RATE: 18 BRPM | WEIGHT: 142.63 LBS | HEART RATE: 91 BPM | HEIGHT: 67 IN

## 2019-12-31 DIAGNOSIS — R51.9 NONINTRACTABLE HEADACHE, UNSPECIFIED CHRONICITY PATTERN, UNSPECIFIED HEADACHE TYPE: ICD-10-CM

## 2019-12-31 DIAGNOSIS — D69.3 CHRONIC ITP (IDIOPATHIC THROMBOCYTOPENIA): ICD-10-CM

## 2019-12-31 DIAGNOSIS — D69.3 CHRONIC ITP (IDIOPATHIC THROMBOCYTOPENIA): Primary | ICD-10-CM

## 2019-12-31 LAB
BASOPHILS NFR BLD: 0 % (ref 0–1.9)
DIFFERENTIAL METHOD: ABNORMAL
EOSINOPHIL NFR BLD: 1 % (ref 0–8)
ERYTHROCYTE [DISTWIDTH] IN BLOOD BY AUTOMATED COUNT: 12.9 % (ref 11.5–14.5)
HCT VFR BLD AUTO: 39.3 % (ref 40–54)
HGB BLD-MCNC: 12.9 G/DL (ref 14–18)
IMM GRANULOCYTES # BLD AUTO: ABNORMAL 10*3/UL
IMM GRANULOCYTES NFR BLD AUTO: ABNORMAL %
LYMPHOCYTES NFR BLD: 1 % (ref 18–48)
MCH RBC QN AUTO: 31.2 PG (ref 27–31)
MCHC RBC AUTO-ENTMCNC: 32.8 G/DL (ref 32–36)
MCV RBC AUTO: 95 FL (ref 82–98)
MONOCYTES NFR BLD: 1 % (ref 4–15)
MPV, BLUE TOP: 9.8 FL (ref 9.2–12.9)
NEUTROPHILS NFR BLD: 75 % (ref 38–73)
NRBC BLD-RTO: 0 /100 WBC
PLATELET # BLD AUTO: 37 K/UL (ref 150–350)
PLATELET BLD QL SMEAR: ABNORMAL
PLATELET, BLUE TOP: 37 K/UL (ref 150–350)
PMV BLD AUTO: 9.8 FL (ref 9.2–12.9)
RBC # BLD AUTO: 4.13 M/UL (ref 4.6–6.2)
WBC # BLD AUTO: 7.58 K/UL (ref 3.9–12.7)
WBC NRBC COR # BLD: 7.58 K/UL (ref 3.9–12.7)

## 2019-12-31 PROCEDURE — 36415 COLL VENOUS BLD VENIPUNCTURE: CPT

## 2019-12-31 PROCEDURE — 85049 AUTOMATED PLATELET COUNT: CPT

## 2019-12-31 PROCEDURE — 99999 PR PBB SHADOW E&M-EST. PATIENT-LVL III: CPT | Mod: PBBFAC,,, | Performed by: INTERNAL MEDICINE

## 2019-12-31 PROCEDURE — 99215 OFFICE O/P EST HI 40 MIN: CPT | Mod: S$PBB,,, | Performed by: INTERNAL MEDICINE

## 2019-12-31 PROCEDURE — 99213 OFFICE O/P EST LOW 20 MIN: CPT | Mod: PBBFAC | Performed by: INTERNAL MEDICINE

## 2019-12-31 PROCEDURE — 99999 PR PBB SHADOW E&M-EST. PATIENT-LVL III: ICD-10-PCS | Mod: PBBFAC,,, | Performed by: INTERNAL MEDICINE

## 2019-12-31 PROCEDURE — 99215 PR OFFICE/OUTPT VISIT, EST, LEVL V, 40-54 MIN: ICD-10-PCS | Mod: S$PBB,,, | Performed by: INTERNAL MEDICINE

## 2019-12-31 RX ORDER — TRAMADOL HYDROCHLORIDE 50 MG/1
50 TABLET ORAL EVERY 6 HOURS PRN
Qty: 50 TABLET | Refills: 0 | Status: SHIPPED | OUTPATIENT
Start: 2019-12-31 | End: 2020-12-30

## 2019-12-31 NOTE — TELEPHONE ENCOUNTER
Nurse notified Dr Chisholm that [facility] called with Mr Burch's Platelet count, 37.4. Pt was seen in clinic when this test was drawn.

## 2019-12-31 NOTE — PROGRESS NOTES
Subjective:       Patient ID: Jaxson Burch Jr. is a 59 y.o. male.    Chief Complaint: Chronic ITP (idiopathic thrombocytopenia) [D69.3]  HPI: We have an opportunity to see Mr. Jaxson Burch Jr. in Hematology Oncology clinic at [facility] on 12/30/2019.  Mr. Jaxson Burch Jr. is a 59 y.o. gentleman with ITP completed weekly ritoxan x 8 treatments in June 2019.  Has had CBC monitored every 3 weeks, with couple times dropped to 30-50.       No history exists.     Past Medical History:   Diagnosis Date    Arthritis     Gastritis     ITP (idiopathic thrombocytopenic purpura)      Family History   Problem Relation Age of Onset    Pancreatic cancer Mother     Colon cancer Neg Hx      Social History     Socioeconomic History    Marital status:      Spouse name: Not on file    Number of children: Not on file    Years of education: Not on file    Highest education level: Not on file   Occupational History    Not on file   Social Needs    Financial resource strain: Not on file    Food insecurity:     Worry: Not on file     Inability: Not on file    Transportation needs:     Medical: Not on file     Non-medical: Not on file   Tobacco Use    Smoking status: Current Every Day Smoker     Packs/day: 0.50     Years: 40.00     Pack years: 20.00     Types: Cigarettes    Smokeless tobacco: Never Used   Substance and Sexual Activity    Alcohol use: Yes     Alcohol/week: 0.0 standard drinks     Comment: socially    Drug use: No    Sexual activity: Yes     Partners: Female   Lifestyle    Physical activity:     Days per week: Not on file     Minutes per session: Not on file    Stress: Not on file   Relationships    Social connections:     Talks on phone: Not on file     Gets together: Not on file     Attends Religion service: Not on file     Active member of club or organization: Not on file     Attends meetings of clubs or organizations: Not on file     Relationship status: Not on file   Other  Topics Concern    Not on file   Social History Narrative    Not on file     Past Surgical History:   Procedure Laterality Date    COLONOSCOPY N/A 11/29/2018    Procedure: COLONOSCOPY;  Surgeon: Brett Townsend MD;  Location: [facility];  Service: Endoscopy;  Laterality: N/A;    JOINT REPLACEMENT Right     hip     Current Outpatient Medications   Medication Sig Dispense Refill    atorvastatin (LIPITOR) 20 MG tablet Take 1 tablet (20 mg total) by mouth once daily. 90 tablet 0    meloxicam (MOBIC) 15 MG tablet       NARCAN 4 mg/actuation Spry       omeprazole (PRILOSEC) 20 MG capsule Take 1 capsule (20 mg total) by mouth once daily. 30 capsule 11    oxyCODONE-acetaminophen (PERCOCET)  mg per tablet Take 1 tablet by mouth every 4 (four) hours as needed for Pain.      sildenafil (VIAGRA) 100 MG tablet Take 100 mg by mouth every other day.  6    tadalafil (CIALIS) 20 MG Tab Take 1 tablet (20 mg total) by mouth daily as needed. 12 tablet 3    zolpidem (AMBIEN) 5 MG Tab Take 5 mg by mouth.       No current facility-administered medications for this visit.        Labs:  Lab Results   Component Value Date    WBC 7.57 12/30/2019    HGB 12.5 (L) 12/30/2019    HCT 38.6 (L) 12/30/2019    MCV 96 12/30/2019    PLT 30 (LL) 12/30/2019     BMP  Lab Results   Component Value Date     12/16/2019    K 4.9 12/16/2019     12/16/2019    CO2 28 12/16/2019    BUN 10 12/16/2019    CREATININE 1.0 12/16/2019    CALCIUM 9.8 12/16/2019    ANIONGAP 9 12/16/2019    ESTGFRAFRICA >60.0 12/16/2019    EGFRNONAA >60.0 12/16/2019     Lab Results   Component Value Date    ALT 26 12/16/2019    AST 25 12/16/2019    ALKPHOS 56 12/16/2019    BILITOT 0.5 12/16/2019       No results found for: IRON, TIBC, FERRITIN, SATURATEDIRO  No results found for: [id number]  No results found for: FOLATE  No results found for: TSH    I have reviewed the radiology reports and examined the scan/xray images.    Review of Systems   Constitutional:  Negative.    HENT: Negative.    Eyes: Negative.    Respiratory: Negative.    Cardiovascular: Negative.    Gastrointestinal: Negative.    Endocrine: Negative.    Genitourinary: Negative.    Musculoskeletal: Negative.    Skin: Negative.    Allergic/Immunologic: Negative.    Neurological: Negative.    Hematological: Negative.    Psychiatric/Behavioral: Negative.      ECOG SCORE    0 - Fully active-able to carry on all pre-disease performance without restriction            Objective:     Vitals:    12/31/19 1005   BP: 112/79   Pulse: 91   Resp: 18   Temp: 97.1 °F (36.2 °C)   Body mass index is 22.34 kg/m².  Physical Exam   Constitutional: He is oriented to person, place, and time. He appears well-developed and well-nourished.   HENT:   Head: Normocephalic and atraumatic.   Eyes: Conjunctivae and EOM are normal.   Neck: Normal range of motion. Neck supple.   Cardiovascular: Normal rate and regular rhythm.   Pulmonary/Chest: Effort normal and breath sounds normal.   Abdominal: Soft. Bowel sounds are normal.   Musculoskeletal: Normal range of motion.   Neurological: He is alert and oriented to person, place, and time.   Skin: Skin is warm and dry.   Psychiatric: He has a normal mood and affect. His behavior is normal. Judgment and thought content normal.   Nursing note and vitals reviewed.        Assessment:      1. Chronic ITP (idiopathic thrombocytopenia)    2. Nonintractable headache, unspecified chronicity pattern, unspecified headache type           Plan:     Chronic ITP (idiopathic thrombocytopenia)  If platelets decrease below 30k, will give decadron 40 mg daily x 4 days.  -     CBC auto differential; Future; Expected date: 12/31/2019  -     Platelet Count, Blue Top; Future; Expected date: 12/31/2019    Nonintractable headache, unspecified chronicity pattern, unspecified headache type  -     traMADol (ULTRAM) 50 mg tablet; Take 1 tablet (50 mg total) by mouth every 6 (six) hours as needed for Pain.  Dispense: 50  tablet; Refill: 0

## 2020-01-02 ENCOUNTER — PATIENT MESSAGE (OUTPATIENT)
Dept: HEMATOLOGY/ONCOLOGY | Facility: CLINIC | Age: 60
End: 2020-01-02

## 2020-01-03 ENCOUNTER — PATIENT MESSAGE (OUTPATIENT)
Dept: HEMATOLOGY/ONCOLOGY | Facility: CLINIC | Age: 60
End: 2020-01-03

## 2020-01-03 ENCOUNTER — OFFICE VISIT (OUTPATIENT)
Dept: GASTROENTEROLOGY | Facility: CLINIC | Age: 60
End: 2020-01-03
Payer: COMMERCIAL

## 2020-01-03 VITALS
SYSTOLIC BLOOD PRESSURE: 112 MMHG | HEART RATE: 85 BPM | WEIGHT: 144.81 LBS | BODY MASS INDEX: 22.73 KG/M2 | HEIGHT: 67 IN | DIASTOLIC BLOOD PRESSURE: 78 MMHG

## 2020-01-03 DIAGNOSIS — R10.13 DYSPEPSIA: ICD-10-CM

## 2020-01-03 DIAGNOSIS — K21.9 GASTROESOPHAGEAL REFLUX DISEASE, ESOPHAGITIS PRESENCE NOT SPECIFIED: Primary | ICD-10-CM

## 2020-01-03 PROCEDURE — 99999 PR PBB SHADOW E&M-EST. PATIENT-LVL III: ICD-10-PCS | Mod: PBBFAC,,, | Performed by: PHYSICIAN ASSISTANT

## 2020-01-03 PROCEDURE — 99213 OFFICE O/P EST LOW 20 MIN: CPT | Mod: PBBFAC | Performed by: PHYSICIAN ASSISTANT

## 2020-01-03 PROCEDURE — 99212 OFFICE O/P EST SF 10 MIN: CPT | Mod: S$GLB,,, | Performed by: PHYSICIAN ASSISTANT

## 2020-01-03 PROCEDURE — 99999 PR PBB SHADOW E&M-EST. PATIENT-LVL III: CPT | Mod: PBBFAC,,, | Performed by: PHYSICIAN ASSISTANT

## 2020-01-03 PROCEDURE — 99212 PR OFFICE/OUTPT VISIT, EST, LEVL II, 10-19 MIN: ICD-10-PCS | Mod: S$GLB,,, | Performed by: PHYSICIAN ASSISTANT

## 2020-01-03 NOTE — PROGRESS NOTES
Subjective:      Patient ID: Jaxson Burch Jr. is a 59 y.o. male.    Chief Complaint: Abdominal Pain (follow up)    HPI:  Patient here for follow up of the above. He reports full resolution of symptoms including abdominal pain, excess gas and bloating, pulsations in the stomach. Aortic US showed no abnormalities. Patient denies having been taking his Omeprazole as prescribed at last visit, although he has some over the counter that he can take if symptoms start again. Denies any additional complaints.    Review of Systems   All other systems reviewed and are negative.      Medical History: Reviewed    Social History: Reviewed    Allergies: Reviewed    Objective:     Physical Exam   Constitutional: He is oriented to person, place, and time. He appears well-developed and well-nourished. No distress.   Patient very pleasant on exam.   HENT:   Head: Normocephalic and atraumatic.   Eyes: EOM are normal. Right eye exhibits no discharge. Left eye exhibits no discharge. No scleral icterus.   Neck: Normal range of motion.   Cardiovascular: Normal rate, regular rhythm and normal heart sounds.   No murmur heard.  Pulmonary/Chest: Effort normal and breath sounds normal. No stridor. No respiratory distress. He has no wheezes.   Abdominal: Soft. Bowel sounds are normal. He exhibits no distension and no mass. There is no tenderness. There is no guarding.   Musculoskeletal: Normal range of motion. He exhibits no deformity.   Neurological: He is alert and oriented to person, place, and time.   Skin: Skin is warm and dry. He is not diaphoretic.   Psychiatric: He has a normal mood and affect. His behavior is normal. Judgment and thought content normal.       Assessment:     1. Gastroesophageal reflux disease, esophagitis presence not specified    2. Dyspepsia        Plan:       Jaxson was seen today for abdominal pain.    Diagnoses and all orders for this visit:    Gastroesophageal reflux disease, esophagitis presence not  specified    Dyspepsia        Follow up if symptoms worsen or fail to improve.    Thank you for the opportunity to participate in the care of this patient.   Frances Elder PA-C.

## 2020-01-14 ENCOUNTER — LAB VISIT (OUTPATIENT)
Dept: LAB | Facility: HOSPITAL | Age: 60
End: 2020-01-14
Attending: INTERNAL MEDICINE
Payer: COMMERCIAL

## 2020-01-14 DIAGNOSIS — D69.3 CHRONIC ITP (IDIOPATHIC THROMBOCYTOPENIA): ICD-10-CM

## 2020-01-14 LAB
BASOPHILS # BLD AUTO: 0.12 K/UL (ref 0–0.2)
BASOPHILS NFR BLD: 2 % (ref 0–1.9)
DIFFERENTIAL METHOD: ABNORMAL
EOSINOPHIL # BLD AUTO: 0.2 K/UL (ref 0–0.5)
EOSINOPHIL NFR BLD: 2.8 % (ref 0–8)
ERYTHROCYTE [DISTWIDTH] IN BLOOD BY AUTOMATED COUNT: 12.9 % (ref 11.5–14.5)
HCT VFR BLD AUTO: 42.1 % (ref 40–54)
HGB BLD-MCNC: 13.5 G/DL (ref 14–18)
IMM GRANULOCYTES # BLD AUTO: 0.01 K/UL (ref 0–0.04)
IMM GRANULOCYTES NFR BLD AUTO: 0.2 % (ref 0–0.5)
LYMPHOCYTES # BLD AUTO: 2.2 K/UL (ref 1–4.8)
LYMPHOCYTES NFR BLD: 37.1 % (ref 18–48)
MCH RBC QN AUTO: 31 PG (ref 27–31)
MCHC RBC AUTO-ENTMCNC: 32.1 G/DL (ref 32–36)
MCV RBC AUTO: 97 FL (ref 82–98)
MONOCYTES # BLD AUTO: 0.4 K/UL (ref 0.3–1)
MONOCYTES NFR BLD: 6.8 % (ref 4–15)
NEUTROPHILS # BLD AUTO: 3.1 K/UL (ref 1.8–7.7)
NEUTROPHILS NFR BLD: 51.1 % (ref 38–73)
NRBC BLD-RTO: 0 /100 WBC
PLATELET # BLD AUTO: 393 K/UL (ref 150–350)
PMV BLD AUTO: 8.5 FL (ref 9.2–12.9)
RBC # BLD AUTO: 4.35 M/UL (ref 4.6–6.2)
WBC # BLD AUTO: 6.03 K/UL (ref 3.9–12.7)

## 2020-01-14 PROCEDURE — 36415 COLL VENOUS BLD VENIPUNCTURE: CPT

## 2020-01-16 ENCOUNTER — TELEPHONE (OUTPATIENT)
Dept: SMOKING CESSATION | Facility: CLINIC | Age: 60
End: 2020-01-16

## 2020-01-16 NOTE — TELEPHONE ENCOUNTER
Patient called to schedule an appointment with the smoking cessation program. Appointment scheduled on 1-

## 2020-01-22 ENCOUNTER — TELEPHONE (OUTPATIENT)
Dept: INTERNAL MEDICINE | Facility: CLINIC | Age: 60
End: 2020-01-22

## 2020-01-22 ENCOUNTER — OFFICE VISIT (OUTPATIENT)
Dept: INTERNAL MEDICINE | Facility: CLINIC | Age: 60
End: 2020-01-22
Payer: COMMERCIAL

## 2020-01-22 VITALS
HEART RATE: 88 BPM | HEIGHT: 67 IN | TEMPERATURE: 98 F | OXYGEN SATURATION: 98 % | SYSTOLIC BLOOD PRESSURE: 116 MMHG | DIASTOLIC BLOOD PRESSURE: 77 MMHG | BODY MASS INDEX: 22.72 KG/M2 | WEIGHT: 144.75 LBS

## 2020-01-22 DIAGNOSIS — D69.3 CHRONIC ITP (IDIOPATHIC THROMBOCYTOPENIA): ICD-10-CM

## 2020-01-22 DIAGNOSIS — J01.90 ACUTE RHINOSINUSITIS: Primary | ICD-10-CM

## 2020-01-22 DIAGNOSIS — Z72.0 TOBACCO USE: ICD-10-CM

## 2020-01-22 DIAGNOSIS — R09.82 POST-NASAL DRIP: ICD-10-CM

## 2020-01-22 PROBLEM — G89.4 CHRONIC PAIN SYNDROME: Status: ACTIVE | Noted: 2020-01-16

## 2020-01-22 PROCEDURE — 99999 PR PBB SHADOW E&M-EST. PATIENT-LVL III: CPT | Mod: PBBFAC,,, | Performed by: FAMILY MEDICINE

## 2020-01-22 PROCEDURE — 3008F PR BODY MASS INDEX (BMI) DOCUMENTED: ICD-10-PCS | Mod: CPTII,S$GLB,, | Performed by: FAMILY MEDICINE

## 2020-01-22 PROCEDURE — 99214 OFFICE O/P EST MOD 30 MIN: CPT | Mod: S$GLB,,, | Performed by: FAMILY MEDICINE

## 2020-01-22 PROCEDURE — 99999 PR PBB SHADOW E&M-EST. PATIENT-LVL III: ICD-10-PCS | Mod: PBBFAC,,, | Performed by: FAMILY MEDICINE

## 2020-01-22 PROCEDURE — 3008F BODY MASS INDEX DOCD: CPT | Mod: CPTII,S$GLB,, | Performed by: FAMILY MEDICINE

## 2020-01-22 PROCEDURE — 99214 PR OFFICE/OUTPT VISIT, EST, LEVL IV, 30-39 MIN: ICD-10-PCS | Mod: S$GLB,,, | Performed by: FAMILY MEDICINE

## 2020-01-22 RX ORDER — FLUTICASONE PROPIONATE 50 MCG
1 SPRAY, SUSPENSION (ML) NASAL DAILY
Qty: 9.9 ML | Refills: 3 | Status: SHIPPED | OUTPATIENT
Start: 2020-01-22 | End: 2022-08-08

## 2020-01-22 RX ORDER — METHYLPREDNISOLONE 4 MG/1
TABLET ORAL
Qty: 1 PACKAGE | Refills: 0 | Status: SHIPPED | OUTPATIENT
Start: 2020-01-22 | End: 2020-10-06 | Stop reason: ALTCHOICE

## 2020-01-22 NOTE — PATIENT INSTRUCTIONS
Sinusitis (No Antibiotics)    The sinuses are air-filled spaces within the bones of the face. They connect to the inside of the nose. Sinusitis is an inflammation of the tissue lining the sinus cavity. Sinus inflammation can occur during a cold. It can also be due to allergies to pollens and other particles in the air. It can cause symptoms such as sinus congestion, headache, sore throat, facial swelling and fullness. It may also cause a low-grade fever. No infection is present, and no antibiotic treatment is needed.  Home care  · Drink plenty of water, hot tea, and other liquids. This may help thin mucus. It also may promote sinus drainage.  · Heat may help soothe painful areas of the face. Use a towel soaked in hot water. Or,  the shower and direct the hot spray onto your face. Using a vaporizer along with a menthol rub at night may also help.   · An expectorant containing guaifenesin may help thin the mucus and promote drainage from the sinuses.  · Over-the-counter decongestants may be used unless a similar medicine was prescribed. Nasal sprays work the fastest. Use one that contains phenylephrine or oxymetazoline. First blow the nose gently. Then use the spray. Do not use these medicines more often than directed on the label or symptoms may get worse. You may also use tablets containing pseudoephedrine. Avoid products that combine ingredients, because side effects may be increased. Read labels. You can also ask the pharmacist for help. (NOTE: Persons with high blood pressure should not use decongestants. They can raise blood pressure.)  · Over-the-counter antihistamines may help if allergies contributed to your sinusitis.    · Use acetaminophen or ibuprofen to control pain, unless another pain medicine was prescribed. (If you have chronic liver or kidney disease or ever had a stomach ulcer, talk with your doctor before using these medicines. Aspirin should never be used in anyone under 18 years of age  who is ill with a fever. It may cause severe liver damage.)  · Use nasal rinses or irrigation as instructed by your health care provider.  · Don't smoke. This can worsen symptoms.  Follow-up care  Follow up with your healthcare provider or our staff if you are not improving within the next week.  When to seek medical advice  Call your healthcare provider if any of these occur:  · Green or yellow discharge from the nose or into the throat  · Facial pain or headache becoming more severe  · Stiff neck  · Unusual drowsiness or confusion  · Swelling of the forehead or eyelids  · Vision problems, including blurred or double vision  · Fever of 100.4ºF (38ºC) or higher, or as directed by your healthcare provider  · Seizure  · Breathing problems  · Symptoms not resolving within 10 days  Date Last Reviewed: 4/13/2015  © 3569-4310 [email or web address]. [address] [place] [address]. All rights reserved. This information is not intended as a substitute for professional medical care. Always follow your healthcare professional's instructions.          Fluticasone nasal spray  What is this medicine?  FLUTICASONE (floo TIK a sone) is a corticosteroid. This medicine is used to treat the symptoms of allergies like sneezing, itchy red eyes, and itchy, runny, or stuffy nose.  How should I use this medicine?  This medicine is for use in the nose. Follow the directions on your product or prescription label. This medicine works best if used at regular intervals. Do not use more often than directed. Make sure that you are using your nasal spray correctly. After 6 months of daily use without a prescription, talk to your doctor or health care professional before using it for a longer time. Ask your doctor or health care professional if you have any questions.  Talk to your pediatrician regarding the use of this medicine in children. Special care may be needed. This medicine has been used in children as young as 2 years.  After two months of daily use without a prescription in a child, talk to your pediatrician before using it for a longer time.  What side effects may I notice from receiving this medicine?  Side effects that you should report to your doctor or health care professional as soon as possible:  · allergic reactions like skin rash, itching or hives, swelling of the face, lips, or tongue  · changes in vision  · flu-like symptoms  · white patches or sores in the mouth or nose  Side effects that usually do not require medical attention (report to your doctor or health care professional if they continue or are bothersome):  · burning or irritation inside the nose or throat  · cough  · headache  · nosebleed  · unusual taste or smell  What may interact with this medicine?  · ketoconazole  · metyrapone  · some medicines for HIV  · vaccines  What if I miss a dose?  If you miss a dose, use it as soon as you remember. If it is almost time for your next dose, use only that dose and continue with your regular schedule. Do not use double or extra doses.  Where should I keep my medicine?  Keep out of the reach of children.  Store at room temperature between 15 and 30 degrees C (59 and 86 degrees F). Throw away any unused medicine after the expiration date.  What should I tell my health care provider before I take this medicine?  They need to know if you have any of these conditions:  · infection, like tuberculosis, herpes, or fungal infection  · recent surgery on nose or sinuses  · taking corticosteroid by mouth  · an unusual or allergic reaction to fluticasone, steroids, other medicines, foods, dyes, or preservatives  · pregnant or trying to get pregnant  · breast-feeding  What should I watch for while using this medicine?  Visit your doctor or health care professional for regular checks on your progress. Some symptoms may improve within 12 hours after starting use. Check with your doctor or health care professional if there is no  improvement in your condition after 3 weeks of use.  Do not come in contact with people who have chickenpox or the measles while you are taking this medicine. If you do, call your doctor right away.  NOTE:This sheet is a summary. It may not cover all possible information. If you have questions about this medicine, talk to your doctor, pharmacist, or health care provider. Copyright© 2017 [employer]        Technique for using nasal spray:  1. Blow nose  2. Lean forward  3. Angle spray outward, using opposite hand to opposite nostril  4. Hold breath and spray  5. Massage nasal fold to retain medicine in nose  6. Avoid blowing nose after administration of medicine    Wrong techniques include:  1. Sitting upright  2. Inhaling quickly when administering medicine  3. Blowing nose after administering medicine  4. Feeling medicine trickle down throat

## 2020-01-22 NOTE — PROGRESS NOTES
"Subjective:       Patient ID: Jaxson Burch Jr. is a 59 y.o. male.    Chief Complaint: Sinus Problem    HPI  Sx as above  Onset one and half weeks ago  Denies fever    OTC meds tried, pseudoephedrine  Sx improved  Requesting steroid injection to "knock it out"  A/w pharyngitis    Sick contacts,wife  Did receive influenza vaccination  Good PO intake  Continues to have sleep disturbance    Review of Systems   Constitutional: Negative for appetite change, fever and unexpected weight change.   HENT: Positive for sinus pain, sneezing and sore throat. Negative for congestion.    Respiratory: Negative for cough.    Gastrointestinal: Negative for nausea and vomiting.   Psychiatric/Behavioral: Positive for sleep disturbance.        Objective:   /77 (BP Location: Right arm, Patient Position: Sitting, BP Method: Large (Automatic))   Pulse 88   Temp 98 °F (36.7 °C) (Oral)   Ht 5' 7" (1.702 m)   Wt 65.6 kg (144 lb 11.7 oz)   SpO2 98%   BMI 22.67 kg/m²     Physical Exam   Constitutional: He appears well-developed and well-nourished.  Non-toxic appearance. He does not have a sickly appearance. He does not appear ill. No distress.   HENT:   Head: Normocephalic and atraumatic.   Right Ear: Tympanic membrane is not erythematous. A middle ear effusion is present.   Left Ear: Tympanic membrane is not erythematous. A middle ear effusion is present.   Nose: Mucosal edema and rhinorrhea present.   Mouth/Throat: Posterior oropharyngeal erythema (mild) present. No oropharyngeal exudate or posterior oropharyngeal edema.   Eyes: Conjunctivae and EOM are normal.   Neck: Normal range of motion. Neck supple.   Cardiovascular: Normal rate and regular rhythm.   No murmur heard.  Pulmonary/Chest: Effort normal and breath sounds normal. No respiratory distress. He has no wheezes. He has no rales.   Abdominal: Soft. Bowel sounds are normal.   Vitals reviewed.    Assessment:     1. Acute rhinosinusitis    2. Post-nasal drip    3. Tobacco " use    4. Chronic ITP (idiopathic thrombocytopenia)      Plan:     Problem List Items Addressed This Visit        Hematology    Chronic ITP (idiopathic thrombocytopenia)       Other    Tobacco use    Current Assessment & Plan     Reports will be going to smoking cessation class           Other Visit Diagnoses     Acute rhinosinusitis    -  Primary    Relevant Medications    methylPREDNISolone (MEDROL DOSEPACK) 4 mg tablet    fluticasone propionate (FLONASE) 50 mcg/actuation nasal spray    Post-nasal drip        Relevant Medications    methylPREDNISolone (MEDROL DOSEPACK) 4 mg tablet      I spent a total of 25 minutes with the patient; more than 50% was on counseling, lifestyle modifications, therapeutic options, side effects, and coordination of care.  Advised of adverse effects of steroid. Pt voiced understanding.    DDx Viral vs. Seasonal URI symptoms. Unlikely bacterial based on H&P, no fever, duration. No antibiotics indicated at this time. Symptomatic treatment advised with fluids, throat lozenges, nasal spray, tylenol/NSAIDs for analgesia if not contraindicated. Advised of proper technique for nasal spray. RTC/ED/Urgent care if symptoms worsen or fever develops.   Follow up if symptoms worsen or fail to improve.

## 2020-01-24 ENCOUNTER — OFFICE VISIT (OUTPATIENT)
Dept: INTERNAL MEDICINE | Facility: CLINIC | Age: 60
End: 2020-01-24
Payer: COMMERCIAL

## 2020-01-24 VITALS
HEIGHT: 67 IN | TEMPERATURE: 98 F | HEART RATE: 78 BPM | DIASTOLIC BLOOD PRESSURE: 84 MMHG | BODY MASS INDEX: 22.89 KG/M2 | OXYGEN SATURATION: 97 % | SYSTOLIC BLOOD PRESSURE: 118 MMHG | WEIGHT: 145.81 LBS

## 2020-01-24 DIAGNOSIS — J34.2 DEVIATED SEPTUM: ICD-10-CM

## 2020-01-24 DIAGNOSIS — F51.04 CHRONIC INSOMNIA: ICD-10-CM

## 2020-01-24 DIAGNOSIS — J01.90 ACUTE RHINOSINUSITIS: Primary | ICD-10-CM

## 2020-01-24 PROCEDURE — 99999 PR PBB SHADOW E&M-EST. PATIENT-LVL III: ICD-10-PCS | Mod: PBBFAC,,, | Performed by: FAMILY MEDICINE

## 2020-01-24 PROCEDURE — 3008F BODY MASS INDEX DOCD: CPT | Mod: CPTII,S$GLB,, | Performed by: FAMILY MEDICINE

## 2020-01-24 PROCEDURE — 99213 PR OFFICE/OUTPT VISIT, EST, LEVL III, 20-29 MIN: ICD-10-PCS | Mod: S$GLB,,, | Performed by: FAMILY MEDICINE

## 2020-01-24 PROCEDURE — 3008F PR BODY MASS INDEX (BMI) DOCUMENTED: ICD-10-PCS | Mod: CPTII,S$GLB,, | Performed by: FAMILY MEDICINE

## 2020-01-24 PROCEDURE — 99999 PR PBB SHADOW E&M-EST. PATIENT-LVL III: CPT | Mod: PBBFAC,,, | Performed by: FAMILY MEDICINE

## 2020-01-24 PROCEDURE — 99213 OFFICE O/P EST LOW 20 MIN: CPT | Mod: S$GLB,,, | Performed by: FAMILY MEDICINE

## 2020-01-24 RX ORDER — ZOLPIDEM TARTRATE 5 MG/1
5 TABLET ORAL NIGHTLY PRN
Qty: 15 TABLET | Refills: 5 | Status: SHIPPED | OUTPATIENT
Start: 2020-02-06 | End: 2020-10-06

## 2020-01-24 RX ORDER — IPRATROPIUM BROMIDE 42 UG/1
2 SPRAY, METERED NASAL 4 TIMES DAILY
Qty: 15 ML | Refills: 0 | Status: SHIPPED | OUTPATIENT
Start: 2020-01-24 | End: 2020-10-06

## 2020-01-24 NOTE — PROGRESS NOTES
Subjective:       Patient ID: Jaxson Burch Jr. is a 59 y.o. male.    Chief Complaint: Sinus Problem    Patient with chronic ITP seen here 2 days ago for ongoing sinus congestion. I saw him as a new patient a month ago in the context acute flu infection.  At that time we checked a CRP and ESR which were both high and I had recommended rechecking them in a month.  Since then he had onset of sinus problems about 2 weeks ago.  He was given a Dosepak 2 days ago for the symptoms.  Reviewed his platelet counts over the past month and has been seen by his hematologist.    States almost 2 weeks ago he was cleaning his truck and saw pollen dust. That evening he had soreness to throat and then nasal congestion, rhinorrhea.    He states he did not get the medrol dose pack from the pharmacy. He is asking for a steroid shot.  He started the flonase - it is not helping - c/o copious nasal secretions, rhinorrhea.    Sinus Problem   Associated symptoms include congestion. Pertinent negatives include no coughing (rare), headaches, shortness of breath, sinus pressure or sore throat.     Review of Systems   Constitutional: Negative for fever.   HENT: Positive for congestion, postnasal drip and rhinorrhea. Negative for sinus pressure and sore throat.    Respiratory: Negative for cough (rare) and shortness of breath.    Cardiovascular: Negative for chest pain.   Neurological: Negative for headaches.       Objective:      Physical Exam   Constitutional: He is oriented to person, place, and time. He appears well-developed and well-nourished.   HENT:   Head: Normocephalic and atraumatic.   Right Ear: Tympanic membrane, external ear and ear canal normal.   Left Ear: Tympanic membrane, external ear and ear canal normal.   Nose: Nose normal.   Mouth/Throat: Oropharynx is clear and moist. No oropharyngeal exudate.   Cerumen obscures most of right TM, partial left.  Septal deviation left - few declevyties to right nasal septal surface   Eyes:  Conjunctivae and EOM are normal.   Neck: Neck supple. No thyromegaly present.   Cardiovascular: Normal rate, regular rhythm and normal heart sounds.   Pulmonary/Chest: Effort normal and breath sounds normal. No respiratory distress. He has no wheezes.   Lymphadenopathy:     He has no cervical adenopathy.   Neurological: He is alert and oriented to person, place, and time.   Skin: Skin is warm and dry.   Psychiatric: He has a normal mood and affect. His behavior is normal.         Assessment/Plan:     1. Acute rhinosinusitis  ipratropium (ATROVENT) 42 mcg (0.06 %) nasal spray   2. Deviated septum  Ambulatory referral to ENT   3. Chronic insomnia  zolpidem (AMBIEN) 5 MG Tab   OK to use PSE if needed. Continue to hold off on systemic steroids.  ENT next stop if problems persist.

## 2020-01-24 NOTE — PATIENT INSTRUCTIONS
Decongestants: pseudoephedrine is stronger - available behind the counter (Sudafed-D).   Phenylephrine is available off the shelf but not as effective.      Treating Insomnia     Learning to relax before bedtime can improve your sleep.     Good sleeping habits are a key part of treatment. If needed, some medications may help you sleep better at first. Making healthy lifestyle changes and learning to relax can improve your sleep. Treating insomnia takes commitment, but trust that your efforts will pay off. Talk to your health care provider before taking any medication.  Healthy lifestyle  Your lifestyle affects your health and your sleep. Here are some healthy habits:  · Keep a regular sleep schedule. Go to bed and get up at the same time each day.  · Exercise regularly. It may help you reduce stress. Avoid strenuous exercise for 2 to 4 hours before bedtime.  · Avoid or limit naps, especially in the late afternoon.  · Use your bed only for sleep and sex.  · Dont spend too much time in bed trying to fall asleep. If you cant fall asleep, get up and do something (no electronics) until you become tired and drowsy.  · Avoid or limit caffeine and nicotine for up to 6 hours before bedtime. They can keep you awake at night.   · Also avoid alcohol for at least 4 to 6 hours before bedtime. It may help you fall asleep at first, but you will have more awakenings throughout the night, and your sleep will not be restful.  Before bedtime  To sleep better every night, try these tips:  · Have a bedtime routine to let your body and mind know when its time to sleep.  · Think of going to bed as relaxing and enjoyable. Sleep will come sooner.  · If your worries dont let you sleep, write them down in a diary. Then close it, and go to bed.  · Make sure the room is not too hot or too cold. If its not dark enough, an eye mask can help. If its noisy, try using earplugs.  Learn to relax  Stress, anxiety, and body tension may keep you  awake at night. To unwind before bedtime, try a warm bath, meditation, or yoga. Also, try the following:  · Deep breathing. Sit or lie back in a chair. Take a slow, deep breath. Hold it for 5 counts. Then breathe out slowly through your mouth. Keep doing this until you feel relaxed.  · Progressive muscle relaxation. Tense and then relax the muscles in your body as you breathe deeply. Start with your feet and work up your body to your neck and face.  Date Last Reviewed: 7/18/2015  © 2626-0128 [employer]. [address] [place] [id number]. All rights reserved. This information is not intended as a substitute for professional medical care. Always follow your healthcare professional's instructions.

## 2020-01-28 ENCOUNTER — CLINICAL SUPPORT (OUTPATIENT)
Dept: SMOKING CESSATION | Facility: CLINIC | Age: 60
End: 2020-01-28
Payer: COMMERCIAL

## 2020-01-28 DIAGNOSIS — F17.210 MODERATE CIGARETTE SMOKER (10-19 PER DAY): Primary | ICD-10-CM

## 2020-01-28 PROCEDURE — 99404 PREV MED CNSL INDIV APPRX 60: CPT | Mod: S$GLB,,,

## 2020-01-28 PROCEDURE — 99999 PR PBB SHADOW E&M-EST. PATIENT-LVL I: ICD-10-PCS | Mod: PBBFAC,,,

## 2020-01-28 PROCEDURE — 99999 PR PBB SHADOW E&M-EST. PATIENT-LVL I: CPT | Mod: PBBFAC,,,

## 2020-01-28 PROCEDURE — 99404 PR PREVENT COUNSEL,INDIV,60 MIN: ICD-10-PCS | Mod: S$GLB,,,

## 2020-01-28 RX ORDER — DIPHENHYDRAMINE HCL 25 MG
4 CAPSULE ORAL
Qty: 300 EACH | Refills: 0 | Status: SHIPPED | OUTPATIENT
Start: 2020-01-28 | End: 2020-03-23

## 2020-01-28 RX ORDER — IBUPROFEN 200 MG
1 TABLET ORAL DAILY
Qty: 28 PATCH | Refills: 0 | Status: SHIPPED | OUTPATIENT
Start: 2020-01-28 | End: 2020-03-23

## 2020-01-28 NOTE — PROGRESS NOTES
1st time in program. Smoking 8-10 cigarettes/day. Quit for 5 weeks cold turkey. The patient wants to quit for his health and money. Will use the 21 mg nicotine patches and 4 mg nicotine gum. The patient will continue individual  and/or group sessions and medication monitoring by CTTS. Prescribed medication management will be by [facility] Medical Practitioner.

## 2020-01-28 NOTE — Clinical Note
1st time in program. Smoking 8-10 cigarettes/day. Quit for 5 weeks cold turkey. The patient wants to quit for his health and money. Will use the 21 mg nicotine patches and 4 mg nicotine gum. The patient will continue individual  and/or group sessions and medication monitoring by CTTS. Prescribed medication management will be by [facility] Medical Practitioner.

## 2020-02-04 ENCOUNTER — CLINICAL SUPPORT (OUTPATIENT)
Dept: SMOKING CESSATION | Facility: CLINIC | Age: 60
End: 2020-02-04
Payer: COMMERCIAL

## 2020-02-04 DIAGNOSIS — F17.210 MODERATE CIGARETTE SMOKER (10-19 PER DAY): Primary | ICD-10-CM

## 2020-02-04 PROCEDURE — 99403 PR PREVENT COUNSEL,INDIV,45 MIN: ICD-10-PCS | Mod: S$GLB,,,

## 2020-02-04 PROCEDURE — 99999 PR PBB SHADOW E&M-EST. PATIENT-LVL I: CPT | Mod: PBBFAC,,,

## 2020-02-04 PROCEDURE — 99999 PR PBB SHADOW E&M-EST. PATIENT-LVL I: ICD-10-PCS | Mod: PBBFAC,,,

## 2020-02-04 PROCEDURE — 99403 PREV MED CNSL INDIV APPRX 45: CPT | Mod: S$GLB,,,

## 2020-02-04 NOTE — PROGRESS NOTES
Individual Follow-Up Form 1    2/4/2020    Quit Date: TBD    Clinical Status of Patient: Outpatient    Length of Service: 30 minutes    Continuing Medication: yes  Patches or Nicotine gum    Other Medications: none     Target Symptoms: Withdrawal and medication side effects. The following were  rated moderate (3) to severe (4) on TCRS:  · Moderate (3): desire craving ,frustration, anxiety, -- Nicotine replacement therapy, withdrawal symptoms, habit  ·   · Severe (4): none    Comments: The patient is smoking 8-10cigarettes/day  from 10 cigarettes/day. He was not able to attend session but agreed to phone follow-up. The patient is not using the 21 mg nicotine patch, but had tried a 4 mg nicotine gum. He stated he thought he was not ready to to do the program and as he stated he really like smoking. He thought since he had not quit, he had not done well. Explained this is not a fast program, it may take a couple of months to understand why he is smoking and what can be done to work on the habit which he stated he believes is at least 80%. Discussed using the patches, how to use and when to use the gum as a substiutte for smoking. He stated he felt better about what was going on and did agree to let me phone back with more information that may help him. The patient will continue individual  and/or group sessions and medication monitoring by CTTS. Prescribed medication management will be by [facility] Medical Practitioner.The patient denies any abnormal behavioral or mental changes at this time.      Diagnosis: F17.210    Next Visit: 2 weeks

## 2020-02-11 ENCOUNTER — PATIENT MESSAGE (OUTPATIENT)
Dept: HEMATOLOGY/ONCOLOGY | Facility: CLINIC | Age: 60
End: 2020-02-11

## 2020-02-11 ENCOUNTER — LAB VISIT (OUTPATIENT)
Dept: LAB | Facility: HOSPITAL | Age: 60
End: 2020-02-11
Attending: INTERNAL MEDICINE
Payer: COMMERCIAL

## 2020-02-11 DIAGNOSIS — D69.3 CHRONIC ITP (IDIOPATHIC THROMBOCYTOPENIA): ICD-10-CM

## 2020-02-11 DIAGNOSIS — D69.3 CHRONIC ITP (IDIOPATHIC THROMBOCYTOPENIA): Primary | ICD-10-CM

## 2020-02-11 DIAGNOSIS — R35.0 URINARY FREQUENCY: Primary | ICD-10-CM

## 2020-02-11 LAB
ALBUMIN SERPL BCP-MCNC: 4 G/DL (ref 3.5–5.2)
ALP SERPL-CCNC: 51 U/L (ref 55–135)
ALT SERPL W/O P-5'-P-CCNC: 20 U/L (ref 10–44)
ANION GAP SERPL CALC-SCNC: 10 MMOL/L (ref 8–16)
AST SERPL-CCNC: 24 U/L (ref 10–40)
BASOPHILS # BLD AUTO: 0.11 K/UL (ref 0–0.2)
BASOPHILS # BLD AUTO: 0.11 K/UL (ref 0–0.2)
BASOPHILS NFR BLD: 1.8 % (ref 0–1.9)
BASOPHILS NFR BLD: 2 % (ref 0–1.9)
BILIRUB SERPL-MCNC: 0.9 MG/DL (ref 0.1–1)
BUN SERPL-MCNC: 12 MG/DL (ref 6–20)
CALCIUM SERPL-MCNC: 9.2 MG/DL (ref 8.7–10.5)
CHLORIDE SERPL-SCNC: 104 MMOL/L (ref 95–110)
CO2 SERPL-SCNC: 27 MMOL/L (ref 23–29)
CREAT SERPL-MCNC: 1 MG/DL (ref 0.5–1.4)
DIFFERENTIAL METHOD: ABNORMAL
DIFFERENTIAL METHOD: ABNORMAL
EOSINOPHIL # BLD AUTO: 0.1 K/UL (ref 0–0.5)
EOSINOPHIL # BLD AUTO: 0.2 K/UL (ref 0–0.5)
EOSINOPHIL NFR BLD: 2.2 % (ref 0–8)
EOSINOPHIL NFR BLD: 2.4 % (ref 0–8)
ERYTHROCYTE [DISTWIDTH] IN BLOOD BY AUTOMATED COUNT: 13 % (ref 11.5–14.5)
ERYTHROCYTE [DISTWIDTH] IN BLOOD BY AUTOMATED COUNT: 13.1 % (ref 11.5–14.5)
EST. GFR  (AFRICAN AMERICAN): >60 ML/MIN/1.73 M^2
EST. GFR  (NON AFRICAN AMERICAN): >60 ML/MIN/1.73 M^2
ESTIMATED AVG GLUCOSE: 88 MG/DL (ref 68–131)
GLUCOSE SERPL-MCNC: 86 MG/DL (ref 70–110)
HBA1C MFR BLD HPLC: 4.7 % (ref 4–5.6)
HCT VFR BLD AUTO: 44.8 % (ref 40–54)
HCT VFR BLD AUTO: 45.8 % (ref 40–54)
HGB BLD-MCNC: 14.5 G/DL (ref 14–18)
HGB BLD-MCNC: 15 G/DL (ref 14–18)
IMM GRANULOCYTES # BLD AUTO: 0.02 K/UL (ref 0–0.04)
IMM GRANULOCYTES # BLD AUTO: 0.02 K/UL (ref 0–0.04)
IMM GRANULOCYTES NFR BLD AUTO: 0.3 % (ref 0–0.5)
IMM GRANULOCYTES NFR BLD AUTO: 0.4 % (ref 0–0.5)
LYMPHOCYTES # BLD AUTO: 2 K/UL (ref 1–4.8)
LYMPHOCYTES # BLD AUTO: 2.7 K/UL (ref 1–4.8)
LYMPHOCYTES NFR BLD: 36.4 % (ref 18–48)
LYMPHOCYTES NFR BLD: 44.1 % (ref 18–48)
MCH RBC QN AUTO: 30.7 PG (ref 27–31)
MCH RBC QN AUTO: 31.3 PG (ref 27–31)
MCHC RBC AUTO-ENTMCNC: 31.7 G/DL (ref 32–36)
MCHC RBC AUTO-ENTMCNC: 33.5 G/DL (ref 32–36)
MCV RBC AUTO: 94 FL (ref 82–98)
MCV RBC AUTO: 97 FL (ref 82–98)
MONOCYTES # BLD AUTO: 0.5 K/UL (ref 0.3–1)
MONOCYTES # BLD AUTO: 0.5 K/UL (ref 0.3–1)
MONOCYTES NFR BLD: 8.4 % (ref 4–15)
MONOCYTES NFR BLD: 8.9 % (ref 4–15)
NEUTROPHILS # BLD AUTO: 2.7 K/UL (ref 1.8–7.7)
NEUTROPHILS # BLD AUTO: 2.7 K/UL (ref 1.8–7.7)
NEUTROPHILS NFR BLD: 43 % (ref 38–73)
NEUTROPHILS NFR BLD: 50.5 % (ref 38–73)
NRBC BLD-RTO: 0 /100 WBC
NRBC BLD-RTO: 0 /100 WBC
PLATELET # BLD AUTO: 429 K/UL (ref 150–350)
PLATELET # BLD AUTO: 443 K/UL (ref 150–350)
PMV BLD AUTO: 8.5 FL (ref 9.2–12.9)
PMV BLD AUTO: 8.7 FL (ref 9.2–12.9)
POTASSIUM SERPL-SCNC: 5 MMOL/L (ref 3.5–5.1)
PROT SERPL-MCNC: 7.2 G/DL (ref 6–8.4)
RBC # BLD AUTO: 4.73 M/UL (ref 4.6–6.2)
RBC # BLD AUTO: 4.79 M/UL (ref 4.6–6.2)
SODIUM SERPL-SCNC: 141 MMOL/L (ref 136–145)
WBC # BLD AUTO: 5.38 K/UL (ref 3.9–12.7)
WBC # BLD AUTO: 6.22 K/UL (ref 3.9–12.7)

## 2020-02-11 PROCEDURE — 36415 COLL VENOUS BLD VENIPUNCTURE: CPT

## 2020-02-11 PROCEDURE — 80053 COMPREHEN METABOLIC PANEL: CPT

## 2020-02-11 PROCEDURE — 83036 HEMOGLOBIN GLYCOSYLATED A1C: CPT

## 2020-02-11 PROCEDURE — 85025 COMPLETE CBC W/AUTO DIFF WBC: CPT

## 2020-02-11 PROCEDURE — 85025 COMPLETE CBC W/AUTO DIFF WBC: CPT | Mod: 91

## 2020-02-12 ENCOUNTER — PATIENT MESSAGE (OUTPATIENT)
Dept: HEMATOLOGY/ONCOLOGY | Facility: CLINIC | Age: 60
End: 2020-02-12

## 2020-02-19 ENCOUNTER — PATIENT MESSAGE (OUTPATIENT)
Dept: HEMATOLOGY/ONCOLOGY | Facility: CLINIC | Age: 60
End: 2020-02-19

## 2020-02-20 ENCOUNTER — PATIENT MESSAGE (OUTPATIENT)
Dept: HEMATOLOGY/ONCOLOGY | Facility: CLINIC | Age: 60
End: 2020-02-20

## 2020-02-27 NOTE — PROGRESS NOTES
Subjective:       Patient ID: Jaxson Burch Jr. is a 59 y.o. male.    Chief Complaint: Chronic ITP (idiopathic thrombocytopenia) [D69.3]  HPI: We have an opportunity to see Mr. Jaxson Burch Jr. in Hematology Oncology clinic at [facility] on 02/27/2020.  Mr. Jaxson Burch Jr. is a 59 y.o. gentleman with ITP completed weekly ritoxan x 8 treatments in June 2019.  Has had CBC monitored every 3 weeks, with couple times dropped to 30-50.       No history exists.     Past Medical History:   Diagnosis Date    Arthritis     Gastritis     ITP (idiopathic thrombocytopenic purpura)      Family History   Problem Relation Age of Onset    Pancreatic cancer Mother     Colon cancer Neg Hx      Social History     Socioeconomic History    Marital status:      Spouse name: Not on file    Number of children: Not on file    Years of education: Not on file    Highest education level: Not on file   Occupational History    Not on file   Social Needs    Financial resource strain: Not on file    Food insecurity:     Worry: Not on file     Inability: Not on file    Transportation needs:     Medical: Not on file     Non-medical: Not on file   Tobacco Use    Smoking status: Current Every Day Smoker     Packs/day: 0.50     Years: 40.00     Pack years: 20.00     Types: Cigarettes, Cigars    Smokeless tobacco: Never Used   Substance and Sexual Activity    Alcohol use: Yes     Alcohol/week: 0.0 standard drinks     Comment: socially    Drug use: No    Sexual activity: Yes     Partners: Female   Lifestyle    Physical activity:     Days per week: Not on file     Minutes per session: Not on file    Stress: Not on file   Relationships    Social connections:     Talks on phone: Not on file     Gets together: Not on file     Attends Jainism service: Not on file     Active member of club or organization: Not on file     Attends meetings of clubs or organizations: Not on file     Relationship status: Not on file    Other Topics Concern    Not on file   Social History Narrative    Not on file     Past Surgical History:   Procedure Laterality Date    COLONOSCOPY N/A 11/29/2018    Procedure: COLONOSCOPY;  Surgeon: Brett Townsend MD;  Location: [facility];  Service: Endoscopy;  Laterality: N/A;    JOINT REPLACEMENT Right     hip     Current Outpatient Medications   Medication Sig Dispense Refill    atorvastatin (LIPITOR) 20 MG tablet Take 1 tablet (20 mg total) by mouth once daily. 90 tablet 0    fluticasone propionate (FLONASE) 50 mcg/actuation nasal spray 1 spray (50 mcg total) by Each Nostril route once daily. May one spray per nostril twice daily, MAX 2 sprays/nostril/day 9.9 mL 3    ipratropium (ATROVENT) 42 mcg (0.06 %) nasal spray 2 sprays by Nasal route 4 (four) times daily. Use for up to 4 days only 15 mL 0    meloxicam (MOBIC) 15 MG tablet       methylPREDNISolone (MEDROL DOSEPACK) 4 mg tablet use as directed 1 Package 0    NARCAN 4 mg/actuation Spry       nicotine (NICODERM CQ) 21 mg/24 hr Place 1 patch onto the skin once daily. (Patient not taking: Reported on 2/4/2020) 28 patch 0    nicotine polacrilex (NICORETTE) 4 MG Gum Take 1 each (4 mg total) by mouth every 2 (two) hours as needed (up to 12 x per day). Mint flavored 300 each 0    omeprazole (PRILOSEC) 20 MG capsule Take 1 capsule (20 mg total) by mouth once daily. 30 capsule 11    oxyCODONE-acetaminophen (PERCOCET)  mg per tablet Take 1 tablet by mouth every 4 (four) hours as needed for Pain.      sildenafil (VIAGRA) 100 MG tablet Take 100 mg by mouth every other day.  6    tadalafil (CIALIS) 20 MG Tab Take 1 tablet (20 mg total) by mouth daily as needed. 12 tablet 3    traMADol (ULTRAM) 50 mg tablet Take 1 tablet (50 mg total) by mouth every 6 (six) hours as needed for Pain. 50 tablet 0    zolpidem (AMBIEN) 5 MG Tab Take 1 tablet (5 mg total) by mouth nightly as needed. 15 tablet 5     No current facility-administered medications for this  visit.        Labs:  Lab Results   Component Value Date    WBC 6.22 02/11/2020    HGB 14.5 02/11/2020    HCT 45.8 02/11/2020    MCV 97 02/11/2020     (H) 02/11/2020     BMP  Lab Results   Component Value Date     02/11/2020    K 5.0 02/11/2020     02/11/2020    CO2 27 02/11/2020    BUN 12 02/11/2020    CREATININE 1.0 02/11/2020    CALCIUM 9.2 02/11/2020    ANIONGAP 10 02/11/2020    ESTGFRAFRICA >60 02/11/2020    EGFRNONAA >60 02/11/2020     Lab Results   Component Value Date    ALT 20 02/11/2020    AST 24 02/11/2020    ALKPHOS 51 (L) 02/11/2020    BILITOT 0.9 02/11/2020       No results found for: IRON, TIBC, FERRITIN, SATURATEDIRO  No results found for: [id number]  No results found for: FOLATE  No results found for: TSH    I have reviewed the radiology reports and examined the scan/xray images.    Review of Systems   Constitutional: Negative.    HENT: Negative.    Eyes: Negative.    Respiratory: Negative.    Cardiovascular: Negative.    Gastrointestinal: Negative.    Endocrine: Negative.    Genitourinary: Negative.    Musculoskeletal: Negative.    Skin: Negative.    Allergic/Immunologic: Negative.    Neurological: Negative.    Hematological: Negative.    Psychiatric/Behavioral: Negative.      ECOG SCORE    0 - Fully active-able to carry on all pre-disease performance without restriction            Objective:     Vitals:    02/28/20 1324   BP: 132/83   Pulse: 81   Resp: 18   Temp: 97.4 °F (36.3 °C)   Body mass index is 23.31 kg/m².  Physical Exam   Constitutional: He is oriented to person, place, and time. He appears well-developed and well-nourished.   HENT:   Head: Normocephalic and atraumatic.   Eyes: Conjunctivae and EOM are normal.   Neck: Normal range of motion. Neck supple.   Cardiovascular: Normal rate and regular rhythm.   Pulmonary/Chest: Effort normal and breath sounds normal.   Abdominal: Soft. Bowel sounds are normal.   Musculoskeletal: Normal range of motion.   Neurological: He is  alert and oriented to person, place, and time.   Skin: Skin is warm and dry.   Psychiatric: He has a normal mood and affect. His behavior is normal. Judgment and thought content normal.   Nursing note and vitals reviewed.        Assessment:      1. Chronic ITP (idiopathic thrombocytopenia)           Plan:     Chronic ITP (idiopathic thrombocytopenia)  -     CBC auto differential; Future; Expected date: 02/28/2020  -     CBC auto differential; Standing  -     Comprehensive metabolic panel; Standing

## 2020-02-28 ENCOUNTER — OFFICE VISIT (OUTPATIENT)
Dept: HEMATOLOGY/ONCOLOGY | Facility: CLINIC | Age: 60
End: 2020-02-28
Payer: COMMERCIAL

## 2020-02-28 VITALS
TEMPERATURE: 97 F | DIASTOLIC BLOOD PRESSURE: 83 MMHG | OXYGEN SATURATION: 99 % | WEIGHT: 148.81 LBS | HEIGHT: 67 IN | SYSTOLIC BLOOD PRESSURE: 132 MMHG | HEART RATE: 81 BPM | RESPIRATION RATE: 18 BRPM | BODY MASS INDEX: 23.36 KG/M2

## 2020-02-28 DIAGNOSIS — D69.3 CHRONIC ITP (IDIOPATHIC THROMBOCYTOPENIA): Primary | ICD-10-CM

## 2020-02-28 PROCEDURE — 3008F PR BODY MASS INDEX (BMI) DOCUMENTED: ICD-10-PCS | Mod: CPTII,S$GLB,, | Performed by: INTERNAL MEDICINE

## 2020-02-28 PROCEDURE — 99999 PR PBB SHADOW E&M-EST. PATIENT-LVL IV: CPT | Mod: PBBFAC,,, | Performed by: INTERNAL MEDICINE

## 2020-02-28 PROCEDURE — 3008F BODY MASS INDEX DOCD: CPT | Mod: CPTII,S$GLB,, | Performed by: INTERNAL MEDICINE

## 2020-02-28 PROCEDURE — 99999 PR PBB SHADOW E&M-EST. PATIENT-LVL IV: ICD-10-PCS | Mod: PBBFAC,,, | Performed by: INTERNAL MEDICINE

## 2020-02-28 PROCEDURE — 99213 PR OFFICE/OUTPT VISIT, EST, LEVL III, 20-29 MIN: ICD-10-PCS | Mod: S$GLB,,, | Performed by: INTERNAL MEDICINE

## 2020-02-28 PROCEDURE — 99213 OFFICE O/P EST LOW 20 MIN: CPT | Mod: S$GLB,,, | Performed by: INTERNAL MEDICINE

## 2020-02-28 RX ORDER — MOMETASONE FUROATE 50 UG/1
2 SPRAY, METERED NASAL DAILY PRN
COMMUNITY
Start: 2020-01-31 | End: 2022-08-08

## 2020-03-03 ENCOUNTER — PATIENT MESSAGE (OUTPATIENT)
Dept: GASTROENTEROLOGY | Facility: CLINIC | Age: 60
End: 2020-03-03

## 2020-03-09 ENCOUNTER — CLINICAL SUPPORT (OUTPATIENT)
Dept: SMOKING CESSATION | Facility: CLINIC | Age: 60
End: 2020-03-09
Payer: COMMERCIAL

## 2020-03-09 ENCOUNTER — PATIENT MESSAGE (OUTPATIENT)
Dept: HEMATOLOGY/ONCOLOGY | Facility: CLINIC | Age: 60
End: 2020-03-09

## 2020-03-09 ENCOUNTER — PATIENT MESSAGE (OUTPATIENT)
Dept: INTERNAL MEDICINE | Facility: CLINIC | Age: 60
End: 2020-03-09

## 2020-03-09 DIAGNOSIS — F17.210 MODERATE CIGARETTE SMOKER (10-19 PER DAY): Primary | ICD-10-CM

## 2020-03-09 PROCEDURE — 99403 PREV MED CNSL INDIV APPRX 45: CPT | Mod: S$GLB,,,

## 2020-03-09 PROCEDURE — 99999 PR PBB SHADOW E&M-EST. PATIENT-LVL I: CPT | Mod: PBBFAC,,,

## 2020-03-09 PROCEDURE — 99403 PR PREVENT COUNSEL,INDIV,45 MIN: ICD-10-PCS | Mod: S$GLB,,,

## 2020-03-09 PROCEDURE — 99999 PR PBB SHADOW E&M-EST. PATIENT-LVL I: ICD-10-PCS | Mod: PBBFAC,,,

## 2020-03-09 NOTE — PROGRESS NOTES
Individual Follow-Up Form    3/9/2020    Quit Date: TBD    Clinical Status of Patient: Outpatient    Length of Service: 30 minutes    Continuing Medication: yes  Patches or Nicotine gum    Other Medications: none     Target Symptoms: Withdrawal and medication side effects. The following were  rated moderate (3) to severe (4) on TCRS:  · Moderate (3): desire/crave -- Nicotine replacement therapy, withdrawal symptoms, habit  ·   · Severe (4): none    Comments: The patient is smoking 8-10 cigarettes/day  from 10 cigarettes/day. He has not been using any medication because his wife said his nausea was coming from the 21 mg nicotine patches. He was using the 4 mg nicotine gum chewing it continuously. Explained to the patient it is not the patch that is making him nauseated but he chewing the gum too long at a time. He is only suppose to be chewing till he has a peppery taste, then park the gum,then chew some more. He said he understood and had been doing really well when on the patch. Explained the importance of being consistent with medication and trying to cut back for his health. The patient will continue individual  and/or group sessions and medication monitoring by CTTS. Prescribed medication management will be by [facility] Medical Practitioner.The patient denies any abnormal behavioral or mental changes at this time.     Diagnosis: F17.210    Next Visit: 2 weeks

## 2020-03-20 ENCOUNTER — TELEPHONE (OUTPATIENT)
Dept: SMOKING CESSATION | Facility: CLINIC | Age: 60
End: 2020-03-20

## 2020-03-20 NOTE — TELEPHONE ENCOUNTER
Called patient to inform of [facility] closure due to COVID-19 for an undetermined time.The sessions should be provided by Jackie Garcias with [facility] and the phone number will be [phone]. Explained the patient can leave a detailed message or text if they need medication refills of have nay questions or concerns.

## 2020-03-23 ENCOUNTER — CLINICAL SUPPORT (OUTPATIENT)
Dept: SMOKING CESSATION | Facility: CLINIC | Age: 60
End: 2020-03-23
Payer: COMMERCIAL

## 2020-03-23 DIAGNOSIS — F17.210 MODERATE CIGARETTE SMOKER (10-19 PER DAY): Primary | ICD-10-CM

## 2020-03-23 PROCEDURE — 99999 PR PBB SHADOW E&M-EST. PATIENT-LVL I: ICD-10-PCS | Mod: PBBFAC,,,

## 2020-03-23 PROCEDURE — 99402 PR PREVENT COUNSEL,INDIV,30 MIN: ICD-10-PCS | Mod: S$GLB,,,

## 2020-03-23 PROCEDURE — 99999 PR PBB SHADOW E&M-EST. PATIENT-LVL I: CPT | Mod: PBBFAC,,,

## 2020-03-23 PROCEDURE — 99402 PREV MED CNSL INDIV APPRX 30: CPT | Mod: S$GLB,,,

## 2020-03-23 NOTE — PROGRESS NOTES
Individual Follow-Up Form    3/23/2020    Quit Date: TBD    Clinical Status of Patient: Outpatient    Length of Service: 30 minutes    Continuing Medication: no    Other Medications: none     Target Symptoms: Withdrawal and medication side effects. The following were  rated moderate (3) to severe (4) on TCRS:  · Moderate (3): none  · Severe (4): none    Comments: The patient Is smoking 7-8 cigarettes/day and was using the 21 mg nicotine patch and 4 mg nicotine gum. He stated he wished to be taken out of the program and not called. Explained we are doing phone sessions and he still wishes to be removed. He states this is not the time for him to be quitting, he  Has other issues and limited time. Did remind patient to take precautions with Covid-19 and that smoking was linked heavily to the virus and to please be careful. Removed remaining appts.as requested and will allow patient to do follow up since he does not want us to call.The patient denies any abnormal behavioral or mental changes at this time.      Diagnosis: F17.210    Next Visit: finished program

## 2020-03-23 NOTE — Clinical Note
The patient Is smoking 7-8 cigarettes/day and was using the 21 mg nicotine patch and 4 mg nicotine gum. He stated he wished to be taken out of the program and not called. Explained we are doing phone sessions and he still wishes to be removed. He states this is not the time for him to be quitting, he  Has other issues and limited time. Did remind patient to take precautions with Covid-19 and that smoking was linked heavily to the virus and to please be careful. Removed remaining appts.as requested and will allow patient to do follow up since he does not want us to call. The patient will continue individual  and/or group sessions and medication monitoring by CTTS

## 2020-04-03 ENCOUNTER — LAB VISIT (OUTPATIENT)
Dept: LAB | Facility: HOSPITAL | Age: 60
End: 2020-04-03
Attending: INTERNAL MEDICINE
Payer: COMMERCIAL

## 2020-04-03 ENCOUNTER — TELEPHONE (OUTPATIENT)
Dept: HEMATOLOGY/ONCOLOGY | Facility: CLINIC | Age: 60
End: 2020-04-03

## 2020-04-03 DIAGNOSIS — D69.3 CHRONIC ITP (IDIOPATHIC THROMBOCYTOPENIA): ICD-10-CM

## 2020-04-03 DIAGNOSIS — D69.3 CHRONIC ITP (IDIOPATHIC THROMBOCYTOPENIA): Primary | ICD-10-CM

## 2020-04-03 LAB
ALBUMIN SERPL BCP-MCNC: 3.9 G/DL (ref 3.5–5.2)
ALP SERPL-CCNC: 50 U/L (ref 55–135)
ALT SERPL W/O P-5'-P-CCNC: 13 U/L (ref 10–44)
ANION GAP SERPL CALC-SCNC: 7 MMOL/L (ref 8–16)
AST SERPL-CCNC: 27 U/L (ref 10–40)
BASOPHILS # BLD AUTO: 0.1 K/UL (ref 0–0.2)
BASOPHILS NFR BLD: 1.5 % (ref 0–1.9)
BILIRUB SERPL-MCNC: 0.5 MG/DL (ref 0.1–1)
BUN SERPL-MCNC: 11 MG/DL (ref 6–20)
CALCIUM SERPL-MCNC: 9.1 MG/DL (ref 8.7–10.5)
CHLORIDE SERPL-SCNC: 105 MMOL/L (ref 95–110)
CO2 SERPL-SCNC: 30 MMOL/L (ref 23–29)
CREAT SERPL-MCNC: 1.2 MG/DL (ref 0.5–1.4)
DIFFERENTIAL METHOD: ABNORMAL
EOSINOPHIL # BLD AUTO: 0.3 K/UL (ref 0–0.5)
EOSINOPHIL NFR BLD: 4 % (ref 0–8)
ERYTHROCYTE [DISTWIDTH] IN BLOOD BY AUTOMATED COUNT: 12 % (ref 11.5–14.5)
EST. GFR  (AFRICAN AMERICAN): >60 ML/MIN/1.73 M^2
EST. GFR  (NON AFRICAN AMERICAN): >60 ML/MIN/1.73 M^2
GLUCOSE SERPL-MCNC: 74 MG/DL (ref 70–110)
HCT VFR BLD AUTO: 42.5 % (ref 40–54)
HGB BLD-MCNC: 14 G/DL (ref 14–18)
IMM GRANULOCYTES # BLD AUTO: 0.02 K/UL (ref 0–0.04)
IMM GRANULOCYTES NFR BLD AUTO: 0.3 % (ref 0–0.5)
LYMPHOCYTES # BLD AUTO: 2.5 K/UL (ref 1–4.8)
LYMPHOCYTES NFR BLD: 37.4 % (ref 18–48)
MCH RBC QN AUTO: 31.1 PG (ref 27–31)
MCHC RBC AUTO-ENTMCNC: 32.9 G/DL (ref 32–36)
MCV RBC AUTO: 94 FL (ref 82–98)
MONOCYTES # BLD AUTO: 0.5 K/UL (ref 0.3–1)
MONOCYTES NFR BLD: 7.1 % (ref 4–15)
NEUTROPHILS # BLD AUTO: 3.4 K/UL (ref 1.8–7.7)
NEUTROPHILS NFR BLD: 49.7 % (ref 38–73)
NRBC BLD-RTO: 0 /100 WBC
PLATELET # BLD AUTO: 32 K/UL (ref 150–350)
PMV BLD AUTO: 8.6 FL (ref 9.2–12.9)
POTASSIUM SERPL-SCNC: 4.3 MMOL/L (ref 3.5–5.1)
PROT SERPL-MCNC: 6.8 G/DL (ref 6–8.4)
RBC # BLD AUTO: 4.5 M/UL (ref 4.6–6.2)
SODIUM SERPL-SCNC: 142 MMOL/L (ref 136–145)
WBC # BLD AUTO: 6.79 K/UL (ref 3.9–12.7)

## 2020-04-03 PROCEDURE — 36415 COLL VENOUS BLD VENIPUNCTURE: CPT

## 2020-04-03 PROCEDURE — 80053 COMPREHEN METABOLIC PANEL: CPT

## 2020-04-09 ENCOUNTER — LAB VISIT (OUTPATIENT)
Dept: LAB | Facility: HOSPITAL | Age: 60
End: 2020-04-09
Attending: INTERNAL MEDICINE
Payer: COMMERCIAL

## 2020-04-09 DIAGNOSIS — D69.3 CHRONIC ITP (IDIOPATHIC THROMBOCYTOPENIA): ICD-10-CM

## 2020-04-09 LAB
BASOPHILS # BLD AUTO: 0.09 K/UL (ref 0–0.2)
BASOPHILS NFR BLD: 1.3 % (ref 0–1.9)
DIFFERENTIAL METHOD: ABNORMAL
EOSINOPHIL # BLD AUTO: 0.1 K/UL (ref 0–0.5)
EOSINOPHIL NFR BLD: 2 % (ref 0–8)
ERYTHROCYTE [DISTWIDTH] IN BLOOD BY AUTOMATED COUNT: 12.3 % (ref 11.5–14.5)
HCT VFR BLD AUTO: 43.7 % (ref 40–54)
HGB BLD-MCNC: 14.5 G/DL (ref 14–18)
IMM GRANULOCYTES # BLD AUTO: 0.02 K/UL (ref 0–0.04)
IMM GRANULOCYTES NFR BLD AUTO: 0.3 % (ref 0–0.5)
LYMPHOCYTES # BLD AUTO: 2.2 K/UL (ref 1–4.8)
LYMPHOCYTES NFR BLD: 30.7 % (ref 18–48)
MCH RBC QN AUTO: 31.5 PG (ref 27–31)
MCHC RBC AUTO-ENTMCNC: 33.2 G/DL (ref 32–36)
MCV RBC AUTO: 95 FL (ref 82–98)
MONOCYTES # BLD AUTO: 0.4 K/UL (ref 0.3–1)
MONOCYTES NFR BLD: 5.2 % (ref 4–15)
NEUTROPHILS # BLD AUTO: 4.3 K/UL (ref 1.8–7.7)
NEUTROPHILS NFR BLD: 60.5 % (ref 38–73)
NRBC BLD-RTO: 0 /100 WBC
PLATELET # BLD AUTO: 228 K/UL (ref 150–350)
PLATELET BLD QL SMEAR: ABNORMAL
PMV BLD AUTO: 9.3 FL (ref 9.2–12.9)
POIKILOCYTOSIS BLD QL SMEAR: SLIGHT
RBC # BLD AUTO: 4.6 M/UL (ref 4.6–6.2)
STOMATOCYTES BLD QL SMEAR: PRESENT
WBC # BLD AUTO: 7.11 K/UL (ref 3.9–12.7)

## 2020-04-09 PROCEDURE — 85025 COMPLETE CBC W/AUTO DIFF WBC: CPT

## 2020-04-09 PROCEDURE — 36415 COLL VENOUS BLD VENIPUNCTURE: CPT

## 2020-04-13 ENCOUNTER — TELEPHONE (OUTPATIENT)
Dept: INTERNAL MEDICINE | Facility: CLINIC | Age: 60
End: 2020-04-13

## 2020-04-13 ENCOUNTER — PATIENT MESSAGE (OUTPATIENT)
Dept: INTERNAL MEDICINE | Facility: CLINIC | Age: 60
End: 2020-04-13

## 2020-04-13 NOTE — TELEPHONE ENCOUNTER
----- Message from Janna Anthony sent at 4/13/2020  1:57 PM CDT -----  Contact: PT  Would like to consult regarding his issues with his sinus. Please call back at [phone]. Thanks

## 2020-04-13 NOTE — TELEPHONE ENCOUNTER
Spoke with the patient asking if he can have the item for nasal cleaning that he received before from Dr. Basurto which it help his sinus problem. Patient don't remember the name. Please advise.

## 2020-04-13 NOTE — TELEPHONE ENCOUNTER
He was told by his pharmacy that sinus irrigation bottle costs $90 - he is trying to get a replacement bottle. I told him I am going to get the brand name and check with [facility] on their cost.

## 2020-04-20 NOTE — TELEPHONE ENCOUNTER
Spoke with patient.  pt informed [facility] has [facility] Sinus Rinse bottle for 16.99.  He asked me to leave the information on his voicemail. Done.

## 2020-04-21 ENCOUNTER — PATIENT MESSAGE (OUTPATIENT)
Dept: INTERNAL MEDICINE | Facility: CLINIC | Age: 60
End: 2020-04-21

## 2020-05-22 ENCOUNTER — LAB VISIT (OUTPATIENT)
Dept: LAB | Facility: HOSPITAL | Age: 60
End: 2020-05-22
Attending: INTERNAL MEDICINE
Payer: COMMERCIAL

## 2020-05-22 DIAGNOSIS — D69.3 CHRONIC ITP (IDIOPATHIC THROMBOCYTOPENIA): ICD-10-CM

## 2020-05-22 LAB
ALBUMIN SERPL BCP-MCNC: 4 G/DL (ref 3.5–5.2)
ALP SERPL-CCNC: 60 U/L (ref 55–135)
ALT SERPL W/O P-5'-P-CCNC: 18 U/L (ref 10–44)
ANION GAP SERPL CALC-SCNC: 6 MMOL/L (ref 8–16)
AST SERPL-CCNC: 34 U/L (ref 10–40)
BASOPHILS # BLD AUTO: 0.1 K/UL (ref 0–0.2)
BASOPHILS NFR BLD: 1.5 % (ref 0–1.9)
BILIRUB SERPL-MCNC: 0.5 MG/DL (ref 0.1–1)
BUN SERPL-MCNC: 14 MG/DL (ref 6–20)
CALCIUM SERPL-MCNC: 9 MG/DL (ref 8.7–10.5)
CHLORIDE SERPL-SCNC: 106 MMOL/L (ref 95–110)
CO2 SERPL-SCNC: 29 MMOL/L (ref 23–29)
CREAT SERPL-MCNC: 1.1 MG/DL (ref 0.5–1.4)
DIFFERENTIAL METHOD: ABNORMAL
EOSINOPHIL # BLD AUTO: 0.3 K/UL (ref 0–0.5)
EOSINOPHIL NFR BLD: 4.8 % (ref 0–8)
ERYTHROCYTE [DISTWIDTH] IN BLOOD BY AUTOMATED COUNT: 12.1 % (ref 11.5–14.5)
EST. GFR  (AFRICAN AMERICAN): >60 ML/MIN/1.73 M^2
EST. GFR  (NON AFRICAN AMERICAN): >60 ML/MIN/1.73 M^2
GLUCOSE SERPL-MCNC: 93 MG/DL (ref 70–110)
HCT VFR BLD AUTO: 42.2 % (ref 40–54)
HGB BLD-MCNC: 13.9 G/DL (ref 14–18)
IMM GRANULOCYTES # BLD AUTO: 0.02 K/UL (ref 0–0.04)
IMM GRANULOCYTES NFR BLD AUTO: 0.3 % (ref 0–0.5)
LYMPHOCYTES # BLD AUTO: 2.2 K/UL (ref 1–4.8)
LYMPHOCYTES NFR BLD: 32.3 % (ref 18–48)
MCH RBC QN AUTO: 31.7 PG (ref 27–31)
MCHC RBC AUTO-ENTMCNC: 32.9 G/DL (ref 32–36)
MCV RBC AUTO: 96 FL (ref 82–98)
MONOCYTES # BLD AUTO: 0.5 K/UL (ref 0.3–1)
MONOCYTES NFR BLD: 7.4 % (ref 4–15)
NEUTROPHILS # BLD AUTO: 3.7 K/UL (ref 1.8–7.7)
NEUTROPHILS NFR BLD: 53.7 % (ref 38–73)
NRBC BLD-RTO: 0 /100 WBC
PLATELET # BLD AUTO: 446 K/UL (ref 150–350)
PMV BLD AUTO: 8.9 FL (ref 9.2–12.9)
POTASSIUM SERPL-SCNC: 5.1 MMOL/L (ref 3.5–5.1)
PROT SERPL-MCNC: 7.4 G/DL (ref 6–8.4)
RBC # BLD AUTO: 4.38 M/UL (ref 4.6–6.2)
SODIUM SERPL-SCNC: 141 MMOL/L (ref 136–145)
WBC # BLD AUTO: 6.85 K/UL (ref 3.9–12.7)

## 2020-05-22 PROCEDURE — 85025 COMPLETE CBC W/AUTO DIFF WBC: CPT

## 2020-05-22 PROCEDURE — 36415 COLL VENOUS BLD VENIPUNCTURE: CPT

## 2020-05-22 PROCEDURE — 80053 COMPREHEN METABOLIC PANEL: CPT

## 2020-05-28 ENCOUNTER — OFFICE VISIT (OUTPATIENT)
Dept: HEMATOLOGY/ONCOLOGY | Facility: CLINIC | Age: 60
End: 2020-05-28
Payer: COMMERCIAL

## 2020-05-28 VITALS
WEIGHT: 147.69 LBS | TEMPERATURE: 99 F | SYSTOLIC BLOOD PRESSURE: 130 MMHG | HEIGHT: 67 IN | OXYGEN SATURATION: 95 % | BODY MASS INDEX: 23.18 KG/M2 | DIASTOLIC BLOOD PRESSURE: 86 MMHG | HEART RATE: 94 BPM

## 2020-05-28 DIAGNOSIS — D69.3 CHRONIC ITP (IDIOPATHIC THROMBOCYTOPENIA): Primary | ICD-10-CM

## 2020-05-28 PROCEDURE — 3008F BODY MASS INDEX DOCD: CPT | Mod: CPTII,S$GLB,, | Performed by: INTERNAL MEDICINE

## 2020-05-28 PROCEDURE — 3008F PR BODY MASS INDEX (BMI) DOCUMENTED: ICD-10-PCS | Mod: CPTII,S$GLB,, | Performed by: INTERNAL MEDICINE

## 2020-05-28 PROCEDURE — 99213 PR OFFICE/OUTPT VISIT, EST, LEVL III, 20-29 MIN: ICD-10-PCS | Mod: S$GLB,,, | Performed by: INTERNAL MEDICINE

## 2020-05-28 PROCEDURE — 99213 OFFICE O/P EST LOW 20 MIN: CPT | Mod: S$GLB,,, | Performed by: INTERNAL MEDICINE

## 2020-05-28 PROCEDURE — 99999 PR PBB SHADOW E&M-EST. PATIENT-LVL III: CPT | Mod: PBBFAC,,, | Performed by: INTERNAL MEDICINE

## 2020-05-28 PROCEDURE — 99999 PR PBB SHADOW E&M-EST. PATIENT-LVL III: ICD-10-PCS | Mod: PBBFAC,,, | Performed by: INTERNAL MEDICINE

## 2020-05-28 NOTE — PROGRESS NOTES
Subjective:       Patient ID: Jaxson Burch Jr. is a 59 y.o. male.    Chief Complaint: Chronic ITP (idiopathic thrombocytopenia) [D69.3]  HPI: We have an opportunity to see Mr. Jaxsno Burch Jr. in Hematology Oncology clinic at [facility] on 05/27/2020.  Mr. Jaxson Burch Jr. is a 59 y.o. gentleman with ITP completed weekly ritoxan x 8 treatments in June 2019.  Has had CBC monitored every 3 weeks, with couple times dropped to 30-50.       No history exists.     Past Medical History:   Diagnosis Date    Arthritis     Gastritis     ITP (idiopathic thrombocytopenic purpura)      Family History   Problem Relation Age of Onset    Pancreatic cancer Mother     Colon cancer Neg Hx      Social History     Socioeconomic History    Marital status:      Spouse name: Not on file    Number of children: Not on file    Years of education: Not on file    Highest education level: Not on file   Occupational History    Not on file   Social Needs    Financial resource strain: Not on file    Food insecurity:     Worry: Not on file     Inability: Not on file    Transportation needs:     Medical: Not on file     Non-medical: Not on file   Tobacco Use    Smoking status: Current Every Day Smoker     Packs/day: 0.50     Years: 40.00     Pack years: 20.00     Types: Cigarettes, Cigars    Smokeless tobacco: Never Used   Substance and Sexual Activity    Alcohol use: Yes     Alcohol/week: 0.0 standard drinks     Comment: socially    Drug use: No    Sexual activity: Yes     Partners: Female   Lifestyle    Physical activity:     Days per week: Not on file     Minutes per session: Not on file    Stress: Not on file   Relationships    Social connections:     Talks on phone: Not on file     Gets together: Not on file     Attends Yazidism service: Not on file     Active member of club or organization: Not on file     Attends meetings of clubs or organizations: Not on file     Relationship status: Not on file    Other Topics Concern    Not on file   Social History Narrative    Not on file     Past Surgical History:   Procedure Laterality Date    COLONOSCOPY N/A 11/29/2018    Procedure: COLONOSCOPY;  Surgeon: Brett Townsend MD;  Location: [facility];  Service: Endoscopy;  Laterality: N/A;    JOINT REPLACEMENT Right     hip     Current Outpatient Medications   Medication Sig Dispense Refill    atorvastatin (LIPITOR) 20 MG tablet Take 1 tablet (20 mg total) by mouth once daily. 90 tablet 0    fluticasone propionate (FLONASE) 50 mcg/actuation nasal spray 1 spray (50 mcg total) by Each Nostril route once daily. May one spray per nostril twice daily, MAX 2 sprays/nostril/day 9.9 mL 3    ipratropium (ATROVENT) 42 mcg (0.06 %) nasal spray 2 sprays by Nasal route 4 (four) times daily. Use for up to 4 days only 15 mL 0    meloxicam (MOBIC) 15 MG tablet       methylPREDNISolone (MEDROL DOSEPACK) 4 mg tablet use as directed 1 Package 0    mometasone (NASONEX) 50 mcg/actuation nasal spray 2 sprays by Nasal route.      NARCAN 4 mg/actuation Spry       omeprazole (PRILOSEC) 20 MG capsule Take 1 capsule (20 mg total) by mouth once daily. 30 capsule 11    oxyCODONE-acetaminophen (PERCOCET)  mg per tablet Take 1 tablet by mouth every 4 (four) hours as needed for Pain.      sildenafil (VIAGRA) 100 MG tablet Take 100 mg by mouth every other day.  6    tadalafil (CIALIS) 20 MG Tab Take 1 tablet (20 mg total) by mouth daily as needed. 12 tablet 3    traMADol (ULTRAM) 50 mg tablet Take 1 tablet (50 mg total) by mouth every 6 (six) hours as needed for Pain. 50 tablet 0    zolpidem (AMBIEN) 5 MG Tab Take 1 tablet (5 mg total) by mouth nightly as needed. 15 tablet 5     No current facility-administered medications for this visit.        Labs:  Lab Results   Component Value Date    WBC 6.85 05/22/2020    HGB 13.9 (L) 05/22/2020    HCT 42.2 05/22/2020    MCV 96 05/22/2020     (H) 05/22/2020     BMP  Lab Results    Component Value Date     05/22/2020    K 5.1 05/22/2020     05/22/2020    CO2 29 05/22/2020    BUN 14 05/22/2020    CREATININE 1.1 05/22/2020    CALCIUM 9.0 05/22/2020    ANIONGAP 6 (L) 05/22/2020    ESTGFRAFRICA >60 05/22/2020    EGFRNONAA >60 05/22/2020     Lab Results   Component Value Date    ALT 18 05/22/2020    AST 34 05/22/2020    ALKPHOS 60 05/22/2020    BILITOT 0.5 05/22/2020       No results found for: IRON, TIBC, FERRITIN, SATURATEDIRO  No results found for: [id number]  No results found for: FOLATE  No results found for: TSH    I have reviewed the radiology reports and examined the scan/xray images.    Review of Systems   Constitutional: Negative.    HENT: Negative.    Eyes: Negative.    Respiratory: Negative.    Cardiovascular: Negative.    Gastrointestinal: Negative.    Endocrine: Negative.    Genitourinary: Negative.    Musculoskeletal: Negative.    Skin: Negative.    Allergic/Immunologic: Negative.    Neurological: Negative.    Hematological: Negative.    Psychiatric/Behavioral: Negative.      ECOG SCORE    0 - Fully active-able to carry on all pre-disease performance without restriction            Objective:     Vitals:    05/28/20 1350   BP: 130/86   Pulse: 94   Temp: 99.1 °F (37.3 °C)   Body mass index is 23.13 kg/m².  Physical Exam   Constitutional: He is oriented to person, place, and time. He appears well-developed and well-nourished.   HENT:   Head: Normocephalic and atraumatic.   Eyes: Conjunctivae and EOM are normal.   Neck: Normal range of motion. Neck supple.   Cardiovascular: Normal rate and regular rhythm.   Pulmonary/Chest: Effort normal and breath sounds normal.   Abdominal: Soft. Bowel sounds are normal.   Musculoskeletal: Normal range of motion.   Neurological: He is alert and oriented to person, place, and time.   Skin: Skin is warm and dry.   Psychiatric: He has a normal mood and affect. His behavior is normal. Judgment and thought content normal.   Nursing note and  vitals reviewed.        Assessment:      1. Chronic ITP (idiopathic thrombocytopenia)           Plan:     Chronic ITP (idiopathic thrombocytopenia)  -     CBC auto differential; Standing  -     Comprehensive metabolic panel; Standing

## 2020-07-09 ENCOUNTER — LAB VISIT (OUTPATIENT)
Dept: LAB | Facility: HOSPITAL | Age: 60
End: 2020-07-09
Attending: INTERNAL MEDICINE
Payer: COMMERCIAL

## 2020-07-09 ENCOUNTER — TELEPHONE (OUTPATIENT)
Dept: HEMATOLOGY/ONCOLOGY | Facility: CLINIC | Age: 60
End: 2020-07-09

## 2020-07-09 DIAGNOSIS — D69.3 CHRONIC ITP (IDIOPATHIC THROMBOCYTOPENIA): Primary | ICD-10-CM

## 2020-07-09 DIAGNOSIS — D69.3 CHRONIC ITP (IDIOPATHIC THROMBOCYTOPENIA): ICD-10-CM

## 2020-07-09 DIAGNOSIS — E64.0 SEQUELAE OF PROTEIN-CALORIE MALNUTRITION: ICD-10-CM

## 2020-07-09 DIAGNOSIS — E55.9 VITAMIN D DEFICIENCY, UNSPECIFIED: ICD-10-CM

## 2020-07-09 LAB
ALBUMIN SERPL BCP-MCNC: 4.2 G/DL (ref 3.5–5.2)
ALP SERPL-CCNC: 46 U/L (ref 55–135)
ALT SERPL W/O P-5'-P-CCNC: 18 U/L (ref 10–44)
ANION GAP SERPL CALC-SCNC: 10 MMOL/L (ref 8–16)
AST SERPL-CCNC: 32 U/L (ref 10–40)
BASOPHILS # BLD AUTO: 0.08 K/UL (ref 0–0.2)
BASOPHILS NFR BLD: 1.1 % (ref 0–1.9)
BILIRUB SERPL-MCNC: 0.5 MG/DL (ref 0.1–1)
BUN SERPL-MCNC: 13 MG/DL (ref 6–20)
CALCIUM SERPL-MCNC: 9.4 MG/DL (ref 8.7–10.5)
CHLORIDE SERPL-SCNC: 104 MMOL/L (ref 95–110)
CO2 SERPL-SCNC: 25 MMOL/L (ref 23–29)
CREAT SERPL-MCNC: 1.2 MG/DL (ref 0.5–1.4)
DIFFERENTIAL METHOD: ABNORMAL
EOSINOPHIL # BLD AUTO: 0.2 K/UL (ref 0–0.5)
EOSINOPHIL NFR BLD: 3.2 % (ref 0–8)
ERYTHROCYTE [DISTWIDTH] IN BLOOD BY AUTOMATED COUNT: 11.7 % (ref 11.5–14.5)
EST. GFR  (AFRICAN AMERICAN): >60 ML/MIN/1.73 M^2
EST. GFR  (NON AFRICAN AMERICAN): >60 ML/MIN/1.73 M^2
GLUCOSE SERPL-MCNC: 101 MG/DL (ref 70–110)
HCT VFR BLD AUTO: 42.8 % (ref 40–54)
HGB BLD-MCNC: 14.3 G/DL (ref 14–18)
IMM GRANULOCYTES # BLD AUTO: 0.02 K/UL (ref 0–0.04)
IMM GRANULOCYTES NFR BLD AUTO: 0.3 % (ref 0–0.5)
LYMPHOCYTES # BLD AUTO: 2.2 K/UL (ref 1–4.8)
LYMPHOCYTES NFR BLD: 31 % (ref 18–48)
MCH RBC QN AUTO: 31.5 PG (ref 27–31)
MCHC RBC AUTO-ENTMCNC: 33.4 G/DL (ref 32–36)
MCV RBC AUTO: 94 FL (ref 82–98)
MONOCYTES # BLD AUTO: 0.5 K/UL (ref 0.3–1)
MONOCYTES NFR BLD: 6.8 % (ref 4–15)
NEUTROPHILS # BLD AUTO: 4.2 K/UL (ref 1.8–7.7)
NEUTROPHILS NFR BLD: 57.6 % (ref 38–73)
NRBC BLD-RTO: 0 /100 WBC
PLATELET # BLD AUTO: 30 K/UL (ref 150–350)
PMV BLD AUTO: 9.8 FL (ref 9.2–12.9)
POTASSIUM SERPL-SCNC: 4.1 MMOL/L (ref 3.5–5.1)
PROT SERPL-MCNC: 7.4 G/DL (ref 6–8.4)
RBC # BLD AUTO: 4.54 M/UL (ref 4.6–6.2)
SODIUM SERPL-SCNC: 139 MMOL/L (ref 136–145)
WBC # BLD AUTO: 7.2 K/UL (ref 3.9–12.7)

## 2020-07-09 PROCEDURE — 36415 COLL VENOUS BLD VENIPUNCTURE: CPT

## 2020-07-09 PROCEDURE — 80053 COMPREHEN METABOLIC PANEL: CPT

## 2020-07-09 PROCEDURE — 82306 VITAMIN D 25 HYDROXY: CPT

## 2020-07-09 PROCEDURE — 85025 COMPLETE CBC W/AUTO DIFF WBC: CPT

## 2020-07-09 PROCEDURE — 80061 LIPID PANEL: CPT

## 2020-07-10 LAB
25(OH)D3+25(OH)D2 SERPL-MCNC: 58 NG/ML (ref 30–96)
CHOLEST SERPL-MCNC: 193 MG/DL (ref 120–199)
CHOLEST/HDLC SERPL: 3.2 {RATIO} (ref 2–5)
HDLC SERPL-MCNC: 60 MG/DL (ref 40–75)
HDLC SERPL: 31.1 % (ref 20–50)
LDLC SERPL CALC-MCNC: 101.6 MG/DL (ref 63–159)
NONHDLC SERPL-MCNC: 133 MG/DL
TRIGL SERPL-MCNC: 157 MG/DL (ref 30–150)

## 2020-07-17 DIAGNOSIS — Z71.89 COMPLEX CARE COORDINATION: ICD-10-CM

## 2020-07-27 ENCOUNTER — CLINICAL SUPPORT (OUTPATIENT)
Dept: SMOKING CESSATION | Facility: CLINIC | Age: 60
End: 2020-07-27
Payer: COMMERCIAL

## 2020-07-27 DIAGNOSIS — F17.200 NICOTINE DEPENDENCE: Primary | ICD-10-CM

## 2020-07-27 PROCEDURE — 99406 PR TOBACCO USE CESSATION INTERMEDIATE 3-10 MINUTES: ICD-10-PCS | Mod: S$GLB,,,

## 2020-07-27 PROCEDURE — 99406 BEHAV CHNG SMOKING 3-10 MIN: CPT | Mod: S$GLB,,,

## 2020-07-27 NOTE — PROGRESS NOTES
Spoke with patient today in regard to smoking cessation progress for 3-6 month telephone follow up on quit 1. Patient states that he is not tobacco free at this time. Not interested in making an appointment quitting on his own.  Informed patient of benefit period, future follow up, and contact information if any further help or support is needed. Will complete smart form for 3-6 month follow up on Quit attempt #1.

## 2020-08-05 ENCOUNTER — LAB VISIT (OUTPATIENT)
Dept: LAB | Facility: HOSPITAL | Age: 60
End: 2020-08-05
Attending: INTERNAL MEDICINE
Payer: COMMERCIAL

## 2020-08-05 DIAGNOSIS — D69.3 CHRONIC ITP (IDIOPATHIC THROMBOCYTOPENIA): ICD-10-CM

## 2020-08-05 LAB
ALBUMIN SERPL BCP-MCNC: 4.2 G/DL (ref 3.5–5.2)
ALP SERPL-CCNC: 49 U/L (ref 55–135)
ALT SERPL W/O P-5'-P-CCNC: 17 U/L (ref 10–44)
ANION GAP SERPL CALC-SCNC: 10 MMOL/L (ref 8–16)
AST SERPL-CCNC: 29 U/L (ref 10–40)
BASOPHILS # BLD AUTO: 0.07 K/UL (ref 0–0.2)
BASOPHILS NFR BLD: 1.2 % (ref 0–1.9)
BILIRUB SERPL-MCNC: 0.8 MG/DL (ref 0.1–1)
BUN SERPL-MCNC: 14 MG/DL (ref 6–20)
CALCIUM SERPL-MCNC: 9.2 MG/DL (ref 8.7–10.5)
CHLORIDE SERPL-SCNC: 102 MMOL/L (ref 95–110)
CO2 SERPL-SCNC: 27 MMOL/L (ref 23–29)
CREAT SERPL-MCNC: 1 MG/DL (ref 0.5–1.4)
DIFFERENTIAL METHOD: ABNORMAL
EOSINOPHIL # BLD AUTO: 0.2 K/UL (ref 0–0.5)
EOSINOPHIL NFR BLD: 3.8 % (ref 0–8)
ERYTHROCYTE [DISTWIDTH] IN BLOOD BY AUTOMATED COUNT: 11.6 % (ref 11.5–14.5)
EST. GFR  (AFRICAN AMERICAN): >60 ML/MIN/1.73 M^2
EST. GFR  (NON AFRICAN AMERICAN): >60 ML/MIN/1.73 M^2
GLUCOSE SERPL-MCNC: 94 MG/DL (ref 70–110)
HCT VFR BLD AUTO: 42.7 % (ref 40–54)
HGB BLD-MCNC: 14.4 G/DL (ref 14–18)
IMM GRANULOCYTES # BLD AUTO: 0.02 K/UL (ref 0–0.04)
IMM GRANULOCYTES NFR BLD AUTO: 0.3 % (ref 0–0.5)
LYMPHOCYTES # BLD AUTO: 2.3 K/UL (ref 1–4.8)
LYMPHOCYTES NFR BLD: 40.5 % (ref 18–48)
MCH RBC QN AUTO: 31.7 PG (ref 27–31)
MCHC RBC AUTO-ENTMCNC: 33.7 G/DL (ref 32–36)
MCV RBC AUTO: 94 FL (ref 82–98)
MONOCYTES # BLD AUTO: 0.5 K/UL (ref 0.3–1)
MONOCYTES NFR BLD: 7.8 % (ref 4–15)
NEUTROPHILS # BLD AUTO: 2.7 K/UL (ref 1.8–7.7)
NEUTROPHILS NFR BLD: 46.4 % (ref 38–73)
NRBC BLD-RTO: 0 /100 WBC
PLATELET # BLD AUTO: 55 K/UL (ref 150–350)
PMV BLD AUTO: 8.9 FL (ref 9.2–12.9)
POTASSIUM SERPL-SCNC: 4.8 MMOL/L (ref 3.5–5.1)
PROT SERPL-MCNC: 7.4 G/DL (ref 6–8.4)
RBC # BLD AUTO: 4.54 M/UL (ref 4.6–6.2)
SODIUM SERPL-SCNC: 139 MMOL/L (ref 136–145)
WBC # BLD AUTO: 5.76 K/UL (ref 3.9–12.7)

## 2020-08-05 PROCEDURE — 85025 COMPLETE CBC W/AUTO DIFF WBC: CPT

## 2020-08-05 PROCEDURE — 36415 COLL VENOUS BLD VENIPUNCTURE: CPT

## 2020-08-05 PROCEDURE — 80053 COMPREHEN METABOLIC PANEL: CPT

## 2020-08-09 NOTE — PROGRESS NOTES
Subjective:       Patient ID: Jaxson Burch Jr. is a 60 y.o. male.    Chief Complaint: Chronic ITP (idiopathic thrombocytopenia) [D69.3]  HPI: We have an opportunity to see Mr. Jaxson Burch Jr. in Hematology Oncology clinic at [facility] on 08/08/2020.  Mr. Jaxson Burch Jr. is a 60 y.o. gentleman with ITP completed weekly ritoxan x 8 treatments in June 2019.  Has had CBC monitored every 3 weeks, with couple times dropped to 30-50.      Oncology History    No history exists.     Past Medical History:   Diagnosis Date    Arthritis     Gastritis     ITP (idiopathic thrombocytopenic purpura)      Family History   Problem Relation Age of Onset    Pancreatic cancer Mother     Colon cancer Neg Hx      Social History     Socioeconomic History    Marital status:      Spouse name: Not on file    Number of children: Not on file    Years of education: Not on file    Highest education level: Not on file   Occupational History    Not on file   Social Needs    Financial resource strain: Not on file    Food insecurity     Worry: Not on file     Inability: Not on file    Transportation needs     Medical: Not on file     Non-medical: Not on file   Tobacco Use    Smoking status: Current Every Day Smoker     Packs/day: 0.50     Years: 40.00     Pack years: 20.00     Types: Cigarettes, Cigars    Smokeless tobacco: Never Used   Substance and Sexual Activity    Alcohol use: Yes     Alcohol/week: 0.0 standard drinks     Comment: socially    Drug use: No    Sexual activity: Yes     Partners: Female   Lifestyle    Physical activity     Days per week: Not on file     Minutes per session: Not on file    Stress: Not on file   Relationships    Social connections     Talks on phone: Not on file     Gets together: Not on file     Attends [employer] service: Not on file     Active member of club or organization: Not on file     Attends meetings of clubs or organizations: Not on file     Relationship status:  Not on file   Other Topics Concern    Not on file   Social History Narrative    Not on file     Past Surgical History:   Procedure Laterality Date    COLONOSCOPY N/A 11/29/2018    Procedure: COLONOSCOPY;  Surgeon: Brett Townsend MD;  [facility];  Service: Endoscopy;  Laterality: N/A;    JOINT REPLACEMENT Right     hip     Current Outpatient Medications   Medication Sig Dispense Refill    atorvastatin (LIPITOR) 20 MG tablet Take 1 tablet (20 mg total) by mouth once daily. 90 tablet 0    fluticasone propionate (FLONASE) 50 mcg/actuation nasal spray 1 spray (50 mcg total) by Each Nostril route once daily. May one spray per nostril twice daily, MAX 2 sprays/nostril/day 9.9 mL 3    ipratropium (ATROVENT) 42 mcg (0.06 %) nasal spray 2 sprays by Nasal route 4 (four) times daily. Use for up to 4 days only 15 mL 0    meloxicam (MOBIC) 15 MG tablet       methylPREDNISolone (MEDROL DOSEPACK) 4 mg tablet use as directed 1 Package 0    mometasone (NASONEX) 50 mcg/actuation nasal spray 2 sprays by Nasal route.      NARCAN 4 mg/actuation Spry       omeprazole (PRILOSEC) 20 MG capsule Take 1 capsule (20 mg total) by mouth once daily. 30 capsule 11    oxyCODONE-acetaminophen (PERCOCET)  mg per tablet Take 1 tablet by mouth every 4 (four) hours as needed for Pain.      sildenafil (VIAGRA) 100 MG tablet Take 100 mg by mouth every other day.  6    tadalafil (CIALIS) 20 MG Tab Take 1 tablet (20 mg total) by mouth daily as needed. 12 tablet 3    traMADol (ULTRAM) 50 mg tablet Take 1 tablet (50 mg total) by mouth every 6 (six) hours as needed for Pain. 50 tablet 0    zolpidem (AMBIEN) 5 MG Tab Take 1 tablet (5 mg total) by mouth nightly as needed. 15 tablet 5     No current facility-administered medications for this visit.        Labs:  Lab Results   Component Value Date    WBC 5.76 08/05/2020    HGB 14.4 08/05/2020    HCT 42.7 08/05/2020    MCV 94 08/05/2020    PLT 55 (L) 08/05/2020     BMP  Lab Results    Component Value Date     08/05/2020    K 4.8 08/05/2020     08/05/2020    CO2 27 08/05/2020    BUN 14 08/05/2020    CREATININE 1.0 08/05/2020    CALCIUM 9.2 08/05/2020    ANIONGAP 10 08/05/2020    ESTGFRAFRICA >60 08/05/2020    EGFRNONAA >60 08/05/2020     Lab Results   Component Value Date    ALT 17 08/05/2020    AST 29 08/05/2020    ALKPHOS 49 (L) 08/05/2020    BILITOT 0.8 08/05/2020       No results found for: IRON, TIBC, FERRITIN, SATURATEDIRO  No results found for: [id number]  No results found for: FOLATE  No results found for: TSH    I have reviewed the radiology reports and examined the scan/xray images.    Review of Systems   Constitutional: Negative.    HENT: Negative.    Eyes: Negative.    Respiratory: Negative.    Cardiovascular: Negative.    Gastrointestinal: Negative.    Endocrine: Negative.    Genitourinary: Negative.    Musculoskeletal: Negative.    Skin: Negative.    Allergic/Immunologic: Negative.    Neurological: Negative.    Hematological: Negative.    Psychiatric/Behavioral: Negative.      ECOG SCORE    0 - Fully active-able to carry on all pre-disease performance without restriction            Objective:     Vitals:    08/10/20 1056   BP: 129/85   Pulse: 88   Resp: 14   Temp: 98.3 °F (36.8 °C)   Body mass index is 22.96 kg/m².  Physical Exam  Vitals signs and nursing note reviewed.   Constitutional:       Appearance: He is well-developed.   HENT:      Head: Normocephalic and atraumatic.   Eyes:      Conjunctiva/sclera: Conjunctivae normal.   Neck:      Musculoskeletal: Normal range of motion and neck supple.   Cardiovascular:      Rate and Rhythm: Normal rate and regular rhythm.   Pulmonary:      Effort: Pulmonary effort is normal.      Breath sounds: Normal breath sounds.   Abdominal:      General: Bowel sounds are normal.      Palpations: Abdomen is soft.   Musculoskeletal: Normal range of motion.   Skin:     General: Skin is warm and dry.   Neurological:      Mental Status: He  is alert and oriented to person, place, and time.   Psychiatric:         Behavior: Behavior normal.         Thought Content: Thought content normal.         Judgment: Judgment normal.           Assessment:      1. Chronic ITP (idiopathic thrombocytopenia)           Plan:     Chronic ITP (idiopathic thrombocytopenia)  -     CBC auto differential; Standing

## 2020-08-10 ENCOUNTER — OFFICE VISIT (OUTPATIENT)
Dept: HEMATOLOGY/ONCOLOGY | Facility: CLINIC | Age: 60
End: 2020-08-10
Payer: COMMERCIAL

## 2020-08-10 VITALS
WEIGHT: 146.63 LBS | SYSTOLIC BLOOD PRESSURE: 129 MMHG | RESPIRATION RATE: 14 BRPM | OXYGEN SATURATION: 98 % | TEMPERATURE: 98 F | HEART RATE: 88 BPM | DIASTOLIC BLOOD PRESSURE: 85 MMHG | BODY MASS INDEX: 23.01 KG/M2 | HEIGHT: 67 IN

## 2020-08-10 DIAGNOSIS — D69.3 CHRONIC ITP (IDIOPATHIC THROMBOCYTOPENIA): Primary | ICD-10-CM

## 2020-08-10 PROCEDURE — 3008F BODY MASS INDEX DOCD: CPT | Mod: CPTII,S$GLB,, | Performed by: INTERNAL MEDICINE

## 2020-08-10 PROCEDURE — 99999 PR PBB SHADOW E&M-EST. PATIENT-LVL IV: ICD-10-PCS | Mod: PBBFAC,,, | Performed by: INTERNAL MEDICINE

## 2020-08-10 PROCEDURE — 99213 OFFICE O/P EST LOW 20 MIN: CPT | Mod: S$GLB,,, | Performed by: INTERNAL MEDICINE

## 2020-08-10 PROCEDURE — 99213 PR OFFICE/OUTPT VISIT, EST, LEVL III, 20-29 MIN: ICD-10-PCS | Mod: S$GLB,,, | Performed by: INTERNAL MEDICINE

## 2020-08-10 PROCEDURE — 99999 PR PBB SHADOW E&M-EST. PATIENT-LVL IV: CPT | Mod: PBBFAC,,, | Performed by: INTERNAL MEDICINE

## 2020-08-10 PROCEDURE — 3008F PR BODY MASS INDEX (BMI) DOCUMENTED: ICD-10-PCS | Mod: CPTII,S$GLB,, | Performed by: INTERNAL MEDICINE

## 2020-08-31 ENCOUNTER — TELEPHONE (OUTPATIENT)
Dept: INTERNAL MEDICINE | Facility: CLINIC | Age: 60
End: 2020-08-31

## 2020-08-31 NOTE — TELEPHONE ENCOUNTER
----- Message from Susan Barcenas sent at 8/31/2020  2:12 PM CDT -----  Regarding: PATIENT STATES HE DID NOT HAVE A COLONOSCOPY DONE IN 2018  Contact: PATIENT

## 2020-10-05 ENCOUNTER — PATIENT MESSAGE (OUTPATIENT)
Dept: INTERNAL MEDICINE | Facility: CLINIC | Age: 60
End: 2020-10-05

## 2020-10-06 ENCOUNTER — OFFICE VISIT (OUTPATIENT)
Dept: INTERNAL MEDICINE | Facility: CLINIC | Age: 60
End: 2020-10-06
Payer: COMMERCIAL

## 2020-10-06 ENCOUNTER — PATIENT MESSAGE (OUTPATIENT)
Dept: INTERNAL MEDICINE | Facility: CLINIC | Age: 60
End: 2020-10-06

## 2020-10-06 VITALS
BODY MASS INDEX: 22.73 KG/M2 | WEIGHT: 144.81 LBS | TEMPERATURE: 98 F | SYSTOLIC BLOOD PRESSURE: 146 MMHG | DIASTOLIC BLOOD PRESSURE: 98 MMHG | HEIGHT: 67 IN | HEART RATE: 79 BPM | OXYGEN SATURATION: 99 %

## 2020-10-06 DIAGNOSIS — R03.0 BLOOD PRESSURE ELEVATED WITHOUT HISTORY OF HTN: Primary | ICD-10-CM

## 2020-10-06 DIAGNOSIS — G89.4 CHRONIC PAIN SYNDROME: ICD-10-CM

## 2020-10-06 DIAGNOSIS — D69.3 CHRONIC ITP (IDIOPATHIC THROMBOCYTOPENIA): ICD-10-CM

## 2020-10-06 PROCEDURE — 90471 IMMUNIZATION ADMIN: CPT | Mod: S$GLB,,, | Performed by: FAMILY MEDICINE

## 2020-10-06 PROCEDURE — 99213 OFFICE O/P EST LOW 20 MIN: CPT | Mod: 25,S$GLB,, | Performed by: FAMILY MEDICINE

## 2020-10-06 PROCEDURE — 99999 PR PBB SHADOW E&M-EST. PATIENT-LVL V: CPT | Mod: PBBFAC,,, | Performed by: FAMILY MEDICINE

## 2020-10-06 PROCEDURE — 99213 PR OFFICE/OUTPT VISIT, EST, LEVL III, 20-29 MIN: ICD-10-PCS | Mod: 25,S$GLB,, | Performed by: FAMILY MEDICINE

## 2020-10-06 PROCEDURE — 3008F BODY MASS INDEX DOCD: CPT | Mod: CPTII,S$GLB,, | Performed by: FAMILY MEDICINE

## 2020-10-06 PROCEDURE — 90471 FLU VACCINE (QUAD) GREATER THAN OR EQUAL TO 3YO PRESERVATIVE FREE IM: ICD-10-PCS | Mod: S$GLB,,, | Performed by: FAMILY MEDICINE

## 2020-10-06 PROCEDURE — 90686 IIV4 VACC NO PRSV 0.5 ML IM: CPT | Mod: S$GLB,,, | Performed by: FAMILY MEDICINE

## 2020-10-06 PROCEDURE — 90686 FLU VACCINE (QUAD) GREATER THAN OR EQUAL TO 3YO PRESERVATIVE FREE IM: ICD-10-PCS | Mod: S$GLB,,, | Performed by: FAMILY MEDICINE

## 2020-10-06 PROCEDURE — 3008F PR BODY MASS INDEX (BMI) DOCUMENTED: ICD-10-PCS | Mod: CPTII,S$GLB,, | Performed by: FAMILY MEDICINE

## 2020-10-06 PROCEDURE — 99999 PR PBB SHADOW E&M-EST. PATIENT-LVL V: ICD-10-PCS | Mod: PBBFAC,,, | Performed by: FAMILY MEDICINE

## 2020-10-06 NOTE — PROGRESS NOTES
Subjective:       Patient ID: Jaxson Burch Jr. is a 60 y.o. male.    Chief Complaint: Hypertension and Flu Vaccine    Patient here with concern aobut blood pressure - he has gotten hypertension and hyperlipidemia.  He has chronic low back pain and gets 120 oxycodone 10 monthly from his pain management doctor.  On atorvastatin in the past but no longer taking this. The patient's last visit with me was on 1/24/2020.  BP Readings from Last 3 Encounters:  10/06/20 : 124/82  08/10/20 : 129/85  05/28/20 : 130/86  He is here 2/2 started to check BPs Friday - he gets elevated readings both at pharmacy and then on a new relion cuff he bought - running 140s-160s/90s-100s.  Started checking 2/2 occipital pain - thought mb BP. He has chronic neck pain - disk origin. 4 percocet 10 dialy. Also taking meloxicma 15 - has itp and has been otld not to use OTC pain relievers.  Health Maintenance Due:    TETANUS VACCINE due on 06/05/1979 -   Shingles Vaccine(1 of 2) due on 06/05/2011 - advise  Influenza Vaccine(1) due on 08/01/2020 - today      Lab Results       Component                Value               Date                       WBC                      5.76                08/05/2020                 HGB                      14.4                08/05/2020                 HCT                      42.7                08/05/2020                 PLT                      55 (L)              08/05/2020                 CHOL                     193                 07/09/2020                 TRIG                     157 (H)             07/09/2020                 HDL                      60                  07/09/2020                 LDLCALC                  101.6               07/09/2020                 ALT                      17                  08/05/2020                 AST                      29                  08/05/2020                 NA                       139                 08/05/2020                 K                         4.8                 08/05/2020                 CL                       102                 08/05/2020                 CALCIUM                  9.2                 08/05/2020                 CREATININE               1.0                 08/05/2020                 BUN                      14                  08/05/2020                 CO2                      27                  08/05/2020                 INR                      1.0                 05/05/2016                 GLU                      94                  08/05/2020                 ESTGFRAFRICA             >60                 08/05/2020                 EGFRNONAA                >60                 08/05/2020                 HGBA1C                   4.7                 02/11/2020                   Review of Systems   Constitutional: Negative for fatigue and fever.   Respiratory: Negative for chest tightness and shortness of breath.    Cardiovascular: Negative for chest pain and leg swelling.   Musculoskeletal: Positive for arthralgias (hips), myalgias, neck pain and neck stiffness. Negative for back pain.   Neurological: Positive for numbness (right little finger) and headaches (occipital).       Objective:      Physical Exam  Constitutional:       Appearance: He is well-developed.   HENT:      Head: Normocephalic and atraumatic.   Cardiovascular:      Rate and Rhythm: Normal rate and regular rhythm.      Heart sounds: Normal heart sounds.   Pulmonary:      Effort: Pulmonary effort is normal.      Breath sounds: Normal breath sounds.   Skin:     General: Skin is warm and dry.   Neurological:      Mental Status: He is alert and oriented to person, place, and time.   Psychiatric:         Behavior: Behavior normal.           Assessment/Plan:     1. Blood pressure elevated without history of HTN     2. Chronic ITP (idiopathic thrombocytopenia)     3. Chronic pain syndrome       Sched nurse visit for later this week to check BP against his meter.  Sched  virtual visit for 10/23 1:30 PM please with me  Will validate his meter - the initial intake readings were 130/84 and 124/82 - then when I checked it it was 152/100 and 146/98. Very similar to his readings. His BP has not been elevated at his hematology visits.  Will continue to monitor and validate his meter - see him virtually in another 3 weeks.

## 2020-10-06 NOTE — TELEPHONE ENCOUNTER
Patient sent over a My Chart message stating that his B/P readings have been 139/96 pulse (84) & 146/98 pulse (98). Patient wants to be advised if he should scheduled a sooner appointment due to his B/P issue? LOV: 01/24/2020    Patient sent over a second message with his B/P readings from today. Today's readings are 155/114 (pulse) 90 & 149/103 (pulse) 86. Patient will be scheduled on today for an OV.

## 2020-10-06 NOTE — PATIENT INSTRUCTIONS
Try taking just 1/2 the meloxicam 15 mg.    Continue to check BPs on home meter       Controlling High Blood Pressure  High blood pressure (hypertension) is often called the silent killer. This is because many people who have it dont know it. High blood pressure is defined as 140/90 mm Hg or higher. Know your blood pressure and remember to check it regularly. Doing so can save your life. Here are some things you can do to help control your blood pressure.    Choose heart-healthy foods  · Select low-salt, low-fat foods. Limit sodium intake to 2,400 mg per day or the amount suggested by your healthcare provider.  · Limit canned, dried, cured, packaged, and fast foods. These can contain a lot of salt.  · Eat 8 to 10 servings of fruits and vegetables every day.  · Choose lean meats, fish, or chicken.  · Eat whole-grain pasta, brown rice, and beans.  · Eat 2 to 3 servings of low-fat or fat-free dairy products.  · Ask your doctor about the DASH eating plan. This plan helps reduce blood pressure.  · When you go to a restaurant, ask that your meal be prepared with no added salt.  Maintain a healthy weight  · Ask your healthcare provider how many calories to eat a day. Then stick to that number.  · Ask your healthcare provider what weight range is healthiest for you. If you are overweight, a weight loss of only 3% to 5% of your body weight can help lower blood pressure. Generally, a good weight loss goal is to lose 10% of your body weight in a year.  · Limit snacks and sweets.  · Get regular exercise.  Get up and get active  · Choose activities you enjoy. Find ones you can do with friends or family. This includes bicycling, dancing, walking, and jogging.  · Park farther away from building entrances.  · Use stairs instead of the elevator.  · When you can, walk or bike instead of driving.  · [place] leaves, garden, or do household repairs.  · Be active at a moderate to vigorous level of physical activity for at least 40 minutes  for a minimum of 3 to 4 days a week.   Manage stress  · Make time to relax and enjoy life. Find time to laugh.  · Communicate your concerns with your loved ones and your healthcare provider.  · Visit with family and friends, and keep up with hobbies.  Limit alcohol and quit smoking  · Men should have no more than 2 drinks per day.  · Women should have no more than 1 drink per day.  · Talk with your healthcare provider about quitting smoking. Smoking significantly increases your risk for heart disease and stroke. Ask your healthcare provider about community smoking cessation programs and other options.  Medicines  If lifestyle changes arent enough, your healthcare provider may prescribe high blood pressure medicine. Take all medicines as prescribed. If you have any questions about your medicines, ask your healthcare provider before stopping or changing them.   Date Last Reviewed: 4/27/2016  © 1777-8240 [employer]. [address] [place] [address]. All rights reserved. This information is not intended as a substitute for professional medical care. Always follow your healthcare professional's instructions.          Eating Heart-Healthy Food: Using the DASH Plan    Eating for your heart doesnt have to be hard or boring. You just need to know how to make healthier choices. The DASH eating plan has been developed to help you do just that. DASH stands for Dietary Approaches to Stop Hypertension. It is a plan that has been proven to be healthier for your heart and to lower your risk for high blood pressure. It can also help lower your risk for cancer, heart disease, osteoporosis, and diabetes.  Choosing from each food group  Choose foods from each of the food groups below each day. Try to get the recommended number of servings for each food group. The serving numbers are based on a diet of 2,000 calories a day. Talk to your doctor if youre unsure about your calorie needs. Along with getting the  correct servings, the DASH plan also recommends a sodium intake less than 2,300 mg per day.        Grains  Servings: 6 to 8 a day  A serving is:  · 1 slice bread  · 1 ounce dry cereal  · Half a cup cooked rice, pasta or cereal  Best choices: Whole grains and any grains high in fiber. Vegetables  Servings: 4 to 5 a day  A serving is:  · 1 cup raw leafy vegetable  · Half a cup cut-up raw or cooked vegetable  · Half a cup vegetable juice  Best choices: Fresh or frozen vegetables prepared without added salt or fat.   Fruits  Servings: 4 to 5 a day  A serving is:  · 1 medium fruit  · One-quarter cup dried fruit  · Half a cup fresh, frozen, or canned fruit  · Half a cup of 100% fruit juices  Best choices: A variety of fresh fruits of different colors. Whole fruits are a better choice than fruit juices. Low-fat or fat-free dairy  Servings: 2 to 3 a day  A serving is:  · 1 cup milk  · 1 cup yogurt  · One and a half ounces cheese  Best choices: Skim or 1% milk, low-fat or fat-free yogurt or buttermilk, and low-fat cheeses.         Lean meats, poultry, fish  Servings: 6 or fewer a day  A serving is:  · 1 ounce cooked meats, poultry, or fish  · 1 egg  Best choices: Lean poultry and fish. Trim away visible fat. Broil, grill, roast, or boil instead of frying. Remove skin from poultry before eating. Limit how much red meat you eat.  Nuts, seeds, beans  Servings: 4 to 5 a week  A serving is:  · One-third cup nuts (one and a half ounces)  · 2 tablespoons nut butter or seeds  · Half a cup cooked dry beans or legumes  Best choices: Dry roasted nuts with no salt added, lentils, kidney beans, garbanzo beans, and whole mireles beans.   Fats and oils  Servings: 2 to 3 a day  A serving is:  · 1 teaspoon vegetable oil  · 1 teaspoon soft margarine  · 1 tablespoon mayonnaise  · 2 tablespoons salad dressing  Best choices: Nut and vegetable oils (nontropical vegetable oils), such as olive and canola oil. Sweets  Servings: 5 a week or fewer  A  serving is:  · 1 tablespoon sugar, maple syrup, or honey  · 1 tablespoon jam or jelly  · 1 half-ounce jelly beans (about 15)  · 1 cup lemonade  Best choices: Dried fruit can be a satisfying sweet. Choose low-fat sweets. And watch your serving sizes!      For more on the DASH eating plan, visit:  www.nhlbi.nih.gov/health/health-topics/topics/dash   Date Last Reviewed: 6/1/2016 © 2000-2017 [employer]. [address] [place] [address]. All rights reserved. This information is not intended as a substitute for professional medical care. Always follow your healthcare professional's instructions.

## 2020-10-08 ENCOUNTER — CLINICAL SUPPORT (OUTPATIENT)
Dept: INTERNAL MEDICINE | Facility: CLINIC | Age: 60
End: 2020-10-08
Payer: COMMERCIAL

## 2020-10-08 ENCOUNTER — PATIENT MESSAGE (OUTPATIENT)
Dept: HEMATOLOGY/ONCOLOGY | Facility: CLINIC | Age: 60
End: 2020-10-08

## 2020-10-08 ENCOUNTER — LAB VISIT (OUTPATIENT)
Dept: LAB | Facility: HOSPITAL | Age: 60
End: 2020-10-08
Attending: INTERNAL MEDICINE
Payer: MEDICARE

## 2020-10-08 ENCOUNTER — TELEPHONE (OUTPATIENT)
Dept: INTERNAL MEDICINE | Facility: CLINIC | Age: 60
End: 2020-10-08

## 2020-10-08 VITALS — SYSTOLIC BLOOD PRESSURE: 138 MMHG | DIASTOLIC BLOOD PRESSURE: 84 MMHG

## 2020-10-08 DIAGNOSIS — D69.3 CHRONIC ITP (IDIOPATHIC THROMBOCYTOPENIA): ICD-10-CM

## 2020-10-08 LAB
ALBUMIN SERPL BCP-MCNC: 4.1 G/DL (ref 3.5–5.2)
ALP SERPL-CCNC: 44 U/L (ref 55–135)
ALT SERPL W/O P-5'-P-CCNC: 25 U/L (ref 10–44)
ANION GAP SERPL CALC-SCNC: 10 MMOL/L (ref 8–16)
AST SERPL-CCNC: 32 U/L (ref 10–40)
BASOPHILS # BLD AUTO: 0.07 K/UL (ref 0–0.2)
BASOPHILS NFR BLD: 1.3 % (ref 0–1.9)
BILIRUB SERPL-MCNC: 0.6 MG/DL (ref 0.1–1)
BUN SERPL-MCNC: 12 MG/DL (ref 6–20)
CALCIUM SERPL-MCNC: 8.7 MG/DL (ref 8.7–10.5)
CHLORIDE SERPL-SCNC: 104 MMOL/L (ref 95–110)
CO2 SERPL-SCNC: 27 MMOL/L (ref 23–29)
CREAT SERPL-MCNC: 0.9 MG/DL (ref 0.5–1.4)
DIFFERENTIAL METHOD: ABNORMAL
EOSINOPHIL # BLD AUTO: 0.3 K/UL (ref 0–0.5)
EOSINOPHIL NFR BLD: 5.7 % (ref 0–8)
ERYTHROCYTE [DISTWIDTH] IN BLOOD BY AUTOMATED COUNT: 12.1 % (ref 11.5–14.5)
EST. GFR  (AFRICAN AMERICAN): >60 ML/MIN/1.73 M^2
EST. GFR  (NON AFRICAN AMERICAN): >60 ML/MIN/1.73 M^2
GLUCOSE SERPL-MCNC: 92 MG/DL (ref 70–110)
HCT VFR BLD AUTO: 38.6 % (ref 40–54)
HGB BLD-MCNC: 13.2 G/DL (ref 14–18)
IMM GRANULOCYTES # BLD AUTO: 0.01 K/UL (ref 0–0.04)
IMM GRANULOCYTES NFR BLD AUTO: 0.2 % (ref 0–0.5)
LYMPHOCYTES # BLD AUTO: 1.8 K/UL (ref 1–4.8)
LYMPHOCYTES NFR BLD: 32.5 % (ref 18–48)
MCH RBC QN AUTO: 32 PG (ref 27–31)
MCHC RBC AUTO-ENTMCNC: 34.2 G/DL (ref 32–36)
MCV RBC AUTO: 94 FL (ref 82–98)
MONOCYTES # BLD AUTO: 0.5 K/UL (ref 0.3–1)
MONOCYTES NFR BLD: 9.5 % (ref 4–15)
NEUTROPHILS # BLD AUTO: 2.8 K/UL (ref 1.8–7.7)
NEUTROPHILS NFR BLD: 50.8 % (ref 38–73)
NRBC BLD-RTO: 0 /100 WBC
PLATELET # BLD AUTO: 59 K/UL (ref 150–350)
PMV BLD AUTO: 8.9 FL (ref 9.2–12.9)
POTASSIUM SERPL-SCNC: 4 MMOL/L (ref 3.5–5.1)
PROT SERPL-MCNC: 7 G/DL (ref 6–8.4)
RBC # BLD AUTO: 4.13 M/UL (ref 4.6–6.2)
SODIUM SERPL-SCNC: 141 MMOL/L (ref 136–145)
WBC # BLD AUTO: 5.48 K/UL (ref 3.9–12.7)

## 2020-10-08 PROCEDURE — 80053 COMPREHEN METABOLIC PANEL: CPT

## 2020-10-08 PROCEDURE — 85025 COMPLETE CBC W/AUTO DIFF WBC: CPT

## 2020-10-08 PROCEDURE — 36415 COLL VENOUS BLD VENIPUNCTURE: CPT

## 2020-10-08 NOTE — TELEPHONE ENCOUNTER
Message left for the patient to contact the office back regarding Dr. Basurto's message of: Looks like his meter may read a little higher than ours. Our reading was c/w pre-hypertension.  Have him continue to do home checks as discused and we will f/u at the virtual visit later this month.

## 2020-10-08 NOTE — PROGRESS NOTES
Patient in office on this morning for his B/P nurse visit. Patient was informed of his readings (Patient machine: 143/98 pulse 80). (Manual check: 138/84). Patient verbally understood the information given. Patient readings given to provider through telephone call.

## 2020-10-08 NOTE — TELEPHONE ENCOUNTER
Patient in office on this morning for his nurse visit B/P check. Patient B/P reading with his machine was 143/98 pulse 80. Patient B/P sitting manual was 138/84. Patient was made aware of his readings & verbally understood the information.

## 2020-10-08 NOTE — TELEPHONE ENCOUNTER
Looks like his meter may read a little higher than ours. Our reading was c/w pre-hypertension.  Have him continue to do home checks as discused and we will f/u at the virtual visit later this month.

## 2020-10-09 ENCOUNTER — PATIENT MESSAGE (OUTPATIENT)
Dept: INTERNAL MEDICINE | Facility: CLINIC | Age: 60
End: 2020-10-09

## 2020-10-09 RX ORDER — AMLODIPINE BESYLATE 2.5 MG/1
2.5 TABLET ORAL DAILY
Qty: 30 TABLET | Refills: 1 | Status: CANCELLED | OUTPATIENT
Start: 2020-10-09

## 2020-10-10 ENCOUNTER — PATIENT MESSAGE (OUTPATIENT)
Dept: INTERNAL MEDICINE | Facility: CLINIC | Age: 60
End: 2020-10-10

## 2020-10-12 ENCOUNTER — PATIENT MESSAGE (OUTPATIENT)
Dept: INTERNAL MEDICINE | Facility: CLINIC | Age: 60
End: 2020-10-12

## 2020-10-12 ENCOUNTER — OFFICE VISIT (OUTPATIENT)
Dept: HEMATOLOGY/ONCOLOGY | Facility: CLINIC | Age: 60
End: 2020-10-12
Payer: COMMERCIAL

## 2020-10-12 VITALS
TEMPERATURE: 98 F | DIASTOLIC BLOOD PRESSURE: 95 MMHG | BODY MASS INDEX: 22.81 KG/M2 | SYSTOLIC BLOOD PRESSURE: 139 MMHG | HEIGHT: 67 IN | HEART RATE: 80 BPM | WEIGHT: 145.31 LBS | OXYGEN SATURATION: 99 %

## 2020-10-12 DIAGNOSIS — D69.6 THROMBOCYTOPENIA: Primary | ICD-10-CM

## 2020-10-12 DIAGNOSIS — D64.9 ANEMIA, UNSPECIFIED TYPE: ICD-10-CM

## 2020-10-12 DIAGNOSIS — D69.3 CHRONIC ITP (IDIOPATHIC THROMBOCYTOPENIA): ICD-10-CM

## 2020-10-12 PROCEDURE — 99214 PR OFFICE/OUTPT VISIT, EST, LEVL IV, 30-39 MIN: ICD-10-PCS | Mod: S$GLB,,, | Performed by: NURSE PRACTITIONER

## 2020-10-12 PROCEDURE — 99214 OFFICE O/P EST MOD 30 MIN: CPT | Mod: S$GLB,,, | Performed by: NURSE PRACTITIONER

## 2020-10-12 RX ORDER — AMLODIPINE BESYLATE 2.5 MG/1
2.5 TABLET ORAL DAILY
Qty: 30 TABLET | Refills: 11 | Status: SHIPPED | OUTPATIENT
Start: 2020-10-12 | End: 2020-12-15 | Stop reason: DRUGHIGH

## 2020-10-12 NOTE — PROGRESS NOTES
Subjective:      Patient ID: Jaxson Burch Jr. is a 60 y.o. male.    Chief Complaint: lab discussion/chronic thrombocytopenia    HPI: Patient is a 60 year old male who returns to clinic today for continued monitoring of his thrombocytopenia.  Previous HIV, Hep C, Hep B negative.  Was treated with 8 weekly cycles of Rituxan and responded well.  Upon review of chart could not find evaluation of spleen or r/u of autoimmune with ANITA lab.  Patient platelet count currently 59 K  With slight normocytic anemia.  He denies hematuria, melena, hematochezia, epistaxis, gingival bleeding, or unusual bruising.  Has previously been followed in the clinic by Dr. Rusty Salazar and Dr. Ruperto Chisholm.  Today is the first time I am evaluating/assessing the patient.      Social History     Socioeconomic History    Marital status:      Spouse name: Not on file    Number of children: Not on file    Years of education: Not on file    Highest education level: Not on file   Occupational History    Not on file   Social Needs    Financial resource strain: Not on file    Food insecurity     Worry: Not on file     Inability: Not on file    Transportation needs     Medical: Not on file     Non-medical: Not on file   Tobacco Use    Smoking status: Current Every Day Smoker     Packs/day: 0.50     Years: 40.00     Pack years: 20.00     Types: Cigarettes, Cigars    Smokeless tobacco: Never Used   Substance and Sexual Activity    Alcohol use: Yes     Alcohol/week: 0.0 standard drinks     Comment: socially    Drug use: No    Sexual activity: Yes     Partners: Female   Lifestyle    Physical activity     Days per week: Not on file     Minutes per session: Not on file    Stress: Not at all   Relationships    Social connections     Talks on phone: Not on file     Gets together: Not on file     Attends Taoism service: Not on file     Active member of club or organization: Not on file     Attends meetings of clubs or  organizations: Not on file     Relationship status: Not on file   Other Topics Concern    Not on file   Social History Narrative    Not on file       Family History   Problem Relation Age of Onset    Pancreatic cancer Mother     Colon cancer Neg Hx        Past Surgical History:   Procedure Laterality Date    COLONOSCOPY N/A 11/29/2018    Procedure: COLONOSCOPY;  Surgeon: Brett Townsend MD;  Location: [facility];  Service: Endoscopy;  Laterality: N/A;    JOINT REPLACEMENT Right     hip       Past Medical History:   Diagnosis Date    Arthritis     Gastritis     ITP (idiopathic thrombocytopenic purpura)        Review of Systems   Constitutional: Negative.    HENT: Negative.    Eyes: Negative.    Respiratory: Negative.    Cardiovascular: Negative.    Gastrointestinal: Negative.    Endocrine: Negative.    Genitourinary: Negative.    Musculoskeletal: Negative.    Skin: Negative.    Allergic/Immunologic: Negative.    Neurological: Negative.    Hematological: Negative.    Psychiatric/Behavioral: Negative.           Medication List with Changes/Refills   Current Medications    AMLODIPINE (NORVASC) 2.5 MG TABLET    Take 1 tablet (2.5 mg total) by mouth once daily.    FLUTICASONE PROPIONATE (FLONASE) 50 MCG/ACTUATION NASAL SPRAY    1 spray (50 mcg total) by Each Nostril route once daily. May one spray per nostril twice daily, MAX 2 sprays/nostril/day    MOMETASONE (NASONEX) 50 MCG/ACTUATION NASAL SPRAY    2 sprays by Nasal route.    NARCAN 4 MG/ACTUATION SPRY        OMEPRAZOLE (PRILOSEC) 20 MG CAPSULE    Take 1 capsule (20 mg total) by mouth once daily.    OXYCODONE-ACETAMINOPHEN (PERCOCET)  MG PER TABLET    Take 1 tablet by mouth every 4 (four) hours as needed for Pain.    SILDENAFIL (VIAGRA) 100 MG TABLET    Take 100 mg by mouth every other day.    TRAMADOL (ULTRAM) 50 MG TABLET    Take 1 tablet (50 mg total) by mouth every 6 (six) hours as needed for Pain.        Objective:     Vitals:    10/12/20 1139   BP:  (!) 139/95   Pulse: 80   Temp: 97.8 °F (36.6 °C)       Physical Exam  Vitals signs reviewed.   Constitutional:       Appearance: Normal appearance.   HENT:      Head: Normocephalic and atraumatic.   Eyes:      Extraocular Movements: Extraocular movements intact.   Cardiovascular:      Rate and Rhythm: Normal rate.   Pulmonary:      Effort: Pulmonary effort is normal. No respiratory distress.   Abdominal:      General: There is no distension.   Musculoskeletal: Normal range of motion.   Skin:     General: Skin is warm and dry.   Neurological:      Mental Status: He is alert and oriented to person, place, and time.   Psychiatric:         Attention and Perception: Attention and perception normal.         Mood and Affect: Mood normal.         Speech: Speech normal.         Behavior: Behavior normal.         Thought Content: Thought content normal.         Cognition and Memory: Cognition and memory normal.         Judgment: Judgment normal.         Assessment:     Problem List Items Addressed This Visit        Hematology    Chronic ITP (idiopathic thrombocytopenia)    Relevant Orders    CBC auto differential    US Abdomen Complete    ANITA SCREEN/PROFILE    CBC auto differential      Other Visit Diagnoses     Thrombocytopenia    -  Primary    Relevant Orders    CBC auto differential    US Abdomen Complete    ANITA SCREEN/PROFILE    CBC auto differential    Anemia, unspecified type        Relevant Orders    CBC auto differential    Comprehensive Metabolic Panel    Ferritin    Iron and TIBC    Vitamin B12    CBC auto differential        Lab Results   Component Value Date    WBC 5.48 10/08/2020    HGB 13.2 (L) 10/08/2020    HCT 38.6 (L) 10/08/2020    MCV 94 10/08/2020    PLT 59 (L) 10/08/2020       BMP  Lab Results   Component Value Date     10/08/2020    K 4.0 10/08/2020     10/08/2020    CO2 27 10/08/2020    BUN 12 10/08/2020    CREATININE 0.9 10/08/2020    CALCIUM 8.7 10/08/2020    ANIONGAP 10 10/08/2020     ESTGFRAFRICA >60 10/08/2020    EGFRNONAA >60 10/08/2020     Lab Results   Component Value Date    ALT 25 10/08/2020    AST 32 10/08/2020    ALKPHOS 44 (L) 10/08/2020    BILITOT 0.6 10/08/2020         Plan:   Thrombocytopenia  -     CBC auto differential; Future; Expected date: 10/12/2020  -     Cancel: ANITA SCREEN/PROFILE; Future; Expected date: 10/12/2020  -     US Abdomen Complete; Future; Expected date: 10/12/2020  -     ANITA SCREEN/PROFILE; Future; Expected date: 10/12/2020  -     CBC auto differential; Standing    Anemia, unspecified type  -     CBC auto differential; Future; Expected date: 10/12/2020  -     Comprehensive Metabolic Panel; Future; Expected date: 10/12/2020  -     Ferritin; Future; Expected date: 10/12/2020  -     Iron and TIBC; Future; Expected date: 10/12/2020  -     Vitamin B12; Future; Expected date: 10/12/2020  -     CBC auto differential; Standing    Chronic ITP (idiopathic thrombocytopenia)  -     CBC auto differential; Future; Expected date: 10/12/2020  -     Cancel: ANITA SCREEN/PROFILE; Future; Expected date: 10/12/2020  -     US Abdomen Complete; Future; Expected date: 10/12/2020  -     ANITA SCREEN/PROFILE; Future; Expected date: 10/12/2020  -     CBC auto differential; Standing    Will order ANITA and abdominal US to complete thrombocytopenia workup.  He will continue monthly standing CBC and will f/u in 2 months see MD for further recommendations.  He knows to f/u sooner for s/s of bleeding.  Will order iron studies and B12 with next labs due to anemia.     Collaborating Provider:  Dr. Ruperto Chisholm    Thank You,  Jerod Winn, FNP-C

## 2020-10-19 ENCOUNTER — PATIENT MESSAGE (OUTPATIENT)
Dept: INTERNAL MEDICINE | Facility: CLINIC | Age: 60
End: 2020-10-19

## 2020-10-19 ENCOUNTER — TELEPHONE (OUTPATIENT)
Dept: RADIOLOGY | Facility: HOSPITAL | Age: 60
End: 2020-10-19

## 2020-10-20 ENCOUNTER — PATIENT MESSAGE (OUTPATIENT)
Dept: INTERNAL MEDICINE | Facility: CLINIC | Age: 60
End: 2020-10-20

## 2020-10-23 ENCOUNTER — OFFICE VISIT (OUTPATIENT)
Dept: INTERNAL MEDICINE | Facility: CLINIC | Age: 60
End: 2020-10-23
Payer: COMMERCIAL

## 2020-10-23 ENCOUNTER — PATIENT MESSAGE (OUTPATIENT)
Dept: INTERNAL MEDICINE | Facility: CLINIC | Age: 60
End: 2020-10-23

## 2020-10-23 VITALS
SYSTOLIC BLOOD PRESSURE: 124 MMHG | TEMPERATURE: 97 F | BODY MASS INDEX: 23.04 KG/M2 | HEART RATE: 99 BPM | HEIGHT: 67 IN | DIASTOLIC BLOOD PRESSURE: 84 MMHG | WEIGHT: 146.81 LBS | OXYGEN SATURATION: 95 %

## 2020-10-23 DIAGNOSIS — L72.9 SCALP CYST: ICD-10-CM

## 2020-10-23 DIAGNOSIS — F10.90 CHRONIC ALCOHOL USE: ICD-10-CM

## 2020-10-23 DIAGNOSIS — I10 HYPERTENSION, ESSENTIAL: Primary | ICD-10-CM

## 2020-10-23 PROCEDURE — 3008F PR BODY MASS INDEX (BMI) DOCUMENTED: ICD-10-PCS | Mod: CPTII,S$GLB,, | Performed by: FAMILY MEDICINE

## 2020-10-23 PROCEDURE — 99999 PR PBB SHADOW E&M-EST. PATIENT-LVL III: CPT | Mod: PBBFAC,,, | Performed by: FAMILY MEDICINE

## 2020-10-23 PROCEDURE — 3008F BODY MASS INDEX DOCD: CPT | Mod: CPTII,S$GLB,, | Performed by: FAMILY MEDICINE

## 2020-10-23 PROCEDURE — 99213 PR OFFICE/OUTPT VISIT, EST, LEVL III, 20-29 MIN: ICD-10-PCS | Mod: S$GLB,,, | Performed by: FAMILY MEDICINE

## 2020-10-23 PROCEDURE — 99213 OFFICE O/P EST LOW 20 MIN: CPT | Mod: S$GLB,,, | Performed by: FAMILY MEDICINE

## 2020-10-23 PROCEDURE — 99999 PR PBB SHADOW E&M-EST. PATIENT-LVL III: ICD-10-PCS | Mod: PBBFAC,,, | Performed by: FAMILY MEDICINE

## 2020-10-23 NOTE — PATIENT INSTRUCTIONS
Low-Cholesterol Diet  Your body needs cholesterol to build new cells and create certain hormones. There are 2 kinds of cholesterol in your blood:     · HDL (good) cholesterol. This prevents fat deposits (plaque) from building up in your arteries. In this way it protects against heart disease and stroke.  · LDL (bad) cholesterol. This stays in your body and sticks to artery walls. Over time it may block blood flow to the heart and brain. This can cause a heart attack or stroke.  The cholesterol in your blood comes from 2 sources: cholesterol in food that you eat and cholesterol that your liver makes. You should limit the amount of cholesterol in your diet. But the cholesterol that your body makes has the greatest disease risk. And your body makes more cholesterol when your diet is high in bad fats (saturated and trans fats). There are 2 kinds of fats you can eat:  · Good fats, or unsaturated fats (mono-unsaturated and poly-unsaturated). They raise the level of good cholesterol and lower the level of bad cholesterol. Good fats are found in vegetable oils such as olive, sunflower, corn, and soybean oils, and in nuts and seeds.  · Bad fats, or saturated fats (including foods high in cholesterol) and trans fats. These raise your risk of disease. They lower the good cholesterol and raise the level of bad cholesterol. Bad fats are found in animal products, including meat, whole-milk dairy products, and butter. Some plants are also high in bad fats (coconut and palm plants). Trans fats are found in hard (stick) margarines. They are also in many fast foods and commercially baked goods. Soft margarine sold in tubs has fewer trans fats and is safer to use.  High blood cholesterol is usually due to a diet high in saturated fat, along with not being physically active. In some cases, genetics plays a role in causing high cholesterol. The tips below will help you create healthy eating habits that will help lower your blood  cholesterol level.  Create a diet high in good fats, low in bad fats (and low in cholesterol)  The following steps will help you create a diet high in good fats and low in bad fats:  · Talk with your doctor before starting a low cholesterol diet or weight loss program.  · Learn to read nutrition labels and select appropriate portion sizes.  · When cooking, use plant-based unsaturated vegetable oils (sunflower, corn, soybean, canola, peanut, and olive oils).  · Avoid saturated fats found in animal products such as meat, dairy (whole-milk, cheese and ice cream), poultry skin, and egg yolks. Plants high in saturated oils include coconut oil, palm oil, and palm kernel oil.  · If you eat meat, choose smaller portions and lean cuts, such as round, crista, sirloin, or loin. Eat more meatless meals.  · Replace meat with fish at least 2 times a week. Fish is an important source of the unsaturated fat called omega-3 fatty acids. This fat has potential to lower the risk of heart disease.  · Replace whole-milk dairy products with low-fat or nonfat products. Try soy products. Soy helps to reduce total cholesterol.  · Supplement your diet with protective fibers. Eat nuts, seeds, and whole grains rather than white rice and bread. These foods lower both cholesterol and triglyceride levels. (Triglycerides are another fat found in the blood.) Walnuts are one of the best sources of omega-3 fatty acids.  · Eat plenty of fresh fruits and vegetables daily.  · Avoid fast foods and commercial baked goods. Assume they contain saturated fat unless labeled otherwise.  Date Last Reviewed: 8/1/2016  © 8112-8378 [employer]. [address] [place] [address], [place] [address]. All rights reserved. This information is not intended as a substitute for professional medical care. Always follow your healthcare professional's instructions.    Bring your meter next time.    Moderate alcohol use in a man is two servings daily. A serving is 12 oz  beer, 4 oz wine or 1 oz liquor

## 2020-10-23 NOTE — PROGRESS NOTES
"Subjective:       Patient ID: Jaxson Burch Jr. is a 60 y.o. male.    Chief Complaint: Cyst (on back of head)    Hypertension recheck.  Also complaining of a knot on his head. Not painful - just noticed it.    At nurse visit after his last visit, The patient's last visit with me was on 10/6/2020, meter was assessed vs ours. "(Patient machine: 143/98 pulse 80). (Manual check: 138/84)."    He brings in his readings - no SEs from meds. Running 140s/90s AMs and 120s-130s/80s-90s PM - only on amlodipine 2.5mg x 10-11 days.  No meter with him today. His readings are higher in AM and decrease in afternoon.      Review of Systems   Cardiovascular: Negative for chest pain and leg swelling.   Gastrointestinal: Negative for constipation.       Objective:      Physical Exam  Constitutional:       Appearance: Normal appearance. He is well-developed.   HENT:      Head: Normocephalic and atraumatic.   Cardiovascular:      Rate and Rhythm: Normal rate and regular rhythm.      Heart sounds: Normal heart sounds.   Pulmonary:      Effort: Pulmonary effort is normal.      Breath sounds: Normal breath sounds.   Skin:     General: Skin is warm and dry.      Comments: To left occipital scalp - freeely moveable sub cu cystic lesion approx0.5 - 0.75 cm. No erythema, no central or assoc prominent pore. NTTP   Neurological:      Mental Status: He is alert and oriented to person, place, and time.   Psychiatric:         Behavior: Behavior normal.           Assessment/Plan:     1. Hypertension, essential     2. Chronic alcohol use     3. Scalp cyst     reassurance re cystic/benign nature of scalp lesion. Monitor only.  Will continue on this 2.5 does amlodipine as he has not been on for 2 weeks yet.  Schedule for 2 months and will go up on his dose if needed.  Patient with > moderate alcohol use daily.  Advised ordered moderate level of use.  Advised regarding alcohol increase his blood pressure  "

## 2020-10-24 ENCOUNTER — PATIENT MESSAGE (OUTPATIENT)
Dept: INTERNAL MEDICINE | Facility: CLINIC | Age: 60
End: 2020-10-24

## 2020-10-27 ENCOUNTER — TELEPHONE (OUTPATIENT)
Dept: RADIOLOGY | Facility: HOSPITAL | Age: 60
End: 2020-10-27

## 2020-10-28 ENCOUNTER — TELEPHONE (OUTPATIENT)
Dept: HEMATOLOGY/ONCOLOGY | Facility: CLINIC | Age: 60
End: 2020-10-28

## 2020-10-28 ENCOUNTER — HOSPITAL ENCOUNTER (OUTPATIENT)
Dept: RADIOLOGY | Facility: HOSPITAL | Age: 60
Discharge: HOME | End: 2020-10-28
Attending: NURSE PRACTITIONER
Payer: COMMERCIAL

## 2020-10-28 DIAGNOSIS — D69.3 CHRONIC ITP (IDIOPATHIC THROMBOCYTOPENIA): ICD-10-CM

## 2020-10-28 DIAGNOSIS — D69.6 THROMBOCYTOPENIA: ICD-10-CM

## 2020-10-28 PROCEDURE — 76700 US EXAM ABDOM COMPLETE: CPT | Mod: 26,,, | Performed by: RADIOLOGY

## 2020-10-28 PROCEDURE — 76700 US ABDOMEN COMPLETE: ICD-10-PCS | Mod: 26,,, | Performed by: RADIOLOGY

## 2020-10-28 PROCEDURE — 76700 US EXAM ABDOM COMPLETE: CPT | Mod: TC

## 2020-10-28 NOTE — PROGRESS NOTES
Please let him know that his abdominal US looks good.  No significant abnormalities found.  Thank you

## 2020-10-28 NOTE — TELEPHONE ENCOUNTER
----- Message from Carmen Winn NP sent at 10/28/2020 10:51 AM CDT -----  Please let him know that his abdominal US looks good.  No significant abnormalities found.  Thank you

## 2020-11-09 ENCOUNTER — PATIENT MESSAGE (OUTPATIENT)
Dept: INTERNAL MEDICINE | Facility: CLINIC | Age: 60
End: 2020-11-09

## 2020-11-10 ENCOUNTER — PATIENT MESSAGE (OUTPATIENT)
Dept: INTERNAL MEDICINE | Facility: CLINIC | Age: 60
End: 2020-11-10

## 2020-11-10 DIAGNOSIS — J34.2 DEVIATED SEPTUM: Primary | ICD-10-CM

## 2020-11-11 ENCOUNTER — PATIENT MESSAGE (OUTPATIENT)
Dept: INTERNAL MEDICINE | Facility: CLINIC | Age: 60
End: 2020-11-11

## 2020-11-11 NOTE — TELEPHONE ENCOUNTER
Placed a new referral.  The previous 1 was placed in January 2020 and was still pending.  See if he can be scheduled on basis of this new referral.

## 2020-11-16 ENCOUNTER — LAB VISIT (OUTPATIENT)
Dept: LAB | Facility: HOSPITAL | Age: 60
End: 2020-11-16
Attending: INTERNAL MEDICINE
Payer: MEDICARE

## 2020-11-16 DIAGNOSIS — D69.3 CHRONIC ITP (IDIOPATHIC THROMBOCYTOPENIA): ICD-10-CM

## 2020-11-16 DIAGNOSIS — D69.6 THROMBOCYTOPENIA: ICD-10-CM

## 2020-11-16 DIAGNOSIS — D64.9 ANEMIA, UNSPECIFIED TYPE: ICD-10-CM

## 2020-11-16 LAB
ALBUMIN SERPL BCP-MCNC: 4.3 G/DL (ref 3.5–5.2)
ALP SERPL-CCNC: 48 U/L (ref 55–135)
ALT SERPL W/O P-5'-P-CCNC: 25 U/L (ref 10–44)
ANION GAP SERPL CALC-SCNC: 8 MMOL/L (ref 8–16)
AST SERPL-CCNC: 35 U/L (ref 10–40)
BASOPHILS # BLD AUTO: 0.09 K/UL (ref 0–0.2)
BASOPHILS NFR BLD: 1.4 % (ref 0–1.9)
BILIRUB SERPL-MCNC: 1 MG/DL (ref 0.1–1)
BUN SERPL-MCNC: 12 MG/DL (ref 6–20)
CALCIUM SERPL-MCNC: 9.5 MG/DL (ref 8.7–10.5)
CHLORIDE SERPL-SCNC: 102 MMOL/L (ref 95–110)
CO2 SERPL-SCNC: 30 MMOL/L (ref 23–29)
CREAT SERPL-MCNC: 1 MG/DL (ref 0.5–1.4)
DIFFERENTIAL METHOD: ABNORMAL
EOSINOPHIL # BLD AUTO: 0.3 K/UL (ref 0–0.5)
EOSINOPHIL NFR BLD: 4 % (ref 0–8)
ERYTHROCYTE [DISTWIDTH] IN BLOOD BY AUTOMATED COUNT: 11.9 % (ref 11.5–14.5)
EST. GFR  (AFRICAN AMERICAN): >60 ML/MIN/1.73 M^2
EST. GFR  (NON AFRICAN AMERICAN): >60 ML/MIN/1.73 M^2
GLUCOSE SERPL-MCNC: 92 MG/DL (ref 70–110)
HCT VFR BLD AUTO: 43.5 % (ref 40–54)
HGB BLD-MCNC: 14.3 G/DL (ref 14–18)
IMM GRANULOCYTES # BLD AUTO: 0.02 K/UL (ref 0–0.04)
IMM GRANULOCYTES NFR BLD AUTO: 0.3 % (ref 0–0.5)
LYMPHOCYTES # BLD AUTO: 2.1 K/UL (ref 1–4.8)
LYMPHOCYTES NFR BLD: 32.7 % (ref 18–48)
MCH RBC QN AUTO: 31.6 PG (ref 27–31)
MCHC RBC AUTO-ENTMCNC: 32.9 G/DL (ref 32–36)
MCV RBC AUTO: 96 FL (ref 82–98)
MONOCYTES # BLD AUTO: 0.5 K/UL (ref 0.3–1)
MONOCYTES NFR BLD: 7.6 % (ref 4–15)
NEUTROPHILS # BLD AUTO: 3.5 K/UL (ref 1.8–7.7)
NEUTROPHILS NFR BLD: 54 % (ref 38–73)
NRBC BLD-RTO: 0 /100 WBC
PLATELET # BLD AUTO: 54 K/UL (ref 150–350)
PMV BLD AUTO: 10.1 FL (ref 9.2–12.9)
POTASSIUM SERPL-SCNC: 4.5 MMOL/L (ref 3.5–5.1)
PROT SERPL-MCNC: 7.5 G/DL (ref 6–8.4)
RBC # BLD AUTO: 4.53 M/UL (ref 4.6–6.2)
SODIUM SERPL-SCNC: 140 MMOL/L (ref 136–145)
WBC # BLD AUTO: 6.48 K/UL (ref 3.9–12.7)

## 2020-11-16 PROCEDURE — 83540 ASSAY OF IRON: CPT

## 2020-11-16 PROCEDURE — 80053 COMPREHEN METABOLIC PANEL: CPT

## 2020-11-16 PROCEDURE — 86038 ANTINUCLEAR ANTIBODIES: CPT

## 2020-11-16 PROCEDURE — 82728 ASSAY OF FERRITIN: CPT

## 2020-11-16 PROCEDURE — 85025 COMPLETE CBC W/AUTO DIFF WBC: CPT

## 2020-11-16 PROCEDURE — 36415 COLL VENOUS BLD VENIPUNCTURE: CPT

## 2020-11-16 PROCEDURE — 82607 VITAMIN B-12: CPT

## 2020-11-17 ENCOUNTER — OFFICE VISIT (OUTPATIENT)
Dept: OTOLARYNGOLOGY | Facility: CLINIC | Age: 60
End: 2020-11-17
Payer: COMMERCIAL

## 2020-11-17 VITALS
DIASTOLIC BLOOD PRESSURE: 87 MMHG | HEART RATE: 79 BPM | TEMPERATURE: 98 F | WEIGHT: 147.25 LBS | BODY MASS INDEX: 23.07 KG/M2 | SYSTOLIC BLOOD PRESSURE: 131 MMHG

## 2020-11-17 DIAGNOSIS — J34.2 DEVIATED SEPTUM: ICD-10-CM

## 2020-11-17 LAB
ANA SER QL IF: NORMAL
FERRITIN SERPL-MCNC: 107 NG/ML (ref 20–300)
IRON SERPL-MCNC: 150 UG/DL (ref 45–160)
SATURATED IRON: 36 % (ref 20–50)
TOTAL IRON BINDING CAPACITY: 411 UG/DL (ref 250–450)
TRANSFERRIN SERPL-MCNC: 278 MG/DL (ref 200–375)
VIT B12 SERPL-MCNC: 1842 PG/ML (ref 210–950)

## 2020-11-17 PROCEDURE — 99202 PR OFFICE/OUTPT VISIT, NEW, LEVL II, 15-29 MIN: ICD-10-PCS | Mod: S$GLB,,, | Performed by: OTOLARYNGOLOGY

## 2020-11-17 PROCEDURE — 99202 OFFICE O/P NEW SF 15 MIN: CPT | Mod: S$GLB,,, | Performed by: OTOLARYNGOLOGY

## 2020-11-17 PROCEDURE — 3008F BODY MASS INDEX DOCD: CPT | Mod: CPTII,S$GLB,, | Performed by: OTOLARYNGOLOGY

## 2020-11-17 PROCEDURE — 1125F AMNT PAIN NOTED PAIN PRSNT: CPT | Mod: S$GLB,,, | Performed by: OTOLARYNGOLOGY

## 2020-11-17 PROCEDURE — 1125F PR PAIN SEVERITY QUANTIFIED, PAIN PRESENT: ICD-10-PCS | Mod: S$GLB,,, | Performed by: OTOLARYNGOLOGY

## 2020-11-17 PROCEDURE — 99999 PR PBB SHADOW E&M-EST. PATIENT-LVL III: CPT | Mod: PBBFAC,,, | Performed by: OTOLARYNGOLOGY

## 2020-11-17 PROCEDURE — 99999 PR PBB SHADOW E&M-EST. PATIENT-LVL III: ICD-10-PCS | Mod: PBBFAC,,, | Performed by: OTOLARYNGOLOGY

## 2020-11-17 PROCEDURE — 3008F PR BODY MASS INDEX (BMI) DOCUMENTED: ICD-10-PCS | Mod: CPTII,S$GLB,, | Performed by: OTOLARYNGOLOGY

## 2020-11-17 NOTE — PROGRESS NOTES
Referring Provider:    Nancy Basurto Md  [address] [facility] [place] [address]  Subjective:   Patient: Jaxson Burch Jr. [id number], :1960   Visit date:2020 9:49 AM    Chief Complaint:  Other (Deviated septum)    HPI:  Jaxson is a 60 y.o. male who I was asked to see in consultation for evaluation of the following issue(s):     Was having significant congestion and post nasal drip  Now has dramatically cut back smoking  No nasal obstruction  No nasal drainage/facial pressure/pain    Review of Systems:  -     Allergic/Immunologic: has No Known Allergies..  -     Constitutional: Current temp: 97.9 °F (36.6 °C) (Temporal)    Pertinent PMH: none    Objective:     Physical Exam:  Vitals:  /87   Pulse 79   Temp 97.9 °F (36.6 °C) (Temporal)   Wt 66.8 kg (147 lb 4.3 oz)   BMI 23.07 kg/m²   General appearance:  Well developed, well nourished    Eyes:  Extraocular motions intact, PERRL    Communication:  no hoarseness, no dysphonia    Ears:  Otoscopy of external auditory canals and tympanic membranes was normal, clinical speech reception thresholds grossly intact, no mass/lesion of auricle.  Nose:  No masses/lesions of external nose, nasal mucosa, and turbinates were within normal limits. SD to left  Mouth:  No mass/lesion of lips, teeth, gums, hard/soft palate, tongue, tonsils, or oropharynx.    Cardiovascular:  No pedal edema; Radial Pulses +2     Neck & Lymphatics:  No cervical lymphadenopathy, no neck mass/crepitus/ asymmetry, trachea is midline, no thyroid enlargement/tenderness/mass.    Psych: Oriented x3,  Alert with normal mood and affect.     Respiration/Chest:  Symmetric expansion during respiration, normal respiratory effort.    Skin:  Warm and intact. No ulcerations of face, scalp, neck.      Assessment & Plan:   Deviated septum  -     Ambulatory referral/consult to ENT      Discussed that septal deviation is a benign state and if there is no significant nasal obstruction, no  intervention is needed.  Currently w/o any significant sinonasal complaints.     We discussed his medical conditions, treatments and plan.  Jaxson should return to clinic if any issues arise (symptoms worsen or persist), otherwise we will see him back in the clinic only as needed.      Thank you for allowing me to participate in the care of Jaxson.       Lev Nava MD, FACS [facility]   [facility]  [address]  [place] [address]  P: [phone]  F: [phone]

## 2020-11-18 ENCOUNTER — OFFICE VISIT (OUTPATIENT)
Dept: HEMATOLOGY/ONCOLOGY | Facility: CLINIC | Age: 60
End: 2020-11-18
Payer: COMMERCIAL

## 2020-11-18 VITALS
DIASTOLIC BLOOD PRESSURE: 79 MMHG | HEIGHT: 67 IN | OXYGEN SATURATION: 97 % | SYSTOLIC BLOOD PRESSURE: 128 MMHG | HEART RATE: 93 BPM | TEMPERATURE: 98 F | WEIGHT: 145.31 LBS | RESPIRATION RATE: 14 BRPM | BODY MASS INDEX: 22.81 KG/M2

## 2020-11-18 DIAGNOSIS — D69.3 CHRONIC ITP (IDIOPATHIC THROMBOCYTOPENIA): Primary | ICD-10-CM

## 2020-11-18 PROCEDURE — 1126F PR PAIN SEVERITY QUANTIFIED, NO PAIN PRESENT: ICD-10-PCS | Mod: S$GLB,,, | Performed by: INTERNAL MEDICINE

## 2020-11-18 PROCEDURE — 1126F AMNT PAIN NOTED NONE PRSNT: CPT | Mod: S$GLB,,, | Performed by: INTERNAL MEDICINE

## 2020-11-18 PROCEDURE — 99214 PR OFFICE/OUTPT VISIT, EST, LEVL IV, 30-39 MIN: ICD-10-PCS | Mod: S$GLB,,, | Performed by: INTERNAL MEDICINE

## 2020-11-18 PROCEDURE — 99214 OFFICE O/P EST MOD 30 MIN: CPT | Mod: S$GLB,,, | Performed by: INTERNAL MEDICINE

## 2020-11-18 PROCEDURE — 99999 PR PBB SHADOW E&M-EST. PATIENT-LVL III: CPT | Mod: PBBFAC,,, | Performed by: INTERNAL MEDICINE

## 2020-11-18 PROCEDURE — 3008F BODY MASS INDEX DOCD: CPT | Mod: CPTII,S$GLB,, | Performed by: INTERNAL MEDICINE

## 2020-11-18 PROCEDURE — 3008F PR BODY MASS INDEX (BMI) DOCUMENTED: ICD-10-PCS | Mod: CPTII,S$GLB,, | Performed by: INTERNAL MEDICINE

## 2020-11-18 PROCEDURE — 99999 PR PBB SHADOW E&M-EST. PATIENT-LVL III: ICD-10-PCS | Mod: PBBFAC,,, | Performed by: INTERNAL MEDICINE

## 2020-11-18 RX ORDER — MELOXICAM 15 MG/1
TABLET ORAL
COMMUNITY
Start: 2020-10-27 | End: 2020-12-15 | Stop reason: ALTCHOICE

## 2020-11-18 NOTE — PROGRESS NOTES
Subjective:       Patient ID: Jaxson Burch Jr. is a 60 y.o. male.    Chief Complaint: Follow-up    HPI This is a 60 year old AA male who come for follow up of his chronic ITP.\ .  Mr. Jaxson Burch Jr. is a 59 y.o. gentleman with ITP completed weekly ritoxan x 8 treatments in June 2019.  Has had CBC monitored periodically. On occasions, like 2/2020, the platelet count dropped but bounced back on its own shortly afterwards. He has a normal abdominal US, normal ANAs  and negative hepatitis/HIV serology. He says he ahs this for at  least 14 years     No history exists.          Past Medical History:   Diagnosis Date    Arthritis      Gastritis      ITP (idiopathic thrombocytopenic purpura)             Family History   Problem Relation Age of Onset    Pancreatic cancer Mother      Colon cancer Neg Hx       Social History               Socioeconomic History    Marital status:        Spouse name: Not on file    Number of children: Not on file    Years of education: Not on file    Highest education level: Not on file   Occupational History    Not on file   Social Needs    Financial resource strain: Not on file    Food insecurity:       Worry: Not on file       Inability: Not on file    Transportation needs:       Medical: Not on file       Non-medical: Not on file   Tobacco Use    Smoking status: Current Every Day Smoker       Packs/day: 0.50       Years: 40.00       Pack years: 20.00       Types: Cigarettes, Cigars    Smokeless tobacco: Never Used   Substance and Sexual Activity    Alcohol use: Yes       Alcohol/week: 0.0 standard drinks       Comment: socially    Drug use: No    Sexual activity: Yes       Partners: Female   Lifestyle    Physical activity:       Days per week: Not on file       Minutes per session: Not on file    Stress: Not on file   Relationships    Social connections:       Talks on phone: Not on file       Gets together: Not on file       Attends Scientology service: Not on  file       Active member of club or organization: Not on file       Attends meetings of clubs or organizations: Not on file       Relationship status: Not on file   Other Topics Concern    Not on file   Social History Narrative    Not on file              Past Surgical History:   Procedure Laterality Date    COLONOSCOPY N/A 11/29/2018     Procedure: COLONOSCOPY;  Surgeon: Brett Townsend MD;  Location: [facility];  Service: Endoscopy;  Laterality: N/A;    JOINT REPLACEMENT Right       hip     Current Medications         Review of Systems   Constitutional: Negative.  Negative for fatigue.   Eyes: Negative.    Cardiovascular: Negative.  Negative for chest pain.   Gastrointestinal: Negative for abdominal pain and nausea.   Genitourinary: Negative.  Negative for hematuria.   Musculoskeletal: Negative.    Integumentary:  Negative.   Neurological: Negative.    Psychiatric/Behavioral: Negative.          Objective:      Physical Exam  Constitutional:       Appearance: He is well-developed.   HENT:      Head: Normocephalic.      Mouth/Throat:      Pharynx: No oropharyngeal exudate.   Eyes:      Pupils: Pupils are equal, round, and reactive to light.   Neck:      Thyroid: No thyromegaly.   Cardiovascular:      Rate and Rhythm: Normal rate and regular rhythm.      Heart sounds: Normal heart sounds. No murmur. No gallop.    Pulmonary:      Effort: No respiratory distress.      Breath sounds: No wheezing or rales.   Abdominal:      General: Bowel sounds are normal. There is no distension.      Palpations: Abdomen is soft. There is no mass.      Tenderness: There is no guarding or rebound.   Musculoskeletal: Normal range of motion.   Lymphadenopathy:      Cervical: No cervical adenopathy.   Skin:     General: Skin is warm and dry.   Neurological:      Mental Status: He is alert and oriented to person, place, and time.   Psychiatric:         Behavior: Behavior normal.         Wt Readings from Last 3 Encounters:   11/18/20 65.9 kg  (145 lb 4.5 oz)   11/17/20 66.8 kg (147 lb 4.3 oz)   10/23/20 66.6 kg (146 lb 13.2 oz)     Temp Readings from Last 3 Encounters:   11/18/20 97.9 °F (36.6 °C)   11/17/20 97.9 °F (36.6 °C) (Temporal)   10/23/20 97.1 °F (36.2 °C) (Tympanic)     BP Readings from Last 3 Encounters:   11/18/20 128/79   11/17/20 131/87   10/23/20 124/84     Pulse Readings from Last 3 Encounters:   11/18/20 93   11/17/20 79   10/23/20 99       Assessment:       1. Chronic ITP (idiopathic thrombocytopenia)        Plan:       Lab Results   Component Value Date    WBC 6.48 11/16/2020    HGB 14.3 11/16/2020    HCT 43.5 11/16/2020    MCV 96 11/16/2020    PLT 54 (L) 11/16/2020       Platelet count   is down. He has no symptoms, no bleeding or petechiae.  He has bounced back on his own  In the past. No treatment for time being. Return in 2 months with a cbc

## 2020-12-11 ENCOUNTER — LAB VISIT (OUTPATIENT)
Dept: LAB | Facility: HOSPITAL | Age: 60
End: 2020-12-11
Attending: NURSE PRACTITIONER
Payer: MEDICARE

## 2020-12-11 DIAGNOSIS — D64.9 ANEMIA, UNSPECIFIED TYPE: ICD-10-CM

## 2020-12-11 DIAGNOSIS — D69.6 THROMBOCYTOPENIA: ICD-10-CM

## 2020-12-11 DIAGNOSIS — D69.3 CHRONIC ITP (IDIOPATHIC THROMBOCYTOPENIA): ICD-10-CM

## 2020-12-11 LAB
BASOPHILS # BLD AUTO: 0.09 K/UL (ref 0–0.2)
BASOPHILS NFR BLD: 1.6 % (ref 0–1.9)
DIFFERENTIAL METHOD: ABNORMAL
EOSINOPHIL # BLD AUTO: 0.2 K/UL (ref 0–0.5)
EOSINOPHIL NFR BLD: 3.7 % (ref 0–8)
ERYTHROCYTE [DISTWIDTH] IN BLOOD BY AUTOMATED COUNT: 11.5 % (ref 11.5–14.5)
HCT VFR BLD AUTO: 41.2 % (ref 40–54)
HGB BLD-MCNC: 13.9 G/DL (ref 14–18)
IMM GRANULOCYTES # BLD AUTO: 0.01 K/UL (ref 0–0.04)
IMM GRANULOCYTES NFR BLD AUTO: 0.2 % (ref 0–0.5)
LYMPHOCYTES # BLD AUTO: 2.2 K/UL (ref 1–4.8)
LYMPHOCYTES NFR BLD: 38.4 % (ref 18–48)
MCH RBC QN AUTO: 31.2 PG (ref 27–31)
MCHC RBC AUTO-ENTMCNC: 33.7 G/DL (ref 32–36)
MCV RBC AUTO: 93 FL (ref 82–98)
MONOCYTES # BLD AUTO: 0.4 K/UL (ref 0.3–1)
MONOCYTES NFR BLD: 7.2 % (ref 4–15)
NEUTROPHILS # BLD AUTO: 2.8 K/UL (ref 1.8–7.7)
NEUTROPHILS NFR BLD: 48.9 % (ref 38–73)
NRBC BLD-RTO: 0 /100 WBC
PLATELET # BLD AUTO: 56 K/UL (ref 150–350)
PMV BLD AUTO: 8.6 FL (ref 9.2–12.9)
RBC # BLD AUTO: 4.45 M/UL (ref 4.6–6.2)
WBC # BLD AUTO: 5.7 K/UL (ref 3.9–12.7)

## 2020-12-11 PROCEDURE — 85025 COMPLETE CBC W/AUTO DIFF WBC: CPT

## 2020-12-11 PROCEDURE — 36415 COLL VENOUS BLD VENIPUNCTURE: CPT

## 2020-12-15 ENCOUNTER — OFFICE VISIT (OUTPATIENT)
Dept: INTERNAL MEDICINE | Facility: CLINIC | Age: 60
End: 2020-12-15
Payer: COMMERCIAL

## 2020-12-15 VITALS
BODY MASS INDEX: 23.22 KG/M2 | HEART RATE: 76 BPM | SYSTOLIC BLOOD PRESSURE: 139 MMHG | HEIGHT: 67 IN | DIASTOLIC BLOOD PRESSURE: 98 MMHG | TEMPERATURE: 96 F | OXYGEN SATURATION: 98 % | WEIGHT: 147.94 LBS

## 2020-12-15 DIAGNOSIS — Z12.2 ENCOUNTER FOR SCREENING FOR MALIGNANT NEOPLASM OF RESPIRATORY ORGANS: ICD-10-CM

## 2020-12-15 DIAGNOSIS — I10 HYPERTENSION, ESSENTIAL: Primary | ICD-10-CM

## 2020-12-15 DIAGNOSIS — F10.90 CHRONIC ALCOHOL USE: ICD-10-CM

## 2020-12-15 DIAGNOSIS — Z72.0 TOBACCO USE: ICD-10-CM

## 2020-12-15 PROCEDURE — 1126F PR PAIN SEVERITY QUANTIFIED, NO PAIN PRESENT: ICD-10-PCS | Mod: S$GLB,,, | Performed by: FAMILY MEDICINE

## 2020-12-15 PROCEDURE — 99999 PR PBB SHADOW E&M-EST. PATIENT-LVL IV: CPT | Mod: PBBFAC,,, | Performed by: FAMILY MEDICINE

## 2020-12-15 PROCEDURE — 99999 PR PBB SHADOW E&M-EST. PATIENT-LVL IV: ICD-10-PCS | Mod: PBBFAC,,, | Performed by: FAMILY MEDICINE

## 2020-12-15 PROCEDURE — 99213 PR OFFICE/OUTPT VISIT, EST, LEVL III, 20-29 MIN: ICD-10-PCS | Mod: S$GLB,,, | Performed by: FAMILY MEDICINE

## 2020-12-15 PROCEDURE — 1126F AMNT PAIN NOTED NONE PRSNT: CPT | Mod: S$GLB,,, | Performed by: FAMILY MEDICINE

## 2020-12-15 PROCEDURE — 99213 OFFICE O/P EST LOW 20 MIN: CPT | Mod: S$GLB,,, | Performed by: FAMILY MEDICINE

## 2020-12-15 PROCEDURE — 3008F PR BODY MASS INDEX (BMI) DOCUMENTED: ICD-10-PCS | Mod: CPTII,S$GLB,, | Performed by: FAMILY MEDICINE

## 2020-12-15 PROCEDURE — 3008F BODY MASS INDEX DOCD: CPT | Mod: CPTII,S$GLB,, | Performed by: FAMILY MEDICINE

## 2020-12-15 RX ORDER — AMLODIPINE BESYLATE 5 MG/1
5 TABLET ORAL DAILY
Qty: 30 TABLET | Refills: 5 | Status: SHIPPED | OUTPATIENT
Start: 2020-12-15 | End: 2021-02-02 | Stop reason: SDUPTHER

## 2020-12-15 RX ORDER — DOXYLAMINE SUCCINATE 25 MG/1
50 TABLET ORAL NIGHTLY PRN
COMMUNITY
End: 2022-02-07

## 2020-12-15 RX ORDER — MELOXICAM 7.5 MG/1
7.5 TABLET ORAL DAILY PRN
COMMUNITY

## 2020-12-15 NOTE — PROGRESS NOTES
Subjective:       Patient ID: Jaxson Burch Jr. is a 60 y.o. male.    Chief Complaint: Follow-up (htn/med)    The patient's last visit with me was on 10/23/2020.  Here for 6 week recheck for hypertension.  He was on amlodipine for less than 2 weeks at his last visit.  On 2.5 mg and today's visit is to assess for control and possible increase in dose.  Patient with excess alcohol use. Counseled last visit re moderate level use, affect on BP.  Pt on chronic opioid tx by Dr Rodas - percocet 10 QID for cervical radiculitis.    He brings in his meter - readings reviewed: 130/96, 128/95, 132/93, 125/98, 119/84, 120/90, 118/89, 118/87    BP Readings from Last 3 Encounters:  12/15/20 : 118/88, 123/94, 139/98, (his meter 130/96)  11/18/20 : 128/79  10/06/20 : 146/98    Health Maintenance Due:  TETANUS VACCINE due on 06/05/1979  Shingles Vaccine(1 of 2) due on 06/05/2011    Review of Systems   Constitutional: Negative for fever.   Respiratory: Negative for cough and shortness of breath.    Cardiovascular: Negative for chest pain and leg swelling.   Psychiatric/Behavioral: Negative for sleep disturbance.       Objective:      Physical Exam  Constitutional:       Appearance: Normal appearance. He is well-developed and normal weight.   HENT:      Head: Normocephalic and atraumatic.   Cardiovascular:      Rate and Rhythm: Normal rate and regular rhythm.      Heart sounds: Normal heart sounds.   Pulmonary:      Effort: Pulmonary effort is normal. No respiratory distress.      Breath sounds: Normal breath sounds.   Musculoskeletal:      Right lower leg: No edema.      Left lower leg: No edema.   Skin:     General: Skin is warm and dry.   Neurological:      Mental Status: He is alert and oriented to person, place, and time.   Psychiatric:         Behavior: Behavior normal.           Assessment/Plan:     1. Hypertension, essential  amLODIPine (NORVASC) 5 MG tablet   2. Chronic alcohol use     3. Tobacco use  CT Chest Lung  Screening Low Dose   4. Encounter for screening for malignant neoplasm of respiratory organs  CT Chest Lung Screening Low Dose   increase to 5 mg amlodipine, recheck 6 weeks with Dr Lantigua

## 2020-12-17 ENCOUNTER — PATIENT OUTREACH (OUTPATIENT)
Dept: ADMINISTRATIVE | Facility: OTHER | Age: 60
End: 2020-12-17

## 2020-12-17 ENCOUNTER — HOSPITAL ENCOUNTER (OUTPATIENT)
Dept: RADIOLOGY | Facility: HOSPITAL | Age: 60
Discharge: HOME | End: 2020-12-17
Attending: PODIATRIST
Payer: COMMERCIAL

## 2020-12-17 ENCOUNTER — OFFICE VISIT (OUTPATIENT)
Dept: PODIATRY | Facility: CLINIC | Age: 60
End: 2020-12-17
Payer: COMMERCIAL

## 2020-12-17 VITALS
HEART RATE: 93 BPM | SYSTOLIC BLOOD PRESSURE: 121 MMHG | DIASTOLIC BLOOD PRESSURE: 79 MMHG | BODY MASS INDEX: 23.54 KG/M2 | WEIGHT: 150 LBS | HEIGHT: 67 IN

## 2020-12-17 DIAGNOSIS — M77.8 ENTHESOPATHY OF LEFT FOOT: ICD-10-CM

## 2020-12-17 DIAGNOSIS — S96.912A REPETITIVE STRAIN INJURY OF LEFT FOOT, INITIAL ENCOUNTER: Primary | ICD-10-CM

## 2020-12-17 DIAGNOSIS — M76.72 PERONEAL TENDINITIS, LEFT: ICD-10-CM

## 2020-12-17 DIAGNOSIS — S96.912A REPETITIVE STRAIN INJURY OF LEFT FOOT, INITIAL ENCOUNTER: ICD-10-CM
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
## 2020-12-17 DIAGNOSIS — M21.6X2 ACQUIRED SUPINATION OF LEFT FOOT: ICD-10-CM
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
## 2020-12-17 PROCEDURE — 73630 X-RAY EXAM OF FOOT: CPT | Mod: 26,LT,, | Performed by: RADIOLOGY

## 2020-12-17 PROCEDURE — 99999 PR PBB SHADOW E&M-EST. PATIENT-LVL III: CPT | Mod: PBBFAC,,, | Performed by: PODIATRIST

## 2020-12-17 PROCEDURE — 73630 XR FOOT COMPLETE 3 VIEW LEFT: ICD-10-PCS | Mod: 26,LT,, | Performed by: RADIOLOGY

## 2020-12-17 PROCEDURE — 99999 PR PBB SHADOW E&M-EST. PATIENT-LVL III: ICD-10-PCS | Mod: PBBFAC,,, | Performed by: PODIATRIST

## 2020-12-17 PROCEDURE — 1125F PR PAIN SEVERITY QUANTIFIED, PAIN PRESENT: ICD-10-PCS | Mod: S$GLB,,, | Performed by: PODIATRIST

## 2020-12-17 PROCEDURE — 99203 PR OFFICE/OUTPT VISIT, NEW, LEVL III, 30-44 MIN: ICD-10-PCS | Mod: S$GLB,,, | Performed by: PODIATRIST

## 2020-12-17 PROCEDURE — 3008F BODY MASS INDEX DOCD: CPT | Mod: CPTII,S$GLB,, | Performed by: PODIATRIST

## 2020-12-17 PROCEDURE — 99203 OFFICE O/P NEW LOW 30 MIN: CPT | Mod: S$GLB,,, | Performed by: PODIATRIST

## 2020-12-17 PROCEDURE — 73630 X-RAY EXAM OF FOOT: CPT | Mod: TC,LT

## 2020-12-17 PROCEDURE — 3008F PR BODY MASS INDEX (BMI) DOCUMENTED: ICD-10-PCS | Mod: CPTII,S$GLB,, | Performed by: PODIATRIST

## 2020-12-17 PROCEDURE — 1125F AMNT PAIN NOTED PAIN PRSNT: CPT | Mod: S$GLB,,, | Performed by: PODIATRIST

## 2020-12-17 NOTE — PROGRESS NOTES
Subjective:       Patient ID: Jaxson Burch Jr. is a 60 y.o. male.    Chief Complaint: Foot Pain (5/10, tennis w/socks, non diabetic, pcp Dr. Basurto.)      HPI: Jaxson Burch Jr. presents to the clinic today for evaluation concerning stated moderate pains to the left foot/ankle at the 5th metatarsal. Patient states pains are approx. 5/10. Pains are described as moderate. Pains have been present for duration of several weeks. Patient states the pains are exacerbated with walking and standing and prolonged activities. States no prior medical evaluation by a MD/DO/DPM/NP. States no NSAIDs. Trauma is not stated. Patient's Primary Care Provider is Nancy Basurto MD.  Patient is an avid runner, running approximately 45 min daily, 7 days a week.  Patient is in good shape.  Runs with [employer] sneakers.    Review of patient's allergies indicates:  No Known Allergies    Past Medical History:   Diagnosis Date    Arthritis     Gastritis     ITP (idiopathic thrombocytopenic purpura)        Family History   Problem Relation Age of Onset    Pancreatic cancer Mother     Colon cancer Neg Hx        Social History     Socioeconomic History    Marital status:      Spouse name: Not on file    Number of children: Not on file    Years of education: Not on file    Highest education level: Not on file   Occupational History    Not on file   Social Needs    Financial resource strain: Not on file    Food insecurity     Worry: Not on file     Inability: Not on file    Transportation needs     Medical: Not on file     Non-medical: Not on file   Tobacco Use    Smoking status: Current Every Day Smoker     Packs/day: 0.50     Years: 40.00     Pack years: 20.00     Types: Cigarettes, Cigars    Smokeless tobacco: Never Used   Substance and Sexual Activity    Alcohol use: Yes     Alcohol/week: 0.0 standard drinks     Comment: socially    Drug use: No    Sexual activity: Yes     Partners: Female   Lifestyle    Physical  "activity     Days per week: Not on file     Minutes per session: Not on file    Stress: Not at all   Relationships    Social connections     Talks on phone: Not on file     Gets together: Not on file     Attends Rastafarian service: Not on file     Active member of club or organization: Not on file     Attends meetings of clubs or organizations: Not on file     Relationship status: Not on file   Other Topics Concern    Not on file   Social History Narrative    Not on file       Past Surgical History:   Procedure Laterality Date    COLONOSCOPY N/A 11/29/2018    Procedure: COLONOSCOPY;  Surgeon: Brett Townsend MD;  Location: [facility];  Service: Endoscopy;  Laterality: N/A;    JOINT REPLACEMENT Right     hip       Review of Systems   Constitutional: Negative for chills, fatigue and fever.   HENT: Negative for hearing loss.    Eyes: Negative for photophobia and visual disturbance.   Respiratory: Negative for cough, chest tightness, shortness of breath and wheezing.    Cardiovascular: Negative for chest pain and palpitations.   Gastrointestinal: Negative for constipation, diarrhea, nausea and vomiting.   Endocrine: Negative for cold intolerance and heat intolerance.   Genitourinary: Negative for flank pain.   Musculoskeletal: Positive for gait problem. Negative for neck pain and neck stiffness.   Skin: Negative for wound.   Neurological: Negative for light-headedness and headaches.   Psychiatric/Behavioral: Negative for sleep disturbance.         Objective:   /79   Pulse 93   Ht 5' 7" (1.702 m)   Wt 68.1 kg (150 lb 0.4 oz)   BMI 23.50 kg/m²     Physical Exam    LOWER EXTREMITY PHYSICAL EXAMINATION  VASCULAR: On the left foot, the dorsalis pedis pulse is 2/4 and the posterior tibial pulse is 2/4. Capillary refill time is less than 3 seconds. Hair growth is present on the dorsum of the foot and at the digits. No rubor is present. Proximal to distal temperature is warm to warm.    DERMATOLOGY: Skin is supple, " dry and intact.     NEUROLOGY: Sensation to light touch is intact. Proprioception is intact. Sensation to pin prick is intact.     ORTHOPEDIC: Manual Muscle Testing is 5/5 in all planes on the left, without pains, with and without resistance.  Discomfort to palpation, left 5th metatarsal bone, mid diaphysis.  Slight discomfort to palpation on the course of the peroneal tendon.  No edema is noted.  Enthesopathy, lateral aspect 5th metatarsal head.  No discomfort to palpation.  Gastroc equinus.  Rectus and anatomic hindfoot and ankle alignment.    Assessment:     1. Repetitive strain injury of left foot, initial encounter    2. Enthesopathy of left foot    3. Peroneal tendinitis, left    4. Acquired supination of left foot          Plan:     Repetitive strain injury of left foot, initial encounter  -     X-Ray Foot Complete Left; Future; Expected date: 12/17/2020    Enthesopathy of left foot  -     X-Ray Foot Complete Left; Future; Expected date: 12/17/2020    Peroneal tendinitis, left    Acquired supination of left foot        Patient likely runs with a supinated attitude of the hindfoot/ankle.  As such, he will need shoe gear which compensates for such.  He is directed to purchase the appropriate virocytka running sneakers.    Did discuss proper and supportive shoe gear in detail and at length with the patient.  These are shoes with firm and robust arch support; medial counter.  Shoes which only bend at the metatarsophalangeal joint and which are rigid in the midfoot and hindfoot. Patient urged to purchase running type or cross training type shoes gear which are designed for pronation control.    NSAIDs as needed.    Follow up as needed or in the event of symptomology.     Please obtain x-ray evaluation to rule out stress injury.        Future Appointments   Date Time Provider Department Center   12/18/2020  3:10 PM [facility] CT1 LIMIT 500 LBS [facility] CT SCAN [facility]   1/20/2021  9:55 AM LABORATORY, [facility] LAB [facility]    1/20/2021 10:00 AM Isiah Edgar MD [facility] HEM ONC [place]   1/26/2021  1:30 PM Salomón Lantigua MD ONLC [facility]

## 2020-12-17 NOTE — PROGRESS NOTES
Health Maintenance Due   Topic Date Due    TETANUS VACCINE  06/05/1979    Shingles Vaccine (1 of 2) 06/05/2011     Updates were requested from care everywhere.  Chart was reviewed for overdue Proactive [facility] Encounters (JULIA) topics (CRS, Breast Cancer Screening, Eye exam)  Health Maintenance has been updated.  LINKS immunization registry triggered.  LINKS not responding.

## 2020-12-18 ENCOUNTER — HOSPITAL ENCOUNTER (OUTPATIENT)
Dept: RADIOLOGY | Facility: HOSPITAL | Age: 60
Discharge: HOME | End: 2020-12-18
Attending: FAMILY MEDICINE
Payer: COMMERCIAL

## 2020-12-18 DIAGNOSIS — Z12.2 ENCOUNTER FOR SCREENING FOR MALIGNANT NEOPLASM OF RESPIRATORY ORGANS: ICD-10-CM

## 2020-12-18 DIAGNOSIS — I70.90 ATHEROSCLEROSIS: Primary | ICD-10-CM

## 2020-12-18 DIAGNOSIS — Z72.0 TOBACCO USE: ICD-10-CM

## 2020-12-18 PROCEDURE — G0297 LDCT FOR LUNG CA SCREEN: HCPCS | Mod: 26,,, | Performed by: RADIOLOGY

## 2020-12-18 PROCEDURE — G0297 LDCT FOR LUNG CA SCREEN: HCPCS | Mod: TC

## 2020-12-18 PROCEDURE — G0297 CT CHEST LUNG SCREENING LOW DOSE: ICD-10-PCS | Mod: 26,,, | Performed by: RADIOLOGY

## 2020-12-18 RX ORDER — PRAVASTATIN SODIUM 40 MG/1
40 TABLET ORAL DAILY
Qty: 90 TABLET | Refills: 3 | Status: SHIPPED | OUTPATIENT
Start: 2020-12-18 | End: 2021-10-19 | Stop reason: SDUPTHER

## 2020-12-19 ENCOUNTER — PATIENT MESSAGE (OUTPATIENT)
Dept: INTERNAL MEDICINE | Facility: CLINIC | Age: 60
End: 2020-12-19

## 2020-12-22 ENCOUNTER — PATIENT MESSAGE (OUTPATIENT)
Dept: INTERNAL MEDICINE | Facility: CLINIC | Age: 60
End: 2020-12-22

## 2020-12-22 ENCOUNTER — PATIENT MESSAGE (OUTPATIENT)
Dept: HEMATOLOGY/ONCOLOGY | Facility: CLINIC | Age: 60
End: 2020-12-22

## 2020-12-22 ENCOUNTER — PATIENT MESSAGE (OUTPATIENT)
Dept: PODIATRY | Facility: CLINIC | Age: 60
End: 2020-12-22

## 2021-01-20 ENCOUNTER — OFFICE VISIT (OUTPATIENT)
Dept: HEMATOLOGY/ONCOLOGY | Facility: CLINIC | Age: 61
End: 2021-01-20
Payer: COMMERCIAL

## 2021-01-20 ENCOUNTER — LAB VISIT (OUTPATIENT)
Dept: LAB | Facility: HOSPITAL | Age: 61
End: 2021-01-20
Attending: INTERNAL MEDICINE
Payer: MEDICARE

## 2021-01-20 VITALS
DIASTOLIC BLOOD PRESSURE: 81 MMHG | WEIGHT: 149.25 LBS | SYSTOLIC BLOOD PRESSURE: 116 MMHG | HEART RATE: 84 BPM | HEIGHT: 67 IN | OXYGEN SATURATION: 98 % | BODY MASS INDEX: 23.43 KG/M2 | TEMPERATURE: 97 F

## 2021-01-20 DIAGNOSIS — Z86.2 HISTORY OF ITP: Primary | ICD-10-CM

## 2021-01-20 DIAGNOSIS — D69.3 CHRONIC ITP (IDIOPATHIC THROMBOCYTOPENIA): ICD-10-CM

## 2021-01-20 LAB
BASOPHILS # BLD AUTO: 0.09 K/UL (ref 0–0.2)
BASOPHILS NFR BLD: 1.3 % (ref 0–1.9)
DIFFERENTIAL METHOD: ABNORMAL
EOSINOPHIL # BLD AUTO: 0.3 K/UL (ref 0–0.5)
EOSINOPHIL NFR BLD: 4.3 % (ref 0–8)
ERYTHROCYTE [DISTWIDTH] IN BLOOD BY AUTOMATED COUNT: 12 % (ref 11.5–14.5)
HCT VFR BLD AUTO: 39.4 % (ref 40–54)
HGB BLD-MCNC: 12.8 G/DL (ref 14–18)
IMM GRANULOCYTES # BLD AUTO: 0.02 K/UL (ref 0–0.04)
IMM GRANULOCYTES NFR BLD AUTO: 0.3 % (ref 0–0.5)
LYMPHOCYTES # BLD AUTO: 2.3 K/UL (ref 1–4.8)
LYMPHOCYTES NFR BLD: 31.5 % (ref 18–48)
MCH RBC QN AUTO: 31.2 PG (ref 27–31)
MCHC RBC AUTO-ENTMCNC: 32.5 G/DL (ref 32–36)
MCV RBC AUTO: 96 FL (ref 82–98)
MONOCYTES # BLD AUTO: 0.6 K/UL (ref 0.3–1)
MONOCYTES NFR BLD: 8.3 % (ref 4–15)
NEUTROPHILS # BLD AUTO: 3.9 K/UL (ref 1.8–7.7)
NEUTROPHILS NFR BLD: 54.3 % (ref 38–73)
NRBC BLD-RTO: 0 /100 WBC
PLATELET # BLD AUTO: 117 K/UL (ref 150–350)
PMV BLD AUTO: 9.3 FL (ref 9.2–12.9)
RBC # BLD AUTO: 4.1 M/UL (ref 4.6–6.2)
WBC # BLD AUTO: 7.2 K/UL (ref 3.9–12.7)

## 2021-01-20 PROCEDURE — 99214 OFFICE O/P EST MOD 30 MIN: CPT | Mod: S$GLB,,, | Performed by: INTERNAL MEDICINE

## 2021-01-20 PROCEDURE — 99999 PR PBB SHADOW E&M-EST. PATIENT-LVL III: ICD-10-PCS | Mod: PBBFAC,,, | Performed by: INTERNAL MEDICINE

## 2021-01-20 PROCEDURE — 99214 PR OFFICE/OUTPT VISIT, EST, LEVL IV, 30-39 MIN: ICD-10-PCS | Mod: S$GLB,,, | Performed by: INTERNAL MEDICINE

## 2021-01-20 PROCEDURE — 85025 COMPLETE CBC W/AUTO DIFF WBC: CPT

## 2021-01-20 PROCEDURE — 36415 COLL VENOUS BLD VENIPUNCTURE: CPT

## 2021-01-20 PROCEDURE — 99213 OFFICE O/P EST LOW 20 MIN: CPT | Mod: PBBFAC | Performed by: INTERNAL MEDICINE

## 2021-01-20 PROCEDURE — 99999 PR PBB SHADOW E&M-EST. PATIENT-LVL III: CPT | Mod: PBBFAC,,, | Performed by: INTERNAL MEDICINE

## 2021-02-01 ENCOUNTER — PATIENT MESSAGE (OUTPATIENT)
Dept: INTERNAL MEDICINE | Facility: CLINIC | Age: 61
End: 2021-02-01

## 2021-02-01 ENCOUNTER — CLINICAL SUPPORT (OUTPATIENT)
Dept: SMOKING CESSATION | Facility: CLINIC | Age: 61
End: 2021-02-01
Payer: COMMERCIAL

## 2021-02-01 DIAGNOSIS — F17.200 NICOTINE DEPENDENCE: Primary | ICD-10-CM

## 2021-02-01 PROCEDURE — 99407 BEHAV CHNG SMOKING > 10 MIN: CPT | Mod: S$GLB,,,

## 2021-02-01 PROCEDURE — 99407 PR TOBACCO USE CESSATION INTENSIVE >10 MINUTES: ICD-10-PCS | Mod: S$GLB,,,

## 2021-02-02 ENCOUNTER — PATIENT MESSAGE (OUTPATIENT)
Dept: INTERNAL MEDICINE | Facility: CLINIC | Age: 61
End: 2021-02-02

## 2021-02-02 ENCOUNTER — OFFICE VISIT (OUTPATIENT)
Dept: INTERNAL MEDICINE | Facility: CLINIC | Age: 61
End: 2021-02-02
Payer: COMMERCIAL

## 2021-02-02 VITALS
WEIGHT: 149.25 LBS | HEIGHT: 67 IN | OXYGEN SATURATION: 98 % | DIASTOLIC BLOOD PRESSURE: 70 MMHG | BODY MASS INDEX: 23.43 KG/M2 | HEART RATE: 67 BPM | SYSTOLIC BLOOD PRESSURE: 118 MMHG | TEMPERATURE: 98 F

## 2021-02-02 DIAGNOSIS — I10 HYPERTENSION, ESSENTIAL: Primary | ICD-10-CM

## 2021-02-02 DIAGNOSIS — Z12.5 SCREENING FOR PROSTATE CANCER: ICD-10-CM

## 2021-02-02 DIAGNOSIS — D69.3 CHRONIC ITP (IDIOPATHIC THROMBOCYTOPENIA): ICD-10-CM

## 2021-02-02 DIAGNOSIS — Z28.39 IMMUNIZATION DEFICIENCY: ICD-10-CM

## 2021-02-02 DIAGNOSIS — R03.0 BLOOD PRESSURE ELEVATED WITHOUT HISTORY OF HTN: ICD-10-CM

## 2021-02-02 DIAGNOSIS — E78.5 HYPERLIPIDEMIA, UNSPECIFIED HYPERLIPIDEMIA TYPE: ICD-10-CM

## 2021-02-02 DIAGNOSIS — I70.90 ATHEROSCLEROSIS: ICD-10-CM

## 2021-02-02 PROCEDURE — 99999 PR PBB SHADOW E&M-EST. PATIENT-LVL IV: CPT | Mod: PBBFAC,,, | Performed by: FAMILY MEDICINE

## 2021-02-02 PROCEDURE — 3008F BODY MASS INDEX DOCD: CPT | Mod: CPTII,S$GLB,, | Performed by: FAMILY MEDICINE

## 2021-02-02 PROCEDURE — 3078F DIAST BP <80 MM HG: CPT | Mod: CPTII,S$GLB,, | Performed by: FAMILY MEDICINE

## 2021-02-02 PROCEDURE — 3078F PR MOST RECENT DIASTOLIC BLOOD PRESSURE < 80 MM HG: ICD-10-PCS | Mod: CPTII,S$GLB,, | Performed by: FAMILY MEDICINE

## 2021-02-02 PROCEDURE — 3074F SYST BP LT 130 MM HG: CPT | Mod: CPTII,S$GLB,, | Performed by: FAMILY MEDICINE

## 2021-02-02 PROCEDURE — 3074F PR MOST RECENT SYSTOLIC BLOOD PRESSURE < 130 MM HG: ICD-10-PCS | Mod: CPTII,S$GLB,, | Performed by: FAMILY MEDICINE

## 2021-02-02 PROCEDURE — 1125F PR PAIN SEVERITY QUANTIFIED, PAIN PRESENT: ICD-10-PCS | Mod: S$GLB,,, | Performed by: FAMILY MEDICINE

## 2021-02-02 PROCEDURE — 99999 PR PBB SHADOW E&M-EST. PATIENT-LVL IV: ICD-10-PCS | Mod: PBBFAC,,, | Performed by: FAMILY MEDICINE

## 2021-02-02 PROCEDURE — 99214 PR OFFICE/OUTPT VISIT, EST, LEVL IV, 30-39 MIN: ICD-10-PCS | Mod: S$GLB,,, | Performed by: FAMILY MEDICINE

## 2021-02-02 PROCEDURE — 1125F AMNT PAIN NOTED PAIN PRSNT: CPT | Mod: S$GLB,,, | Performed by: FAMILY MEDICINE

## 2021-02-02 PROCEDURE — 3008F PR BODY MASS INDEX (BMI) DOCUMENTED: ICD-10-PCS | Mod: CPTII,S$GLB,, | Performed by: FAMILY MEDICINE

## 2021-02-02 PROCEDURE — 99214 OFFICE O/P EST MOD 30 MIN: CPT | Mod: S$GLB,,, | Performed by: FAMILY MEDICINE

## 2021-02-02 RX ORDER — AMLODIPINE BESYLATE 5 MG/1
5 TABLET ORAL DAILY
Qty: 90 TABLET | Refills: 3 | Status: SHIPPED | OUTPATIENT
Start: 2021-02-02 | End: 2022-04-06

## 2021-02-03 ENCOUNTER — LAB VISIT (OUTPATIENT)
Dept: LAB | Facility: HOSPITAL | Age: 61
End: 2021-02-03
Attending: FAMILY MEDICINE
Payer: MEDICARE

## 2021-02-03 DIAGNOSIS — Z12.5 SCREENING FOR PROSTATE CANCER: ICD-10-CM

## 2021-02-03 DIAGNOSIS — E78.5 HYPERLIPIDEMIA, UNSPECIFIED HYPERLIPIDEMIA TYPE: ICD-10-CM

## 2021-02-03 PROCEDURE — 36415 COLL VENOUS BLD VENIPUNCTURE: CPT

## 2021-02-03 PROCEDURE — 80061 LIPID PANEL: CPT

## 2021-02-03 PROCEDURE — 84443 ASSAY THYROID STIM HORMONE: CPT

## 2021-02-03 PROCEDURE — 84153 ASSAY OF PSA TOTAL: CPT

## 2021-02-04 ENCOUNTER — PATIENT MESSAGE (OUTPATIENT)
Dept: INTERNAL MEDICINE | Facility: CLINIC | Age: 61
End: 2021-02-04

## 2021-02-04 LAB
CHOLEST SERPL-MCNC: 181 MG/DL (ref 120–199)
CHOLEST/HDLC SERPL: 3.2 {RATIO} (ref 2–5)
COMPLEXED PSA SERPL-MCNC: 1.8 NG/ML (ref 0–4)
HDLC SERPL-MCNC: 57 MG/DL (ref 40–75)
HDLC SERPL: 31.5 % (ref 20–50)
LDLC SERPL CALC-MCNC: 97.8 MG/DL (ref 63–159)
NONHDLC SERPL-MCNC: 124 MG/DL
TRIGL SERPL-MCNC: 131 MG/DL (ref 30–150)
TSH SERPL DL<=0.005 MIU/L-ACNC: 0.9 UIU/ML (ref 0.4–4)

## 2021-02-05 ENCOUNTER — PATIENT MESSAGE (OUTPATIENT)
Dept: INTERNAL MEDICINE | Facility: CLINIC | Age: 61
End: 2021-02-05

## 2021-02-08 ENCOUNTER — PATIENT MESSAGE (OUTPATIENT)
Dept: INTERNAL MEDICINE | Facility: CLINIC | Age: 61
End: 2021-02-08

## 2021-02-11 ENCOUNTER — PATIENT MESSAGE (OUTPATIENT)
Dept: INTERNAL MEDICINE | Facility: CLINIC | Age: 61
End: 2021-02-11

## 2021-02-11 ENCOUNTER — PATIENT MESSAGE (OUTPATIENT)
Dept: HEMATOLOGY/ONCOLOGY | Facility: CLINIC | Age: 61
End: 2021-02-11

## 2021-02-18 ENCOUNTER — PES CALL (OUTPATIENT)
Dept: ADMINISTRATIVE | Facility: CLINIC | Age: 61
End: 2021-02-18

## 2021-02-22 ENCOUNTER — PATIENT MESSAGE (OUTPATIENT)
Dept: INTERNAL MEDICINE | Facility: CLINIC | Age: 61
End: 2021-02-22

## 2021-03-01 ENCOUNTER — PES CALL (OUTPATIENT)
Dept: ADMINISTRATIVE | Facility: CLINIC | Age: 61
End: 2021-03-01

## 2021-03-11 ENCOUNTER — TELEPHONE (OUTPATIENT)
Dept: INTERNAL MEDICINE | Facility: CLINIC | Age: 61
End: 2021-03-11

## 2021-03-24 ENCOUNTER — OFFICE VISIT (OUTPATIENT)
Dept: HEMATOLOGY/ONCOLOGY | Facility: CLINIC | Age: 61
End: 2021-03-24
Payer: COMMERCIAL

## 2021-03-24 ENCOUNTER — LAB VISIT (OUTPATIENT)
Dept: LAB | Facility: HOSPITAL | Age: 61
End: 2021-03-24
Attending: ALLERGY & IMMUNOLOGY
Payer: MEDICARE

## 2021-03-24 VITALS
HEIGHT: 67 IN | WEIGHT: 151.88 LBS | HEART RATE: 81 BPM | SYSTOLIC BLOOD PRESSURE: 125 MMHG | BODY MASS INDEX: 23.84 KG/M2 | OXYGEN SATURATION: 97 % | DIASTOLIC BLOOD PRESSURE: 82 MMHG

## 2021-03-24 DIAGNOSIS — Z86.2 HISTORY OF ITP: Primary | ICD-10-CM

## 2021-03-24 DIAGNOSIS — D69.6 THROMBOCYTOPENIA: ICD-10-CM

## 2021-03-24 DIAGNOSIS — Z86.2 HISTORY OF ITP: ICD-10-CM

## 2021-03-24 LAB
BASOPHILS # BLD AUTO: 0.08 K/UL (ref 0–0.2)
BASOPHILS NFR BLD: 1.2 % (ref 0–1.9)
DIFFERENTIAL METHOD: ABNORMAL
EOSINOPHIL # BLD AUTO: 0.3 K/UL (ref 0–0.5)
EOSINOPHIL NFR BLD: 4 % (ref 0–8)
ERYTHROCYTE [DISTWIDTH] IN BLOOD BY AUTOMATED COUNT: 12.3 % (ref 11.5–14.5)
HCT VFR BLD AUTO: 38.8 % (ref 40–54)
HGB BLD-MCNC: 12.9 G/DL (ref 14–18)
IMM GRANULOCYTES # BLD AUTO: 0.02 K/UL (ref 0–0.04)
IMM GRANULOCYTES NFR BLD AUTO: 0.3 % (ref 0–0.5)
LYMPHOCYTES # BLD AUTO: 2.4 K/UL (ref 1–4.8)
LYMPHOCYTES NFR BLD: 37.5 % (ref 18–48)
MCH RBC QN AUTO: 31.5 PG (ref 27–31)
MCHC RBC AUTO-ENTMCNC: 33.2 G/DL (ref 32–36)
MCV RBC AUTO: 95 FL (ref 82–98)
MONOCYTES # BLD AUTO: 0.6 K/UL (ref 0.3–1)
MONOCYTES NFR BLD: 8.8 % (ref 4–15)
NEUTROPHILS # BLD AUTO: 3.1 K/UL (ref 1.8–7.7)
NEUTROPHILS NFR BLD: 48.2 % (ref 38–73)
NRBC BLD-RTO: 0 /100 WBC
PLATELET # BLD AUTO: 56 K/UL (ref 150–350)
PMV BLD AUTO: 8.7 FL (ref 9.2–12.9)
RBC # BLD AUTO: 4.09 M/UL (ref 4.6–6.2)
WBC # BLD AUTO: 6.45 K/UL (ref 3.9–12.7)

## 2021-03-24 PROCEDURE — 3079F PR MOST RECENT DIASTOLIC BLOOD PRESSURE 80-89 MM HG: ICD-10-PCS | Mod: CPTII,S$GLB,, | Performed by: INTERNAL MEDICINE

## 2021-03-24 PROCEDURE — 85025 COMPLETE CBC W/AUTO DIFF WBC: CPT | Performed by: INTERNAL MEDICINE

## 2021-03-24 PROCEDURE — 1125F AMNT PAIN NOTED PAIN PRSNT: CPT | Mod: S$GLB,,, | Performed by: INTERNAL MEDICINE

## 2021-03-24 PROCEDURE — 3079F DIAST BP 80-89 MM HG: CPT | Mod: CPTII,S$GLB,, | Performed by: INTERNAL MEDICINE

## 2021-03-24 PROCEDURE — 3074F PR MOST RECENT SYSTOLIC BLOOD PRESSURE < 130 MM HG: ICD-10-PCS | Mod: CPTII,S$GLB,, | Performed by: INTERNAL MEDICINE

## 2021-03-24 PROCEDURE — 99999 PR PBB SHADOW E&M-EST. PATIENT-LVL III: ICD-10-PCS | Mod: PBBFAC,,, | Performed by: INTERNAL MEDICINE

## 2021-03-24 PROCEDURE — 36415 COLL VENOUS BLD VENIPUNCTURE: CPT | Performed by: INTERNAL MEDICINE

## 2021-03-24 PROCEDURE — 1125F PR PAIN SEVERITY QUANTIFIED, PAIN PRESENT: ICD-10-PCS | Mod: S$GLB,,, | Performed by: INTERNAL MEDICINE

## 2021-03-24 PROCEDURE — 99214 PR OFFICE/OUTPT VISIT, EST, LEVL IV, 30-39 MIN: ICD-10-PCS | Mod: S$GLB,,, | Performed by: INTERNAL MEDICINE

## 2021-03-24 PROCEDURE — 3008F PR BODY MASS INDEX (BMI) DOCUMENTED: ICD-10-PCS | Mod: CPTII,S$GLB,, | Performed by: INTERNAL MEDICINE

## 2021-03-24 PROCEDURE — 3008F BODY MASS INDEX DOCD: CPT | Mod: CPTII,S$GLB,, | Performed by: INTERNAL MEDICINE

## 2021-03-24 PROCEDURE — 3074F SYST BP LT 130 MM HG: CPT | Mod: CPTII,S$GLB,, | Performed by: INTERNAL MEDICINE

## 2021-03-24 PROCEDURE — 99999 PR PBB SHADOW E&M-EST. PATIENT-LVL III: CPT | Mod: PBBFAC,,, | Performed by: INTERNAL MEDICINE

## 2021-03-24 PROCEDURE — 99214 OFFICE O/P EST MOD 30 MIN: CPT | Mod: S$GLB,,, | Performed by: INTERNAL MEDICINE

## 2021-03-24 RX ORDER — LORATADINE 10 MG/1
10 TABLET ORAL DAILY
COMMUNITY

## 2021-04-27 ENCOUNTER — OFFICE VISIT (OUTPATIENT)
Dept: INTERNAL MEDICINE | Facility: CLINIC | Age: 61
End: 2021-04-27
Payer: MEDICARE

## 2021-04-27 ENCOUNTER — TELEPHONE (OUTPATIENT)
Dept: INTERNAL MEDICINE | Facility: CLINIC | Age: 61
End: 2021-04-27

## 2021-04-27 ENCOUNTER — OFFICE VISIT (OUTPATIENT)
Dept: GASTROENTEROLOGY | Facility: CLINIC | Age: 61
End: 2021-04-27
Payer: MEDICARE

## 2021-04-27 VITALS
HEART RATE: 86 BPM | DIASTOLIC BLOOD PRESSURE: 74 MMHG | DIASTOLIC BLOOD PRESSURE: 74 MMHG | WEIGHT: 147.69 LBS | WEIGHT: 147.63 LBS | HEART RATE: 86 BPM | HEIGHT: 67 IN | SYSTOLIC BLOOD PRESSURE: 116 MMHG | SYSTOLIC BLOOD PRESSURE: 116 MMHG | BODY MASS INDEX: 23.17 KG/M2 | TEMPERATURE: 98 F | BODY MASS INDEX: 23.18 KG/M2 | HEIGHT: 67 IN | OXYGEN SATURATION: 97 %

## 2021-04-27 DIAGNOSIS — R19.4 CHANGE IN BOWEL HABITS: ICD-10-CM

## 2021-04-27 DIAGNOSIS — L30.9 DERMATITIS: Primary | ICD-10-CM

## 2021-04-27 DIAGNOSIS — K21.9 GASTROESOPHAGEAL REFLUX DISEASE WITHOUT ESOPHAGITIS: ICD-10-CM

## 2021-04-27 DIAGNOSIS — K52.9 POSTPRANDIAL DIARRHEA: Primary | ICD-10-CM

## 2021-04-27 PROBLEM — N40.1 BENIGN PROSTATIC HYPERPLASIA WITH INCOMPLETE BLADDER EMPTYING: Status: ACTIVE | Noted: 2021-04-06

## 2021-04-27 PROBLEM — R39.14 BENIGN PROSTATIC HYPERPLASIA WITH INCOMPLETE BLADDER EMPTYING: Status: ACTIVE | Noted: 2021-04-06

## 2021-04-27 PROCEDURE — 3008F BODY MASS INDEX DOCD: CPT | Mod: CPTII,S$GLB,, | Performed by: FAMILY MEDICINE

## 2021-04-27 PROCEDURE — 99999 PR PBB SHADOW E&M-EST. PATIENT-LVL III: ICD-10-PCS | Mod: PBBFAC,,, | Performed by: INTERNAL MEDICINE

## 2021-04-27 PROCEDURE — 1125F AMNT PAIN NOTED PAIN PRSNT: CPT | Mod: S$GLB,,, | Performed by: FAMILY MEDICINE

## 2021-04-27 PROCEDURE — 99214 PR OFFICE/OUTPT VISIT, EST, LEVL IV, 30-39 MIN: ICD-10-PCS | Mod: S$GLB,,, | Performed by: INTERNAL MEDICINE

## 2021-04-27 PROCEDURE — 99213 OFFICE O/P EST LOW 20 MIN: CPT | Mod: S$GLB,,, | Performed by: FAMILY MEDICINE

## 2021-04-27 PROCEDURE — 3008F PR BODY MASS INDEX (BMI) DOCUMENTED: ICD-10-PCS | Mod: CPTII,S$GLB,, | Performed by: INTERNAL MEDICINE

## 2021-04-27 PROCEDURE — 3008F PR BODY MASS INDEX (BMI) DOCUMENTED: ICD-10-PCS | Mod: CPTII,S$GLB,, | Performed by: FAMILY MEDICINE

## 2021-04-27 PROCEDURE — 99999 PR PBB SHADOW E&M-EST. PATIENT-LVL III: CPT | Mod: PBBFAC,,, | Performed by: INTERNAL MEDICINE

## 2021-04-27 PROCEDURE — 99213 PR OFFICE/OUTPT VISIT, EST, LEVL III, 20-29 MIN: ICD-10-PCS | Mod: S$GLB,,, | Performed by: FAMILY MEDICINE

## 2021-04-27 PROCEDURE — 1125F PR PAIN SEVERITY QUANTIFIED, PAIN PRESENT: ICD-10-PCS | Mod: S$GLB,,, | Performed by: FAMILY MEDICINE

## 2021-04-27 PROCEDURE — 99214 OFFICE O/P EST MOD 30 MIN: CPT | Mod: S$GLB,,, | Performed by: INTERNAL MEDICINE

## 2021-04-27 PROCEDURE — 99999 PR PBB SHADOW E&M-EST. PATIENT-LVL IV: CPT | Mod: PBBFAC,,, | Performed by: FAMILY MEDICINE

## 2021-04-27 PROCEDURE — 1125F AMNT PAIN NOTED PAIN PRSNT: CPT | Mod: S$GLB,,, | Performed by: INTERNAL MEDICINE

## 2021-04-27 PROCEDURE — 99999 PR PBB SHADOW E&M-EST. PATIENT-LVL IV: ICD-10-PCS | Mod: PBBFAC,,, | Performed by: FAMILY MEDICINE

## 2021-04-27 PROCEDURE — 1125F PR PAIN SEVERITY QUANTIFIED, PAIN PRESENT: ICD-10-PCS | Mod: S$GLB,,, | Performed by: INTERNAL MEDICINE

## 2021-04-27 PROCEDURE — 3008F BODY MASS INDEX DOCD: CPT | Mod: CPTII,S$GLB,, | Performed by: INTERNAL MEDICINE

## 2021-04-27 RX ORDER — TRIAMCINOLONE ACETONIDE 1 MG/G
CREAM TOPICAL 2 TIMES DAILY
Qty: 15 G | Refills: 0 | Status: SHIPPED | OUTPATIENT
Start: 2021-04-27

## 2021-04-27 RX ORDER — OMEPRAZOLE 40 MG/1
40 CAPSULE, DELAYED RELEASE ORAL DAILY
Qty: 30 CAPSULE | Refills: 5 | Status: SHIPPED | OUTPATIENT
Start: 2021-04-27 | End: 2021-07-29

## 2021-04-27 RX ORDER — OMEPRAZOLE 20 MG/1
40 CAPSULE, DELAYED RELEASE ORAL DAILY
Qty: 30 CAPSULE | Refills: 3 | Status: SHIPPED | OUTPATIENT
Start: 2021-04-27 | End: 2021-04-27 | Stop reason: DRUGHIGH

## 2021-04-27 RX ORDER — AZITHROMYCIN 250 MG/1
TABLET, FILM COATED ORAL
COMMUNITY
Start: 2021-03-13 | End: 2022-02-07 | Stop reason: ALTCHOICE

## 2021-04-27 RX ORDER — HYDROCORTISONE 1 %
CREAM (GRAM) TOPICAL 2 TIMES DAILY
Qty: 30 G | Refills: 0 | Status: SHIPPED | OUTPATIENT
Start: 2021-04-27 | End: 2021-04-27 | Stop reason: ALTCHOICE

## 2021-04-28 ENCOUNTER — TELEPHONE (OUTPATIENT)
Dept: GASTROENTEROLOGY | Facility: CLINIC | Age: 61
End: 2021-04-28

## 2021-04-28 ENCOUNTER — PATIENT MESSAGE (OUTPATIENT)
Dept: GASTROENTEROLOGY | Facility: CLINIC | Age: 61
End: 2021-04-28

## 2021-04-28 DIAGNOSIS — K21.9 GASTROESOPHAGEAL REFLUX DISEASE WITHOUT ESOPHAGITIS: Primary | ICD-10-CM

## 2021-04-29 ENCOUNTER — PATIENT MESSAGE (OUTPATIENT)
Dept: GASTROENTEROLOGY | Facility: CLINIC | Age: 61
End: 2021-04-29

## 2021-04-30 ENCOUNTER — PATIENT MESSAGE (OUTPATIENT)
Dept: GASTROENTEROLOGY | Facility: CLINIC | Age: 61
End: 2021-04-30

## 2021-05-03 ENCOUNTER — PATIENT MESSAGE (OUTPATIENT)
Dept: GASTROENTEROLOGY | Facility: CLINIC | Age: 61
End: 2021-05-03

## 2021-05-03 DIAGNOSIS — R19.7 DIARRHEA, UNSPECIFIED TYPE: Primary | ICD-10-CM

## 2021-05-19 ENCOUNTER — PES CALL (OUTPATIENT)
Dept: ADMINISTRATIVE | Facility: CLINIC | Age: 61
End: 2021-05-19

## 2021-05-21 ENCOUNTER — PATIENT OUTREACH (OUTPATIENT)
Dept: ADMINISTRATIVE | Facility: OTHER | Age: 61
End: 2021-05-21

## 2021-05-24 ENCOUNTER — TELEPHONE (OUTPATIENT)
Dept: GASTROENTEROLOGY | Facility: CLINIC | Age: 61
End: 2021-05-24

## 2021-06-10 ENCOUNTER — PATIENT MESSAGE (OUTPATIENT)
Dept: INTERNAL MEDICINE | Facility: CLINIC | Age: 61
End: 2021-06-10

## 2021-06-29 ENCOUNTER — OFFICE VISIT (OUTPATIENT)
Dept: INTERNAL MEDICINE | Facility: CLINIC | Age: 61
End: 2021-06-29
Payer: MEDICARE

## 2021-06-29 VITALS
TEMPERATURE: 97 F | HEIGHT: 67 IN | HEART RATE: 86 BPM | WEIGHT: 148.38 LBS | SYSTOLIC BLOOD PRESSURE: 116 MMHG | BODY MASS INDEX: 23.29 KG/M2 | OXYGEN SATURATION: 98 % | DIASTOLIC BLOOD PRESSURE: 80 MMHG

## 2021-06-29 DIAGNOSIS — J06.9 URTI (ACUTE UPPER RESPIRATORY INFECTION): Primary | ICD-10-CM

## 2021-06-29 PROCEDURE — 3008F PR BODY MASS INDEX (BMI) DOCUMENTED: ICD-10-PCS | Mod: CPTII,S$GLB,, | Performed by: FAMILY MEDICINE

## 2021-06-29 PROCEDURE — 99999 PR PBB SHADOW E&M-EST. PATIENT-LVL IV: CPT | Mod: PBBFAC,,, | Performed by: FAMILY MEDICINE

## 2021-06-29 PROCEDURE — 3008F BODY MASS INDEX DOCD: CPT | Mod: CPTII,S$GLB,, | Performed by: FAMILY MEDICINE

## 2021-06-29 PROCEDURE — 99999 PR PBB SHADOW E&M-EST. PATIENT-LVL IV: ICD-10-PCS | Mod: PBBFAC,,, | Performed by: FAMILY MEDICINE

## 2021-06-29 PROCEDURE — 99213 OFFICE O/P EST LOW 20 MIN: CPT | Mod: S$GLB,,, | Performed by: FAMILY MEDICINE

## 2021-06-29 PROCEDURE — 99213 PR OFFICE/OUTPT VISIT, EST, LEVL III, 20-29 MIN: ICD-10-PCS | Mod: S$GLB,,, | Performed by: FAMILY MEDICINE

## 2021-06-29 RX ORDER — PREDNISONE 20 MG/1
20 TABLET ORAL DAILY
Qty: 5 TABLET | Refills: 0 | Status: SHIPPED | OUTPATIENT
Start: 2021-06-29 | End: 2022-02-07

## 2021-08-02 ENCOUNTER — LAB VISIT (OUTPATIENT)
Dept: LAB | Facility: HOSPITAL | Age: 61
End: 2021-08-02
Attending: FAMILY MEDICINE
Payer: MEDICARE

## 2021-08-02 ENCOUNTER — OFFICE VISIT (OUTPATIENT)
Dept: INTERNAL MEDICINE | Facility: CLINIC | Age: 61
End: 2021-08-02
Payer: MEDICARE

## 2021-08-02 VITALS
HEART RATE: 86 BPM | HEIGHT: 67 IN | SYSTOLIC BLOOD PRESSURE: 128 MMHG | TEMPERATURE: 98 F | BODY MASS INDEX: 22.88 KG/M2 | OXYGEN SATURATION: 98 % | WEIGHT: 145.75 LBS | DIASTOLIC BLOOD PRESSURE: 84 MMHG

## 2021-08-02 DIAGNOSIS — R25.2 MUSCLE CRAMPS: ICD-10-CM

## 2021-08-02 DIAGNOSIS — R25.2 MUSCLE CRAMPS: Primary | ICD-10-CM

## 2021-08-02 PROCEDURE — 1160F RVW MEDS BY RX/DR IN RCRD: CPT | Mod: CPTII,S$GLB,, | Performed by: FAMILY MEDICINE

## 2021-08-02 PROCEDURE — 3008F BODY MASS INDEX DOCD: CPT | Mod: CPTII,S$GLB,, | Performed by: FAMILY MEDICINE

## 2021-08-02 PROCEDURE — 99499 UNLISTED E&M SERVICE: CPT | Mod: HCNC,S$GLB,, | Performed by: FAMILY MEDICINE

## 2021-08-02 PROCEDURE — 99214 OFFICE O/P EST MOD 30 MIN: CPT | Mod: S$GLB,,, | Performed by: FAMILY MEDICINE

## 2021-08-02 PROCEDURE — 1125F AMNT PAIN NOTED PAIN PRSNT: CPT | Mod: CPTII,S$GLB,, | Performed by: FAMILY MEDICINE

## 2021-08-02 PROCEDURE — 1125F PR PAIN SEVERITY QUANTIFIED, PAIN PRESENT: ICD-10-PCS | Mod: CPTII,S$GLB,, | Performed by: FAMILY MEDICINE

## 2021-08-02 PROCEDURE — 99999 PR PBB SHADOW E&M-EST. PATIENT-LVL III: CPT | Mod: PBBFAC,,, | Performed by: FAMILY MEDICINE

## 2021-08-02 PROCEDURE — 99999 PR PBB SHADOW E&M-EST. PATIENT-LVL III: ICD-10-PCS | Mod: PBBFAC,,, | Performed by: FAMILY MEDICINE

## 2021-08-02 PROCEDURE — 3074F SYST BP LT 130 MM HG: CPT | Mod: CPTII,S$GLB,, | Performed by: FAMILY MEDICINE

## 2021-08-02 PROCEDURE — 1159F PR MEDICATION LIST DOCUMENTED IN MEDICAL RECORD: ICD-10-PCS | Mod: CPTII,S$GLB,, | Performed by: FAMILY MEDICINE

## 2021-08-02 PROCEDURE — 3079F DIAST BP 80-89 MM HG: CPT | Mod: CPTII,S$GLB,, | Performed by: FAMILY MEDICINE

## 2021-08-02 PROCEDURE — 99214 PR OFFICE/OUTPT VISIT, EST, LEVL IV, 30-39 MIN: ICD-10-PCS | Mod: S$GLB,,, | Performed by: FAMILY MEDICINE

## 2021-08-02 PROCEDURE — 84132 ASSAY OF SERUM POTASSIUM: CPT | Performed by: FAMILY MEDICINE

## 2021-08-02 PROCEDURE — 1160F PR REVIEW ALL MEDS BY PRESCRIBER/CLIN PHARMACIST DOCUMENTED: ICD-10-PCS | Mod: CPTII,S$GLB,, | Performed by: FAMILY MEDICINE

## 2021-08-02 PROCEDURE — 99499 RISK ADDL DX/OHS AUDIT: ICD-10-PCS | Mod: HCNC,S$GLB,, | Performed by: FAMILY MEDICINE

## 2021-08-02 PROCEDURE — 3079F PR MOST RECENT DIASTOLIC BLOOD PRESSURE 80-89 MM HG: ICD-10-PCS | Mod: CPTII,S$GLB,, | Performed by: FAMILY MEDICINE

## 2021-08-02 PROCEDURE — 83735 ASSAY OF MAGNESIUM: CPT | Performed by: FAMILY MEDICINE

## 2021-08-02 PROCEDURE — 3074F PR MOST RECENT SYSTOLIC BLOOD PRESSURE < 130 MM HG: ICD-10-PCS | Mod: CPTII,S$GLB,, | Performed by: FAMILY MEDICINE

## 2021-08-02 PROCEDURE — 36415 COLL VENOUS BLD VENIPUNCTURE: CPT | Performed by: FAMILY MEDICINE

## 2021-08-02 PROCEDURE — 3008F PR BODY MASS INDEX (BMI) DOCUMENTED: ICD-10-PCS | Mod: CPTII,S$GLB,, | Performed by: FAMILY MEDICINE

## 2021-08-02 PROCEDURE — 1159F MED LIST DOCD IN RCRD: CPT | Mod: CPTII,S$GLB,, | Performed by: FAMILY MEDICINE

## 2021-08-03 ENCOUNTER — PATIENT MESSAGE (OUTPATIENT)
Dept: INTERNAL MEDICINE | Facility: CLINIC | Age: 61
End: 2021-08-03

## 2021-08-03 LAB
MAGNESIUM SERPL-MCNC: 2 MG/DL (ref 1.6–2.6)
POTASSIUM SERPL-SCNC: 4.4 MMOL/L (ref 3.5–5.1)

## 2021-09-15 ENCOUNTER — OFFICE VISIT (OUTPATIENT)
Dept: HEMATOLOGY/ONCOLOGY | Facility: CLINIC | Age: 61
End: 2021-09-15
Payer: MEDICARE

## 2021-09-15 ENCOUNTER — LAB VISIT (OUTPATIENT)
Dept: LAB | Facility: HOSPITAL | Age: 61
End: 2021-09-15
Attending: INTERNAL MEDICINE
Payer: MEDICARE

## 2021-09-15 VITALS
SYSTOLIC BLOOD PRESSURE: 130 MMHG | DIASTOLIC BLOOD PRESSURE: 79 MMHG | HEIGHT: 67 IN | HEART RATE: 93 BPM | WEIGHT: 149.5 LBS | TEMPERATURE: 98 F | BODY MASS INDEX: 23.46 KG/M2 | OXYGEN SATURATION: 99 %

## 2021-09-15 DIAGNOSIS — Z86.2 HISTORY OF ITP: ICD-10-CM

## 2021-09-15 DIAGNOSIS — D69.6 THROMBOCYTOPENIA: ICD-10-CM

## 2021-09-15 DIAGNOSIS — Z86.2 HISTORY OF ITP: Primary | ICD-10-CM

## 2021-09-15 LAB
BASOPHILS # BLD AUTO: 0.11 K/UL (ref 0–0.2)
BASOPHILS NFR BLD: 1.6 % (ref 0–1.9)
DIFFERENTIAL METHOD: ABNORMAL
EOSINOPHIL # BLD AUTO: 0.2 K/UL (ref 0–0.5)
EOSINOPHIL NFR BLD: 2.9 % (ref 0–8)
ERYTHROCYTE [DISTWIDTH] IN BLOOD BY AUTOMATED COUNT: 12.3 % (ref 11.5–14.5)
HCT VFR BLD AUTO: 39.7 % (ref 40–54)
HGB BLD-MCNC: 12.9 G/DL (ref 14–18)
IMM GRANULOCYTES # BLD AUTO: 0.01 K/UL (ref 0–0.04)
IMM GRANULOCYTES NFR BLD AUTO: 0.1 % (ref 0–0.5)
LYMPHOCYTES # BLD AUTO: 2.4 K/UL (ref 1–4.8)
LYMPHOCYTES NFR BLD: 34.2 % (ref 18–48)
MCH RBC QN AUTO: 31.1 PG (ref 27–31)
MCHC RBC AUTO-ENTMCNC: 32.5 G/DL (ref 32–36)
MCV RBC AUTO: 96 FL (ref 82–98)
MONOCYTES # BLD AUTO: 0.6 K/UL (ref 0.3–1)
MONOCYTES NFR BLD: 8.3 % (ref 4–15)
NEUTROPHILS # BLD AUTO: 3.7 K/UL (ref 1.8–7.7)
NEUTROPHILS NFR BLD: 52.9 % (ref 38–73)
NRBC BLD-RTO: 0 /100 WBC
PLATELET # BLD AUTO: 544 K/UL (ref 150–450)
PMV BLD AUTO: 8.3 FL (ref 9.2–12.9)
RBC # BLD AUTO: 4.15 M/UL (ref 4.6–6.2)
WBC # BLD AUTO: 6.99 K/UL (ref 3.9–12.7)

## 2021-09-15 PROCEDURE — 99499 RISK ADDL DX/OHS AUDIT: ICD-10-PCS | Mod: HCNC,S$GLB,, | Performed by: INTERNAL MEDICINE

## 2021-09-15 PROCEDURE — 99214 OFFICE O/P EST MOD 30 MIN: CPT | Mod: S$GLB,,, | Performed by: INTERNAL MEDICINE

## 2021-09-15 PROCEDURE — 3075F PR MOST RECENT SYSTOLIC BLOOD PRESS GE 130-139MM HG: ICD-10-PCS | Mod: CPTII,S$GLB,, | Performed by: INTERNAL MEDICINE

## 2021-09-15 PROCEDURE — 99214 PR OFFICE/OUTPT VISIT, EST, LEVL IV, 30-39 MIN: ICD-10-PCS | Mod: S$GLB,,, | Performed by: INTERNAL MEDICINE

## 2021-09-15 PROCEDURE — 3075F SYST BP GE 130 - 139MM HG: CPT | Mod: CPTII,S$GLB,, | Performed by: INTERNAL MEDICINE

## 2021-09-15 PROCEDURE — 3078F PR MOST RECENT DIASTOLIC BLOOD PRESSURE < 80 MM HG: ICD-10-PCS | Mod: CPTII,S$GLB,, | Performed by: INTERNAL MEDICINE

## 2021-09-15 PROCEDURE — 3008F PR BODY MASS INDEX (BMI) DOCUMENTED: ICD-10-PCS | Mod: CPTII,S$GLB,, | Performed by: INTERNAL MEDICINE

## 2021-09-15 PROCEDURE — 85025 COMPLETE CBC W/AUTO DIFF WBC: CPT | Performed by: INTERNAL MEDICINE

## 2021-09-15 PROCEDURE — 36415 COLL VENOUS BLD VENIPUNCTURE: CPT | Performed by: INTERNAL MEDICINE

## 2021-09-15 PROCEDURE — 1159F MED LIST DOCD IN RCRD: CPT | Mod: CPTII,S$GLB,, | Performed by: INTERNAL MEDICINE

## 2021-09-15 PROCEDURE — 99999 PR PBB SHADOW E&M-EST. PATIENT-LVL III: CPT | Mod: PBBFAC,,, | Performed by: INTERNAL MEDICINE

## 2021-09-15 PROCEDURE — 1159F PR MEDICATION LIST DOCUMENTED IN MEDICAL RECORD: ICD-10-PCS | Mod: CPTII,S$GLB,, | Performed by: INTERNAL MEDICINE

## 2021-09-15 PROCEDURE — 3008F BODY MASS INDEX DOCD: CPT | Mod: CPTII,S$GLB,, | Performed by: INTERNAL MEDICINE

## 2021-09-15 PROCEDURE — 3078F DIAST BP <80 MM HG: CPT | Mod: CPTII,S$GLB,, | Performed by: INTERNAL MEDICINE

## 2021-09-15 PROCEDURE — 99999 PR PBB SHADOW E&M-EST. PATIENT-LVL III: ICD-10-PCS | Mod: PBBFAC,,, | Performed by: INTERNAL MEDICINE

## 2021-09-15 PROCEDURE — 99499 UNLISTED E&M SERVICE: CPT | Mod: HCNC,S$GLB,, | Performed by: INTERNAL MEDICINE

## 2021-09-28 ENCOUNTER — IMMUNIZATION (OUTPATIENT)
Dept: PRIMARY CARE CLINIC | Facility: CLINIC | Age: 61
End: 2021-09-28
Payer: MEDICARE

## 2021-09-28 DIAGNOSIS — Z23 NEED FOR VACCINATION: Primary | ICD-10-CM

## 2021-09-28 PROCEDURE — 91300 COVID-19, MRNA, LNP-S, PF, 30 MCG/0.3 ML DOSE VACCINE: CPT | Mod: PBBFAC | Performed by: FAMILY MEDICINE

## 2021-09-28 PROCEDURE — 0003A COVID-19, MRNA, LNP-S, PF, 30 MCG/0.3 ML DOSE VACCINE: CPT | Mod: CV19,PBBFAC | Performed by: FAMILY MEDICINE

## 2021-10-19 DIAGNOSIS — I70.90 ATHEROSCLEROSIS: ICD-10-CM

## 2021-10-19 RX ORDER — PRAVASTATIN SODIUM 40 MG/1
40 TABLET ORAL DAILY
Qty: 90 TABLET | Refills: 3 | Status: SHIPPED | OUTPATIENT
Start: 2021-10-19 | End: 2022-08-08 | Stop reason: SDUPTHER

## 2021-10-28 ENCOUNTER — IMMUNIZATION (OUTPATIENT)
Dept: INTERNAL MEDICINE | Facility: CLINIC | Age: 61
End: 2021-10-28
Payer: MEDICARE

## 2021-10-28 PROCEDURE — 90686 IIV4 VACC NO PRSV 0.5 ML IM: CPT | Mod: HCNC,S$GLB,, | Performed by: FAMILY MEDICINE

## 2021-10-28 PROCEDURE — G0008 ADMIN INFLUENZA VIRUS VAC: HCPCS | Mod: HCNC,S$GLB,, | Performed by: FAMILY MEDICINE

## 2021-10-28 PROCEDURE — 90686 FLU VACCINE (QUAD) GREATER THAN OR EQUAL TO 3YO PRESERVATIVE FREE IM: ICD-10-PCS | Mod: HCNC,S$GLB,, | Performed by: FAMILY MEDICINE

## 2021-10-28 PROCEDURE — G0008 FLU VACCINE (QUAD) GREATER THAN OR EQUAL TO 3YO PRESERVATIVE FREE IM: ICD-10-PCS | Mod: HCNC,S$GLB,, | Performed by: FAMILY MEDICINE

## 2022-01-27 ENCOUNTER — TELEPHONE (OUTPATIENT)
Dept: ADMINISTRATIVE | Facility: HOSPITAL | Age: 62
End: 2022-01-27
Payer: MEDICARE

## 2022-02-03 DIAGNOSIS — I10 HYPERTENSION, ESSENTIAL: ICD-10-CM

## 2022-02-07 ENCOUNTER — OFFICE VISIT (OUTPATIENT)
Dept: INTERNAL MEDICINE | Facility: CLINIC | Age: 62
End: 2022-02-07
Payer: MEDICARE

## 2022-02-07 ENCOUNTER — LAB VISIT (OUTPATIENT)
Dept: LAB | Facility: HOSPITAL | Age: 62
End: 2022-02-07
Attending: FAMILY MEDICINE
Payer: MEDICARE

## 2022-02-07 VITALS
OXYGEN SATURATION: 97 % | HEART RATE: 75 BPM | RESPIRATION RATE: 18 BRPM | SYSTOLIC BLOOD PRESSURE: 136 MMHG | DIASTOLIC BLOOD PRESSURE: 78 MMHG | TEMPERATURE: 98 F | WEIGHT: 147.25 LBS | HEIGHT: 67 IN | BODY MASS INDEX: 23.11 KG/M2

## 2022-02-07 DIAGNOSIS — D69.6 THROMBOCYTOPENIA: ICD-10-CM

## 2022-02-07 DIAGNOSIS — Z12.5 SCREENING FOR PROSTATE CANCER: ICD-10-CM

## 2022-02-07 DIAGNOSIS — N40.1 BENIGN PROSTATIC HYPERPLASIA WITH INCOMPLETE BLADDER EMPTYING: ICD-10-CM

## 2022-02-07 DIAGNOSIS — R39.14 BENIGN PROSTATIC HYPERPLASIA WITH INCOMPLETE BLADDER EMPTYING: ICD-10-CM

## 2022-02-07 DIAGNOSIS — D69.3 CHRONIC ITP (IDIOPATHIC THROMBOCYTOPENIA): ICD-10-CM

## 2022-02-07 DIAGNOSIS — Z00.00 ANNUAL PHYSICAL EXAM: ICD-10-CM

## 2022-02-07 DIAGNOSIS — L72.3 SEBACEOUS CYST: ICD-10-CM

## 2022-02-07 DIAGNOSIS — R25.2 MUSCLE CRAMPS: ICD-10-CM

## 2022-02-07 DIAGNOSIS — I10 PRIMARY HYPERTENSION: ICD-10-CM

## 2022-02-07 DIAGNOSIS — I10 HYPERTENSION, ESSENTIAL: ICD-10-CM

## 2022-02-07 DIAGNOSIS — Z00.00 ANNUAL PHYSICAL EXAM: Primary | ICD-10-CM

## 2022-02-07 DIAGNOSIS — E78.5 HYPERLIPIDEMIA, UNSPECIFIED HYPERLIPIDEMIA TYPE: ICD-10-CM

## 2022-02-07 LAB
BASOPHILS # BLD AUTO: 0.09 K/UL (ref 0–0.2)
BASOPHILS NFR BLD: 1.5 % (ref 0–1.9)
DIFFERENTIAL METHOD: ABNORMAL
EOSINOPHIL # BLD AUTO: 0.3 K/UL (ref 0–0.5)
EOSINOPHIL NFR BLD: 4.6 % (ref 0–8)
ERYTHROCYTE [DISTWIDTH] IN BLOOD BY AUTOMATED COUNT: 12.3 % (ref 11.5–14.5)
HCT VFR BLD AUTO: 41.3 % (ref 40–54)
HGB BLD-MCNC: 13.8 G/DL (ref 14–18)
IMM GRANULOCYTES # BLD AUTO: 0.01 K/UL (ref 0–0.04)
IMM GRANULOCYTES NFR BLD AUTO: 0.2 % (ref 0–0.5)
LYMPHOCYTES # BLD AUTO: 2.2 K/UL (ref 1–4.8)
LYMPHOCYTES NFR BLD: 36.5 % (ref 18–48)
MCH RBC QN AUTO: 32.9 PG (ref 27–31)
MCHC RBC AUTO-ENTMCNC: 33.4 G/DL (ref 32–36)
MCV RBC AUTO: 99 FL (ref 82–98)
MONOCYTES # BLD AUTO: 0.4 K/UL (ref 0.3–1)
MONOCYTES NFR BLD: 7 % (ref 4–15)
NEUTROPHILS # BLD AUTO: 3 K/UL (ref 1.8–7.7)
NEUTROPHILS NFR BLD: 50.2 % (ref 38–73)
NRBC BLD-RTO: 0 /100 WBC
PLATELET # BLD AUTO: 166 K/UL (ref 150–450)
PMV BLD AUTO: 10.8 FL (ref 9.2–12.9)
RBC # BLD AUTO: 4.19 M/UL (ref 4.6–6.2)
WBC # BLD AUTO: 6.03 K/UL (ref 3.9–12.7)

## 2022-02-07 PROCEDURE — 3075F SYST BP GE 130 - 139MM HG: CPT | Mod: HCNC,CPTII,S$GLB, | Performed by: FAMILY MEDICINE

## 2022-02-07 PROCEDURE — 80061 LIPID PANEL: CPT | Mod: HCNC | Performed by: FAMILY MEDICINE

## 2022-02-07 PROCEDURE — 3078F DIAST BP <80 MM HG: CPT | Mod: HCNC,CPTII,S$GLB, | Performed by: FAMILY MEDICINE

## 2022-02-07 PROCEDURE — 3008F PR BODY MASS INDEX (BMI) DOCUMENTED: ICD-10-PCS | Mod: HCNC,CPTII,S$GLB, | Performed by: FAMILY MEDICINE

## 2022-02-07 PROCEDURE — 3078F PR MOST RECENT DIASTOLIC BLOOD PRESSURE < 80 MM HG: ICD-10-PCS | Mod: HCNC,CPTII,S$GLB, | Performed by: FAMILY MEDICINE

## 2022-02-07 PROCEDURE — 80053 COMPREHEN METABOLIC PANEL: CPT | Mod: HCNC | Performed by: FAMILY MEDICINE

## 2022-02-07 PROCEDURE — 99214 PR OFFICE/OUTPT VISIT, EST, LEVL IV, 30-39 MIN: ICD-10-PCS | Mod: HCNC,S$GLB,, | Performed by: FAMILY MEDICINE

## 2022-02-07 PROCEDURE — 3075F PR MOST RECENT SYSTOLIC BLOOD PRESS GE 130-139MM HG: ICD-10-PCS | Mod: HCNC,CPTII,S$GLB, | Performed by: FAMILY MEDICINE

## 2022-02-07 PROCEDURE — 1159F PR MEDICATION LIST DOCUMENTED IN MEDICAL RECORD: ICD-10-PCS | Mod: HCNC,CPTII,S$GLB, | Performed by: FAMILY MEDICINE

## 2022-02-07 PROCEDURE — 85025 COMPLETE CBC W/AUTO DIFF WBC: CPT | Mod: HCNC | Performed by: FAMILY MEDICINE

## 2022-02-07 PROCEDURE — 99499 UNLISTED E&M SERVICE: CPT | Mod: HCNC,S$GLB,, | Performed by: FAMILY MEDICINE

## 2022-02-07 PROCEDURE — 99499 RISK ADDL DX/OHS AUDIT: ICD-10-PCS | Mod: HCNC,S$GLB,, | Performed by: FAMILY MEDICINE

## 2022-02-07 PROCEDURE — 36415 COLL VENOUS BLD VENIPUNCTURE: CPT | Mod: HCNC | Performed by: FAMILY MEDICINE

## 2022-02-07 PROCEDURE — 84153 ASSAY OF PSA TOTAL: CPT | Mod: HCNC | Performed by: FAMILY MEDICINE

## 2022-02-07 PROCEDURE — 84443 ASSAY THYROID STIM HORMONE: CPT | Mod: HCNC | Performed by: FAMILY MEDICINE

## 2022-02-07 PROCEDURE — 99999 PR PBB SHADOW E&M-EST. PATIENT-LVL IV: ICD-10-PCS | Mod: PBBFAC,HCNC,, | Performed by: FAMILY MEDICINE

## 2022-02-07 PROCEDURE — 3008F BODY MASS INDEX DOCD: CPT | Mod: HCNC,CPTII,S$GLB, | Performed by: FAMILY MEDICINE

## 2022-02-07 PROCEDURE — 1159F MED LIST DOCD IN RCRD: CPT | Mod: HCNC,CPTII,S$GLB, | Performed by: FAMILY MEDICINE

## 2022-02-07 PROCEDURE — 99214 OFFICE O/P EST MOD 30 MIN: CPT | Mod: HCNC,S$GLB,, | Performed by: FAMILY MEDICINE

## 2022-02-07 PROCEDURE — 99999 PR PBB SHADOW E&M-EST. PATIENT-LVL IV: CPT | Mod: PBBFAC,HCNC,, | Performed by: FAMILY MEDICINE

## 2022-02-07 NOTE — PROGRESS NOTES
Subjective:       Patient ID: Jaxson Burch Jr. is a 61 y.o. male.    Chief Complaint: Follow-up and Cyst    Follow-up hypertension hyperlipidemia acid reflux muscle cramping.  Has a history BPH.  He is followed by Hematology for chronic ITP.  He denies headache chest pain palpitations shortness of breath or edema.  Colonoscopy done 2018 with several polyps removed.  He will be due for 5 year follow-up.    Review of Systems   Constitutional: Negative for activity change, appetite change, chills, diaphoresis, fatigue, fever and unexpected weight change.   HENT: Negative for congestion.    Respiratory: Negative for cough, chest tightness, shortness of breath and wheezing.    Cardiovascular: Negative for chest pain, palpitations and leg swelling.   Gastrointestinal: Negative for abdominal distention, abdominal pain, constipation, nausea and vomiting.   Genitourinary: Negative for difficulty urinating, dysuria and hematuria.   Neurological: Negative for dizziness, weakness, light-headedness and headaches.   Hematological: Does not bruise/bleed easily.       Objective:      Physical Exam  Constitutional:       General: He is not in acute distress.     Appearance: Normal appearance. He is not ill-appearing or diaphoretic.   HENT:      Right Ear: Tympanic membrane normal.      Left Ear: Tympanic membrane normal.      Nose: Nose normal.   Neck:      Comments: Normal thyroid 2+ carotids  Cardiovascular:      Rate and Rhythm: Normal rate and regular rhythm.      Heart sounds: No murmur heard.  No gallop.    Pulmonary:      Effort: Pulmonary effort is normal. No respiratory distress.      Breath sounds: No wheezing, rhonchi or rales.   Abdominal:      General: There is no distension.      Palpations: Abdomen is soft. There is no mass.      Tenderness: There is no abdominal tenderness.   Lymphadenopathy:      Cervical: No cervical adenopathy.   Skin:     General: Skin is warm and dry.      Coloration: Skin is not pale.       Findings: No bruising or erythema.      Comments: 3 cm sebaceous cyst occipital area.  He would like dermatology referral for removal   Neurological:      Mental Status: He is alert.         Lab Visit on 09/15/2021   Component Date Value Ref Range Status    WBC 09/15/2021 6.99  3.90 - 12.70 K/uL Final    RBC 09/15/2021 4.15* 4.60 - 6.20 M/uL Final    Hemoglobin 09/15/2021 12.9* 14.0 - 18.0 g/dL Final    Hematocrit 09/15/2021 39.7* 40.0 - 54.0 % Final    MCV 09/15/2021 96  82 - 98 fL Final    MCH 09/15/2021 31.1* 27.0 - 31.0 pg Final    MCHC 09/15/2021 32.5  32.0 - 36.0 g/dL Final    RDW 09/15/2021 12.3  11.5 - 14.5 % Final    Platelets 09/15/2021 544* 150 - 450 K/uL Final    MPV 09/15/2021 8.3* 9.2 - 12.9 fL Final    Immature Granulocytes 09/15/2021 0.1  0.0 - 0.5 % Final    Gran # (ANC) 09/15/2021 3.7  1.8 - 7.7 K/uL Final    Immature Grans (Abs) 09/15/2021 0.01  0.00 - 0.04 K/uL Final    Lymph # 09/15/2021 2.4  1.0 - 4.8 K/uL Final    Mono # 09/15/2021 0.6  0.3 - 1.0 K/uL Final    Eos # 09/15/2021 0.2  0.0 - 0.5 K/uL Final    Baso # 09/15/2021 0.11  0.00 - 0.20 K/uL Final    nRBC 09/15/2021 0  0 /100 WBC Final    Gran % 09/15/2021 52.9  38.0 - 73.0 % Final    Lymph % 09/15/2021 34.2  18.0 - 48.0 % Final    Mono % 09/15/2021 8.3  4.0 - 15.0 % Final    Eosinophil % 09/15/2021 2.9  0.0 - 8.0 % Final    Basophil % 09/15/2021 1.6  0.0 - 1.9 % Final    Differential Method 09/15/2021 Automated   Final     Assessment:       1. Annual physical exam    2. Sebaceous cyst    3. Screening for prostate cancer    4. Primary hypertension    5. Thrombocytopenia    6. Muscle cramps    7. Hypertension, essential    8. Hyperlipidemia, unspecified hyperlipidemia type    9. Chronic ITP (idiopathic thrombocytopenia)    10. Benign prostatic hyperplasia with incomplete bladder emptying        Plan:     Blood pressure controlled lab was ordered.  Dermatology referral for sebaceous cyst of the septal area.  Follow-up  in 6 months.  Medications reviewed.    Annual physical exam  -     Urinalysis; Future; Expected date: 02/07/2022  -     Comprehensive Metabolic Panel; Future; Expected date: 02/07/2022    Sebaceous cyst  -     Ambulatory referral/consult to Dermatology; Future; Expected date: 02/14/2022    Screening for prostate cancer  -     PSA, Screening; Future; Expected date: 02/07/2022    Primary hypertension  -     Lipid Panel; Future; Expected date: 02/07/2022  -     TSH; Future; Expected date: 02/07/2022    Thrombocytopenia  -     CBC Auto Differential; Future; Expected date: 02/07/2022    Muscle cramps    Hypertension, essential    Hyperlipidemia, unspecified hyperlipidemia type    Chronic ITP (idiopathic thrombocytopenia)    Benign prostatic hyperplasia with incomplete bladder emptying

## 2022-02-08 ENCOUNTER — TELEPHONE (OUTPATIENT)
Dept: INTERNAL MEDICINE | Facility: CLINIC | Age: 62
End: 2022-02-08
Payer: MEDICARE

## 2022-02-08 ENCOUNTER — PATIENT MESSAGE (OUTPATIENT)
Dept: INTERNAL MEDICINE | Facility: CLINIC | Age: 62
End: 2022-02-08
Payer: MEDICARE

## 2022-02-08 DIAGNOSIS — Z12.5 ENCOUNTER FOR SCREENING FOR MALIGNANT NEOPLASM OF PROSTATE: ICD-10-CM

## 2022-02-08 DIAGNOSIS — R97.20 PSA ELEVATION: Primary | ICD-10-CM

## 2022-02-08 LAB
ALBUMIN SERPL BCP-MCNC: 3.9 G/DL (ref 3.5–5.2)
ALP SERPL-CCNC: 52 U/L (ref 55–135)
ALT SERPL W/O P-5'-P-CCNC: 68 U/L (ref 10–44)
ANION GAP SERPL CALC-SCNC: 10 MMOL/L (ref 8–16)
AST SERPL-CCNC: 30 U/L (ref 10–40)
BILIRUB SERPL-MCNC: 0.6 MG/DL (ref 0.1–1)
BUN SERPL-MCNC: 13 MG/DL (ref 6–20)
CALCIUM SERPL-MCNC: 9.3 MG/DL (ref 8.7–10.5)
CHLORIDE SERPL-SCNC: 106 MMOL/L (ref 95–110)
CHOLEST SERPL-MCNC: 179 MG/DL (ref 120–199)
CHOLEST/HDLC SERPL: 2.4 {RATIO} (ref 2–5)
CO2 SERPL-SCNC: 24 MMOL/L (ref 23–29)
COMPLEXED PSA SERPL-MCNC: 3.1 NG/ML (ref 0–4)
CREAT SERPL-MCNC: 0.9 MG/DL (ref 0.5–1.4)
EST. GFR  (AFRICAN AMERICAN): >60 ML/MIN/1.73 M^2
EST. GFR  (NON AFRICAN AMERICAN): >60 ML/MIN/1.73 M^2
GLUCOSE SERPL-MCNC: 90 MG/DL (ref 70–110)
HDLC SERPL-MCNC: 74 MG/DL (ref 40–75)
HDLC SERPL: 41.3 % (ref 20–50)
LDLC SERPL CALC-MCNC: ABNORMAL MG/DL (ref 63–159)
NONHDLC SERPL-MCNC: 105 MG/DL
POTASSIUM SERPL-SCNC: 4.6 MMOL/L (ref 3.5–5.1)
PROT SERPL-MCNC: 7.3 G/DL (ref 6–8.4)
SODIUM SERPL-SCNC: 140 MMOL/L (ref 136–145)
TRIGL SERPL-MCNC: 411 MG/DL (ref 30–150)
TSH SERPL DL<=0.005 MIU/L-ACNC: 0.71 UIU/ML (ref 0.4–4)

## 2022-02-08 NOTE — TELEPHONE ENCOUNTER
----- Message from Gwendolyn Dumont sent at 2/8/2022  8:46 AM CST -----  Type:  Patient Returning Call    Who Called:patient  Who Left Message for Patient:nurse  Does the patient know what this is regarding? Test results  ould the patient rather a call back or a response via [employer]? Call back  Best Call Back Number:[phone]  Additional Information:na

## 2022-03-11 ENCOUNTER — OFFICE VISIT (OUTPATIENT)
Dept: DERMATOLOGY | Facility: CLINIC | Age: 62
End: 2022-03-11
Payer: MEDICARE

## 2022-03-11 DIAGNOSIS — L72.3 SEBACEOUS CYST: ICD-10-CM

## 2022-03-11 DIAGNOSIS — L72.9 SCALP CYST: Primary | ICD-10-CM

## 2022-03-11 PROCEDURE — 1159F PR MEDICATION LIST DOCUMENTED IN MEDICAL RECORD: ICD-10-PCS | Mod: CPTII,S$GLB,, | Performed by: DERMATOLOGY

## 2022-03-11 PROCEDURE — 1160F PR REVIEW ALL MEDS BY PRESCRIBER/CLIN PHARMACIST DOCUMENTED: ICD-10-PCS | Mod: CPTII,S$GLB,, | Performed by: DERMATOLOGY

## 2022-03-11 PROCEDURE — 99203 PR OFFICE/OUTPT VISIT, NEW, LEVL III, 30-44 MIN: ICD-10-PCS | Mod: S$GLB,,, | Performed by: DERMATOLOGY

## 2022-03-11 PROCEDURE — 99203 OFFICE O/P NEW LOW 30 MIN: CPT | Mod: S$GLB,,, | Performed by: DERMATOLOGY

## 2022-03-11 PROCEDURE — 99999 PR PBB SHADOW E&M-EST. PATIENT-LVL III: ICD-10-PCS | Mod: PBBFAC,,, | Performed by: DERMATOLOGY

## 2022-03-11 PROCEDURE — 1159F MED LIST DOCD IN RCRD: CPT | Mod: CPTII,S$GLB,, | Performed by: DERMATOLOGY

## 2022-03-11 PROCEDURE — 1160F RVW MEDS BY RX/DR IN RCRD: CPT | Mod: CPTII,S$GLB,, | Performed by: DERMATOLOGY

## 2022-03-11 PROCEDURE — 99999 PR PBB SHADOW E&M-EST. PATIENT-LVL III: CPT | Mod: PBBFAC,,, | Performed by: DERMATOLOGY

## 2022-03-11 NOTE — PROGRESS NOTES
Subjective:       Patient ID:  Jaxson Burch Jr. is a 61 y.o. male who presents for   Chief Complaint   Patient presents with    Cyst     Lower Back of scalp     61M presents to clinic today for cyst of back of scalp. It has been present for years, growing slightly. He has not had treatment for this. No pain, drainage or redness currently.       Review of Systems   Constitutional: Negative for malaise.   Skin: Negative for rash and abscesses.        Objective:    Physical Exam   Constitutional: He appears well-developed and well-nourished. No distress.   Neurological: He is alert and oriented to person, place, and time.   Psychiatric: He has a normal mood and affect.   Skin:   Areas Examined (abnormalities noted in diagram):   Scalp / Hair Palpated and Inspected  Head / Face Inspection Performed  Neck Inspection Performed         Diagram Legend     Erythematous scaling macule/papule c/w actinic keratosis       Vascular papule c/w angioma      Pigmented verrucoid papule/plaque c/w seborrheic keratosis      Yellow umbilicated papule c/w sebaceous hyperplasia      Irregularly shaped tan macule c/w lentigo     1-2 mm smooth white papules consistent with Milia      Movable subcutaneous cyst with punctum c/w epidermal inclusion cyst      Subcutaneous movable cyst c/w pilar cyst      Firm pink to brown papule c/w dermatofibroma      Pedunculated fleshy papule(s) c/w skin tag(s)      Evenly pigmented macule c/w junctional nevus     Mildly variegated pigmented, slightly irregular-bordered macule c/w mildly atypical nevus      Flesh colored to evenly pigmented papule c/w intradermal nevus       Pink pearly papule/plaque c/w basal cell carcinoma      Erythematous hyperkeratotic cursted plaque c/w SCC      Surgical scar with no sign of skin cancer recurrence      Open and closed comedones      Inflammatory papules and pustules      Verrucoid papule consistent consistent with wart     Erythematous eczematous patches and  plaques     Dystrophic onycholytic nail with subungual debris c/w onychomycosis     Umbilicated papule    Erythematous-base heme-crusted tan verrucoid plaque consistent with inflamed seborrheic keratosis     Erythematous Silvery Scaling Plaque c/w Psoriasis     See annotation      Assessment / Plan:        Scalp cyst  No concerning features  Offered elective excision, procedure details, risks and benefits discussed  Patient states he will consider and contact us if he would like to proceed with excision           Follow up for cyst excision if patient opts for treatment.

## 2022-03-17 ENCOUNTER — TELEPHONE (OUTPATIENT)
Dept: INTERNAL MEDICINE | Facility: CLINIC | Age: 62
End: 2022-03-17
Payer: MEDICARE

## 2022-03-17 NOTE — TELEPHONE ENCOUNTER
----- Message from Nevaeh Rodriguez sent at 3/17/2022  8:11 AM CDT -----  Pt called requesting a call back , please give a call back at .[phone]

## 2022-04-05 ENCOUNTER — TELEPHONE (OUTPATIENT)
Dept: DERMATOLOGY | Facility: CLINIC | Age: 62
End: 2022-04-05
Payer: MEDICARE

## 2022-04-05 DIAGNOSIS — I10 HYPERTENSION, ESSENTIAL: ICD-10-CM

## 2022-04-05 NOTE — TELEPHONE ENCOUNTER
No new care gaps identified.  Powered by [employer]. Reference number: [id number].   4/05/2022 6:00:17 AM CDT

## 2022-04-05 NOTE — TELEPHONE ENCOUNTER
Returned call to patient.  Patients reports he already got an appointment scheduled and no other assistance is needed at this time.    ----- Message from Concepción Wilson sent at 4/5/2022  8:09 AM CDT -----  Contact: Jaxson Castañeda would like a call back at [phone] in regards to getting an appointment.    Thanks

## 2022-04-06 RX ORDER — AMLODIPINE BESYLATE 5 MG/1
TABLET ORAL
Qty: 90 TABLET | Refills: 3 | Status: SHIPPED | OUTPATIENT
Start: 2022-04-06 | End: 2023-02-08 | Stop reason: SDUPTHER

## 2022-04-06 NOTE — TELEPHONE ENCOUNTER
Refill Authorization Note   Jaxson Burch  is requesting a refill authorization.  Brief Assessment and Rationale for Refill:  Approve     Medication Therapy Plan:       Medication Reconciliation Completed: No   Comments:     No Care Gaps recommended.     Note composed:1:38 PM 04/06/2022

## 2022-04-21 ENCOUNTER — TELEPHONE (OUTPATIENT)
Dept: ADMINISTRATIVE | Facility: HOSPITAL | Age: 62
End: 2022-04-21
Payer: MEDICARE

## 2022-04-26 ENCOUNTER — PES CALL (OUTPATIENT)
Dept: ADMINISTRATIVE | Facility: CLINIC | Age: 62
End: 2022-04-26
Payer: MEDICARE

## 2022-05-05 ENCOUNTER — PATIENT MESSAGE (OUTPATIENT)
Dept: INTERNAL MEDICINE | Facility: CLINIC | Age: 62
End: 2022-05-05
Payer: MEDICARE

## 2022-05-05 ENCOUNTER — LAB VISIT (OUTPATIENT)
Dept: LAB | Facility: HOSPITAL | Age: 62
End: 2022-05-05
Attending: FAMILY MEDICINE
Payer: MEDICARE

## 2022-05-05 DIAGNOSIS — R97.20 PSA ELEVATION: ICD-10-CM

## 2022-05-05 DIAGNOSIS — Z12.5 ENCOUNTER FOR SCREENING FOR MALIGNANT NEOPLASM OF PROSTATE: ICD-10-CM

## 2022-05-05 DIAGNOSIS — I10 HYPERTENSION, ESSENTIAL: ICD-10-CM

## 2022-05-05 LAB
ALBUMIN SERPL BCP-MCNC: 3.7 G/DL (ref 3.5–5.2)
ALP SERPL-CCNC: 45 U/L (ref 55–135)
ALT SERPL W/O P-5'-P-CCNC: 23 U/L (ref 10–44)
ANION GAP SERPL CALC-SCNC: 8 MMOL/L (ref 8–16)
AST SERPL-CCNC: 28 U/L (ref 10–40)
BILIRUB SERPL-MCNC: 0.9 MG/DL (ref 0.1–1)
BUN SERPL-MCNC: 11 MG/DL (ref 6–20)
CALCIUM SERPL-MCNC: 9 MG/DL (ref 8.7–10.5)
CHLORIDE SERPL-SCNC: 106 MMOL/L (ref 95–110)
CO2 SERPL-SCNC: 27 MMOL/L (ref 23–29)
COMPLEXED PSA SERPL-MCNC: 3.9 NG/ML (ref 0–4)
CREAT SERPL-MCNC: 0.8 MG/DL (ref 0.5–1.4)
EST. GFR  (AFRICAN AMERICAN): >60 ML/MIN/1.73 M^2
EST. GFR  (NON AFRICAN AMERICAN): >60 ML/MIN/1.73 M^2
GLUCOSE SERPL-MCNC: 105 MG/DL (ref 70–110)
POTASSIUM SERPL-SCNC: 4.7 MMOL/L (ref 3.5–5.1)
PROT SERPL-MCNC: 6.6 G/DL (ref 6–8.4)
SODIUM SERPL-SCNC: 141 MMOL/L (ref 136–145)

## 2022-05-05 PROCEDURE — 80053 COMPREHEN METABOLIC PANEL: CPT | Performed by: FAMILY MEDICINE

## 2022-05-05 PROCEDURE — 36415 COLL VENOUS BLD VENIPUNCTURE: CPT | Performed by: FAMILY MEDICINE

## 2022-05-05 PROCEDURE — 84153 ASSAY OF PSA TOTAL: CPT | Performed by: FAMILY MEDICINE

## 2022-05-06 ENCOUNTER — PATIENT MESSAGE (OUTPATIENT)
Dept: INTERNAL MEDICINE | Facility: CLINIC | Age: 62
End: 2022-05-06
Payer: MEDICARE

## 2022-05-09 PROBLEM — Z00.00 ANNUAL PHYSICAL EXAM: Status: RESOLVED | Noted: 2021-02-02 | Resolved: 2022-05-09

## 2022-05-17 ENCOUNTER — OFFICE VISIT (OUTPATIENT)
Dept: GASTROENTEROLOGY | Facility: CLINIC | Age: 62
End: 2022-05-17
Payer: MEDICARE

## 2022-05-17 VITALS
DIASTOLIC BLOOD PRESSURE: 78 MMHG | OXYGEN SATURATION: 97 % | HEIGHT: 67 IN | SYSTOLIC BLOOD PRESSURE: 130 MMHG | BODY MASS INDEX: 23.46 KG/M2 | WEIGHT: 149.5 LBS | HEART RATE: 75 BPM

## 2022-05-17 DIAGNOSIS — Z86.010 HISTORY OF COLON POLYPS: Primary | ICD-10-CM

## 2022-05-17 DIAGNOSIS — R19.4 CHANGE IN BOWEL HABITS: ICD-10-CM

## 2022-05-17 PROCEDURE — 1159F MED LIST DOCD IN RCRD: CPT | Mod: CPTII,S$GLB,, | Performed by: INTERNAL MEDICINE

## 2022-05-17 PROCEDURE — 3075F PR MOST RECENT SYSTOLIC BLOOD PRESS GE 130-139MM HG: ICD-10-PCS | Mod: CPTII,S$GLB,, | Performed by: INTERNAL MEDICINE

## 2022-05-17 PROCEDURE — 3008F BODY MASS INDEX DOCD: CPT | Mod: CPTII,S$GLB,, | Performed by: INTERNAL MEDICINE

## 2022-05-17 PROCEDURE — 1159F PR MEDICATION LIST DOCUMENTED IN MEDICAL RECORD: ICD-10-PCS | Mod: CPTII,S$GLB,, | Performed by: INTERNAL MEDICINE

## 2022-05-17 PROCEDURE — 99214 PR OFFICE/OUTPT VISIT, EST, LEVL IV, 30-39 MIN: ICD-10-PCS | Mod: S$GLB,,, | Performed by: INTERNAL MEDICINE

## 2022-05-17 PROCEDURE — 99999 PR PBB SHADOW E&M-EST. PATIENT-LVL III: ICD-10-PCS | Mod: PBBFAC,,, | Performed by: INTERNAL MEDICINE

## 2022-05-17 PROCEDURE — 3075F SYST BP GE 130 - 139MM HG: CPT | Mod: CPTII,S$GLB,, | Performed by: INTERNAL MEDICINE

## 2022-05-17 PROCEDURE — 99999 PR PBB SHADOW E&M-EST. PATIENT-LVL III: CPT | Mod: PBBFAC,,, | Performed by: INTERNAL MEDICINE

## 2022-05-17 PROCEDURE — 3078F PR MOST RECENT DIASTOLIC BLOOD PRESSURE < 80 MM HG: ICD-10-PCS | Mod: CPTII,S$GLB,, | Performed by: INTERNAL MEDICINE

## 2022-05-17 PROCEDURE — 3008F PR BODY MASS INDEX (BMI) DOCUMENTED: ICD-10-PCS | Mod: CPTII,S$GLB,, | Performed by: INTERNAL MEDICINE

## 2022-05-17 PROCEDURE — 3078F DIAST BP <80 MM HG: CPT | Mod: CPTII,S$GLB,, | Performed by: INTERNAL MEDICINE

## 2022-05-17 PROCEDURE — 99214 OFFICE O/P EST MOD 30 MIN: CPT | Mod: S$GLB,,, | Performed by: INTERNAL MEDICINE

## 2022-05-17 RX ORDER — SODIUM, POTASSIUM,MAG SULFATES 17.5-3.13G
1 SOLUTION, RECONSTITUTED, ORAL ORAL DAILY
Qty: 1 KIT | Refills: 0 | Status: SHIPPED | OUTPATIENT
Start: 2022-05-17 | End: 2022-05-19

## 2022-05-17 RX ORDER — OXYCODONE AND ACETAMINOPHEN 10; 325 MG/1; MG/1
1 TABLET ORAL EVERY 6 HOURS PRN
COMMUNITY
Start: 2022-04-26

## 2022-05-17 NOTE — PROGRESS NOTES
[facility]  Gastroenterology    PCP: Salomón Lantigua MD    5/17/22    Reason for Visit: Discuss Colonoscopy    Subjective:   Jaxson Burch Jr. is a 61 y.o. male with PMH of HTN, ITP and colon polyps who presents today to discuss colonoscopy. Patient was seen previously for change in bowel habits which have improved now on PPI. Last colonoscopy was in 2018 with few polyps removed and a repeat recommended in 5 years.       Past Medical History:   Diagnosis Date    Arthritis     Gastritis     Hypertension, essential 2/2/2021    ITP (idiopathic thrombocytopenic purpura)        Past Surgical History:   Procedure Laterality Date    COLONOSCOPY N/A 11/29/2018    Procedure: COLONOSCOPY;  Surgeon: Brett Townsend MD;  Location: [facility];  Service: Endoscopy;  Laterality: N/A;    FRACTURE SURGERY  02 04 2005    Hip replacement    JOINT REPLACEMENT Right     hip       Current Outpatient Medications on File Prior to Visit   Medication Sig Dispense Refill    amLODIPine (NORVASC) 5 MG tablet TAKE 1 TABLET(5 MG) BY MOUTH EVERY DAY 90 tablet 3    loratadine (CLARITIN) 10 mg tablet Take 10 mg by mouth once daily.      meloxicam (MOBIC) 7.5 MG tablet Take 7.5 mg by mouth daily as needed for Pain.      mometasone (NASONEX) 50 mcg/actuation nasal spray 2 sprays by Nasal route daily as needed.       multivit-calc-iron-FA-K-hb#244 -80 mg-mcg-mcg Tab Take by mouth.      omeprazole (PRILOSEC) 40 MG capsule TAKE 1 CAPSULE(40 MG) BY MOUTH EVERY DAY 90 capsule 3    pravastatin (PRAVACHOL) 40 MG tablet Take 1 tablet (40 mg total) by mouth once daily. 90 tablet 3    sildenafil (VIAGRA) 100 MG tablet Take 100 mg by mouth every other day.  6    triamcinolone acetonide 0.1% (KENALOG) 0.1 % cream Apply topically 2 (two) times daily. 15 g 0    fluticasone propionate (FLONASE) 50 mcg/actuation nasal spray 1 spray (50 mcg total) by Each Nostril route once daily. May one spray per nostril twice daily, MAX 2  sprays/nostril/day (Patient not taking: Reported on 2022) 9.9 mL 3    oxyCODONE-acetaminophen (PERCOCET)  mg per tablet Take 1 tablet by mouth every 6 (six) hours as needed.       No current facility-administered medications on file prior to visit.       Review of patient's allergies indicates:  No Known Allergies    Social History     Socioeconomic History    Marital status:    Tobacco Use    Smoking status: Light Tobacco Smoker     Packs/day: 0.25     Years: 15.00     Pack years: 3.75     Types: Cigarettes     Start date: 1/10/1989     Last attempt to quit: 2020     Years since quittin.3    Smokeless tobacco: Never Used   Substance and Sexual Activity    Alcohol use: Yes     Alcohol/week: 20.0 standard drinks     Types: 10 Cans of beer, 10 Shots of liquor per week     Comment: socially    Drug use: No    Sexual activity: Yes     Partners: Female     Birth control/protection: None       Family History   Problem Relation Age of Onset    Pancreatic cancer Mother     Cancer Mother         Pancreatic cancer    Colon cancer Neg Hx        Review of Systems   Constitutional: Negative for appetite change, fever and unexpected weight change.   HENT: Negative for sore throat and trouble swallowing.    Eyes: Negative for visual disturbance.   Respiratory: Negative for cough, shortness of breath and wheezing.    Cardiovascular: Negative for chest pain, palpitations and leg swelling.   Gastrointestinal: Negative for abdominal pain, blood in stool, constipation, diarrhea, nausea and vomiting.   Genitourinary: Negative for dysuria.   Musculoskeletal: Negative for arthralgias and myalgias.   Skin: Negative for color change, pallor and rash.   Neurological: Negative for dizziness and weakness.   Hematological: Negative for adenopathy.   Psychiatric/Behavioral: Negative for agitation.       Objective:   Vitals:   Vitals:    22 1402   BP: 130/78   Pulse: 75       Physical Exam  Vitals  reviewed.   Constitutional:       General: He is not in acute distress.     Appearance: He is not diaphoretic.   HENT:      Head: Normocephalic and atraumatic.      Mouth/Throat:      Pharynx: No oropharyngeal exudate.   Eyes:      General: No scleral icterus.        Right eye: No discharge.         Left eye: No discharge.      Conjunctiva/sclera: Conjunctivae normal.      Pupils: Pupils are equal, round, and reactive to light.   Cardiovascular:      Rate and Rhythm: Normal rate and regular rhythm.      Heart sounds: Normal heart sounds. No murmur heard.    No friction rub. No gallop.   Pulmonary:      Effort: Pulmonary effort is normal. No respiratory distress.      Breath sounds: Normal breath sounds. No stridor. No wheezing or rales.   Abdominal:      General: Bowel sounds are normal. There is no distension.      Palpations: Abdomen is soft. There is no mass.      Tenderness: There is no abdominal tenderness. There is no guarding.   Musculoskeletal:         General: Normal range of motion.      Cervical back: Normal range of motion.   Skin:     General: Skin is warm and dry.      Coloration: Skin is not pale.      Findings: No erythema or rash.   Neurological:      Mental Status: He is alert and oriented to person, place, and time.         IMPRESSION     Problem List Items Addressed This Visit    None     Visit Diagnoses     History of colon polyps    -  Primary    Relevant Orders    Case Request Endoscopy: COLONOSCOPY (Completed)    Change in bowel habits        Relevant Orders    Case Request Endoscopy: COLONOSCOPY (Completed)          PLANS:    - Patient's change in bowel habits have improved and his surveillance colonoscopy is due in 1 year. He would like to try and pursue colonoscopy sooner if possible given that he had these bowel issues recently to ensure all is alright  - Will therefore place request for colonoscopy for evaluation and for polyp surveillance  - Of note, he is to get his plt count checked  later this month to ensure it is ok to proceed with the colonoscopy    History of colon polyps  -     Case Request Endoscopy: COLONOSCOPY    Change in bowel habits  -     Case Request Endoscopy: COLONOSCOPY    Other orders  -     sodium,potassium,mag sulfates (SUPREP BOWEL PREP KIT) 17.5-3.13-1.6 gram SolR; Take 177 mLs by mouth once daily. for 2 days  Dispense: 1 kit; Refill: 0      Marisol Carrillo MD  Gastroenterology and Hepatology

## 2022-05-19 ENCOUNTER — TELEPHONE (OUTPATIENT)
Dept: INTERNAL MEDICINE | Facility: CLINIC | Age: 62
End: 2022-05-19
Payer: MEDICARE

## 2022-05-19 DIAGNOSIS — E78.1 HYPERTRIGLYCERIDEMIA: ICD-10-CM

## 2022-05-19 DIAGNOSIS — R97.20 ELEVATED PSA: Primary | ICD-10-CM

## 2022-05-19 NOTE — TELEPHONE ENCOUNTER
Can we order a PSA to recheck it? Pt states it was borderline elevated when checked on 5/5/22. He would also like an A1C and CMP ordered as well to recheck his glucose level since that was borderline too. Please advise

## 2022-05-19 NOTE — TELEPHONE ENCOUNTER
----- Message from Connie Burch sent at 5/19/2022  2:01 PM CDT -----  Pt states he has called numerous times to get orders in for him to get blood work. He would like a call back at [phone] and states if he doesn't answer the phone, please leave a message letting him know what the doctor said.

## 2022-05-19 NOTE — TELEPHONE ENCOUNTER
Pt was notified to wait 4-6 weeks before rechecking and he said he will call back to schedule in about a month

## 2022-05-19 NOTE — TELEPHONE ENCOUNTER
----- Message from Zaria Rodney sent at 5/19/2022 11:08 AM CDT -----  States she would like to get a A1C and PSA stream blood work done today. Please call pt [phone]. He would like to get it done today, so he can get his results later today. Thank you

## 2022-05-19 NOTE — TELEPHONE ENCOUNTER
----- Message from Virgie Puri sent at 5/19/2022 10:55 AM CDT -----  Regarding: lab work  Contact: pt  Pt requesting lab work. Please call asap [phone]

## 2022-05-19 NOTE — TELEPHONE ENCOUNTER
----- Message from Perry Appiah sent at 5/19/2022 12:18 PM CDT -----  Contact: PT  Calling about his paperwork. Call back at [phone]

## 2022-05-23 ENCOUNTER — PATIENT MESSAGE (OUTPATIENT)
Dept: HEMATOLOGY/ONCOLOGY | Facility: CLINIC | Age: 62
End: 2022-05-23
Payer: MEDICARE

## 2022-05-25 ENCOUNTER — TELEPHONE (OUTPATIENT)
Dept: GASTROENTEROLOGY | Facility: CLINIC | Age: 62
End: 2022-05-25
Payer: MEDICARE

## 2022-05-25 NOTE — TELEPHONE ENCOUNTER
----- Message from Brian Sanders sent at 5/25/2022  4:33 PM CDT -----  Contact: [phone]  Patient is calling regarding medication,omeprazole (PRILOSEC) 40 MG capsule. Is requesting a lower dose(10-20mg) for personal reasons .. please call pt back [phone] . Thanks/dj

## 2022-05-25 NOTE — TELEPHONE ENCOUNTER
Gloria Carrillo, I returned the pt. call and he stated that, he wanted you to try him on Omeprazole 7.5 instead of the 40mg. He said the reason is because, he spoke with someone who told him that, the 40mg can cut off your nature, he don't want anything that will cut off his nature, what he meant by this I don't know, and he didn't go into details.

## 2022-05-26 ENCOUNTER — TELEPHONE (OUTPATIENT)
Dept: GASTROENTEROLOGY | Facility: CLINIC | Age: 62
End: 2022-05-26
Payer: MEDICARE

## 2022-05-26 RX ORDER — OMEPRAZOLE 20 MG/1
20 CAPSULE, DELAYED RELEASE ORAL DAILY
Qty: 30 CAPSULE | Refills: 3 | Status: SHIPPED | OUTPATIENT
Start: 2022-05-26 | End: 2022-06-20 | Stop reason: SDUPTHER

## 2022-05-26 NOTE — TELEPHONE ENCOUNTER
Pt notified, per Dr Carrillo, Prilosec should not be worsening his erectile dysfunction. She has cut the dose in half to 20 mg. Pt agreed to plan.

## 2022-05-26 NOTE — TELEPHONE ENCOUNTER
----- Message from Marisol Carrillo MD sent at 5/26/2022  7:40 AM CDT -----  Regarding: FW: Rx strength change request.  Contact: [phone]  Decreased to 20 mg  ----- Message -----  From: Nancy Ruelas MA  Sent: 5/25/2022   4:42 PM CDT  To: Marisol Carrillo MD  Subject: Rx strength change request.                      Patient wants to decrease Omeprazole to 10mg, since he read 40mg could worsen his ED. Please advise.  ----- Message -----  From: Brian Sanders  Sent: 5/25/2022   4:36 PM CDT  To: Gerardo ROSE [facility]    Patient is calling regarding medication,omeprazole (PRILOSEC) 40 MG capsule. Is requesting a lower dose(10-20mg) for personal reasons .. please call pt back [phone] . Thanks/dj

## 2022-06-16 ENCOUNTER — LAB VISIT (OUTPATIENT)
Dept: LAB | Facility: HOSPITAL | Age: 62
End: 2022-06-16
Attending: INTERNAL MEDICINE
Payer: MEDICARE

## 2022-06-16 ENCOUNTER — OFFICE VISIT (OUTPATIENT)
Dept: HEMATOLOGY/ONCOLOGY | Facility: CLINIC | Age: 62
End: 2022-06-16
Payer: MEDICARE

## 2022-06-16 VITALS
TEMPERATURE: 97 F | DIASTOLIC BLOOD PRESSURE: 81 MMHG | WEIGHT: 143.94 LBS | OXYGEN SATURATION: 99 % | SYSTOLIC BLOOD PRESSURE: 125 MMHG | HEART RATE: 87 BPM | HEIGHT: 67 IN | BODY MASS INDEX: 22.59 KG/M2

## 2022-06-16 DIAGNOSIS — D69.6 THROMBOCYTOPENIA: ICD-10-CM

## 2022-06-16 DIAGNOSIS — Z86.2 HISTORY OF ITP: ICD-10-CM

## 2022-06-16 DIAGNOSIS — Z86.2 HISTORY OF ITP: Primary | ICD-10-CM

## 2022-06-16 LAB
BASOPHILS # BLD AUTO: 0.11 K/UL (ref 0–0.2)
BASOPHILS NFR BLD: 1.8 % (ref 0–1.9)
DIFFERENTIAL METHOD: ABNORMAL
EOSINOPHIL # BLD AUTO: 0.1 K/UL (ref 0–0.5)
EOSINOPHIL NFR BLD: 2 % (ref 0–8)
ERYTHROCYTE [DISTWIDTH] IN BLOOD BY AUTOMATED COUNT: 11.8 % (ref 11.5–14.5)
HCT VFR BLD AUTO: 41 % (ref 40–54)
HGB BLD-MCNC: 13.5 G/DL (ref 14–18)
IMM GRANULOCYTES # BLD AUTO: 0.01 K/UL (ref 0–0.04)
IMM GRANULOCYTES NFR BLD AUTO: 0.2 % (ref 0–0.5)
LYMPHOCYTES # BLD AUTO: 2.5 K/UL (ref 1–4.8)
LYMPHOCYTES NFR BLD: 41.5 % (ref 18–48)
MCH RBC QN AUTO: 31.2 PG (ref 27–31)
MCHC RBC AUTO-ENTMCNC: 32.9 G/DL (ref 32–36)
MCV RBC AUTO: 95 FL (ref 82–98)
MONOCYTES # BLD AUTO: 0.5 K/UL (ref 0.3–1)
MONOCYTES NFR BLD: 8.9 % (ref 4–15)
NEUTROPHILS # BLD AUTO: 2.7 K/UL (ref 1.8–7.7)
NEUTROPHILS NFR BLD: 45.6 % (ref 38–73)
NRBC BLD-RTO: 0 /100 WBC
PLATELET # BLD AUTO: 269 K/UL (ref 150–450)
PMV BLD AUTO: 8.5 FL (ref 9.2–12.9)
RBC # BLD AUTO: 4.33 M/UL (ref 4.6–6.2)
WBC # BLD AUTO: 5.95 K/UL (ref 3.9–12.7)

## 2022-06-16 PROCEDURE — 85025 COMPLETE CBC W/AUTO DIFF WBC: CPT | Performed by: INTERNAL MEDICINE

## 2022-06-16 PROCEDURE — 3074F PR MOST RECENT SYSTOLIC BLOOD PRESSURE < 130 MM HG: ICD-10-PCS | Mod: CPTII,S$GLB,, | Performed by: INTERNAL MEDICINE

## 2022-06-16 PROCEDURE — 3079F PR MOST RECENT DIASTOLIC BLOOD PRESSURE 80-89 MM HG: ICD-10-PCS | Mod: CPTII,S$GLB,, | Performed by: INTERNAL MEDICINE

## 2022-06-16 PROCEDURE — 99999 PR PBB SHADOW E&M-EST. PATIENT-LVL III: ICD-10-PCS | Mod: PBBFAC,,, | Performed by: INTERNAL MEDICINE

## 2022-06-16 PROCEDURE — 3079F DIAST BP 80-89 MM HG: CPT | Mod: CPTII,S$GLB,, | Performed by: INTERNAL MEDICINE

## 2022-06-16 PROCEDURE — 99214 PR OFFICE/OUTPT VISIT, EST, LEVL IV, 30-39 MIN: ICD-10-PCS | Mod: S$GLB,,, | Performed by: INTERNAL MEDICINE

## 2022-06-16 PROCEDURE — 3008F PR BODY MASS INDEX (BMI) DOCUMENTED: ICD-10-PCS | Mod: CPTII,S$GLB,, | Performed by: INTERNAL MEDICINE

## 2022-06-16 PROCEDURE — 3074F SYST BP LT 130 MM HG: CPT | Mod: CPTII,S$GLB,, | Performed by: INTERNAL MEDICINE

## 2022-06-16 PROCEDURE — 36415 COLL VENOUS BLD VENIPUNCTURE: CPT | Performed by: INTERNAL MEDICINE

## 2022-06-16 PROCEDURE — 1159F MED LIST DOCD IN RCRD: CPT | Mod: CPTII,S$GLB,, | Performed by: INTERNAL MEDICINE

## 2022-06-16 PROCEDURE — 1159F PR MEDICATION LIST DOCUMENTED IN MEDICAL RECORD: ICD-10-PCS | Mod: CPTII,S$GLB,, | Performed by: INTERNAL MEDICINE

## 2022-06-16 PROCEDURE — 99214 OFFICE O/P EST MOD 30 MIN: CPT | Mod: S$GLB,,, | Performed by: INTERNAL MEDICINE

## 2022-06-16 PROCEDURE — 3008F BODY MASS INDEX DOCD: CPT | Mod: CPTII,S$GLB,, | Performed by: INTERNAL MEDICINE

## 2022-06-16 PROCEDURE — 99999 PR PBB SHADOW E&M-EST. PATIENT-LVL III: CPT | Mod: PBBFAC,,, | Performed by: INTERNAL MEDICINE

## 2022-06-16 NOTE — PROGRESS NOTES
Subjective:       Patient ID: Jaxson Burch Jr. is a 62 y.o. male.    Chief Complaint: No chief complaint on file.    HPI   Mr. Burch returns today for follow-up.  This is the first time that I am seeing him.  He was last seen by Dr. Edgar in September 2021. I have reviewed Dr. Edgar's note.     Briefly, he is a 62-year-old male who had been previously followed for ITP.  He was treated with Rituxan in June 2019. He has had a normal abdominal ultrasound, normal ANITA and negative hepatitis and HIV serology.  In 2020 on several occasions his platelets were in the 50-09095 range.  His most recent CBC is from 02/07/2022 and had shown a white count of 6000 per cubic mm, hemoglobin 13.8 grams/deciliter, hematocrit 41.3%, MCV 99 and platelets 504279 per cubic mm.    Review of Systems    Overall he feels well, and he has no complaints.  ECOG PS is 0.  He denies any anxiety, depression, easy bruising, fevers, chills, night  sweats, weight loss, nausea, vomiting, diarrhea, constipation, diplopia,  blurred vision, headache, chest pain, palpitations, shortness of breath, breast pain, abdominal pain, extremity pain, epistaxis, black stools or blood in the stools, hematuria, or difficulty ambulating.  The remainder of the ten-point ROS, including general, skin, lymph, H/N, cardiorespiratory, GI, , Neuro, Endocrine, and psychiatric is negative.     Objective:      Physical Exam    He is alert, oriented to time, place, person, pleasant, well      nourished, in no acute physical distress.                                    VITAL SIGNS:  Reviewed                                      HEENT:  Normal.  There are no nasal, oral, lip, gingival, auricular, lid,    or conjunctival lesions.  Mucosae are moist and pink, and there is no        thrush.  Pupils are equal, reactive to light and accommodation.              Extraocular muscle movements are intact.    Dentition is fair.  There is no frontal or maxillary tenderness.                                    NECK:  Supple without JVD, adenopathy, or thyromegaly.                       LUNGS:  Clear to auscultation without wheezing, rales, or rhonchi.           CARDIOVASCULAR:  Reveals an S1, S2, no murmurs, no rubs, no gallops.         ABDOMEN:  Soft, nontender, without organomegaly.  Bowel sounds are    present.                                                                     EXTREMITIES:  No cyanosis, clubbing, or edema.                                                               LYMPHATIC:  There is no cervical, axillary, or supraclavicular adenopathy.   SKIN:  Warm and moist, without petechiae, rashes, induration, or ecchymoses.           NEUROLOGIC:  DTRs are 0-1+ bilaterally, symmetrical, motor function is 5/5,  and cranial nerves are  within normal limits.  Assessment:       Problem List Items Addressed This Visit     History of ITP - Primary          Plan:         I had a long discussion with Mr. Burch.  I went over his most recent counts.  At at this point we will proceed as follows:  1. He will have a repeat CBC today and I will call him with the results.  A gave him the option of following with his primary care physician and seeing us on a p.r.n. basis, however, he stated that he would prefer to continue to come at our clinic every 6 months.  His multiple questions were answered to his satisfaction.  ,I spent approximately 35 minutes reviewing the available records and evaluating the patient, out of which over 50% of the time was spent face to face with the patient in counseling and coordinating this patient's care.      10:25 a.m. ADDENDUM  His WBCs today are 5,900 per cubic mm, hemoglobin 13.5 grams/deciliter, hematocrit 41%, and platelets 269,000 per cubic mm. He may return (at his request) in 6 weeks with another CBC.  I have explained to him that despite the normal CBC today, ITP may recur at any time in the future.

## 2022-06-21 ENCOUNTER — TELEPHONE (OUTPATIENT)
Dept: GASTROENTEROLOGY | Facility: CLINIC | Age: 62
End: 2022-06-21
Payer: MEDICARE

## 2022-06-21 RX ORDER — OMEPRAZOLE 20 MG/1
20 CAPSULE, DELAYED RELEASE ORAL DAILY
Qty: 30 CAPSULE | Refills: 3 | Status: SHIPPED | OUTPATIENT
Start: 2022-06-21 | End: 2022-12-12

## 2022-06-21 NOTE — TELEPHONE ENCOUNTER
----- Message from Nori Cantu sent at 6/21/2022 10:17 AM CDT -----  Contact: Jaxson  Patient is calling to speak with the nurse regarding medication. Patient reports misplacing medication and need to get it replaced. Please give patient a call back [phone].

## 2022-06-21 NOTE — TELEPHONE ENCOUNTER
Patient calling to request another prescription of omeprazole 20 mg to be sent to his pharmacy because he was moving and lost his original prescription. Patient informed that Dr. Carrillo sent medication to his pharmacy. Patient then stated that his pharmacy stated that his insurance will not fill medication stating that it is to soon. Patient advised to reach out to his insurance company.

## 2022-06-29 NOTE — PROGRESS NOTES
Reason for visit: Follow up for ITP    HPI:   The patient is a 59-year-old  male who presents to the hematology oncology clinic today to discuss further evaluation and management recommendations for immune mediated thrombocytopenia.  The patient had previously been followed by Dr. Marija Patel and Dr. Deven Norris with hematology oncology. The patient reports that he was diagnosed with immune mediated thrombocytopenia in 2012 when he noticed bruising on his lower extremities and sought medical attention for this. The patient reports that he did not tolerate high doses of prednisone very well. He has since been treated with chronic infusions of IVIG with good response.  He denies any melena, hematochezia, hematemesis, hemoptysis, hematuria or epistaxis. He denies any fever, chills or night sweats. He denies any bowel or urinary complaints.  The patient has completed 4 doses of weekly Rituxan on 2013 and tolerated this well. He denies any chest pain or shortness of breath. He denies any nausea or vomiting. I have reviewed all of his interval clinical history in EPIC.   His medical insurance now allows him to follow up back with me at [facility]. He has not required any treatment of his chronic ITP in the interim.  He was subsequently noted to have relapse of his chronic ITP and has completed 8 weekly doses of Rituxan in 2019.    PAST MEDICAL HISTORY:   1. ITP  2. H. Pylori positive gastritis    SURGICAL HISTORY:   Denies    FAMILY HISTORY: The patient's mother  from complications related to pancreatic cancer at the age of 67. His brother  from complications related to lung cancer in his 40s. A paternal uncle  from complications related to throat cancer. A maternal uncle  from complications related to lung cancer. He denies any immediate family members with bleeding/clotting disorders.    SOCIAL HISTORY: He reports a 63+ pack year smoking history and is a current  What Type Of Note Output Would You Prefer (Optional)?: Standard Output Hpi Title: Evaluation of Skin Lesions smoker. He states that he has moderated alcohol consumption. He has never used any recreational drugs. He works as a . He is  and has 7 children. He lives in [place].    ALLERGIES: [NKDA]    MEDICATIONS: [Medcard has been reviewed and/or reconciled.]    REVIEW OF SYSTEMS:   GENERAL: [No fevers, chills or sweats. No fatigue, weight loss or loss of appetite.]  HEENT: [No blurred vision, tinnitus, nasal discharge, sorethroat or dysphagia.]  HEART: [No chest pain, palpitations or shortness of breath.]   LUNGS: [No cough, hemoptysis or breathing problems.]  ABDOMEN: [No abdominal pain, nausea, vomiting, diarrhea, constipation or melena.]  GENITOURINARY: [No dysuria, bleeding or malodorous discharge.]  NEURO: [No headache, dizziness or vertigo.]  HEMATOLOGY: [No easy bruising, spontaneous bleeding or blood clots in the past].  MUSCULOSKELETAL: [No arthralgias, myalgias or bone pains.]  SKIN: [No rashes or skin lesions.]  PSYCHIATRY: [No depression or anxiety.]    PHYSICAL EXAMINATION:   VS: Reviewed on nurse's notes.  APPEARANCE: The patient is a well-groomed  male who appears in no acute distress.   HEENT: No scleral icterus. Right external auditory canal clear. Left canal inflamed. No oral ulcers, lesions. Throat clear  HEAD: No sinus tenderness.  NECK: Supple. No palpable lymphadenopathy. Thyroid non-tender, no palpable masses  CHEST: Breath sounds clear bilaterally. No rales. No rhonchi. Unlabored respirations.  CARDIOVASCULAR: Normal S1, S2. Normal rate. Regular rhythm.  ABDOMEN: Bowel sounds slightly hyperactive. No tenderness. No abdominal distention. No hepatomegaly. No splenomegaly.  LYMPHATIC: No palpable supraclavicular, axillary nodes  EXTREMITIES: No clubbing, cyanosis, edema  SKIN: No lesions. No petechiae. No ecchymoses. No induration or nodules  NEUROLOGIC: No focal findings. Alert & Oriented x 3. Mood appropriate to affect    LABS:    Reviewed    IMAGING:  Reviewed    IMPRESSION:  1. Immune mediated thrombocytopenia    PLAN:  Weeks  1.  I had a detailed discussion with the patient today with regard to his current clinical situation.  Review of CBC from today shows improved platelet count compared to check last week.  The patient has completed a total of 8  of IV Rituxan for treatment of his ITP in June 2019.  The patient previously had an excellent response when this was last given in 2013.  Rationale for this was reviewed in detail and the patient expressed understanding and agreement.  The patient has previously not responded to corticosteroid therapy for his ITP.  He has also had complications related to avascular necrosis with corticosteroids in the past.  2. I have discussed with the patient that he should seek immediate medical attention for any signs/symptoms of bleeding. He expressed understanding of this. He knows to present to the emergency room at [facility], [place] if this happens.     I will recheck the patient's lab work in 3 weeks and contact him with results at which time further follow-up will be determined. He knows to call sooner for any new problems or questions.    Rusty Salazar MD

## 2022-07-06 DIAGNOSIS — R19.4 CHANGE IN BOWEL HABITS: ICD-10-CM

## 2022-07-06 DIAGNOSIS — Z86.010 HX OF COLONIC POLYPS: Primary | ICD-10-CM

## 2022-08-08 ENCOUNTER — OFFICE VISIT (OUTPATIENT)
Dept: INTERNAL MEDICINE | Facility: CLINIC | Age: 62
End: 2022-08-08
Payer: MEDICARE

## 2022-08-08 ENCOUNTER — LAB VISIT (OUTPATIENT)
Dept: LAB | Facility: HOSPITAL | Age: 62
End: 2022-08-08
Attending: FAMILY MEDICINE
Payer: MEDICARE

## 2022-08-08 VITALS
HEIGHT: 67 IN | HEART RATE: 75 BPM | DIASTOLIC BLOOD PRESSURE: 80 MMHG | BODY MASS INDEX: 22.66 KG/M2 | SYSTOLIC BLOOD PRESSURE: 130 MMHG | OXYGEN SATURATION: 98 % | WEIGHT: 144.38 LBS | RESPIRATION RATE: 18 BRPM

## 2022-08-08 DIAGNOSIS — F17.210 CIGARETTE NICOTINE DEPENDENCE WITHOUT COMPLICATION: ICD-10-CM

## 2022-08-08 DIAGNOSIS — I10 PRIMARY HYPERTENSION: Primary | ICD-10-CM

## 2022-08-08 DIAGNOSIS — E78.5 HYPERLIPIDEMIA, UNSPECIFIED HYPERLIPIDEMIA TYPE: ICD-10-CM

## 2022-08-08 DIAGNOSIS — I70.90 ATHEROSCLEROSIS: ICD-10-CM

## 2022-08-08 DIAGNOSIS — K21.9 GASTROESOPHAGEAL REFLUX DISEASE WITHOUT ESOPHAGITIS: ICD-10-CM

## 2022-08-08 DIAGNOSIS — I10 PRIMARY HYPERTENSION: ICD-10-CM

## 2022-08-08 LAB
ALBUMIN SERPL BCP-MCNC: 3.8 G/DL (ref 3.5–5.2)
ALP SERPL-CCNC: 60 U/L (ref 55–135)
ALT SERPL W/O P-5'-P-CCNC: 21 U/L (ref 10–44)
ANION GAP SERPL CALC-SCNC: 9 MMOL/L (ref 8–16)
AST SERPL-CCNC: 28 U/L (ref 10–40)
BASOPHILS # BLD AUTO: 0.09 K/UL (ref 0–0.2)
BASOPHILS NFR BLD: 1.3 % (ref 0–1.9)
BILIRUB SERPL-MCNC: 0.6 MG/DL (ref 0.1–1)
BUN SERPL-MCNC: 10 MG/DL (ref 8–23)
CALCIUM SERPL-MCNC: 8.8 MG/DL (ref 8.7–10.5)
CHLORIDE SERPL-SCNC: 104 MMOL/L (ref 95–110)
CHOLEST SERPL-MCNC: 160 MG/DL (ref 120–199)
CHOLEST/HDLC SERPL: 2.1 {RATIO} (ref 2–5)
CO2 SERPL-SCNC: 26 MMOL/L (ref 23–29)
CREAT SERPL-MCNC: 0.9 MG/DL (ref 0.5–1.4)
DIFFERENTIAL METHOD: ABNORMAL
EOSINOPHIL # BLD AUTO: 0.2 K/UL (ref 0–0.5)
EOSINOPHIL NFR BLD: 3.2 % (ref 0–8)
ERYTHROCYTE [DISTWIDTH] IN BLOOD BY AUTOMATED COUNT: 12.6 % (ref 11.5–14.5)
EST. GFR  (NO RACE VARIABLE): >60 ML/MIN/1.73 M^2
GLUCOSE SERPL-MCNC: 80 MG/DL (ref 70–110)
HCT VFR BLD AUTO: 42 % (ref 40–54)
HDLC SERPL-MCNC: 78 MG/DL (ref 40–75)
HDLC SERPL: 48.8 % (ref 20–50)
HGB BLD-MCNC: 13.7 G/DL (ref 14–18)
IMM GRANULOCYTES # BLD AUTO: 0.01 K/UL (ref 0–0.04)
IMM GRANULOCYTES NFR BLD AUTO: 0.1 % (ref 0–0.5)
LDLC SERPL CALC-MCNC: 70.8 MG/DL (ref 63–159)
LYMPHOCYTES # BLD AUTO: 2.8 K/UL (ref 1–4.8)
LYMPHOCYTES NFR BLD: 39.7 % (ref 18–48)
MCH RBC QN AUTO: 31.9 PG (ref 27–31)
MCHC RBC AUTO-ENTMCNC: 32.6 G/DL (ref 32–36)
MCV RBC AUTO: 98 FL (ref 82–98)
MONOCYTES # BLD AUTO: 0.6 K/UL (ref 0.3–1)
MONOCYTES NFR BLD: 9.2 % (ref 4–15)
NEUTROPHILS # BLD AUTO: 3.2 K/UL (ref 1.8–7.7)
NEUTROPHILS NFR BLD: 46.5 % (ref 38–73)
NONHDLC SERPL-MCNC: 82 MG/DL
NRBC BLD-RTO: 0 /100 WBC
PLATELET # BLD AUTO: 219 K/UL (ref 150–450)
PMV BLD AUTO: 10 FL (ref 9.2–12.9)
POTASSIUM SERPL-SCNC: 4.2 MMOL/L (ref 3.5–5.1)
PROT SERPL-MCNC: 6.5 G/DL (ref 6–8.4)
RBC # BLD AUTO: 4.29 M/UL (ref 4.6–6.2)
SODIUM SERPL-SCNC: 139 MMOL/L (ref 136–145)
TRIGL SERPL-MCNC: 56 MG/DL (ref 30–150)
WBC # BLD AUTO: 6.92 K/UL (ref 3.9–12.7)

## 2022-08-08 PROCEDURE — 3075F SYST BP GE 130 - 139MM HG: CPT | Mod: CPTII,S$GLB,, | Performed by: FAMILY MEDICINE

## 2022-08-08 PROCEDURE — 85025 COMPLETE CBC W/AUTO DIFF WBC: CPT | Performed by: FAMILY MEDICINE

## 2022-08-08 PROCEDURE — 1159F MED LIST DOCD IN RCRD: CPT | Mod: CPTII,S$GLB,, | Performed by: FAMILY MEDICINE

## 2022-08-08 PROCEDURE — 3008F BODY MASS INDEX DOCD: CPT | Mod: CPTII,S$GLB,, | Performed by: FAMILY MEDICINE

## 2022-08-08 PROCEDURE — 3079F DIAST BP 80-89 MM HG: CPT | Mod: CPTII,S$GLB,, | Performed by: FAMILY MEDICINE

## 2022-08-08 PROCEDURE — 3075F PR MOST RECENT SYSTOLIC BLOOD PRESS GE 130-139MM HG: ICD-10-PCS | Mod: CPTII,S$GLB,, | Performed by: FAMILY MEDICINE

## 2022-08-08 PROCEDURE — 1159F PR MEDICATION LIST DOCUMENTED IN MEDICAL RECORD: ICD-10-PCS | Mod: CPTII,S$GLB,, | Performed by: FAMILY MEDICINE

## 2022-08-08 PROCEDURE — 3079F PR MOST RECENT DIASTOLIC BLOOD PRESSURE 80-89 MM HG: ICD-10-PCS | Mod: CPTII,S$GLB,, | Performed by: FAMILY MEDICINE

## 2022-08-08 PROCEDURE — 99999 PR PBB SHADOW E&M-EST. PATIENT-LVL IV: CPT | Mod: PBBFAC,,, | Performed by: FAMILY MEDICINE

## 2022-08-08 PROCEDURE — 36415 COLL VENOUS BLD VENIPUNCTURE: CPT | Performed by: FAMILY MEDICINE

## 2022-08-08 PROCEDURE — 99214 OFFICE O/P EST MOD 30 MIN: CPT | Mod: S$GLB,,, | Performed by: FAMILY MEDICINE

## 2022-08-08 PROCEDURE — 99214 PR OFFICE/OUTPT VISIT, EST, LEVL IV, 30-39 MIN: ICD-10-PCS | Mod: S$GLB,,, | Performed by: FAMILY MEDICINE

## 2022-08-08 PROCEDURE — 80053 COMPREHEN METABOLIC PANEL: CPT | Performed by: FAMILY MEDICINE

## 2022-08-08 PROCEDURE — 99999 PR PBB SHADOW E&M-EST. PATIENT-LVL IV: ICD-10-PCS | Mod: PBBFAC,,, | Performed by: FAMILY MEDICINE

## 2022-08-08 PROCEDURE — 80061 LIPID PANEL: CPT | Performed by: FAMILY MEDICINE

## 2022-08-08 PROCEDURE — 3008F PR BODY MASS INDEX (BMI) DOCUMENTED: ICD-10-PCS | Mod: CPTII,S$GLB,, | Performed by: FAMILY MEDICINE

## 2022-08-08 RX ORDER — PRAVASTATIN SODIUM 40 MG/1
40 TABLET ORAL DAILY
Qty: 90 TABLET | Refills: 3 | Status: SHIPPED | OUTPATIENT
Start: 2022-08-08 | End: 2023-02-08 | Stop reason: SDUPTHER

## 2022-08-08 NOTE — PROGRESS NOTES
Subjective:       Patient ID: Jaxson Burch Jr. is a 62 y.o. male.    Chief Complaint: Follow-up    Follow-up hypertension hyperlipidemia esophageal reflux nicotine dependence.  He denies headache chest pain palpitations shortness of breath or edema.  He resumed smoking cigarettes about a 3rd a pack a day.  He is interested low-dose CT lungs.  He declined immunizations.  Reflux is controlled Prilosec.  He denies dysphagia.    Review of Systems   Constitutional: Negative for activity change, appetite change, chills, fever and unexpected weight change.   HENT: Positive for congestion.         Using Flonase as needed   Respiratory: Negative for cough, chest tightness, shortness of breath and wheezing.    Cardiovascular: Negative for chest pain, palpitations and leg swelling.   Gastrointestinal: Negative for abdominal distention and abdominal pain.   Endocrine: Negative for polydipsia and polyuria.   Neurological: Negative for dizziness, weakness, light-headedness and headaches.       Objective:      Physical Exam  Constitutional:       General: He is not in acute distress.     Appearance: He is not ill-appearing or diaphoretic.   Neck:      Comments: Normal thyroid  Cardiovascular:      Rate and Rhythm: Normal rate and regular rhythm.      Heart sounds: No murmur heard.    No gallop.   Pulmonary:      Effort: Pulmonary effort is normal. No respiratory distress.      Breath sounds: No wheezing, rhonchi or rales.   Abdominal:      General: There is no distension.      Palpations: Abdomen is soft. There is no mass.      Tenderness: There is no abdominal tenderness.   Lymphadenopathy:      Cervical: No cervical adenopathy.   Skin:     General: Skin is warm and dry.      Coloration: Skin is not pale.      Findings: No erythema.   Neurological:      Mental Status: He is alert and oriented to person, place, and time.   Psychiatric:         Mood and Affect: Mood normal.         Behavior: Behavior normal.         Thought  Content: Thought content normal.         Judgment: Judgment normal.         Lab Visit on 06/16/2022   Component Date Value Ref Range Status    WBC 06/16/2022 5.95  3.90 - 12.70 K/uL Final    RBC 06/16/2022 4.33 (A) 4.60 - 6.20 M/uL Final    Hemoglobin 06/16/2022 13.5 (A) 14.0 - 18.0 g/dL Final    Hematocrit 06/16/2022 41.0  40.0 - 54.0 % Final    MCV 06/16/2022 95  82 - 98 fL Final    MCH 06/16/2022 31.2 (A) 27.0 - 31.0 pg Final    MCHC 06/16/2022 32.9  32.0 - 36.0 g/dL Final    RDW 06/16/2022 11.8  11.5 - 14.5 % Final    Platelets 06/16/2022 269  150 - 450 K/uL Final    MPV 06/16/2022 8.5 (A) 9.2 - 12.9 fL Final    Immature Granulocytes 06/16/2022 0.2  0.0 - 0.5 % Final    Gran # (ANC) 06/16/2022 2.7  1.8 - 7.7 K/uL Final    Immature Grans (Abs) 06/16/2022 0.01  0.00 - 0.04 K/uL Final    Lymph # 06/16/2022 2.5  1.0 - 4.8 K/uL Final    Mono # 06/16/2022 0.5  0.3 - 1.0 K/uL Final    Eos # 06/16/2022 0.1  0.0 - 0.5 K/uL Final    Baso # 06/16/2022 0.11  0.00 - 0.20 K/uL Final    nRBC 06/16/2022 0  0 /100 WBC Final    Gran % 06/16/2022 45.6  38.0 - 73.0 % Final    Lymph % 06/16/2022 41.5  18.0 - 48.0 % Final    Mono % 06/16/2022 8.9  4.0 - 15.0 % Final    Eosinophil % 06/16/2022 2.0  0.0 - 8.0 % Final    Basophil % 06/16/2022 1.8  0.0 - 1.9 % Final    Differential Method 06/16/2022 Automated   Final     Assessment:       1. Primary hypertension    2. Hyperlipidemia, unspecified hyperlipidemia type    3. Atherosclerosis    4. Cigarette nicotine dependence without complication    5. Gastroesophageal reflux disease without esophagitis        Plan:     Blood pressure controlled to previous triglyceride elevated discussed dry triglyceride diet.  Discussed nicotine cessation.  Medications reviewed.  Follow-up in 6 months.    Primary hypertension  -     CBC Auto Differential; Future; Expected date: 08/08/2022  -     Comprehensive Metabolic Panel; Future; Expected date: 08/08/2022    Hyperlipidemia,  unspecified hyperlipidemia type  -     Lipid Panel; Future; Expected date: 08/08/2022    Atherosclerosis  -     pravastatin (PRAVACHOL) 40 MG tablet; Take 1 tablet (40 mg total) by mouth once daily.  Dispense: 90 tablet; Refill: 3    Cigarette nicotine dependence without complication  -     CT Chest Lung Screening Low Dose; Future; Expected date: 08/08/2022    Gastroesophageal reflux disease without esophagitis

## 2022-08-09 ENCOUNTER — TELEPHONE (OUTPATIENT)
Dept: INTERNAL MEDICINE | Facility: CLINIC | Age: 62
End: 2022-08-09
Payer: MEDICARE

## 2022-08-09 NOTE — TELEPHONE ENCOUNTER
----- Message from Miguelito Verdugo sent at 8/9/2022  1:23 PM CDT -----  Contact: pt  Pt is wanting to have a NURSE call him back rg his labs and can be reached at [phone]//thanks

## 2022-08-24 ENCOUNTER — HOSPITAL ENCOUNTER (OUTPATIENT)
Dept: RADIOLOGY | Facility: HOSPITAL | Age: 62
Discharge: HOME | End: 2022-08-24
Attending: FAMILY MEDICINE
Payer: MEDICARE

## 2022-08-24 ENCOUNTER — PATIENT MESSAGE (OUTPATIENT)
Dept: INTERNAL MEDICINE | Facility: CLINIC | Age: 62
End: 2022-08-24
Payer: MEDICARE

## 2022-08-24 DIAGNOSIS — F17.210 CIGARETTE NICOTINE DEPENDENCE WITHOUT COMPLICATION: ICD-10-CM

## 2022-08-24 DIAGNOSIS — F17.210 CIGARETTE NICOTINE DEPENDENCE WITHOUT COMPLICATION: Primary | ICD-10-CM

## 2022-08-24 PROCEDURE — 71271 CT CHEST LUNG SCREENING LOW DOSE: ICD-10-PCS | Mod: 26,,, | Performed by: RADIOLOGY

## 2022-08-24 PROCEDURE — 71271 CT THORAX LUNG CANCER SCR C-: CPT | Mod: TC

## 2022-08-24 PROCEDURE — 71271 CT THORAX LUNG CANCER SCR C-: CPT | Mod: 26,,, | Performed by: RADIOLOGY

## 2022-09-12 ENCOUNTER — DOCUMENTATION ONLY (OUTPATIENT)
Dept: PALLIATIVE MEDICINE | Facility: CLINIC | Age: 62
End: 2022-09-12
Payer: MEDICARE

## 2022-09-12 DIAGNOSIS — Z86.2 HISTORY OF ITP: Primary | ICD-10-CM

## 2022-09-12 DIAGNOSIS — D69.6 THROMBOCYTOPENIA: ICD-10-CM

## 2022-09-12 NOTE — PROGRESS NOTES
Nurse reached out to pt regarding a follow up with hematology pt was scheduled to follow up in 6 weeks according to Dr Qureshi's note, pt stated he likes to come every 6 month. Nurse explained he may want to get his labs checked and discuss how often is safe for him to come in. Nurse offered him a visit for this month, pt stated no less set something up for oct. Oct was set up, provider notified of this conversation who he is scheduled to see. BERNARDA Nicole hematologist. Provider eddy education pt at time of visit once he show up regarding safe follow ups with ITP

## 2022-10-26 ENCOUNTER — HOSPITAL ENCOUNTER (OUTPATIENT)
Dept: PREADMISSION TESTING | Facility: HOSPITAL | Age: 62
Discharge: HOME | End: 2022-10-26
Attending: INTERNAL MEDICINE
Payer: MEDICARE

## 2022-10-26 DIAGNOSIS — R19.4 CHANGE IN BOWEL HABITS: ICD-10-CM

## 2022-10-26 DIAGNOSIS — Z86.010 HX OF COLONIC POLYPS: ICD-10-CM

## 2022-10-28 ENCOUNTER — HOSPITAL ENCOUNTER (OUTPATIENT)
Dept: PREADMISSION TESTING | Facility: HOSPITAL | Age: 62
Discharge: HOME | End: 2022-10-28
Payer: MEDICARE

## 2022-11-01 ENCOUNTER — HOSPITAL ENCOUNTER (OUTPATIENT)
Dept: PREADMISSION TESTING | Facility: HOSPITAL | Age: 62
Discharge: HOME | End: 2022-11-01
Payer: MEDICARE

## 2022-11-01 ENCOUNTER — OFFICE VISIT (OUTPATIENT)
Dept: INTERNAL MEDICINE | Facility: CLINIC | Age: 62
End: 2022-11-01
Payer: MEDICARE

## 2022-11-01 VITALS
HEIGHT: 67 IN | TEMPERATURE: 98 F | HEART RATE: 75 BPM | BODY MASS INDEX: 22.04 KG/M2 | OXYGEN SATURATION: 99 % | DIASTOLIC BLOOD PRESSURE: 78 MMHG | SYSTOLIC BLOOD PRESSURE: 124 MMHG | WEIGHT: 140.44 LBS

## 2022-11-01 DIAGNOSIS — M54.12 CERVICAL RADICULITIS: ICD-10-CM

## 2022-11-01 DIAGNOSIS — I10 PRIMARY HYPERTENSION: Primary | ICD-10-CM

## 2022-11-01 DIAGNOSIS — Z12.11 SCREENING FOR COLON CANCER: ICD-10-CM

## 2022-11-01 DIAGNOSIS — R19.4 CHANGE IN BOWEL HABITS: ICD-10-CM

## 2022-11-01 DIAGNOSIS — Z86.010 HISTORY OF COLON POLYPS: Primary | ICD-10-CM

## 2022-11-01 DIAGNOSIS — G89.4 CHRONIC PAIN SYNDROME: ICD-10-CM

## 2022-11-01 PROCEDURE — 1159F PR MEDICATION LIST DOCUMENTED IN MEDICAL RECORD: ICD-10-PCS | Mod: CPTII,S$GLB,, | Performed by: PHYSICIAN ASSISTANT

## 2022-11-01 PROCEDURE — 3078F PR MOST RECENT DIASTOLIC BLOOD PRESSURE < 80 MM HG: ICD-10-PCS | Mod: CPTII,S$GLB,, | Performed by: PHYSICIAN ASSISTANT

## 2022-11-01 PROCEDURE — 99999 PR PBB SHADOW E&M-EST. PATIENT-LVL IV: ICD-10-PCS | Mod: PBBFAC,,, | Performed by: PHYSICIAN ASSISTANT

## 2022-11-01 PROCEDURE — 1159F MED LIST DOCD IN RCRD: CPT | Mod: CPTII,S$GLB,, | Performed by: PHYSICIAN ASSISTANT

## 2022-11-01 PROCEDURE — 1160F RVW MEDS BY RX/DR IN RCRD: CPT | Mod: CPTII,S$GLB,, | Performed by: PHYSICIAN ASSISTANT

## 2022-11-01 PROCEDURE — 3078F DIAST BP <80 MM HG: CPT | Mod: CPTII,S$GLB,, | Performed by: PHYSICIAN ASSISTANT

## 2022-11-01 PROCEDURE — 1160F PR REVIEW ALL MEDS BY PRESCRIBER/CLIN PHARMACIST DOCUMENTED: ICD-10-PCS | Mod: CPTII,S$GLB,, | Performed by: PHYSICIAN ASSISTANT

## 2022-11-01 PROCEDURE — 3074F SYST BP LT 130 MM HG: CPT | Mod: CPTII,S$GLB,, | Performed by: PHYSICIAN ASSISTANT

## 2022-11-01 PROCEDURE — 99999 PR PBB SHADOW E&M-EST. PATIENT-LVL IV: CPT | Mod: PBBFAC,,, | Performed by: PHYSICIAN ASSISTANT

## 2022-11-01 PROCEDURE — 99213 OFFICE O/P EST LOW 20 MIN: CPT | Mod: S$GLB,,, | Performed by: PHYSICIAN ASSISTANT

## 2022-11-01 PROCEDURE — 99213 PR OFFICE/OUTPT VISIT, EST, LEVL III, 20-29 MIN: ICD-10-PCS | Mod: S$GLB,,, | Performed by: PHYSICIAN ASSISTANT

## 2022-11-01 PROCEDURE — 3074F PR MOST RECENT SYSTOLIC BLOOD PRESSURE < 130 MM HG: ICD-10-PCS | Mod: CPTII,S$GLB,, | Performed by: PHYSICIAN ASSISTANT

## 2022-11-01 RX ORDER — POLYETHYLENE GLYCOL 3350, SODIUM SULFATE ANHYDROUS, SODIUM BICARBONATE, SODIUM CHLORIDE, POTASSIUM CHLORIDE 236; 22.74; 6.74; 5.86; 2.97 G/4L; G/4L; G/4L; G/4L; G/4L
4 POWDER, FOR SOLUTION ORAL ONCE
Qty: 4000 ML | Refills: 0 | Status: SHIPPED | OUTPATIENT
Start: 2022-11-01 | End: 2022-11-01

## 2022-11-01 NOTE — PROGRESS NOTES
"Subjective:      Patient ID: Jaxson Burch Jr. is a 62 y.o. male.    Chief Complaint: Other (Pain)    Patient is new to me, being seen today for pain.  Requesting referral to pain medicine.  Requesting Dr. Lev Turner ([place] [facility])    Previously seen at [facility] by physician     Last visit August 2022.    Review of Systems   Constitutional:  Negative for chills, diaphoresis and fever.   HENT:  Negative for congestion, rhinorrhea and sore throat.    Respiratory:  Negative for cough, shortness of breath and wheezing.    Gastrointestinal:  Negative for abdominal pain, constipation, diarrhea, nausea and vomiting.   Musculoskeletal:  Positive for arthralgias, myalgias and neck pain.   Skin:  Negative for rash.   Neurological:  Negative for dizziness, light-headedness and headaches.     Objective:   /78 (BP Location: Left arm, Patient Position: Sitting, BP Method: Medium (Manual))   Pulse 75   Temp 97.8 °F (36.6 °C) (Temporal)   Ht 5' 7" (1.702 m)   Wt 63.7 kg (140 lb 6.9 oz)   SpO2 99%   BMI 21.99 kg/m²   Physical Exam  Constitutional:       General: He is not in acute distress.     Appearance: Normal appearance. He is well-developed. He is not ill-appearing.   HENT:      Head: Normocephalic and atraumatic.   Cardiovascular:      Rate and Rhythm: Normal rate and regular rhythm.      Heart sounds: Normal heart sounds. No murmur heard.  Pulmonary:      Effort: Pulmonary effort is normal. No respiratory distress.      Breath sounds: Normal breath sounds. No decreased breath sounds.   Musculoskeletal:      Right lower leg: No edema.      Left lower leg: No edema.   Skin:     General: Skin is warm and dry.      Findings: No rash.   Psychiatric:         Speech: Speech normal.         Behavior: Behavior normal.         Thought Content: Thought content normal.     Assessment:      1. Primary hypertension    2. Cervical radiculitis    3. Chronic pain syndrome       Plan:   Primary " hypertension   Controlled     Cervical radiculitis  -     Ambulatory referral/consult to Pain Clinic; Future; Expected date: 11/08/2022    Chronic pain syndrome  -     Ambulatory referral/consult to Pain Clinic; Future; Expected date: 11/08/2022    Discussed worsening signs/symptoms and when to return to clinic or go to ED.   Patient expresses understanding and agrees with treatment plan.

## 2022-11-02 ENCOUNTER — TELEPHONE (OUTPATIENT)
Dept: INTERNAL MEDICINE | Facility: CLINIC | Age: 62
End: 2022-11-02
Payer: MEDICARE

## 2022-11-02 ENCOUNTER — PATIENT MESSAGE (OUTPATIENT)
Dept: INTERNAL MEDICINE | Facility: CLINIC | Age: 62
End: 2022-11-02
Payer: MEDICARE

## 2022-11-02 DIAGNOSIS — Z86.2 HISTORY OF ITP: Primary | ICD-10-CM

## 2022-11-02 NOTE — TELEPHONE ENCOUNTER
----- Message from Alana Sanders sent at 11/2/2022 10:00 AM CDT -----  Contact: Jaxson Mills is calling in regards to a referral was faxed to the wrong.Please fax to this number [phone] and put att:Cailin.Please call back at [phone]        Thanks  NATIVIDAD

## 2022-11-11 ENCOUNTER — PATIENT MESSAGE (OUTPATIENT)
Dept: OPHTHALMOLOGY | Facility: CLINIC | Age: 62
End: 2022-11-11

## 2022-11-11 ENCOUNTER — OFFICE VISIT (OUTPATIENT)
Dept: OPHTHALMOLOGY | Facility: CLINIC | Age: 62
End: 2022-11-11
Payer: MEDICARE

## 2022-11-11 DIAGNOSIS — H25.13 CATARACT, NUCLEAR SCLEROTIC SENILE, BILATERAL: Primary | ICD-10-CM

## 2022-11-11 DIAGNOSIS — H52.7 REFRACTIVE ERRORS: ICD-10-CM

## 2022-11-11 DIAGNOSIS — Z83.511 FAMILY HISTORY OF GLAUCOMA IN SISTER: ICD-10-CM

## 2022-11-11 PROCEDURE — 1159F MED LIST DOCD IN RCRD: CPT | Mod: CPTII,S$GLB,, | Performed by: OPTOMETRIST

## 2022-11-11 PROCEDURE — 1159F PR MEDICATION LIST DOCUMENTED IN MEDICAL RECORD: ICD-10-PCS | Mod: CPTII,S$GLB,, | Performed by: OPTOMETRIST

## 2022-11-11 PROCEDURE — 92015 PR REFRACTION: ICD-10-PCS | Mod: S$GLB,,, | Performed by: OPTOMETRIST

## 2022-11-11 PROCEDURE — 92015 DETERMINE REFRACTIVE STATE: CPT | Mod: S$GLB,,, | Performed by: OPTOMETRIST

## 2022-11-11 PROCEDURE — 92004 COMPRE OPH EXAM NEW PT 1/>: CPT | Mod: S$GLB,,, | Performed by: OPTOMETRIST

## 2022-11-11 PROCEDURE — 99999 PR PBB SHADOW E&M-EST. PATIENT-LVL II: ICD-10-PCS | Mod: PBBFAC,,, | Performed by: OPTOMETRIST

## 2022-11-11 PROCEDURE — 92004 PR EYE EXAM, NEW PATIENT,COMPREHESV: ICD-10-PCS | Mod: S$GLB,,, | Performed by: OPTOMETRIST

## 2022-11-11 PROCEDURE — 99999 PR PBB SHADOW E&M-EST. PATIENT-LVL II: CPT | Mod: PBBFAC,,, | Performed by: OPTOMETRIST

## 2022-11-11 NOTE — PROGRESS NOTES
HPI    Pt is a new pt. Last eye exam was less than a year ago. He says he has   been experiencing a tenderness and soreness RUL in the nasal corner. He   also says he wants to have his cataracts checked. Pt would like a new   glasses prescription.   Last edited by Mane De La Torre MA on 11/11/2022  9:44 AM.            Assessment /Plan     For exam results, see Encounter Report.    Cataract, nuclear sclerotic senile, bilateral    Family history of glaucoma in sister    Refractive errors      Moderate cataracts OU, not surgical    Low IOP, healthy ONH    Dispense Final Rx for glasses.  RTC 1 year  Discussed above and answered questions.

## 2022-12-16 RX ORDER — SODIUM, POTASSIUM,MAG SULFATES 17.5-3.13G
1 SOLUTION, RECONSTITUTED, ORAL ORAL DAILY
Qty: 1 KIT | Refills: 0 | Status: CANCELLED | OUTPATIENT
Start: 2022-12-16 | End: 2022-12-18

## 2022-12-19 ENCOUNTER — PATIENT MESSAGE (OUTPATIENT)
Dept: PREADMISSION TESTING | Facility: HOSPITAL | Age: 62
End: 2022-12-19
Payer: MEDICARE

## 2022-12-21 ENCOUNTER — ANESTHESIA EVENT (OUTPATIENT)
Dept: ENDOSCOPY | Facility: HOSPITAL | Age: 62
End: 2022-12-21
Payer: MEDICARE

## 2022-12-21 ENCOUNTER — HOSPITAL ENCOUNTER (OUTPATIENT)
Facility: HOSPITAL | Age: 62
Discharge: HOME | End: 2022-12-21
Attending: INTERNAL MEDICINE | Admitting: INTERNAL MEDICINE
Payer: MEDICARE

## 2022-12-21 ENCOUNTER — ANESTHESIA (OUTPATIENT)
Dept: ENDOSCOPY | Facility: HOSPITAL | Age: 62
End: 2022-12-21
Payer: MEDICARE

## 2022-12-21 DIAGNOSIS — Z86.010 PERSONAL HISTORY OF COLONIC POLYPS: ICD-10-CM

## 2022-12-21 DIAGNOSIS — R19.4 CHANGE IN BOWEL HABITS: Primary | ICD-10-CM

## 2022-12-21 PROBLEM — Z86.0100 PERSONAL HISTORY OF COLONIC POLYPS: Status: ACTIVE | Noted: 2022-12-21

## 2022-12-21 PROCEDURE — 88305 TISSUE EXAM BY PATHOLOGIST: CPT | Mod: 26,HCNC,, | Performed by: PATHOLOGY

## 2022-12-21 PROCEDURE — 88305 TISSUE EXAM BY PATHOLOGIST: ICD-10-PCS | Mod: 26,HCNC,, | Performed by: PATHOLOGY

## 2022-12-21 PROCEDURE — 25000003 PHARM REV CODE 250: Mod: HCNC | Performed by: NURSE ANESTHETIST, CERTIFIED REGISTERED

## 2022-12-21 PROCEDURE — 37000009 HC ANESTHESIA EA ADD 15 MINS: Mod: HCNC | Performed by: INTERNAL MEDICINE

## 2022-12-21 PROCEDURE — 37000008 HC ANESTHESIA 1ST 15 MINUTES: Mod: HCNC | Performed by: INTERNAL MEDICINE

## 2022-12-21 PROCEDURE — 88305 TISSUE EXAM BY PATHOLOGIST: CPT | Mod: HCNC | Performed by: PATHOLOGY

## 2022-12-21 PROCEDURE — 45380 PR COLONOSCOPY,BIOPSY: ICD-10-PCS | Mod: HCNC,,, | Performed by: INTERNAL MEDICINE

## 2022-12-21 PROCEDURE — 45380 COLONOSCOPY AND BIOPSY: CPT | Mod: HCNC,,, | Performed by: INTERNAL MEDICINE

## 2022-12-21 PROCEDURE — 27201012 HC FORCEPS, HOT/COLD, DISP: Mod: HCNC | Performed by: INTERNAL MEDICINE

## 2022-12-21 PROCEDURE — 45380 COLONOSCOPY AND BIOPSY: CPT | Mod: HCNC | Performed by: INTERNAL MEDICINE

## 2022-12-21 PROCEDURE — 63600175 PHARM REV CODE 636 W HCPCS: Mod: HCNC | Performed by: NURSE ANESTHETIST, CERTIFIED REGISTERED

## 2022-12-21 RX ORDER — LIDOCAINE HYDROCHLORIDE 10 MG/ML
INJECTION, SOLUTION EPIDURAL; INFILTRATION; INTRACAUDAL; PERINEURAL
Status: DISCONTINUED | OUTPATIENT
Start: 2022-12-21 | End: 2022-12-21

## 2022-12-21 RX ORDER — PROPOFOL 10 MG/ML
VIAL (ML) INTRAVENOUS
Status: DISCONTINUED | OUTPATIENT
Start: 2022-12-21 | End: 2022-12-21

## 2022-12-21 RX ADMIN — LIDOCAINE HYDROCHLORIDE 50 MG: 10 INJECTION, SOLUTION EPIDURAL; INFILTRATION; INTRACAUDAL; PERINEURAL at 11:12

## 2022-12-21 RX ADMIN — SODIUM CHLORIDE, POTASSIUM CHLORIDE, SODIUM LACTATE AND CALCIUM CHLORIDE: 600; 310; 30; 20 INJECTION, SOLUTION INTRAVENOUS at 11:12

## 2022-12-21 RX ADMIN — PROPOFOL 100 MG: 10 INJECTION, EMULSION INTRAVENOUS at 11:12

## 2022-12-21 NOTE — PLAN OF CARE
Dr. Carrillo bedside reviewing results with pt. Back to baseline. No pain, no n/v. No bleeding. VSS. Discharge folder received. Pt discharged.

## 2022-12-21 NOTE — ANESTHESIA POSTPROCEDURE EVALUATION
Anesthesia Post Evaluation    Patient: Jaxson Burch Jr.    Procedure(s) Performed: Procedure(s) (LRB):  COLONOSCOPY (N/A)    Final Anesthesia Type: MAC      Patient location during evaluation: GI PACU  Patient participation: Yes- Able to Participate  Level of consciousness: awake and alert  Post-procedure vital signs: reviewed and stable  Pain management: adequate  Airway patency: patent  KATLYN mitigation strategies: Multimodal analgesia  PONV status at discharge: No PONV  Anesthetic complications: no      Cardiovascular status: hemodynamically stable  Respiratory status: unassisted, room air and spontaneous ventilation  Hydration status: euvolemic  Follow-up not needed.          Vitals Value Taken Time   /77 12/21/22 1224   Temp 36.3 °C (97.4 °F) 12/21/22 1224   Pulse 90 12/21/22 1224   Resp 16 12/21/22 1224   SpO2 100 % 12/21/22 1224         Event Time   Out of Recovery 12:27:00         Pain/Boone Score: Boone Score: 10 (12/21/2022 12:10 PM)

## 2022-12-21 NOTE — DISCHARGE SUMMARY
[facility]  Discharge Note  Short Stay    Procedure(s) (LRB):  COLONOSCOPY (N/A)      OUTCOME: Patient tolerated treatment/procedure well without complication and is now ready for discharge.    DISPOSITION: Home or Self Care    FINAL DIAGNOSIS:  Change in bowel habits    FOLLOWUP: With primary care provider    DISCHARGE INSTRUCTIONS:  No discharge procedures on file.

## 2022-12-21 NOTE — ANESTHESIA PREPROCEDURE EVALUATION
12/21/2022  Jaxson Burch Jr. is a 62 y.o., male.      Pre-op Assessment    I have reviewed the Patient Summary Reports.    I have reviewed the NPO Status.   I have reviewed the Medications.     Review of Systems  Anesthesia Hx:  No problems with previous Anesthesia    Social:  Smoker, Alcohol Use    Hematology/Oncology:        Hematology Comments: Idiopathic thrombocytopenia   Cardiovascular:   Hypertension hyperlipidemia Normal sinus rhythm   Normal ECG   No previous ECGs available   Confirmed by BLAINE MINER MD ([id number]) on 9/21/2013 3:41:18 PM    Pulmonary:  Pulmonary Normal    Renal/:   BPH    Hepatic/GI:   Bowel Prep. GERD, well controlled    Musculoskeletal:   Cervical radiculitis   Neurological:   Neuromuscular Disease,   Peripheral Neuropathy    Endocrine:  Endocrine Normal        Physical Exam  General: Well nourished, Cooperative, Alert and Oriented    Airway:  Mallampati: II   Mouth Opening: Normal  TM Distance: Normal  Tongue: Normal  Neck ROM: Normal ROM    Dental:        Anesthesia Plan  Type of Anesthesia, risks & benefits discussed:    Anesthesia Type: MAC  Intra-op Monitoring Plan: Standard ASA Monitors  Induction:  IV  Informed Consent: Informed consent signed with the Patient and all parties understand the risks and agree with anesthesia plan.  All questions answered.   ASA Score: 2  Day of Surgery Review of History & Physical: H&P Update referred to the surgeon/provider.I have interviewed and examined the patient. I have reviewed the patient's H&P dated: There are no significant changes.     Ready For Surgery From Anesthesia Perspective.     .

## 2022-12-21 NOTE — [employer] PATIENT INSTRUCTIONS
Discharge Summary/Instructions after an Endoscopic Procedure  Patient Name: Jaxson Burch  Patient MRN: [id number]  Patient YOB: 1960 Wednesday, December 21, 2022 Marisol Carrillo MD  Dear patient,  As a result of recent federal legislation (The Federal Cures Act), you may   receive lab or pathology results from your procedure in your [facility]   account before your physician is able to contact you. Your physician or   their representative will relay the results to you with their   recommendations at their soonest availability.  Thank you,  RESTRICTIONS:  During your procedure today, you received medications for sedation.  These   medications may affect your judgment, balance and coordination.  Therefore,   for 24 hours, you have the following restrictions:   - DO NOT drive a car, operate machinery, make legal/financial decisions,   sign important papers or drink alcohol.    ACTIVITY:  Today: no heavy lifting, straining or running due to procedural   sedation/anesthesia.  The following day: return to full activity including work.  DIET:  Eat and drink normally unless instructed otherwise.     TREATMENT FOR COMMON SIDE EFFECTS:  - Mild abdominal pain, nausea, belching, bloating or excessive gas:  rest,   eat lightly and use a heating pad.  - Sore Throat: treat with throat lozenges and/or gargle with warm salt   water.  - Because air was used during the procedure, expelling large amounts of air   from your rectum or belching is normal.  - If a bowel prep was taken, you may not have a bowel movement for 1-3 days.    This is normal.  SYMPTOMS TO WATCH FOR AND REPORT TO YOUR PHYSICIAN:  1. Abdominal pain or bloating, other than gas cramps.  2. Chest pain.  3. Back pain.  4. Signs of infection such as: chills or fever occurring within 24 hours   after the procedure.  5. Rectal bleeding, which would show as bright red, maroon, or black stools.   (A tablespoon of blood from the rectum is not serious, especially  if   hemorrhoids are present.)  6. Vomiting.  7. Weakness or dizziness.  GO DIRECTLY TO THE NEAREST EMERGENCY ROOM IF YOU HAVE ANY OF THE FOLLOWING:      Difficulty breathing              Chills and/or fever over 101 F   Persistent vomiting and/or vomiting blood   Severe abdominal pain   Severe chest pain   Black, tarry stools   Bleeding- more than one tablespoon   Any other symptom or condition that you feel may need urgent attention  Your doctor recommends these additional instructions:  If any biopsies were taken, your doctors clinic will contact you in 1 to 2   weeks with any results.  - Discharge patient to home (via wheelchair).   - Resume previous diet.   - Continue present medications.   - Await pathology results.   - Repeat colonoscopy in 5 years for surveillance.   - Telephone GI clinic for pathology results in 2 weeks.   - Patient has a contact number available for emergencies.  The signs and   symptoms of potential delayed complications were discussed with the   patient.  Return to normal activities tomorrow.  Written discharge   instructions were provided to the patient.  For questions, problems or results please call your physician Marisol Carrillo MD at Work:  [phone]  If you have any questions about the above instructions, call the GI   department at [phone] or call the endoscopy unit at [phone]   from 7am until 3 pm.  [facility] [place], EMERGENCY ROOM PHONE NUMBER:   [phone]  IF A COMPLICATION OR EMERGENCY SITUATION ARISES AND YOU ARE UNABLE TO REACH   YOUR PHYSICIAN - GO DIRECTLY TO THE EMERGENCY ROOM.  I have read or have had read to me these discharge instructions for my   procedure and have received a written copy.  I understand these   instructions and will follow-up with my physician if I have any questions.     __________________________________       _____________________________________  Nurse Signature                                           Patient/Designated   Responsible Party Signature  MD Marisol Huitron MD  12/21/2022 11:57:39 AM  [employer]

## 2022-12-21 NOTE — H&P
Short Stay Endoscopy History and Physical    PCP - Salomón Lantigua MD    Procedure - Colonoscopy  ASA - 2  Mallampati - per anesthesia  History of Anesthesia problems - no  Family history Anesthesia problems -  no     HPI:  This is a 62 y.o. male here for evaluation of :   Active Hospital Problems    Diagnosis  POA    *Change in bowel habits [R19.4]  No    Personal history of colonic polyps [Z86.010]  Not Applicable      Resolved Hospital Problems   No resolved problems to display.         Health Maintenance         Date Due Completion Date    COVID-19 Vaccine (4 - Booster for Pfizer series) 11/23/2021 9/28/2021    TETANUS VACCINE 08/08/2023 (Originally 6/5/1979) ---    Shingles Vaccine (1 of 2) 08/08/2023 (Originally 6/5/2011) ---    High Dose Statin 11/11/2023 11/11/2022    Colorectal Cancer Screening 11/29/2023 11/29/2018    Pneumococcal Vaccines (Age 0-64) (3 - PPSV23 if available, else PCV20) 06/05/2026 9/9/2019    Lipid Panel 08/08/2027 8/8/2022            ROS:  CONSTITUTIONAL: Denies weight change,  fatigue, fevers, chills, night sweats.  CARDIOVASCULAR: Denies chest pain, shortness of breath, orthopnea and edema.  RESPIRATORY: Denies cough, hemoptysis, dyspnea, and wheezing.  GI: See HPI.    Medical History:   Past Medical History:   Diagnosis Date    Arthritis     Gastritis     Hypertension, essential 2/2/2021    ITP (idiopathic thrombocytopenic purpura)        Surgical History:   Past Surgical History:   Procedure Laterality Date    COLONOSCOPY N/A 11/29/2018    Procedure: COLONOSCOPY;  Surgeon: Brett Townsend MD;  Location: [facility];  Service: Endoscopy;  Laterality: N/A;    FRACTURE SURGERY  02 04 2005    Hip replacement    JOINT REPLACEMENT Right     hip       Family History:   Family History   Problem Relation Age of Onset    Pancreatic cancer Mother     Cancer Mother         Pancreatic cancer    Colon cancer Neg Hx        Social History:   Social History     Tobacco Use    Smoking status: Light Smoker      Packs/day: 0.25     Years: 15.00     Pack years: 3.75     Types: Cigarettes     Start date: 1/10/1989     Last attempt to quit: 2020     Years since quittin.9    Smokeless tobacco: Never   Substance Use Topics    Alcohol use: Yes     Alcohol/week: 20.0 standard drinks     Types: 10 Cans of beer, 10 Shots of liquor per week     Comment: socially    Drug use: No       Allergies:   Review of patient's allergies indicates:  No Known Allergies    Medications:   No current facility-administered medications on file prior to encounter.     Current Outpatient Medications on File Prior to Encounter   Medication Sig Dispense Refill    amLODIPine (NORVASC) 5 MG tablet TAKE 1 TABLET(5 MG) BY MOUTH EVERY DAY 90 tablet 3    loratadine (CLARITIN) 10 mg tablet Take 10 mg by mouth once daily.      meloxicam (MOBIC) 7.5 MG tablet Take 7.5 mg by mouth daily as needed for Pain.      multivit-calc-iron-FA-K-hb#244 -80 mg-mcg-mcg Tab Take by mouth.      oxyCODONE-acetaminophen (PERCOCET)  mg per tablet Take 1 tablet by mouth every 6 (six) hours as needed.      pravastatin (PRAVACHOL) 40 MG tablet Take 1 tablet (40 mg total) by mouth once daily. 90 tablet 3    sildenafil (VIAGRA) 100 MG tablet Take 100 mg by mouth every other day.  6    triamcinolone acetonide 0.1% (KENALOG) 0.1 % cream Apply topically 2 (two) times daily. 15 g 0       Physical Exam:  Vital Signs: There were no vitals filed for this visit.  General Appearance: Well appearing in no acute distress  ENT: OP clear  Chest: CTA B  CV: RRR, no m/r/g  Abd: s/nt/nd/nabs  Ext: no edema    Labs:Reviewed    IMP:  Active Hospital Problems    Diagnosis  POA    *Change in bowel habits [R19.4]  No    Personal history of colonic polyps [Z86.010]  Not Applicable      Resolved Hospital Problems   No resolved problems to display.         Plan:   I have explained the risks and benefits of colonoscopy to the patient including but not limited to bleeding, perforation,  infection, and death. The patient wishes to proceed.

## 2022-12-22 VITALS
TEMPERATURE: 97 F | DIASTOLIC BLOOD PRESSURE: 77 MMHG | HEIGHT: 67 IN | BODY MASS INDEX: 22.13 KG/M2 | WEIGHT: 141 LBS | RESPIRATION RATE: 16 BRPM | SYSTOLIC BLOOD PRESSURE: 103 MMHG | HEART RATE: 90 BPM | OXYGEN SATURATION: 100 %

## 2022-12-27 LAB
FINAL PATHOLOGIC DIAGNOSIS: NORMAL
Lab: NORMAL

## 2023-01-17 NOTE — PROGRESS NOTES
Subjective:       Patient ID: Jaxson Burch Jr. is a 62 y.o. male.    Chief Complaint: ITP    HPI: Mr. Burch is a pleasant 62 year old male who is following up for his hx of ITP. He was treated with Rituxan in June 2019. He has had a normal abdominal ultrasound, normal ANITA and negative hepatitis and HIV serology.  In 2020 on several occasions his platelets were in the 50-00918 range.    Today: Patient states he has been well. He denies any bleeding, bruising, abdominal pain, n/v/d/c, headaches, fevers, night sweats.     Social History     Socioeconomic History    Marital status:    Tobacco Use    Smoking status: Light Smoker     Packs/day: 0.25     Years: 15.00     Pack years: 3.75     Types: Cigarettes     Start date: 1/10/1989     Last attempt to quit: 1/12/2020     Years since quitting: 3.0    Smokeless tobacco: Never   Substance and Sexual Activity    Alcohol use: Yes     Alcohol/week: 20.0 standard drinks     Types: 10 Cans of beer, 10 Shots of liquor per week     Comment: socially    Drug use: No    Sexual activity: Yes     Partners: Female     Birth control/protection: None       Past Medical History:   Diagnosis Date    Arthritis     Gastritis     Hypertension, essential 2/2/2021    ITP (idiopathic thrombocytopenic purpura)        Family History   Problem Relation Age of Onset    Pancreatic cancer Mother     Cancer Mother         Pancreatic cancer    Colon cancer Neg Hx        Past Surgical History:   Procedure Laterality Date    COLONOSCOPY N/A 11/29/2018    Procedure: COLONOSCOPY;  Surgeon: Brett Townsend MD;  Location: [facility];  Service: Endoscopy;  Laterality: N/A;    COLONOSCOPY N/A 12/21/2022    Procedure: COLONOSCOPY;  Surgeon: Marisol Carrillo MD;  Location: [facility] ENDO;  Service: Endoscopy;  Laterality: N/A;    FRACTURE SURGERY  02 04 2005    Hip replacement    JOINT REPLACEMENT Right     hip       Review of Systems   Constitutional:  Negative for activity change, appetite change, chills,  diaphoresis, fatigue, fever and unexpected weight change.   HENT:  Negative for congestion, nosebleeds and rhinorrhea.    Respiratory:  Negative for cough and shortness of breath.    Cardiovascular:  Negative for chest pain and leg swelling.   Gastrointestinal:  Negative for abdominal pain, anal bleeding, blood in stool, constipation, diarrhea, nausea and vomiting.   Genitourinary:  Negative for hematuria.   Musculoskeletal:  Negative for arthralgias and myalgias.   Skin:  Negative for color change and pallor.   Neurological:  Negative for dizziness, weakness, light-headedness, numbness and headaches.       Medication List with Changes/Refills   Current Medications    AMLODIPINE (NORVASC) 5 MG TABLET    TAKE 1 TABLET(5 MG) BY MOUTH EVERY DAY    LORATADINE (CLARITIN) 10 MG TABLET    Take 10 mg by mouth once daily.    MELOXICAM (MOBIC) 7.5 MG TABLET    Take 7.5 mg by mouth daily as needed for Pain.    MULTIVIT-CALC-IRON-FA-K-HB#244 -80 MG-MCG-MCG TAB    Take by mouth.    OMEPRAZOLE (PRILOSEC) 20 MG CAPSULE    TAKE 1 CAPSULE(20 MG) BY MOUTH EVERY DAY    OXYCODONE-ACETAMINOPHEN (PERCOCET)  MG PER TABLET    Take 1 tablet by mouth every 6 (six) hours as needed.    PRAVASTATIN (PRAVACHOL) 40 MG TABLET    Take 1 tablet (40 mg total) by mouth once daily.    SILDENAFIL (VIAGRA) 100 MG TABLET    Take 100 mg by mouth every other day.    TRIAMCINOLONE ACETONIDE 0.1% (KENALOG) 0.1 % CREAM    Apply topically 2 (two) times daily.     Objective:     Vitals:    01/18/23 1053   BP: 108/76   Pulse: 85   Temp: 97.4 °F (36.3 °C)       Physical Exam  Vitals reviewed.   Constitutional:       General: He is not in acute distress.     Appearance: He is not ill-appearing, toxic-appearing or diaphoretic.   HENT:      Head: Normocephalic and atraumatic.   Eyes:      Conjunctiva/sclera: Conjunctivae normal.   Cardiovascular:      Rate and Rhythm: Normal rate.   Pulmonary:      Effort: Pulmonary effort is normal.   Abdominal:       General: Bowel sounds are normal.   Musculoskeletal:      Right lower leg: No edema.      Left lower leg: No edema.   Skin:     General: Skin is warm.      Coloration: Skin is not jaundiced or pale.      Findings: No bruising, erythema, lesion or rash.   Neurological:      Mental Status: He is alert.      Motor: No weakness.      Gait: Gait normal.   Psychiatric:         Mood and Affect: Mood normal.         Behavior: Behavior normal.         Thought Content: Thought content normal.          Labs/Results:  Lab Results   Component Value Date    WBC 6.16 01/18/2023    RBC 4.49 (L) 01/18/2023    HGB 14.3 01/18/2023    HCT 42.2 01/18/2023    MCV 94 01/18/2023    MCH 31.8 (H) 01/18/2023    MCHC 33.9 01/18/2023    RDW 11.8 01/18/2023    PLT 67 (L) 01/18/2023    MPV 8.9 (L) 01/18/2023    GRAN 3.3 01/18/2023    GRAN 53.7 01/18/2023    LYMPH 2.2 01/18/2023    LYMPH 36.0 01/18/2023    MONO 0.4 01/18/2023    MONO 6.7 01/18/2023    EOS 0.1 01/18/2023    BASO 0.09 01/18/2023    EOSINOPHIL 1.8 01/18/2023    BASOPHIL 1.5 01/18/2023         Assessment:     Problem List Items Addressed This Visit          Hematology    Chronic ITP (idiopathic thrombocytopenia)    Relevant Orders    CBC Auto Differential    CBC Auto Differential    Comprehensive Metabolic Panel    Comprehensive Metabolic Panel       Oncology    History of ITP - Primary    Relevant Orders    CBC Auto Differential    CBC Auto Differential    Comprehensive Metabolic Panel    Comprehensive Metabolic Panel       GI    Personal history of colonic polyps       Other    Tobacco use     Plan:     History of ITP  --plts: 67  --gave him the option of following with his primary care physician and seeing us on a p.r.n. basis, however, he stated that he would prefer to continue to come at our clinic every 6 months  --ITP may recur at any time in the future.  --bleeding precautions given    Tobacco use  --8/2022 CT chest negative, repeat in 1 year    Follow-Up: cbc cmp in 6 months.  12 months RTC with cbc prior     Ofelia Chu PA-C  Hematology Oncology

## 2023-01-18 ENCOUNTER — OFFICE VISIT (OUTPATIENT)
Dept: HEMATOLOGY/ONCOLOGY | Facility: CLINIC | Age: 63
End: 2023-01-18
Payer: MEDICARE

## 2023-01-18 ENCOUNTER — LAB VISIT (OUTPATIENT)
Dept: LAB | Facility: HOSPITAL | Age: 63
End: 2023-01-18
Payer: MEDICARE

## 2023-01-18 VITALS
WEIGHT: 138.25 LBS | OXYGEN SATURATION: 99 % | HEART RATE: 85 BPM | HEIGHT: 67 IN | SYSTOLIC BLOOD PRESSURE: 108 MMHG | DIASTOLIC BLOOD PRESSURE: 76 MMHG | BODY MASS INDEX: 21.7 KG/M2 | TEMPERATURE: 97 F

## 2023-01-18 DIAGNOSIS — Z72.0 TOBACCO USE: ICD-10-CM

## 2023-01-18 DIAGNOSIS — D69.3 CHRONIC ITP (IDIOPATHIC THROMBOCYTOPENIA): ICD-10-CM

## 2023-01-18 DIAGNOSIS — D69.6 THROMBOCYTOPENIA: ICD-10-CM

## 2023-01-18 DIAGNOSIS — Z86.2 HISTORY OF ITP: ICD-10-CM

## 2023-01-18 DIAGNOSIS — Z86.010 PERSONAL HISTORY OF COLONIC POLYPS: ICD-10-CM

## 2023-01-18 DIAGNOSIS — Z86.2 HISTORY OF ITP: Primary | ICD-10-CM

## 2023-01-18 LAB
BASOPHILS # BLD AUTO: 0.09 K/UL (ref 0–0.2)
BASOPHILS NFR BLD: 1.5 % (ref 0–1.9)
DIFFERENTIAL METHOD: ABNORMAL
EOSINOPHIL # BLD AUTO: 0.1 K/UL (ref 0–0.5)
EOSINOPHIL NFR BLD: 1.8 % (ref 0–8)
ERYTHROCYTE [DISTWIDTH] IN BLOOD BY AUTOMATED COUNT: 11.8 % (ref 11.5–14.5)
HCT VFR BLD AUTO: 42.2 % (ref 40–54)
HGB BLD-MCNC: 14.3 G/DL (ref 14–18)
IMM GRANULOCYTES # BLD AUTO: 0.02 K/UL (ref 0–0.04)
IMM GRANULOCYTES NFR BLD AUTO: 0.3 % (ref 0–0.5)
LYMPHOCYTES # BLD AUTO: 2.2 K/UL (ref 1–4.8)
LYMPHOCYTES NFR BLD: 36 % (ref 18–48)
MCH RBC QN AUTO: 31.8 PG (ref 27–31)
MCHC RBC AUTO-ENTMCNC: 33.9 G/DL (ref 32–36)
MCV RBC AUTO: 94 FL (ref 82–98)
MONOCYTES # BLD AUTO: 0.4 K/UL (ref 0.3–1)
MONOCYTES NFR BLD: 6.7 % (ref 4–15)
NEUTROPHILS # BLD AUTO: 3.3 K/UL (ref 1.8–7.7)
NEUTROPHILS NFR BLD: 53.7 % (ref 38–73)
NRBC BLD-RTO: 0 /100 WBC
PLATELET # BLD AUTO: 67 K/UL (ref 150–450)
PMV BLD AUTO: 8.9 FL (ref 9.2–12.9)
RBC # BLD AUTO: 4.49 M/UL (ref 4.6–6.2)
WBC # BLD AUTO: 6.16 K/UL (ref 3.9–12.7)

## 2023-01-18 PROCEDURE — 3008F BODY MASS INDEX DOCD: CPT | Mod: HCNC,CPTII,S$GLB,

## 2023-01-18 PROCEDURE — 99214 OFFICE O/P EST MOD 30 MIN: CPT | Mod: HCNC,S$GLB,,

## 2023-01-18 PROCEDURE — 3078F DIAST BP <80 MM HG: CPT | Mod: HCNC,CPTII,S$GLB,

## 2023-01-18 PROCEDURE — 99999 PR PBB SHADOW E&M-EST. PATIENT-LVL III: ICD-10-PCS | Mod: PBBFAC,HCNC,,

## 2023-01-18 PROCEDURE — 85025 COMPLETE CBC W/AUTO DIFF WBC: CPT | Mod: HCNC

## 2023-01-18 PROCEDURE — 36415 COLL VENOUS BLD VENIPUNCTURE: CPT | Mod: HCNC

## 2023-01-18 PROCEDURE — 99999 PR PBB SHADOW E&M-EST. PATIENT-LVL III: CPT | Mod: PBBFAC,HCNC,,

## 2023-01-18 PROCEDURE — 1159F MED LIST DOCD IN RCRD: CPT | Mod: HCNC,CPTII,S$GLB,

## 2023-01-18 PROCEDURE — 3008F PR BODY MASS INDEX (BMI) DOCUMENTED: ICD-10-PCS | Mod: HCNC,CPTII,S$GLB,

## 2023-01-18 PROCEDURE — 3074F PR MOST RECENT SYSTOLIC BLOOD PRESSURE < 130 MM HG: ICD-10-PCS | Mod: HCNC,CPTII,S$GLB,

## 2023-01-18 PROCEDURE — 3078F PR MOST RECENT DIASTOLIC BLOOD PRESSURE < 80 MM HG: ICD-10-PCS | Mod: HCNC,CPTII,S$GLB,

## 2023-01-18 PROCEDURE — 1159F PR MEDICATION LIST DOCUMENTED IN MEDICAL RECORD: ICD-10-PCS | Mod: HCNC,CPTII,S$GLB,

## 2023-01-18 PROCEDURE — 99214 PR OFFICE/OUTPT VISIT, EST, LEVL IV, 30-39 MIN: ICD-10-PCS | Mod: HCNC,S$GLB,,

## 2023-01-18 PROCEDURE — 3074F SYST BP LT 130 MM HG: CPT | Mod: HCNC,CPTII,S$GLB,

## 2023-01-19 ENCOUNTER — PATIENT MESSAGE (OUTPATIENT)
Dept: HEMATOLOGY/ONCOLOGY | Facility: CLINIC | Age: 63
End: 2023-01-19
Payer: MEDICARE

## 2023-01-19 ENCOUNTER — LAB VISIT (OUTPATIENT)
Dept: LAB | Facility: HOSPITAL | Age: 63
End: 2023-01-19
Payer: MEDICARE

## 2023-01-19 ENCOUNTER — TELEPHONE (OUTPATIENT)
Dept: INTERNAL MEDICINE | Facility: CLINIC | Age: 63
End: 2023-01-19
Payer: MEDICARE

## 2023-01-19 DIAGNOSIS — Z86.2 HISTORY OF ITP: ICD-10-CM

## 2023-01-19 DIAGNOSIS — D69.3 CHRONIC ITP (IDIOPATHIC THROMBOCYTOPENIA): ICD-10-CM

## 2023-01-19 DIAGNOSIS — Z86.2 HISTORY OF ITP: Primary | ICD-10-CM

## 2023-01-19 LAB
ALBUMIN SERPL BCP-MCNC: 3.8 G/DL (ref 3.5–5.2)
ALP SERPL-CCNC: 53 U/L (ref 55–135)
ALT SERPL W/O P-5'-P-CCNC: 15 U/L (ref 10–44)
ANION GAP SERPL CALC-SCNC: 10 MMOL/L (ref 8–16)
AST SERPL-CCNC: 22 U/L (ref 10–40)
BILIRUB SERPL-MCNC: 0.5 MG/DL (ref 0.1–1)
BUN SERPL-MCNC: 10 MG/DL (ref 8–23)
CALCIUM SERPL-MCNC: 9.2 MG/DL (ref 8.7–10.5)
CHLORIDE SERPL-SCNC: 105 MMOL/L (ref 95–110)
CO2 SERPL-SCNC: 24 MMOL/L (ref 23–29)
CREAT SERPL-MCNC: 1 MG/DL (ref 0.5–1.4)
EST. GFR  (NO RACE VARIABLE): >60 ML/MIN/1.73 M^2
ESTIMATED AVG GLUCOSE: 88 MG/DL (ref 68–131)
GLUCOSE SERPL-MCNC: 142 MG/DL (ref 70–110)
HBA1C MFR BLD: 4.7 % (ref 4–5.6)
POTASSIUM SERPL-SCNC: 4.4 MMOL/L (ref 3.5–5.1)
PROT SERPL-MCNC: 6.9 G/DL (ref 6–8.4)
SODIUM SERPL-SCNC: 139 MMOL/L (ref 136–145)

## 2023-01-19 PROCEDURE — 80053 COMPREHEN METABOLIC PANEL: CPT | Mod: HCNC

## 2023-01-19 PROCEDURE — 83036 HEMOGLOBIN GLYCOSYLATED A1C: CPT | Mod: HCNC

## 2023-01-19 PROCEDURE — 36415 COLL VENOUS BLD VENIPUNCTURE: CPT | Mod: HCNC

## 2023-01-20 ENCOUNTER — OFFICE VISIT (OUTPATIENT)
Dept: INTERNAL MEDICINE | Facility: CLINIC | Age: 63
End: 2023-01-20
Payer: MEDICARE

## 2023-01-20 VITALS
WEIGHT: 138.25 LBS | OXYGEN SATURATION: 99 % | SYSTOLIC BLOOD PRESSURE: 118 MMHG | TEMPERATURE: 98 F | HEART RATE: 80 BPM | RESPIRATION RATE: 18 BRPM | DIASTOLIC BLOOD PRESSURE: 80 MMHG | BODY MASS INDEX: 21.65 KG/M2

## 2023-01-20 DIAGNOSIS — I10 PRIMARY HYPERTENSION: ICD-10-CM

## 2023-01-20 DIAGNOSIS — R73.09 ELEVATED RANDOM BLOOD GLUCOSE LEVEL: Primary | ICD-10-CM

## 2023-01-20 PROCEDURE — 99213 PR OFFICE/OUTPT VISIT, EST, LEVL III, 20-29 MIN: ICD-10-PCS | Mod: HCNC,S$GLB,,

## 2023-01-20 PROCEDURE — 1160F PR REVIEW ALL MEDS BY PRESCRIBER/CLIN PHARMACIST DOCUMENTED: ICD-10-PCS | Mod: HCNC,CPTII,S$GLB,

## 2023-01-20 PROCEDURE — 3044F PR MOST RECENT HEMOGLOBIN A1C LEVEL <7.0%: ICD-10-PCS | Mod: HCNC,CPTII,S$GLB,

## 2023-01-20 PROCEDURE — 1160F RVW MEDS BY RX/DR IN RCRD: CPT | Mod: HCNC,CPTII,S$GLB,

## 2023-01-20 PROCEDURE — 3008F BODY MASS INDEX DOCD: CPT | Mod: HCNC,CPTII,S$GLB,

## 2023-01-20 PROCEDURE — 3074F PR MOST RECENT SYSTOLIC BLOOD PRESSURE < 130 MM HG: ICD-10-PCS | Mod: HCNC,CPTII,S$GLB,

## 2023-01-20 PROCEDURE — 99213 OFFICE O/P EST LOW 20 MIN: CPT | Mod: HCNC,S$GLB,,

## 2023-01-20 PROCEDURE — 1159F PR MEDICATION LIST DOCUMENTED IN MEDICAL RECORD: ICD-10-PCS | Mod: HCNC,CPTII,S$GLB,

## 2023-01-20 PROCEDURE — 3008F PR BODY MASS INDEX (BMI) DOCUMENTED: ICD-10-PCS | Mod: HCNC,CPTII,S$GLB,

## 2023-01-20 PROCEDURE — 3044F HG A1C LEVEL LT 7.0%: CPT | Mod: HCNC,CPTII,S$GLB,

## 2023-01-20 PROCEDURE — 3074F SYST BP LT 130 MM HG: CPT | Mod: HCNC,CPTII,S$GLB,

## 2023-01-20 PROCEDURE — 99999 PR PBB SHADOW E&M-EST. PATIENT-LVL III: ICD-10-PCS | Mod: PBBFAC,HCNC,,

## 2023-01-20 PROCEDURE — 3079F DIAST BP 80-89 MM HG: CPT | Mod: HCNC,CPTII,S$GLB,

## 2023-01-20 PROCEDURE — 3079F PR MOST RECENT DIASTOLIC BLOOD PRESSURE 80-89 MM HG: ICD-10-PCS | Mod: HCNC,CPTII,S$GLB,

## 2023-01-20 PROCEDURE — 99999 PR PBB SHADOW E&M-EST. PATIENT-LVL III: CPT | Mod: PBBFAC,HCNC,,

## 2023-01-20 PROCEDURE — 1159F MED LIST DOCD IN RCRD: CPT | Mod: HCNC,CPTII,S$GLB,

## 2023-01-20 NOTE — PROGRESS NOTES
Jaxson Burch Claudette  01/20/2023  [id number]    Salomón Lantigua MD  Patient Care Team:  Salomón Lantigua MD as PCP - General (Family Medicine)  Margaux Mratinez LPN as Care Coordinator (Internal Medicine)          Visit Type:an urgent visit for a new problem    Chief Complaint:  Chief Complaint   Patient presents with    Follow-up     Patient is following up for lab results. Was concerned about his fasting glucose. It was 142, but he reports having drank 2 cups of coffee with sugar. His A1C was 4.7.        History of Present Illness:    Patient had blood work done on yesterday  On the [facility] BG was 147  Not fasting prior to labs    Lab Results   Component Value Date    HGBA1C 4.7 01/19/2023     Pt would like his BG levels checked today    History:  Past Medical History:   Diagnosis Date    Arthritis     Gastritis     Hypertension, essential 2/2/2021    ITP (idiopathic thrombocytopenic purpura)      Past Surgical History:   Procedure Laterality Date    COLONOSCOPY N/A 11/29/2018    Procedure: COLONOSCOPY;  Surgeon: Brett Townsend MD;  Location: [facility] ENDO;  Service: Endoscopy;  Laterality: N/A;    COLONOSCOPY N/A 12/21/2022    Procedure: COLONOSCOPY;  Surgeon: Marisol Carrillo MD;  Location: [facility];  Service: Endoscopy;  Laterality: N/A;    FRACTURE SURGERY  02 04 2005    Hip replacement    JOINT REPLACEMENT Right     hip     Family History   Problem Relation Age of Onset    Pancreatic cancer Mother     Cancer Mother         Pancreatic cancer    Colon cancer Neg Hx      Social History     Socioeconomic History    Marital status:    Tobacco Use    Smoking status: Every Day     Packs/day: 0.50     Years: 34.00     Pack years: 17.00     Types: Cigarettes     Start date: 1/10/1989     Passive exposure: Past    Smokeless tobacco: Never   Substance and Sexual Activity    Alcohol use: Yes     Alcohol/week: 20.0 standard drinks     Types: 10 Cans of beer, 10 Shots of liquor per week     Comment: socially    Drug use:  No    Sexual activity: Yes     Partners: Female     Birth control/protection: None     Patient Active Problem List   Diagnosis    Chronic ITP (idiopathic thrombocytopenia)    Cervical radiculitis    Erectile dysfunction    History of ITP    High risk medication use    Gastroesophageal reflux disease    Weight loss    Insomnia    Hyperkalemia    Numbness of left foot    Chronic pain syndrome    Tobacco use    Atherosclerosis    Immunization deficiency    Blood pressure elevated without history of HTN    Hyperlipidemia    Primary hypertension    Benign prostatic hyperplasia with incomplete bladder emptying    Muscle cramps    Thrombocytopenia    Sebaceous cyst    Cigarette nicotine dependence without complication    Change in bowel habits    Personal history of colonic polyps     Review of patient's allergies indicates:  No Known Allergies    The following were reviewed at this visit: active problem list, medication list, allergies, family history, social history, and health maintenance.    Medications:  Current Outpatient Medications on File Prior to Visit   Medication Sig Dispense Refill    amLODIPine (NORVASC) 5 MG tablet TAKE 1 TABLET(5 MG) BY MOUTH EVERY DAY 90 tablet 3    loratadine (CLARITIN) 10 mg tablet Take 10 mg by mouth once daily.      meloxicam (MOBIC) 7.5 MG tablet Take 7.5 mg by mouth daily as needed for Pain.      multivit-calc-iron-FA-K-hb#244 -80 mg-mcg-mcg Tab Take by mouth.      omeprazole (PRILOSEC) 20 MG capsule TAKE 1 CAPSULE(20 MG) BY MOUTH EVERY DAY 30 capsule 3    oxyCODONE-acetaminophen (PERCOCET)  mg per tablet Take 1 tablet by mouth every 6 (six) hours as needed.      pravastatin (PRAVACHOL) 40 MG tablet Take 1 tablet (40 mg total) by mouth once daily. 90 tablet 3    sildenafil (VIAGRA) 100 MG tablet Take 100 mg by mouth every other day.  6    triamcinolone acetonide 0.1% (KENALOG) 0.1 % cream Apply topically 2 (two) times daily. (Patient not taking: Reported on 1/20/2023) 15  g 0     No current facility-administered medications on file prior to visit.       Medications have been reviewed and reconciled with patient at this visit.  Barriers to medications reviewed with patient.    Adverse reactions to current medications reviewed with patient..    Over the counter medications reviewed and reconciled with patient.    Exam:  Wt Readings from Last 3 Encounters:   01/20/23 62.7 kg (138 lb 3.7 oz)   01/18/23 62.7 kg (138 lb 3.7 oz)   12/21/22 64 kg (141 lb)     Temp Readings from Last 3 Encounters:   01/20/23 98 °F (36.7 °C)   01/18/23 97.4 °F (36.3 °C) (Temporal)   12/21/22 97.4 °F (36.3 °C)     BP Readings from Last 3 Encounters:   01/20/23 118/80   01/18/23 108/76   12/21/22 103/77     Pulse Readings from Last 3 Encounters:   01/20/23 80   01/18/23 85   12/21/22 90     Body mass index is 21.65 kg/m².      Review of Systems   HENT:  Negative for congestion and sinus pain.    Respiratory:  Negative for cough, shortness of breath, wheezing and stridor.    Cardiovascular:  Negative for chest pain and palpitations.   Physical Exam  Vitals and nursing note reviewed.   Constitutional:       General: He is not in acute distress.     Appearance: He is well-developed. He is not diaphoretic.   HENT:      Head: Normocephalic and atraumatic.      Right Ear: External ear normal.      Left Ear: External ear normal.   Eyes:      General:         Right eye: No discharge.         Left eye: No discharge.      Conjunctiva/sclera: Conjunctivae normal.      Pupils: Pupils are equal, round, and reactive to light.   Cardiovascular:      Rate and Rhythm: Normal rate and regular rhythm.      Heart sounds: Normal heart sounds. No murmur heard.  Pulmonary:      Effort: Pulmonary effort is normal. No respiratory distress.      Breath sounds: Normal breath sounds. No wheezing.   Abdominal:      General: Bowel sounds are normal.   Neurological:      Mental Status: He is alert and oriented to person, place, and time.    Psychiatric:         Behavior: Behavior normal.         Thought Content: Thought content normal.         Judgment: Judgment normal.       Laboratory Reviewed ({Yes)  Lab Results   Component Value Date    WBC 6.16 01/18/2023    HGB 14.3 01/18/2023    HCT 42.2 01/18/2023    PLT 67 (L) 01/18/2023    CHOL 160 08/08/2022    TRIG 56 08/08/2022    HDL 78 (H) 08/08/2022    ALT 15 01/19/2023    AST 22 01/19/2023     01/19/2023    K 4.4 01/19/2023     01/19/2023    CREATININE 1.0 01/19/2023    BUN 10 01/19/2023    CO2 24 01/19/2023    TSH 0.712 02/07/2022    PSA 3.9 05/05/2022    INR 1.0 05/05/2016    HGBA1C 4.7 01/19/2023       Jaxson was seen today for follow-up.    Diagnoses and all orders for this visit:    Elevated random blood glucose level  -     POCT Glucose, Hand-Held Device    Primary hypertension  At visit, Blood pressure is at goal. Continue current medications      In office BG level was 92  Explained that he was not a risk of being a diabetic  He works out several days a week and he watches his diet     Keep follow up appt with PCP on next month     Care Plan/Goals: Reviewed    Goals    None         Follow up: No follow-ups on file.    After visit summary was printed and given to patient upon discharge today.  Patient goals and care plan are included in After Visit Summary.

## 2023-01-25 ENCOUNTER — PATIENT MESSAGE (OUTPATIENT)
Dept: INTERNAL MEDICINE | Facility: CLINIC | Age: 63
End: 2023-01-25
Payer: MEDICARE

## 2023-01-27 ENCOUNTER — PATIENT MESSAGE (OUTPATIENT)
Dept: INTERNAL MEDICINE | Facility: CLINIC | Age: 63
End: 2023-01-27
Payer: MEDICARE

## 2023-02-07 DIAGNOSIS — Z00.00 ENCOUNTER FOR MEDICARE ANNUAL WELLNESS EXAM: ICD-10-CM

## 2023-02-08 ENCOUNTER — OFFICE VISIT (OUTPATIENT)
Dept: INTERNAL MEDICINE | Facility: CLINIC | Age: 63
End: 2023-02-08
Payer: MEDICARE

## 2023-02-08 VITALS
WEIGHT: 144.19 LBS | BODY MASS INDEX: 22.63 KG/M2 | OXYGEN SATURATION: 97 % | HEIGHT: 67 IN | HEART RATE: 72 BPM | DIASTOLIC BLOOD PRESSURE: 76 MMHG | SYSTOLIC BLOOD PRESSURE: 124 MMHG | TEMPERATURE: 98 F

## 2023-02-08 DIAGNOSIS — I70.90 ATHEROSCLEROSIS: ICD-10-CM

## 2023-02-08 DIAGNOSIS — D69.3 CHRONIC ITP (IDIOPATHIC THROMBOCYTOPENIA): ICD-10-CM

## 2023-02-08 DIAGNOSIS — I10 HYPERTENSION, ESSENTIAL: Primary | ICD-10-CM

## 2023-02-08 DIAGNOSIS — E78.5 HYPERLIPIDEMIA, UNSPECIFIED HYPERLIPIDEMIA TYPE: ICD-10-CM

## 2023-02-08 DIAGNOSIS — R73.09 OTHER ABNORMAL GLUCOSE: ICD-10-CM

## 2023-02-08 DIAGNOSIS — G54.2 CERVICAL NEUROPATHY: ICD-10-CM

## 2023-02-08 PROBLEM — D69.6 THROMBOCYTOPENIA: Status: RESOLVED | Noted: 2022-02-07 | Resolved: 2023-02-08

## 2023-02-08 PROBLEM — I70.0 AORTIC ATHEROSCLEROSIS: Status: ACTIVE | Noted: 2023-02-08

## 2023-02-08 PROBLEM — R03.0 BLOOD PRESSURE ELEVATED WITHOUT HISTORY OF HTN: Status: RESOLVED | Noted: 2021-02-02 | Resolved: 2023-02-08

## 2023-02-08 PROBLEM — L72.3 SEBACEOUS CYST: Status: RESOLVED | Noted: 2022-02-07 | Resolved: 2023-02-08

## 2023-02-08 PROBLEM — Z86.2 HISTORY OF ITP: Status: RESOLVED | Noted: 2017-04-10 | Resolved: 2023-02-08

## 2023-02-08 PROCEDURE — 99214 OFFICE O/P EST MOD 30 MIN: CPT | Mod: HCNC,S$GLB,, | Performed by: FAMILY MEDICINE

## 2023-02-08 PROCEDURE — 3074F SYST BP LT 130 MM HG: CPT | Mod: HCNC,CPTII,S$GLB, | Performed by: FAMILY MEDICINE

## 2023-02-08 PROCEDURE — 3044F PR MOST RECENT HEMOGLOBIN A1C LEVEL <7.0%: ICD-10-PCS | Mod: HCNC,CPTII,S$GLB, | Performed by: FAMILY MEDICINE

## 2023-02-08 PROCEDURE — 3074F PR MOST RECENT SYSTOLIC BLOOD PRESSURE < 130 MM HG: ICD-10-PCS | Mod: HCNC,CPTII,S$GLB, | Performed by: FAMILY MEDICINE

## 2023-02-08 PROCEDURE — 99999 PR PBB SHADOW E&M-EST. PATIENT-LVL III: CPT | Mod: PBBFAC,HCNC,, | Performed by: FAMILY MEDICINE

## 2023-02-08 PROCEDURE — 99999 PR PBB SHADOW E&M-EST. PATIENT-LVL III: ICD-10-PCS | Mod: PBBFAC,HCNC,, | Performed by: FAMILY MEDICINE

## 2023-02-08 PROCEDURE — 3078F DIAST BP <80 MM HG: CPT | Mod: HCNC,CPTII,S$GLB, | Performed by: FAMILY MEDICINE

## 2023-02-08 PROCEDURE — 3044F HG A1C LEVEL LT 7.0%: CPT | Mod: HCNC,CPTII,S$GLB, | Performed by: FAMILY MEDICINE

## 2023-02-08 PROCEDURE — 3008F PR BODY MASS INDEX (BMI) DOCUMENTED: ICD-10-PCS | Mod: HCNC,CPTII,S$GLB, | Performed by: FAMILY MEDICINE

## 2023-02-08 PROCEDURE — 3008F BODY MASS INDEX DOCD: CPT | Mod: HCNC,CPTII,S$GLB, | Performed by: FAMILY MEDICINE

## 2023-02-08 PROCEDURE — 99214 PR OFFICE/OUTPT VISIT, EST, LEVL IV, 30-39 MIN: ICD-10-PCS | Mod: HCNC,S$GLB,, | Performed by: FAMILY MEDICINE

## 2023-02-08 PROCEDURE — 3078F PR MOST RECENT DIASTOLIC BLOOD PRESSURE < 80 MM HG: ICD-10-PCS | Mod: HCNC,CPTII,S$GLB, | Performed by: FAMILY MEDICINE

## 2023-02-08 RX ORDER — AMLODIPINE BESYLATE 5 MG/1
5 TABLET ORAL DAILY
Qty: 90 TABLET | Refills: 3 | Status: SHIPPED | OUTPATIENT
Start: 2023-02-08 | End: 2024-02-19 | Stop reason: SDUPTHER

## 2023-02-08 RX ORDER — PRAVASTATIN SODIUM 40 MG/1
40 TABLET ORAL DAILY
Qty: 90 TABLET | Refills: 3 | Status: SHIPPED | OUTPATIENT
Start: 2023-02-08 | End: 2023-07-19

## 2023-02-08 NOTE — PROGRESS NOTES
Subjective:       Patient ID: Jaxson Burch Jr. is a 62 y.o. male.    Chief Complaint: Follow-up    Follow-up hypertension hyperlipidemia ITP esophageal reflux nicotine dependence.  He denies chest pain palpitations shortness of breath or edema.  Has a cervical neuropathy followed by pain management.  Denies bruising.  He continues to smoke cigarettes.  Low-dose CT lung was done and reviewed.  It does show some coronary atherosclerosis which was discussed with patient.    Follow-up  Associated symptoms include numbness. Pertinent negatives include no abdominal pain, chest pain, coughing, fatigue, headaches or weakness.   Review of Systems   Constitutional:  Negative for activity change, appetite change, fatigue and unexpected weight change.   Respiratory:  Negative for cough, chest tightness, shortness of breath and wheezing.    Cardiovascular:  Negative for chest pain, palpitations and leg swelling.   Gastrointestinal:  Negative for abdominal distention and abdominal pain.   Neurological:  Positive for numbness. Negative for dizziness, weakness, light-headedness and headaches.        Numbness left arm and ulna fingers.  Present for about 1 year     Objective:      Physical Exam  Constitutional:       General: He is not in acute distress.     Appearance: He is not ill-appearing or diaphoretic.   Cardiovascular:      Rate and Rhythm: Normal rate and regular rhythm.      Heart sounds: No murmur heard.    No gallop.   Pulmonary:      Effort: Pulmonary effort is normal.      Breath sounds: Normal breath sounds. No wheezing, rhonchi or rales.   Abdominal:      General: There is no distension.      Palpations: Abdomen is soft. There is no mass.      Tenderness: There is no abdominal tenderness.   Lymphadenopathy:      Cervical: No cervical adenopathy.   Skin:     General: Skin is warm and dry.      Coloration: Skin is not pale.      Findings: No erythema.   Neurological:      Mental Status: He is alert.       Lab Visit on  01/19/2023   Component Date Value Ref Range Status    Sodium 01/19/2023 139  136 - 145 mmol/L Final    Potassium 01/19/2023 4.4  3.5 - 5.1 mmol/L Final    Chloride 01/19/2023 105  95 - 110 mmol/L Final    CO2 01/19/2023 24  23 - 29 mmol/L Final    Glucose 01/19/2023 142 (H)  70 - 110 mg/dL Final    BUN 01/19/2023 10  8 - 23 mg/dL Final    Creatinine 01/19/2023 1.0  0.5 - 1.4 mg/dL Final    Calcium 01/19/2023 9.2  8.7 - 10.5 mg/dL Final    Total Protein 01/19/2023 6.9  6.0 - 8.4 g/dL Final    Albumin 01/19/2023 3.8  3.5 - 5.2 g/dL Final    Total Bilirubin 01/19/2023 0.5  0.1 - 1.0 mg/dL Final    Alkaline Phosphatase 01/19/2023 53 (L)  55 - 135 U/L Final    AST 01/19/2023 22  10 - 40 U/L Final    ALT 01/19/2023 15  10 - 44 U/L Final    Anion Gap 01/19/2023 10  8 - 16 mmol/L Final    eGFR 01/19/2023 >60  >60 mL/min/1.73 m^2 Final    Hemoglobin A1C 01/19/2023 4.7  4.0 - 5.6 % Final    Estimated Avg Glucose 01/19/2023 88  68 - 131 mg/dL Final     Assessment:       1. Hypertension, essential    2. Atherosclerosis    3. Hyperlipidemia, unspecified hyperlipidemia type    4. Chronic ITP (idiopathic thrombocytopenia)    5. Other abnormal glucose    6. Cervical neuropathy          Plan:   Blood pressure controlled refill amlodipine Pravachol.  Lab was ordered.  Discussed nicotine cessation.  Follow-up  6 months      Hypertension, essential  -     amLODIPine (NORVASC) 5 MG tablet; Take 1 tablet (5 mg total) by mouth once daily.  Dispense: 90 tablet; Refill: 3  -     Glucose, Fasting; Future; Expected date: 02/08/2023    Atherosclerosis  -     pravastatin (PRAVACHOL) 40 MG tablet; Take 1 tablet (40 mg total) by mouth once daily.  Dispense: 90 tablet; Refill: 3    Hyperlipidemia, unspecified hyperlipidemia type  -     Lipid Panel; Future; Expected date: 02/08/2023    Chronic ITP (idiopathic thrombocytopenia)  -     CBC Auto Differential; Future; Expected date: 02/08/2023    Other abnormal glucose  -     Glucose, Fasting; Future;  Expected date: 02/08/2023    Cervical neuropathy

## 2023-02-09 ENCOUNTER — LAB VISIT (OUTPATIENT)
Dept: LAB | Facility: HOSPITAL | Age: 63
End: 2023-02-09
Attending: FAMILY MEDICINE
Payer: MEDICARE

## 2023-02-09 DIAGNOSIS — I10 HYPERTENSION, ESSENTIAL: ICD-10-CM

## 2023-02-09 DIAGNOSIS — D69.3 CHRONIC ITP (IDIOPATHIC THROMBOCYTOPENIA): ICD-10-CM

## 2023-02-09 DIAGNOSIS — R73.09 OTHER ABNORMAL GLUCOSE: ICD-10-CM

## 2023-02-09 DIAGNOSIS — E78.5 HYPERLIPIDEMIA, UNSPECIFIED HYPERLIPIDEMIA TYPE: ICD-10-CM

## 2023-02-09 DIAGNOSIS — Z00.00 ENCOUNTER FOR MEDICARE ANNUAL WELLNESS EXAM: ICD-10-CM

## 2023-02-09 LAB
BASOPHILS # BLD AUTO: 0.08 K/UL (ref 0–0.2)
BASOPHILS NFR BLD: 1.4 % (ref 0–1.9)
CHOLEST SERPL-MCNC: 171 MG/DL (ref 120–199)
CHOLEST/HDLC SERPL: 2.3 {RATIO} (ref 2–5)
DIFFERENTIAL METHOD: ABNORMAL
EOSINOPHIL # BLD AUTO: 0.2 K/UL (ref 0–0.5)
EOSINOPHIL NFR BLD: 2.8 % (ref 0–8)
ERYTHROCYTE [DISTWIDTH] IN BLOOD BY AUTOMATED COUNT: 12.2 % (ref 11.5–14.5)
GLUCOSE SERPL-MCNC: 102 MG/DL (ref 70–110)
HCT VFR BLD AUTO: 43.9 % (ref 40–54)
HDLC SERPL-MCNC: 76 MG/DL (ref 40–75)
HDLC SERPL: 44.4 % (ref 20–50)
HGB BLD-MCNC: 13.9 G/DL (ref 14–18)
IMM GRANULOCYTES # BLD AUTO: 0.01 K/UL (ref 0–0.04)
IMM GRANULOCYTES NFR BLD AUTO: 0.2 % (ref 0–0.5)
LDLC SERPL CALC-MCNC: 84.6 MG/DL (ref 63–159)
LYMPHOCYTES # BLD AUTO: 2.2 K/UL (ref 1–4.8)
LYMPHOCYTES NFR BLD: 37.8 % (ref 18–48)
MCH RBC QN AUTO: 31.2 PG (ref 27–31)
MCHC RBC AUTO-ENTMCNC: 31.7 G/DL (ref 32–36)
MCV RBC AUTO: 99 FL (ref 82–98)
MONOCYTES # BLD AUTO: 0.4 K/UL (ref 0.3–1)
MONOCYTES NFR BLD: 6.8 % (ref 4–15)
NEUTROPHILS # BLD AUTO: 3 K/UL (ref 1.8–7.7)
NEUTROPHILS NFR BLD: 51 % (ref 38–73)
NONHDLC SERPL-MCNC: 95 MG/DL
NRBC BLD-RTO: 0 /100 WBC
PLATELET # BLD AUTO: 287 K/UL (ref 150–450)
PMV BLD AUTO: 10 FL (ref 9.2–12.9)
RBC # BLD AUTO: 4.45 M/UL (ref 4.6–6.2)
TRIGL SERPL-MCNC: 52 MG/DL (ref 30–150)
WBC # BLD AUTO: 5.77 K/UL (ref 3.9–12.7)

## 2023-02-09 PROCEDURE — 85025 COMPLETE CBC W/AUTO DIFF WBC: CPT | Mod: HCNC | Performed by: FAMILY MEDICINE

## 2023-02-09 PROCEDURE — 82947 ASSAY GLUCOSE BLOOD QUANT: CPT | Mod: HCNC | Performed by: FAMILY MEDICINE

## 2023-02-09 PROCEDURE — 36415 COLL VENOUS BLD VENIPUNCTURE: CPT | Mod: HCNC | Performed by: FAMILY MEDICINE

## 2023-02-09 PROCEDURE — 80061 LIPID PANEL: CPT | Mod: HCNC | Performed by: FAMILY MEDICINE

## 2023-02-10 ENCOUNTER — PATIENT MESSAGE (OUTPATIENT)
Dept: INTERNAL MEDICINE | Facility: CLINIC | Age: 63
End: 2023-02-10
Payer: MEDICARE

## 2023-02-10 ENCOUNTER — TELEPHONE (OUTPATIENT)
Dept: INTERNAL MEDICINE | Facility: CLINIC | Age: 63
End: 2023-02-10
Payer: MEDICARE

## 2023-03-05 ENCOUNTER — HOSPITAL ENCOUNTER (EMERGENCY)
Facility: HOSPITAL | Age: 63
Discharge: HOME | End: 2023-03-05
Attending: EMERGENCY MEDICINE
Payer: MEDICARE

## 2023-03-05 VITALS
HEIGHT: 67 IN | TEMPERATURE: 98 F | OXYGEN SATURATION: 99 % | DIASTOLIC BLOOD PRESSURE: 69 MMHG | RESPIRATION RATE: 16 BRPM | WEIGHT: 139.25 LBS | HEART RATE: 72 BPM | SYSTOLIC BLOOD PRESSURE: 117 MMHG | BODY MASS INDEX: 21.85 KG/M2

## 2023-03-05 DIAGNOSIS — G44.209 TENSION-TYPE HEADACHE, NOT INTRACTABLE, UNSPECIFIED CHRONICITY PATTERN: Primary | ICD-10-CM

## 2023-03-05 PROCEDURE — 99284 EMERGENCY DEPT VISIT MOD MDM: CPT | Mod: 25,HCNC

## 2023-03-05 RX ORDER — BUTALBITAL, ACETAMINOPHEN AND CAFFEINE 50; 325; 40 MG/1; MG/1; MG/1
1 TABLET ORAL EVERY 6 HOURS PRN
Qty: 15 TABLET | Refills: 0 | Status: SHIPPED | OUTPATIENT
Start: 2023-03-05

## 2023-03-05 NOTE — ED PROVIDER NOTES
Encounter Date: 3/5/2023       History     Chief Complaint   Patient presents with    Headache     Left sided headache x 3 weeks, sharp and intermittent, has neck pain (chronic)     62-year-old male with complaint of left-sided headache over the past 3 weeks.  Patient reports pain is sharp and comes and goes.  Patient denies history of headaches.  Patient concerned and requesting CT scan.      Review of patient's allergies indicates:  No Known Allergies  Past Medical History:   Diagnosis Date    Arthritis     Gastritis     Hypertension, essential 2/2/2021    ITP (idiopathic thrombocytopenic purpura)      Past Surgical History:   Procedure Laterality Date    COLONOSCOPY N/A 11/29/2018    Procedure: COLONOSCOPY;  Surgeon: Brett Townsend MD;  Location: [facility] END[facility];  Service: Endoscopy;  Laterality: N/A;    COLONOSCOPY N/A 12/21/2022    Procedure: COLONOSCOPY;  Surgeon: Marisol Carrillo MD;  Location: [facility] END[facility];  Service: Endoscopy;  Laterality: N/A;    FRACTURE SURGERY  02 04 2005    Hip replacement    JOINT REPLACEMENT Right     hip     Family History   Problem Relation Age of Onset    Pancreatic cancer Mother     Cancer Mother         Pancreatic cancer    Colon cancer Neg Hx      Social History     Tobacco Use    Smoking status: Every Day     Packs/day: 0.50     Years: 34.00     Pack years: 17.00     Types: Cigarettes     Start date: 1/10/1989     Passive exposure: Past    Smokeless tobacco: Never   Substance Use Topics    Alcohol use: Yes     Alcohol/week: 20.0 standard drinks     Types: 10 Cans of beer, 10 Shots of liquor per week     Comment: socially    Drug use: No     Review of Systems   Constitutional:  Negative for fever.   HENT:  Negative for sore throat.    Respiratory:  Negative for shortness of breath.    Cardiovascular:  Negative for chest pain.   Gastrointestinal:  Negative for nausea.   Genitourinary:  Negative for dysuria.   Musculoskeletal:  Negative for back pain.   Skin:  Negative for rash.    Neurological:  Positive for headaches. Negative for weakness.   Hematological:  Does not bruise/bleed easily.     Physical Exam     Initial Vitals [03/05/23 1245]   BP Pulse Resp Temp SpO2   117/69 72 16 98.2 °F (36.8 °C) 99 %      MAP       --         Physical Exam    Nursing note and vitals reviewed.  Constitutional: He appears well-developed and well-nourished.   HENT:   Head: Normocephalic and atraumatic.   Eyes: Conjunctivae are normal. Pupils are equal, round, and reactive to light.   Neck: Neck supple.   Normal range of motion.  Cardiovascular:  Normal rate, regular rhythm, normal heart sounds and intact distal pulses.           Pulmonary/Chest: Breath sounds normal.   Abdominal: Abdomen is soft. There is no rebound and no guarding.   Musculoskeletal:         General: Normal range of motion.      Cervical back: Normal range of motion and neck supple.     Neurological: He is alert.   Skin: Skin is warm and dry.   Psychiatric: He has a normal mood and affect. His behavior is normal. Thought content normal.       ED Course   Procedures  Labs Reviewed - No data to display       Imaging Results              CT Head Without Contrast (Final result)  Result time 03/05/23 13:51:12      Final result by Jacob Benson MD (03/05/23 13:51:12)                   Impression:      No evidence of acute intracranial pathology.      Electronically signed by: Jacob Benson  Date:    03/05/2023  Time:    13:51               Narrative:    EXAMINATION:  CT HEAD WITHOUT CONTRAST    CLINICAL HISTORY:  headache;    TECHNIQUE:  Low dose axial CT images obtained throughout the head without the use of intravenous contrast.  Axial, sagittal and coronal reconstructions were performed. All CT scans at this location are performed using dose modulation techniques as appropriate to a performed exam including the following: Automated exposure control; adjustment of the mA and/or kV according to patient size (this includes techniques or  standardized protocols for targeted exams where dose is matched to indication/reason for exam; i. e. extremities or head); use of iterative reconstruction technique.    COMPARISON:  CT head 05/05/2016.    FINDINGS:  Intracranial compartment:    The brain parenchyma appears within normal limits.  No parenchymal mass, hemorrhage, edema or major vascular distribution infarct. No evidence of hydrocephalus.    No extra-axial blood or fluid collections.    Skull/extracranial contents (limited evaluation):    No fracture. Mastoid air cells and paranasal sinuses are essentially clear.                                       Imaging Results              CT Head Without Contrast (Final result)  Result time 03/05/23 13:51:12      Final result by Jacob Benson MD (03/05/23 13:51:12)                   Impression:      No evidence of acute intracranial pathology.      Electronically signed by: Jacob Benson  Date:    03/05/2023  Time:    13:51               Narrative:    EXAMINATION:  CT HEAD WITHOUT CONTRAST    CLINICAL HISTORY:  headache;    TECHNIQUE:  Low dose axial CT images obtained throughout the head without the use of intravenous contrast.  Axial, sagittal and coronal reconstructions were performed. All CT scans at this location are performed using dose modulation techniques as appropriate to a performed exam including the following: Automated exposure control; adjustment of the mA and/or kV according to patient size (this includes techniques or standardized protocols for targeted exams where dose is matched to indication/reason for exam; i. e. extremities or head); use of iterative reconstruction technique.    COMPARISON:  CT head 05/05/2016.    FINDINGS:  Intracranial compartment:    The brain parenchyma appears within normal limits.  No parenchymal mass, hemorrhage, edema or major vascular distribution infarct. No evidence of hydrocephalus.    No extra-axial blood or fluid collections.    Skull/extracranial  contents (limited evaluation):    No fracture. Mastoid air cells and paranasal sinuses are essentially clear.                                      Medications - No data to display                           Clinical Impression:   Final diagnoses:  [G44.209] Tension-type headache, not intractable, unspecified chronicity pattern (Primary)        ED Disposition Condition    Discharge Stable          ED Prescriptions       Medication Sig Dispense Start Date End Date Auth. Provider    butalbital-acetaminophen-caffeine -40 mg (FIORICET, ESGIC) -40 mg per tablet Take 1 tablet by mouth every 6 (six) hours as needed for Headaches. 15 tablet 3/5/2023 -- Benjamin Bolanos NP          Follow-up Information       Follow up With Specialties Details Why Contact Info    Salomón Lantigua MD Family Medicine Schedule an appointment as soon as possible for a visit in 2 days  [id number] [place] [id number] [phone]               Benjamin Bolanos NP  03/05/23 9795

## 2023-05-23 ENCOUNTER — PES CALL (OUTPATIENT)
Dept: ADMINISTRATIVE | Facility: CLINIC | Age: 63
End: 2023-05-23
Payer: MEDICARE

## 2023-06-05 NOTE — TELEPHONE ENCOUNTER
Refills requested on Omeprazole 20 mg QD; last refill given on 03/06/23 30 cap with 3 refills; last office visit on 05/17/22

## 2023-06-06 RX ORDER — OMEPRAZOLE 20 MG/1
CAPSULE, DELAYED RELEASE ORAL
Qty: 30 CAPSULE | Refills: 3 | Status: SHIPPED | OUTPATIENT
Start: 2023-06-06 | End: 2023-10-20 | Stop reason: SDUPTHER

## 2023-06-09 ENCOUNTER — TELEPHONE (OUTPATIENT)
Dept: OTOLARYNGOLOGY | Facility: CLINIC | Age: 63
End: 2023-06-09
Payer: MEDICARE

## 2023-06-09 NOTE — TELEPHONE ENCOUNTER
----- Message from Shy Llanes sent at 6/9/2023  8:50 AM CDT -----  Contact: gopal  Patient is calling to speak with the nurse regarding appointment. Reports having ringing in the ears and needs to get checked. Please give the patient a call back at [phone]  Thanks stephan

## 2023-06-28 ENCOUNTER — CLINICAL SUPPORT (OUTPATIENT)
Dept: AUDIOLOGY | Facility: CLINIC | Age: 63
End: 2023-06-28
Payer: MEDICARE

## 2023-06-28 ENCOUNTER — OFFICE VISIT (OUTPATIENT)
Dept: OTOLARYNGOLOGY | Facility: CLINIC | Age: 63
End: 2023-06-28
Payer: MEDICARE

## 2023-06-28 VITALS — TEMPERATURE: 99 F | BODY MASS INDEX: 21.87 KG/M2 | HEIGHT: 67 IN | WEIGHT: 139.31 LBS

## 2023-06-28 DIAGNOSIS — J01.40 ACUTE NON-RECURRENT PANSINUSITIS: ICD-10-CM

## 2023-06-28 DIAGNOSIS — H90.3 SENSORINEURAL HEARING LOSS (SNHL) OF BOTH EARS: ICD-10-CM

## 2023-06-28 DIAGNOSIS — H61.23 BILATERAL IMPACTED CERUMEN: ICD-10-CM

## 2023-06-28 DIAGNOSIS — H90.3 SENSORINEURAL HEARING LOSS, BILATERAL: Primary | ICD-10-CM

## 2023-06-28 DIAGNOSIS — H93.19 TINNITUS, UNSPECIFIED LATERALITY: Primary | ICD-10-CM

## 2023-06-28 DIAGNOSIS — H93.19 TINNITUS, UNSPECIFIED LATERALITY: ICD-10-CM

## 2023-06-28 PROCEDURE — 92557 PR COMPREHENSIVE HEARING TEST: ICD-10-PCS | Mod: S$GLB,,,

## 2023-06-28 PROCEDURE — 92557 COMPREHENSIVE HEARING TEST: CPT | Mod: S$GLB,,,

## 2023-06-28 PROCEDURE — 3008F BODY MASS INDEX DOCD: CPT | Mod: CPTII,S$GLB,, | Performed by: OTOLARYNGOLOGY

## 2023-06-28 PROCEDURE — 99214 OFFICE O/P EST MOD 30 MIN: CPT | Mod: 25,S$GLB,, | Performed by: OTOLARYNGOLOGY

## 2023-06-28 PROCEDURE — 1159F MED LIST DOCD IN RCRD: CPT | Mod: CPTII,S$GLB,, | Performed by: OTOLARYNGOLOGY

## 2023-06-28 PROCEDURE — 99999 PR PBB SHADOW E&M-EST. PATIENT-LVL III: ICD-10-PCS | Mod: PBBFAC,,, | Performed by: OTOLARYNGOLOGY

## 2023-06-28 PROCEDURE — 99999 PR PBB SHADOW E&M-EST. PATIENT-LVL III: CPT | Mod: PBBFAC,,, | Performed by: OTOLARYNGOLOGY

## 2023-06-28 PROCEDURE — 3044F PR MOST RECENT HEMOGLOBIN A1C LEVEL <7.0%: ICD-10-PCS | Mod: CPTII,S$GLB,, | Performed by: OTOLARYNGOLOGY

## 2023-06-28 PROCEDURE — 69210 PR REMOVAL IMPACTED CERUMEN REQUIRING INSTRUMENTATION, UNILATERAL: ICD-10-PCS | Mod: S$GLB,,, | Performed by: OTOLARYNGOLOGY

## 2023-06-28 PROCEDURE — 92567 TYMPANOMETRY: CPT | Mod: S$GLB,,,

## 2023-06-28 PROCEDURE — 99214 PR OFFICE/OUTPT VISIT, EST, LEVL IV, 30-39 MIN: ICD-10-PCS | Mod: 25,S$GLB,, | Performed by: OTOLARYNGOLOGY

## 2023-06-28 PROCEDURE — 92567 PR TYMPA2METRY: ICD-10-PCS | Mod: S$GLB,,,

## 2023-06-28 PROCEDURE — 69210 REMOVE IMPACTED EAR WAX UNI: CPT | Mod: S$GLB,,, | Performed by: OTOLARYNGOLOGY

## 2023-06-28 PROCEDURE — 1159F PR MEDICATION LIST DOCUMENTED IN MEDICAL RECORD: ICD-10-PCS | Mod: CPTII,S$GLB,, | Performed by: OTOLARYNGOLOGY

## 2023-06-28 PROCEDURE — 3008F PR BODY MASS INDEX (BMI) DOCUMENTED: ICD-10-PCS | Mod: CPTII,S$GLB,, | Performed by: OTOLARYNGOLOGY

## 2023-06-28 PROCEDURE — 99999 PR PBB SHADOW E&M-EST. PATIENT-LVL I: ICD-10-PCS | Mod: PBBFAC,,,

## 2023-06-28 PROCEDURE — 3044F HG A1C LEVEL LT 7.0%: CPT | Mod: CPTII,S$GLB,, | Performed by: OTOLARYNGOLOGY

## 2023-06-28 PROCEDURE — 99999 PR PBB SHADOW E&M-EST. PATIENT-LVL I: CPT | Mod: PBBFAC,,,

## 2023-06-28 RX ORDER — METHYLPREDNISOLONE 4 MG/1
TABLET ORAL
Qty: 1 EACH | Refills: 0 | Status: SHIPPED | OUTPATIENT
Start: 2023-06-28 | End: 2023-09-20

## 2023-06-28 RX ORDER — AMOXICILLIN AND CLAVULANATE POTASSIUM 875; 125 MG/1; MG/1
1 TABLET, FILM COATED ORAL 2 TIMES DAILY
Qty: 20 TABLET | Refills: 0 | Status: SHIPPED | OUTPATIENT
Start: 2023-06-28 | End: 2023-07-08

## 2023-06-28 NOTE — PROGRESS NOTES
Referring Provider: Bran Schultz MD    Jaxson Burch JrClaudette was seen 06/28/2023 for an audiological evaluation. Previous audiological evaluation completed at an outside clinic in 2017 revealed normal hearing sensitivity in both ears. Patient complains of intermittent tinnitus in the left ear for the past 20 years. Patient reported tinnitus has been constant lately. He has a history of noise exposure with use of hearing protection. Patient denied difficulties hearing and family history of hearing loss.     Otoscopy revealed clear canals with visualization of the tympanic membrane in both ears. Tympanograms were Type A for the right ear and Type A for the left ear. Audiometry revealed normal hearing sensitivity through 3000 Hz sloping to a mild sensorineural hearing loss at 4000 Hz rising to normal hearing sensitivity in both ears. Speech Reception Thresholds were  15 dBHL for the right ear and 30 dBHL for the left ear. Word recognition scores were excellent for the right ear and excellent for the left ear.    Patient was counseled on the above findings.    Recommendations:  ENT Review.   Repeat audiological evaluation in one to two years to monitor hearing, or sooner if needed.

## 2023-06-28 NOTE — PROGRESS NOTES
"Referring Provider:    No referring provider defined for this encounter.  Subjective:   Patient: Jaxson Burch Jr. [id number], :1960   Visit date:2023 10:52 AM    Chief Complaint:  Cerumen Impaction (Wax in the right ear )    HPI:    Prior notes reviewed by myself.  Clinical documentation obtained by nursing staff reviewed.     63-year-old gentleman presents for evaluation of possible cerumen impaction as well as bilateral nonpulsatile tinnitus.  He has had issues with cerumen impaction in the past requiring debridement.  The tinnitus has been more noticeable over the last year.  He does have some history of loud noise exposure.  No other significant otologic history.  He did have an audiogram today which is below.  He also reports congestion, rhinorrhea, postnasal drip, some of which has been greenish and discolored.  He has had generalized malaise and facial pain/pressure.  He does have a history of recurrent acute sinusitis in the past.      Objective:     Physical Exam:  Vitals:  Temp 98.7 °F (37.1 °C)   Ht 5' 7" (1.702 m)   Wt 63.2 kg (139 lb 5.3 oz)   BMI 21.82 kg/m²   General appearance:  Well developed, well nourished    Ears:  Otoscopy of external auditory canals and tympanic membranes was significant for bilateral cerumen impaction and normal TMs, clinical speech reception thresholds grossly intact, no mass/lesion of auricle.    Nose:  No masses/lesions of external nose, nasal mucosa, septum, and turbinates were within normal limits.    Mouth:  No mass/lesion of lips, teeth, gums, hard/soft palate, tongue, tonsils, or oropharynx.    Neck & Lymphatics:  No cervical lymphadenopathy, no neck mass/crepitus/ asymmetry, trachea is midline, no thyroid enlargement/tenderness/mass.    Procedure Note    CHIEF COMPLAINT:  Cerumen Impaction    Description:  The patient was seated in an exam chair.  An ear speculum was placed in the right EAC and was examined under the microscope.  Suction and/or loop " curettes were used to remove a large cerumen impaction.  The tympanic membrane was visualized and was normal in appearance.  The procedure was repeated on the left side in a similar fashion.  The TM was intact and normal on this side as well.  The patient tolerated the procedure well.      [x]  Data Reviewed:    Lab Results   Component Value Date    WBC 5.77 02/09/2023    HGB Negative 05/26/2023    HCT 43.9 02/09/2023    MCV 99 (H) 02/09/2023    EOSINOPHIL 2.8 02/09/2023         [x]  Independent interpretation of test: SNHL, HF centered at 4k     Media Information    File Link    Annotation on 6/28/2023 11:25 AM by Galilea Anthony CCC-A: AUDIOGRAM        Key Information    Document ID File Type Document Type Description   [id number] Annotation Annotation AUDIOGRAM     Import Information    Attached At Date Time User Dept   Encounter Level 6/28/2023 11:25 AM Galilea Anthony CCC-A [facility] Audiology     Encounter    Clinical Support on 6/28/23 with Galilea Anthony, CCC-A     Media Audit Information    Insurance Documents was deleted from this patient by Sangeetha Flynn on 6/15/2014 at 10:07 AM (DCS ID: [id number], File: [id number])             Assessment & Plan:   Tinnitus, unspecified laterality  -     Ambulatory referral/consult to Audiology; Future; Expected date: 07/05/2023  -     amoxicillin-clavulanate 875-125mg (AUGMENTIN) 875-125 mg per tablet; Take 1 tablet by mouth 2 (two) times daily. for 10 days  Dispense: 20 tablet; Refill: 0    Acute non-recurrent pansinusitis  -     methylPREDNISolone (MEDROL DOSEPACK) 4 mg tablet; use as directed  Dispense: 1 each; Refill: 0    Sensorineural hearing loss (SNHL) of both ears    Bilateral impacted cerumen [H61.23 (ICD-10-CM)]        His wax was removed successfully.  He does have a high-frequency hearing loss which is most likely the etiology of his tinnitus.  We discussed coping mechanisms and he understands that a hearing aid evaluation is available to  him as well.  We agreed to treat his acute sinusitis with a Medrol Dosepak and antibiotics.  He can follow up as needed.

## 2023-07-19 DIAGNOSIS — I70.90 ATHEROSCLEROSIS: ICD-10-CM

## 2023-07-19 RX ORDER — PRAVASTATIN SODIUM 40 MG/1
TABLET ORAL
Qty: 90 TABLET | Refills: 0 | Status: SHIPPED | OUTPATIENT
Start: 2023-07-19 | End: 2024-02-19 | Stop reason: SDUPTHER

## 2023-07-19 NOTE — TELEPHONE ENCOUNTER
No care due was identified.  Health [facility] Embedded Care Due Messages. Reference number: [id number].   7/19/2023 5:16:04 AM CDT

## 2023-07-19 NOTE — TELEPHONE ENCOUNTER
Refill Decision Note   Jaxson Burch  is requesting a refill authorization.  Brief Assessment and Rationale for Refill:  Approve     Medication Therapy Plan:  REVIEWED ED VISIT NOTES; APPROVED 90+0 TO BRIDGE TO FOV 8/14/23      Extended chart review required: Yes   Comments:     Note composed:7:36 AM 07/19/2023             Appointments     Last Visit   2/8/2023 Salomón Lantigua MD   Next Visit   8/14/2023 Salomón Lantigua MD

## 2023-07-20 ENCOUNTER — PATIENT MESSAGE (OUTPATIENT)
Dept: OTOLARYNGOLOGY | Facility: CLINIC | Age: 63
End: 2023-07-20
Payer: MEDICARE

## 2023-07-26 ENCOUNTER — TELEPHONE (OUTPATIENT)
Dept: OPHTHALMOLOGY | Facility: CLINIC | Age: 63
End: 2023-07-26
Payer: MEDICARE

## 2023-07-26 NOTE — TELEPHONE ENCOUNTER
----- Message from Annelise Mcleod sent at 7/26/2023  9:00 AM CDT -----  Patient is requesting a call. He stated that he needs a copy of his prescription. Please call him back at [phone].

## 2023-08-08 ENCOUNTER — TELEPHONE (OUTPATIENT)
Dept: INTERNAL MEDICINE | Facility: CLINIC | Age: 63
End: 2023-08-08
Payer: MEDICARE

## 2023-08-08 ENCOUNTER — HOSPITAL ENCOUNTER (OUTPATIENT)
Dept: RADIOLOGY | Facility: HOSPITAL | Age: 63
Discharge: HOME | End: 2023-08-08
Attending: FAMILY MEDICINE
Payer: MEDICARE

## 2023-08-08 DIAGNOSIS — F17.210 CIGARETTE NICOTINE DEPENDENCE WITHOUT COMPLICATION: ICD-10-CM

## 2023-08-08 PROCEDURE — 71271 CT CHEST LUNG SCREENING LOW DOSE: ICD-10-PCS | Mod: 26,HCNC,, | Performed by: RADIOLOGY

## 2023-08-08 PROCEDURE — 71271 CT THORAX LUNG CANCER SCR C-: CPT | Mod: 26,HCNC,, | Performed by: RADIOLOGY

## 2023-08-08 PROCEDURE — 71271 CT THORAX LUNG CANCER SCR C-: CPT | Mod: TC,HCNC

## 2023-08-08 NOTE — TELEPHONE ENCOUNTER
Returned call to patient informed once test results reviewed by the provider he will receive a call. Patient voiced understanding.

## 2023-08-14 ENCOUNTER — OFFICE VISIT (OUTPATIENT)
Dept: INTERNAL MEDICINE | Facility: CLINIC | Age: 63
End: 2023-08-14
Payer: MEDICARE

## 2023-08-14 ENCOUNTER — LAB VISIT (OUTPATIENT)
Dept: LAB | Facility: HOSPITAL | Age: 63
End: 2023-08-14
Attending: FAMILY MEDICINE
Payer: MEDICARE

## 2023-08-14 VITALS
BODY MASS INDEX: 21.87 KG/M2 | WEIGHT: 139.31 LBS | DIASTOLIC BLOOD PRESSURE: 72 MMHG | TEMPERATURE: 98 F | HEART RATE: 72 BPM | SYSTOLIC BLOOD PRESSURE: 118 MMHG | OXYGEN SATURATION: 98 % | HEIGHT: 67 IN

## 2023-08-14 DIAGNOSIS — R73.9 HYPERGLYCEMIA: ICD-10-CM

## 2023-08-14 DIAGNOSIS — E78.5 HYPERLIPIDEMIA, UNSPECIFIED HYPERLIPIDEMIA TYPE: ICD-10-CM

## 2023-08-14 DIAGNOSIS — I10 HYPERTENSION, UNSPECIFIED TYPE: Primary | ICD-10-CM

## 2023-08-14 DIAGNOSIS — I70.90 ATHEROSCLEROSIS: ICD-10-CM

## 2023-08-14 DIAGNOSIS — Z72.0 TOBACCO USE: ICD-10-CM

## 2023-08-14 LAB
ESTIMATED AVG GLUCOSE: 88 MG/DL (ref 68–131)
GLUCOSE SERPL-MCNC: 96 MG/DL (ref 70–110)
HBA1C MFR BLD: 4.7 % (ref 4–5.6)

## 2023-08-14 PROCEDURE — 82947 ASSAY GLUCOSE BLOOD QUANT: CPT | Mod: HCNC | Performed by: FAMILY MEDICINE

## 2023-08-14 PROCEDURE — 99214 OFFICE O/P EST MOD 30 MIN: CPT | Mod: HCNC,S$GLB,, | Performed by: FAMILY MEDICINE

## 2023-08-14 PROCEDURE — 36415 COLL VENOUS BLD VENIPUNCTURE: CPT | Mod: HCNC | Performed by: FAMILY MEDICINE

## 2023-08-14 PROCEDURE — 3078F PR MOST RECENT DIASTOLIC BLOOD PRESSURE < 80 MM HG: ICD-10-PCS | Mod: HCNC,CPTII,S$GLB, | Performed by: FAMILY MEDICINE

## 2023-08-14 PROCEDURE — 83036 HEMOGLOBIN GLYCOSYLATED A1C: CPT | Mod: HCNC | Performed by: FAMILY MEDICINE

## 2023-08-14 PROCEDURE — 3078F DIAST BP <80 MM HG: CPT | Mod: HCNC,CPTII,S$GLB, | Performed by: FAMILY MEDICINE

## 2023-08-14 PROCEDURE — 3074F PR MOST RECENT SYSTOLIC BLOOD PRESSURE < 130 MM HG: ICD-10-PCS | Mod: HCNC,CPTII,S$GLB, | Performed by: FAMILY MEDICINE

## 2023-08-14 PROCEDURE — 99214 PR OFFICE/OUTPT VISIT, EST, LEVL IV, 30-39 MIN: ICD-10-PCS | Mod: HCNC,S$GLB,, | Performed by: FAMILY MEDICINE

## 2023-08-14 PROCEDURE — 3044F HG A1C LEVEL LT 7.0%: CPT | Mod: HCNC,CPTII,S$GLB, | Performed by: FAMILY MEDICINE

## 2023-08-14 PROCEDURE — 3044F PR MOST RECENT HEMOGLOBIN A1C LEVEL <7.0%: ICD-10-PCS | Mod: HCNC,CPTII,S$GLB, | Performed by: FAMILY MEDICINE

## 2023-08-14 PROCEDURE — 99999 PR PBB SHADOW E&M-EST. PATIENT-LVL III: ICD-10-PCS | Mod: PBBFAC,HCNC,, | Performed by: FAMILY MEDICINE

## 2023-08-14 PROCEDURE — 1159F MED LIST DOCD IN RCRD: CPT | Mod: HCNC,CPTII,S$GLB, | Performed by: FAMILY MEDICINE

## 2023-08-14 PROCEDURE — 3008F BODY MASS INDEX DOCD: CPT | Mod: HCNC,CPTII,S$GLB, | Performed by: FAMILY MEDICINE

## 2023-08-14 PROCEDURE — 99999 PR PBB SHADOW E&M-EST. PATIENT-LVL III: CPT | Mod: PBBFAC,HCNC,, | Performed by: FAMILY MEDICINE

## 2023-08-14 PROCEDURE — 3008F PR BODY MASS INDEX (BMI) DOCUMENTED: ICD-10-PCS | Mod: HCNC,CPTII,S$GLB, | Performed by: FAMILY MEDICINE

## 2023-08-14 PROCEDURE — 1159F PR MEDICATION LIST DOCUMENTED IN MEDICAL RECORD: ICD-10-PCS | Mod: HCNC,CPTII,S$GLB, | Performed by: FAMILY MEDICINE

## 2023-08-14 PROCEDURE — 3074F SYST BP LT 130 MM HG: CPT | Mod: HCNC,CPTII,S$GLB, | Performed by: FAMILY MEDICINE

## 2023-08-14 NOTE — PROGRESS NOTES
Subjective:       Patient ID: Jaxson Burch Jr. is a 63 y.o. male.    Chief Complaint: No chief complaint on file.    Follow-up hypertension hyperlipidemia coronary calcification hypoglycemia he denies headache chest pain palpitations shortness of breath edema.  Recent low-dose CT chest was noted.  Nodules are unchanged he continues to wean cigarettes.  He is followed by urology regards BPH.  He denies polyuria polydipsia hypoglycemic symptoms      Review of Systems   Constitutional:  Negative for activity change, appetite change, fatigue and unexpected weight change.   Respiratory:  Negative for cough, chest tightness, shortness of breath and wheezing.    Cardiovascular:  Negative for chest pain, palpitations and leg swelling.   Gastrointestinal:  Negative for abdominal distention, abdominal pain, constipation and diarrhea.   Genitourinary:  Negative for difficulty urinating.   Neurological:  Negative for dizziness, weakness, light-headedness and headaches.       Objective:      Physical Exam  Constitutional:       General: He is not in acute distress.     Appearance: He is well-developed. He is not ill-appearing or diaphoretic.   HENT:      Nose: Nose normal.   Eyes:      Conjunctiva/sclera: Conjunctivae normal.   Neck:      Thyroid: No thyromegaly.      Comments: Normal thyroid  2+ carotids  Cardiovascular:      Rate and Rhythm: Normal rate and regular rhythm.      Heart sounds: Normal heart sounds. No murmur heard.     No gallop.   Pulmonary:      Effort: Pulmonary effort is normal. No respiratory distress.      Breath sounds: Normal breath sounds. No wheezing, rhonchi or rales.   Abdominal:      General: Bowel sounds are normal. There is no distension.      Palpations: Abdomen is soft. There is no mass.      Tenderness: There is no abdominal tenderness.   Musculoskeletal:      Cervical back: Neck supple.   Lymphadenopathy:      Cervical: No cervical adenopathy.   Skin:     General: Skin is warm and dry.       Coloration: Skin is not pale.      Findings: No erythema.   Neurological:      Mental Status: He is alert and oriented to person, place, and time.         Lab Visit on 02/09/2023   Component Date Value Ref Range Status    WBC 02/09/2023 5.77  3.90 - 12.70 K/uL Final    RBC 02/09/2023 4.45 (L)  4.60 - 6.20 M/uL Final    Hemoglobin 02/09/2023 13.9 (L)  14.0 - 18.0 g/dL Final    Hematocrit 02/09/2023 43.9  40.0 - 54.0 % Final    MCV 02/09/2023 99 (H)  82 - 98 fL Final    MCH 02/09/2023 31.2 (H)  27.0 - 31.0 pg Final    MCHC 02/09/2023 31.7 (L)  32.0 - 36.0 g/dL Final    RDW 02/09/2023 12.2  11.5 - 14.5 % Final    Platelets 02/09/2023 287  150 - 450 K/uL Final    MPV 02/09/2023 10.0  9.2 - 12.9 fL Final    Immature Granulocytes 02/09/2023 0.2  0.0 - 0.5 % Final    Gran # (ANC) 02/09/2023 3.0  1.8 - 7.7 K/uL Final    Immature Grans (Abs) 02/09/2023 0.01  0.00 - 0.04 K/uL Final    Lymph # 02/09/2023 2.2  1.0 - 4.8 K/uL Final    Mono # 02/09/2023 0.4  0.3 - 1.0 K/uL Final    Eos # 02/09/2023 0.2  0.0 - 0.5 K/uL Final    Baso # 02/09/2023 0.08  0.00 - 0.20 K/uL Final    nRBC 02/09/2023 0  0 /100 WBC Final    Gran % 02/09/2023 51.0  38.0 - 73.0 % Final    Lymph % 02/09/2023 37.8  18.0 - 48.0 % Final    Mono % 02/09/2023 6.8  4.0 - 15.0 % Final    Eosinophil % 02/09/2023 2.8  0.0 - 8.0 % Final    Basophil % 02/09/2023 1.4  0.0 - 1.9 % Final    Differential Method 02/09/2023 Automated   Final    Glucose, Fasting 02/09/2023 102  70 - 110 mg/dL Final    Cholesterol 02/09/2023 171  120 - 199 mg/dL Final    Triglycerides 02/09/2023 52  30 - 150 mg/dL Final    HDL 02/09/2023 76 (H)  40 - 75 mg/dL Final    LDL Cholesterol 02/09/2023 84.6  63.0 - 159.0 mg/dL Final    HDL/Cholesterol Ratio 02/09/2023 44.4  20.0 - 50.0 % Final    Total Cholesterol/HDL Ratio 02/09/2023 2.3  2.0 - 5.0 Final    Non-HDL Cholesterol 02/09/2023 95  mg/dL Final     Assessment:       1. Hypertension, unspecified type    2. Hyperglycemia    3. Hyperlipidemia,  unspecified hyperlipidemia type    4. Tobacco use    5. Atherosclerosis        Plan:   Blood pressure controlled glucose A1c was ordered discussed nicotine cessation discussed carotid artery calcification.  Reviewed CT low-dose of the lung with him.  Follow-up in 6 months with lab.  Repeat low-dose CT about 1 year.      Hypertension, unspecified type  -     CBC Auto Differential; Future; Expected date: 02/14/2024  -     Urinalysis; Future; Expected date: 02/14/2024  -     Comprehensive Metabolic Panel; Future; Expected date: 02/14/2024  -     Lipid Panel; Future; Expected date: 02/14/2024  -     TSH; Future; Expected date: 02/14/2024    Hyperglycemia  -     Glucose, Fasting; Future; Expected date: 08/14/2023  -     Hemoglobin A1C; Future; Expected date: 08/14/2023    Hyperlipidemia, unspecified hyperlipidemia type    Tobacco use    Atherosclerosis

## 2023-09-20 ENCOUNTER — OFFICE VISIT (OUTPATIENT)
Dept: INTERNAL MEDICINE | Facility: CLINIC | Age: 63
End: 2023-09-20
Payer: MEDICARE

## 2023-09-20 VITALS
OXYGEN SATURATION: 98 % | WEIGHT: 139 LBS | SYSTOLIC BLOOD PRESSURE: 112 MMHG | TEMPERATURE: 98 F | DIASTOLIC BLOOD PRESSURE: 80 MMHG | HEART RATE: 88 BPM | BODY MASS INDEX: 21.77 KG/M2

## 2023-09-20 DIAGNOSIS — J01.90 ACUTE SINUSITIS, RECURRENCE NOT SPECIFIED, UNSPECIFIED LOCATION: Primary | ICD-10-CM

## 2023-09-20 PROCEDURE — 3044F HG A1C LEVEL LT 7.0%: CPT | Mod: HCNC,CPTII,S$GLB, | Performed by: PHYSICIAN ASSISTANT

## 2023-09-20 PROCEDURE — 3079F PR MOST RECENT DIASTOLIC BLOOD PRESSURE 80-89 MM HG: ICD-10-PCS | Mod: HCNC,CPTII,S$GLB, | Performed by: PHYSICIAN ASSISTANT

## 2023-09-20 PROCEDURE — 3074F SYST BP LT 130 MM HG: CPT | Mod: HCNC,CPTII,S$GLB, | Performed by: PHYSICIAN ASSISTANT

## 2023-09-20 PROCEDURE — 96372 THER/PROPH/DIAG INJ SC/IM: CPT | Mod: HCNC,S$GLB,, | Performed by: PHYSICIAN ASSISTANT

## 2023-09-20 PROCEDURE — 3074F PR MOST RECENT SYSTOLIC BLOOD PRESSURE < 130 MM HG: ICD-10-PCS | Mod: HCNC,CPTII,S$GLB, | Performed by: PHYSICIAN ASSISTANT

## 2023-09-20 PROCEDURE — 99999 PR PBB SHADOW E&M-EST. PATIENT-LVL III: ICD-10-PCS | Mod: PBBFAC,HCNC,, | Performed by: PHYSICIAN ASSISTANT

## 2023-09-20 PROCEDURE — 1160F RVW MEDS BY RX/DR IN RCRD: CPT | Mod: HCNC,CPTII,S$GLB, | Performed by: PHYSICIAN ASSISTANT

## 2023-09-20 PROCEDURE — 1159F PR MEDICATION LIST DOCUMENTED IN MEDICAL RECORD: ICD-10-PCS | Mod: HCNC,CPTII,S$GLB, | Performed by: PHYSICIAN ASSISTANT

## 2023-09-20 PROCEDURE — 99213 OFFICE O/P EST LOW 20 MIN: CPT | Mod: HCNC,25,S$GLB, | Performed by: PHYSICIAN ASSISTANT

## 2023-09-20 PROCEDURE — 1159F MED LIST DOCD IN RCRD: CPT | Mod: HCNC,CPTII,S$GLB, | Performed by: PHYSICIAN ASSISTANT

## 2023-09-20 PROCEDURE — 3008F BODY MASS INDEX DOCD: CPT | Mod: HCNC,CPTII,S$GLB, | Performed by: PHYSICIAN ASSISTANT

## 2023-09-20 PROCEDURE — 3008F PR BODY MASS INDEX (BMI) DOCUMENTED: ICD-10-PCS | Mod: HCNC,CPTII,S$GLB, | Performed by: PHYSICIAN ASSISTANT

## 2023-09-20 PROCEDURE — 96372 PR INJECTION,THERAP/PROPH/DIAG2ST, IM OR SUBCUT: ICD-10-PCS | Mod: HCNC,S$GLB,, | Performed by: PHYSICIAN ASSISTANT

## 2023-09-20 PROCEDURE — 3044F PR MOST RECENT HEMOGLOBIN A1C LEVEL <7.0%: ICD-10-PCS | Mod: HCNC,CPTII,S$GLB, | Performed by: PHYSICIAN ASSISTANT

## 2023-09-20 PROCEDURE — 99999 PR PBB SHADOW E&M-EST. PATIENT-LVL III: CPT | Mod: PBBFAC,HCNC,, | Performed by: PHYSICIAN ASSISTANT

## 2023-09-20 PROCEDURE — 1160F PR REVIEW ALL MEDS BY PRESCRIBER/CLIN PHARMACIST DOCUMENTED: ICD-10-PCS | Mod: HCNC,CPTII,S$GLB, | Performed by: PHYSICIAN ASSISTANT

## 2023-09-20 PROCEDURE — 3079F DIAST BP 80-89 MM HG: CPT | Mod: HCNC,CPTII,S$GLB, | Performed by: PHYSICIAN ASSISTANT

## 2023-09-20 PROCEDURE — 99213 PR OFFICE/OUTPT VISIT, EST, LEVL III, 20-29 MIN: ICD-10-PCS | Mod: HCNC,25,S$GLB, | Performed by: PHYSICIAN ASSISTANT

## 2023-09-20 RX ORDER — METHYLPREDNISOLONE ACETATE 80 MG/ML
40 INJECTION, SUSPENSION INTRA-ARTICULAR; INTRALESIONAL; INTRAMUSCULAR; SOFT TISSUE
Status: COMPLETED | OUTPATIENT
Start: 2023-09-20 | End: 2023-09-20

## 2023-09-20 RX ORDER — AMOXICILLIN 875 MG/1
875 TABLET, FILM COATED ORAL
COMMUNITY
End: 2024-02-19

## 2023-09-20 RX ORDER — AZELASTINE 1 MG/ML
1 SPRAY, METERED NASAL 2 TIMES DAILY
Qty: 30 ML | Refills: 0 | Status: SHIPPED | OUTPATIENT
Start: 2023-09-20

## 2023-09-20 RX ADMIN — METHYLPREDNISOLONE ACETATE 40 MG: 80 INJECTION, SUSPENSION INTRA-ARTICULAR; INTRALESIONAL; INTRAMUSCULAR; SOFT TISSUE at 11:09

## 2023-09-21 NOTE — PROGRESS NOTES
Subjective:       Patient ID: Jaxson Burch Jr. is a 63 y.o. male.    Chief Complaint: Sinus Problem (Patient stated that he has all the sinus symptoms for the last 4 days. He wants an allergy shot.)      Sinus Problem  This is a new problem. The current episode started in the past 7 days. The problem has been gradually worsening since onset. There has been no fever. Associated symptoms include congestion, coughing, sinus pressure and sneezing. Pertinent negatives include no chills, ear pain, shortness of breath or sore throat. Past treatments include nothing.       Review of Systems   Constitutional:  Negative for chills.   HENT:  Positive for congestion, sinus pressure and sneezing. Negative for ear pain and sore throat.    Respiratory:  Positive for cough. Negative for shortness of breath.        Objective:      Physical Exam  Vitals and nursing note reviewed.   Constitutional:       General: He is not in acute distress.     Appearance: He is well-developed.   HENT:      Head: Normocephalic and atraumatic.      Right Ear: Tympanic membrane, ear canal and external ear normal.      Left Ear: Tympanic membrane, ear canal and external ear normal.      Nose: No mucosal edema.      Comments: Erythematous nasal mucosa noted     Mouth/Throat:      Lips: Pink.      Mouth: Mucous membranes are moist.      Pharynx: No posterior oropharyngeal erythema.      Comments: Clear PND noted to posterior pharynx    Eyes:      General: Lids are normal. No scleral icterus.     Extraocular Movements: Extraocular movements intact.      Conjunctiva/sclera: Conjunctivae normal.   Cardiovascular:      Rate and Rhythm: Normal rate and regular rhythm.   Pulmonary:      Effort: Pulmonary effort is normal.      Breath sounds: Normal breath sounds. No decreased breath sounds, wheezing, rhonchi or rales.   Neurological:      Mental Status: He is alert.      Cranial Nerves: No cranial nerve deficit.   Psychiatric:         Mood and Affect: Mood and  affect normal.         Assessment:       1. Acute sinusitis, recurrence not specified, unspecified location        Plan:   1. Acute sinusitis, recurrence not specified, unspecified location  -     methylPREDNISolone acetate injection 40 mg  -     azelastine (ASTELIN) 137 mcg (0.1 %) nasal spray; 1 spray (137 mcg total) by Nasal route 2 (two) times daily.  Dispense: 30 mL; Refill: 0

## 2023-10-18 ENCOUNTER — TELEPHONE (OUTPATIENT)
Dept: GASTROENTEROLOGY | Facility: CLINIC | Age: 63
End: 2023-10-18
Payer: MEDICARE

## 2023-10-18 NOTE — TELEPHONE ENCOUNTER
Refills requested on omeprazole 20 mg QD; last refill given on 06/06/23 30 capsules with 3 refills ; last office visit on 05/17/2022

## 2023-10-18 NOTE — TELEPHONE ENCOUNTER
----- Message from El Moreno sent at 10/18/2023  2:13 PM CDT -----  Contact: gopal  Patient is requesting a refill omeprazole. Prescription can be sent to [facility] pharmacy [address] [place] [address] [place] [address] phone number [phone].Please call him back at [phone] or send a message through my chart to advise when prescription has been sent.        Thanks  DD

## 2023-10-20 RX ORDER — OMEPRAZOLE 20 MG/1
20 CAPSULE, DELAYED RELEASE ORAL DAILY
Qty: 30 CAPSULE | Refills: 5 | Status: SHIPPED | OUTPATIENT
Start: 2023-10-20 | End: 2024-01-31 | Stop reason: SDUPTHER

## 2023-12-21 NOTE — LETTER
November 17, 2020      Nancy Basurto MD  [address] [place] [address] [address] [place] [address]           [facility]  [address] [place] [address] [place] [address]  Phone: [phone]  Fax: [phone]          Patient: Jaxson Burch Jr.   MR Number: [id number]   YOB: 1960   Date of Visit: 11/17/2020       Dear Dr. Nancy Basurto:    Thank you for referring Jaxson Burch to me for evaluation. Attached you will find relevant portions of my assessment and plan of care.    If you have questions, please do not hesitate to call me. I look forward to following Jaxson Burch along with you.    Sincerely,    Lev Nava MD    Enclosure  CC:  No Recipients    If you would like to receive this communication electronically, please contact [email or web address] or [phone] to request more information on [facility] [facility] Link access.    For providers and/or their staff who would like to refer a patient to [facility], please contact us through our one-stop-shop provider referral line, [facility] , at [phone].    If you feel you have received this communication in error or would no longer like to receive these types of communications, please e-mail [email or web address]         
No

## 2024-01-19 NOTE — TELEPHONE ENCOUNTER
Please advise.   Hpi Title: Evaluation of a Skin Lesion Additional History: Patient concerned of spot on right ear.

## 2024-01-30 ENCOUNTER — TELEPHONE (OUTPATIENT)
Dept: OPHTHALMOLOGY | Facility: CLINIC | Age: 64
End: 2024-01-30
Payer: MEDICARE

## 2024-01-30 NOTE — TELEPHONE ENCOUNTER
----- Message from Annelise Mcleod sent at 1/30/2024 12:02 PM CST -----  Pt need to schedule cataract cons [phone]

## 2024-01-31 RX ORDER — OMEPRAZOLE 20 MG/1
20 CAPSULE, DELAYED RELEASE ORAL DAILY
Qty: 30 CAPSULE | Refills: 5 | Status: SHIPPED | OUTPATIENT
Start: 2024-01-31 | End: 2024-02-01 | Stop reason: SDUPTHER

## 2024-01-31 NOTE — TELEPHONE ENCOUNTER
----- Message from Annika Acharya sent at 1/31/2024  8:56 AM CST -----  .Type:  RX Refill Request    Who Called: .Jaxson Burch Jr.   Refill or New Rx:refill  RX Name and Strength omeprazole (PRILOSEC) 20 MG capsule  How is the patient currently taking it? (ex. 1XDay):daily  Is this a 30 day or 90 day RX:    Preferred Pharmacy with phone number:  [place], [place]     Local or Mail Order:local  Ordering Provider:Gerardo  Would the patient rather a call back or a response via [facility]? Call back  Best Call Back Number:.[phone]  Additional Information:

## 2024-01-31 NOTE — TELEPHONE ENCOUNTER
Call returned to , pt states that he is requesting to status of his refill request states that [facility] but issues have arose with this pharmacy and completing his refills; pt is requesting that refills be forwarded to [facility] pharmacy  in [place]

## 2024-01-31 NOTE — TELEPHONE ENCOUNTER
Refills requested on omeprazole 20 mg QD; last refill given on 10/20/23 30 caps with 5 refills; last office visit on 05/17/22

## 2024-01-31 NOTE — TELEPHONE ENCOUNTER
----- Message from Ade Rivera sent at 1/31/2024 10:41 AM CST -----  Regarding: pt advice  Contact: [phone]  Pt calling in regards to confirming the status of refill omeprazole (PRILOSEC) 20 MG capsule. Pls call       ..  [facility] - [address]  [address] [facility] [address]  Phone: [phone] Fax: [phone]      ..

## 2024-02-01 RX ORDER — OMEPRAZOLE 20 MG/1
20 CAPSULE, DELAYED RELEASE ORAL DAILY
Qty: 30 CAPSULE | Refills: 5 | Status: SHIPPED | OUTPATIENT
Start: 2024-02-01 | End: 2024-05-14

## 2024-02-19 ENCOUNTER — OFFICE VISIT (OUTPATIENT)
Dept: INTERNAL MEDICINE | Facility: CLINIC | Age: 64
End: 2024-02-19
Payer: MEDICARE

## 2024-02-19 ENCOUNTER — LAB VISIT (OUTPATIENT)
Dept: LAB | Facility: HOSPITAL | Age: 64
End: 2024-02-19
Attending: FAMILY MEDICINE
Payer: MEDICARE

## 2024-02-19 VITALS
HEART RATE: 82 BPM | BODY MASS INDEX: 22.18 KG/M2 | HEIGHT: 67 IN | TEMPERATURE: 98 F | OXYGEN SATURATION: 99 % | SYSTOLIC BLOOD PRESSURE: 124 MMHG | DIASTOLIC BLOOD PRESSURE: 76 MMHG | WEIGHT: 141.31 LBS

## 2024-02-19 DIAGNOSIS — Z23 NEED FOR VACCINATION: ICD-10-CM

## 2024-02-19 DIAGNOSIS — R91.1 SOLITARY PULMONARY NODULE: ICD-10-CM

## 2024-02-19 DIAGNOSIS — D69.3 CHRONIC ITP (IDIOPATHIC THROMBOCYTOPENIA): ICD-10-CM

## 2024-02-19 DIAGNOSIS — K21.9 GASTROESOPHAGEAL REFLUX DISEASE WITHOUT ESOPHAGITIS: ICD-10-CM

## 2024-02-19 DIAGNOSIS — I10 HYPERTENSION, ESSENTIAL: ICD-10-CM

## 2024-02-19 DIAGNOSIS — I70.0 AORTIC ATHEROSCLEROSIS: ICD-10-CM

## 2024-02-19 DIAGNOSIS — I10 HYPERTENSION, UNSPECIFIED TYPE: ICD-10-CM

## 2024-02-19 DIAGNOSIS — I70.90 ATHEROSCLEROSIS: ICD-10-CM

## 2024-02-19 DIAGNOSIS — F17.210 CIGARETTE NICOTINE DEPENDENCE WITHOUT COMPLICATION: ICD-10-CM

## 2024-02-19 DIAGNOSIS — Z00.00 ANNUAL PHYSICAL EXAM: Primary | ICD-10-CM

## 2024-02-19 DIAGNOSIS — G89.4 CHRONIC PAIN SYNDROME: ICD-10-CM

## 2024-02-19 PROBLEM — R73.09 OTHER ABNORMAL GLUCOSE: Status: RESOLVED | Noted: 2023-02-08 | Resolved: 2024-02-19

## 2024-02-19 PROBLEM — Z28.39 IMMUNIZATION DEFICIENCY: Status: RESOLVED | Noted: 2021-02-02 | Resolved: 2024-02-19

## 2024-02-19 LAB
ALBUMIN SERPL BCP-MCNC: 3.9 G/DL (ref 3.5–5.2)
ALP SERPL-CCNC: 45 U/L (ref 55–135)
ALT SERPL W/O P-5'-P-CCNC: 19 U/L (ref 10–44)
ANION GAP SERPL CALC-SCNC: 6 MMOL/L (ref 8–16)
AST SERPL-CCNC: 30 U/L (ref 10–40)
BASOPHILS # BLD AUTO: 0.08 K/UL (ref 0–0.2)
BASOPHILS NFR BLD: 1.2 % (ref 0–1.9)
BILIRUB SERPL-MCNC: 0.6 MG/DL (ref 0.1–1)
BUN SERPL-MCNC: 10 MG/DL (ref 8–23)
CALCIUM SERPL-MCNC: 9.7 MG/DL (ref 8.7–10.5)
CHLORIDE SERPL-SCNC: 106 MMOL/L (ref 95–110)
CHOLEST SERPL-MCNC: 155 MG/DL (ref 120–199)
CHOLEST/HDLC SERPL: 2.5 {RATIO} (ref 2–5)
CO2 SERPL-SCNC: 28 MMOL/L (ref 23–29)
CREAT SERPL-MCNC: 0.9 MG/DL (ref 0.5–1.4)
DIFFERENTIAL METHOD BLD: ABNORMAL
EOSINOPHIL # BLD AUTO: 0.1 K/UL (ref 0–0.5)
EOSINOPHIL NFR BLD: 1.7 % (ref 0–8)
ERYTHROCYTE [DISTWIDTH] IN BLOOD BY AUTOMATED COUNT: 12.4 % (ref 11.5–14.5)
EST. GFR  (NO RACE VARIABLE): >60 ML/MIN/1.73 M^2
GLUCOSE SERPL-MCNC: 107 MG/DL (ref 70–110)
HCT VFR BLD AUTO: 44 % (ref 40–54)
HDLC SERPL-MCNC: 61 MG/DL (ref 40–75)
HDLC SERPL: 39.4 % (ref 20–50)
HGB BLD-MCNC: 14.3 G/DL (ref 14–18)
IMM GRANULOCYTES # BLD AUTO: 0.01 K/UL (ref 0–0.04)
IMM GRANULOCYTES NFR BLD AUTO: 0.1 % (ref 0–0.5)
LDLC SERPL CALC-MCNC: 85.4 MG/DL (ref 63–159)
LYMPHOCYTES # BLD AUTO: 1.9 K/UL (ref 1–4.8)
LYMPHOCYTES NFR BLD: 27.9 % (ref 18–48)
MCH RBC QN AUTO: 31.6 PG (ref 27–31)
MCHC RBC AUTO-ENTMCNC: 32.5 G/DL (ref 32–36)
MCV RBC AUTO: 97 FL (ref 82–98)
MONOCYTES # BLD AUTO: 0.6 K/UL (ref 0.3–1)
MONOCYTES NFR BLD: 7.9 % (ref 4–15)
NEUTROPHILS # BLD AUTO: 4.3 K/UL (ref 1.8–7.7)
NEUTROPHILS NFR BLD: 61.2 % (ref 38–73)
NONHDLC SERPL-MCNC: 94 MG/DL
NRBC BLD-RTO: 0 /100 WBC
PLATELET # BLD AUTO: 287 K/UL (ref 150–450)
PMV BLD AUTO: 9.6 FL (ref 9.2–12.9)
POTASSIUM SERPL-SCNC: 4.5 MMOL/L (ref 3.5–5.1)
PROT SERPL-MCNC: 7 G/DL (ref 6–8.4)
RBC # BLD AUTO: 4.52 M/UL (ref 4.6–6.2)
SODIUM SERPL-SCNC: 140 MMOL/L (ref 136–145)
TRIGL SERPL-MCNC: 43 MG/DL (ref 30–150)
TSH SERPL DL<=0.005 MIU/L-ACNC: 1.15 UIU/ML (ref 0.4–4)
WBC # BLD AUTO: 6.95 K/UL (ref 3.9–12.7)

## 2024-02-19 PROCEDURE — 90686 IIV4 VACC NO PRSV 0.5 ML IM: CPT | Mod: HCNC,S$GLB,, | Performed by: FAMILY MEDICINE

## 2024-02-19 PROCEDURE — 3078F DIAST BP <80 MM HG: CPT | Mod: HCNC,CPTII,S$GLB, | Performed by: FAMILY MEDICINE

## 2024-02-19 PROCEDURE — 3008F BODY MASS INDEX DOCD: CPT | Mod: HCNC,CPTII,S$GLB, | Performed by: FAMILY MEDICINE

## 2024-02-19 PROCEDURE — 3074F SYST BP LT 130 MM HG: CPT | Mod: HCNC,CPTII,S$GLB, | Performed by: FAMILY MEDICINE

## 2024-02-19 PROCEDURE — 99214 OFFICE O/P EST MOD 30 MIN: CPT | Mod: HCNC,S$GLB,, | Performed by: FAMILY MEDICINE

## 2024-02-19 PROCEDURE — 80053 COMPREHEN METABOLIC PANEL: CPT | Mod: HCNC | Performed by: FAMILY MEDICINE

## 2024-02-19 PROCEDURE — G0008 ADMIN INFLUENZA VIRUS VAC: HCPCS | Mod: HCNC,S$GLB,, | Performed by: FAMILY MEDICINE

## 2024-02-19 PROCEDURE — 85025 COMPLETE CBC W/AUTO DIFF WBC: CPT | Mod: HCNC | Performed by: FAMILY MEDICINE

## 2024-02-19 PROCEDURE — 36415 COLL VENOUS BLD VENIPUNCTURE: CPT | Mod: HCNC | Performed by: FAMILY MEDICINE

## 2024-02-19 PROCEDURE — 84443 ASSAY THYROID STIM HORMONE: CPT | Mod: HCNC | Performed by: FAMILY MEDICINE

## 2024-02-19 PROCEDURE — 1159F MED LIST DOCD IN RCRD: CPT | Mod: HCNC,CPTII,S$GLB, | Performed by: FAMILY MEDICINE

## 2024-02-19 PROCEDURE — 80061 LIPID PANEL: CPT | Mod: HCNC | Performed by: FAMILY MEDICINE

## 2024-02-19 PROCEDURE — 99999 PR PBB SHADOW E&M-EST. PATIENT-LVL III: CPT | Mod: PBBFAC,HCNC,, | Performed by: FAMILY MEDICINE

## 2024-02-19 RX ORDER — AMLODIPINE BESYLATE 5 MG/1
5 TABLET ORAL DAILY
Qty: 90 TABLET | Refills: 3 | Status: SHIPPED | OUTPATIENT
Start: 2024-02-19

## 2024-02-19 RX ORDER — PRAVASTATIN SODIUM 40 MG/1
40 TABLET ORAL DAILY
Qty: 90 TABLET | Refills: 3 | Status: SHIPPED | OUTPATIENT
Start: 2024-02-19

## 2024-02-19 NOTE — PROGRESS NOTES
Subjective:       Patient ID: Jaxson Burch Jr. is a 63 y.o. male.    Chief Complaint: Follow-up    Annual exam.  Follow-up hypertension hyperlipidemia aortic atherosclerosis chronic ITP.  He is followed by pain management for chronic pain on regular medication, urology for BPH with PSA up-to-date, GI for esophageal reflux on Prilosec without dysphagia.  He denies headache chest pain palpitations shortness of breath or edema.  He continues to smoke 1/2 pack of cigarettes a day.  He would like repeat CT lung.    Follow-up  Associated symptoms include arthralgias and neck pain. Pertinent negatives include no abdominal pain, chest pain, congestion, coughing, fatigue, headaches, nausea, vomiting or weakness.     Review of Systems   Constitutional:  Negative for activity change, appetite change, fatigue and unexpected weight change.   HENT:  Negative for congestion.    Respiratory:  Negative for cough, chest tightness, shortness of breath and wheezing.    Cardiovascular:  Negative for chest pain, palpitations and leg swelling.   Gastrointestinal:  Negative for abdominal distention, abdominal pain, diarrhea, nausea and vomiting.        Occasional indigestion on Prilosec   Genitourinary:  Negative for difficulty urinating, dysuria, hematuria and urgency.   Musculoskeletal:  Positive for arthralgias and neck pain.   Neurological:  Negative for dizziness, weakness, light-headedness and headaches.       Objective:      Physical Exam  Constitutional:       General: He is not in acute distress.     Appearance: He is not ill-appearing or diaphoretic.   HENT:      Right Ear: Tympanic membrane normal.      Left Ear: Tympanic membrane normal.      Nose: Nose normal.   Neck:      Comments: 2+ carotids normal thyroid  Cardiovascular:      Rate and Rhythm: Normal rate and regular rhythm.      Heart sounds: No murmur heard.     No gallop.   Pulmonary:      Effort: Pulmonary effort is normal. No respiratory distress.      Breath sounds:  No wheezing, rhonchi or rales.   Abdominal:      General: There is no distension.      Palpations: Abdomen is soft.      Tenderness: There is no abdominal tenderness.   Lymphadenopathy:      Cervical: No cervical adenopathy.   Skin:     General: Skin is warm and dry.      Coloration: Skin is not pale.      Findings: No erythema.   Neurological:      Mental Status: He is alert and oriented to person, place, and time.   Psychiatric:         Behavior: Behavior normal.         Lab Visit on 08/14/2023   Component Date Value Ref Range Status    Glucose, Fasting 08/14/2023 96  70 - 110 mg/dL Final    Hemoglobin A1C 08/14/2023 4.7  4.0 - 5.6 % Final    Estimated Avg Glucose 08/14/2023 88  68 - 131 mg/dL Final     Assessment:       1. Annual physical exam    2. Solitary pulmonary nodule    3. Atherosclerosis    4. Hypertension, essential    5. Aortic atherosclerosis    6. Chronic ITP (idiopathic thrombocytopenia)    7. Gastroesophageal reflux disease without esophagitis    8. Chronic pain syndrome    9. Cigarette nicotine dependence without complication    10. Hypertension, unspecified type        Plan:   CT lung was ordered.  He has a past history of pulmonary nodule.  Cigarette cessation was discussed.  He is smoking about 1/2 pack cigarettes a day.  Aortic atherosclerosis being medically treated.  Blood pressure controlled.  Recheck CBC for thrombocytopenia.  Discontinue nicotine.  Health maintenance reviewed.  He agrees to flu immunization.  He declined RSV and COVID booster.  Follow-up in 6 months.  Medications refilled.      Annual physical exam    Solitary pulmonary nodule  -     CT Chest Without Contrast; Future; Expected date: 02/19/2024    Atherosclerosis  -     pravastatin (PRAVACHOL) 40 MG tablet; Take 1 tablet (40 mg total) by mouth once daily.  Dispense: 90 tablet; Refill: 3    Hypertension, essential  -     CBC Auto Differential; Future; Expected date: 02/19/2024  -     Urinalysis; Future; Expected date:  02/19/2024  -     Comprehensive Metabolic Panel; Future; Expected date: 02/19/2024  -     Lipid Panel; Future; Expected date: 02/19/2024  -     TSH; Future; Expected date: 02/19/2024  -     amLODIPine (NORVASC) 5 MG tablet; Take 1 tablet (5 mg total) by mouth once daily.  Dispense: 90 tablet; Refill: 3    Aortic atherosclerosis    Chronic ITP (idiopathic thrombocytopenia)    Gastroesophageal reflux disease without esophagitis    Chronic pain syndrome    Cigarette nicotine dependence without complication    Hypertension, unspecified type

## 2024-02-21 ENCOUNTER — TELEPHONE (OUTPATIENT)
Dept: GASTROENTEROLOGY | Facility: CLINIC | Age: 64
End: 2024-02-21
Payer: MEDICARE

## 2024-02-21 NOTE — TELEPHONE ENCOUNTER
Call returned to  regarding rescheduling his appointment with , pt states that he is unable to have ahis procedure performed on tomorrow, pt informed that tomorrow isn't a procedure but an appointment. Pt verbalized misunderstanding and states that he will be present tomorrow for his appointment with

## 2024-02-21 NOTE — TELEPHONE ENCOUNTER
----- Message from Alayna Rae sent at 2/19/2024  2:05 PM CST -----  Name of Who is Calling:SHILPA BHATT JR. [[id number]]        What is the request in detail:PT would like a callback from the office in regards to levi upcoming appt to a Friday appt.Please advise thank you       Can the clinic reply by [facility]:        What Number to Call Back if not in [facility]:.Telephone Information:  Mobile          [phone]

## 2024-03-08 ENCOUNTER — HOSPITAL ENCOUNTER (OUTPATIENT)
Dept: RADIOLOGY | Facility: HOSPITAL | Age: 64
Discharge: HOME | End: 2024-03-08
Attending: FAMILY MEDICINE
Payer: MEDICARE

## 2024-03-08 DIAGNOSIS — R91.1 SOLITARY PULMONARY NODULE: ICD-10-CM

## 2024-03-08 PROCEDURE — 71250 CT THORAX DX C-: CPT | Mod: 26,HCNC,, | Performed by: RADIOLOGY

## 2024-03-08 PROCEDURE — 71250 CT THORAX DX C-: CPT | Mod: TC,HCNC

## 2024-03-15 ENCOUNTER — OFFICE VISIT (OUTPATIENT)
Dept: OPHTHALMOLOGY | Facility: CLINIC | Age: 64
End: 2024-03-15
Payer: MEDICARE

## 2024-03-15 ENCOUNTER — DOCUMENTATION ONLY (OUTPATIENT)
Dept: OPHTHALMOLOGY | Facility: CLINIC | Age: 64
End: 2024-03-15

## 2024-03-15 DIAGNOSIS — H25.11 NUCLEAR SCLEROSIS OF RIGHT EYE: ICD-10-CM

## 2024-03-15 DIAGNOSIS — H25.12 NUCLEAR SCLEROSIS OF LEFT EYE: Primary | ICD-10-CM

## 2024-03-15 PROCEDURE — 1159F MED LIST DOCD IN RCRD: CPT | Mod: HCNC,CPTII,S$GLB, | Performed by: STUDENT IN AN ORGANIZED HEALTH CARE EDUCATION/TRAINING PROGRAM

## 2024-03-15 PROCEDURE — 99999 PR PBB SHADOW E&M-EST. PATIENT-LVL III: CPT | Mod: PBBFAC,HCNC,, | Performed by: STUDENT IN AN ORGANIZED HEALTH CARE EDUCATION/TRAINING PROGRAM

## 2024-03-15 PROCEDURE — 92136 OPHTHALMIC BIOMETRY: CPT | Mod: HCNC,RT,S$GLB, | Performed by: STUDENT IN AN ORGANIZED HEALTH CARE EDUCATION/TRAINING PROGRAM

## 2024-03-15 PROCEDURE — 1160F RVW MEDS BY RX/DR IN RCRD: CPT | Mod: HCNC,CPTII,S$GLB, | Performed by: STUDENT IN AN ORGANIZED HEALTH CARE EDUCATION/TRAINING PROGRAM

## 2024-03-15 PROCEDURE — 99204 OFFICE O/P NEW MOD 45 MIN: CPT | Mod: HCNC,S$GLB,, | Performed by: STUDENT IN AN ORGANIZED HEALTH CARE EDUCATION/TRAINING PROGRAM

## 2024-03-15 RX ORDER — PREDNISOLONE ACETATE 10 MG/ML
1 SUSPENSION/ DROPS OPHTHALMIC 4 TIMES DAILY
Qty: 5 ML | Refills: 1 | Status: SHIPPED | OUTPATIENT
Start: 2024-03-15

## 2024-03-15 NOTE — PROGRESS NOTES
"HPI     Cataract     Additional comments: Pt states he has become more reliant on his glasses.   Pt states he finds himself using his bifocals more to read text messages   on his phone. Pt states he has noticed his vision "looks" different in   different light settings. Pt states he has difficulty with glare   sometimes. Pt denies any pain or irritation at this time.          Last edited by Kathy Villarreal on 3/15/2024  1:44 PM.            Assessment /Plan     For exam results, see Encounter Report.    Nuclear sclerosis of left eye  Follow       Nuclear sclerosis of right eye  Visually Significant Cataract OU  Patient reports decreased vision consistent with the clinical amount of lenticular opacity, which reaches the level of visual significance and affects activities of daily living including reading and glare. Risks, benefits, and alternatives to cataract surgery were discussed.  Discussion of risks included possibility of infection as well as permanent vision loss.The pt expressed a desire to proceed with surgery with the potential for some reasonable degree of visual improvement. Recommended regular use of artificial tears and good lid hygiene to optimize surgical outcome.     Discussed IOL options and refractive outcomes for this patient.    Phaco right eye,   Topical  Will aim for Monofocal - Distance IOL    Intraop Kenalog 40: No    Post op gtts:   PF      Discussed that vision may be limited by:  none    Dilation: good  Alpha Blockers: none    SS record completed, IOL master reviewed, external referral completed.   Referral to [facility] for Ophthalmic surgery  Prescriptions sent for preoperative medications  Explained that patient may need glasses after surgery.    RTC for POD#1          "

## 2024-03-15 NOTE — PROGRESS NOTES
Short Stay Record    Diagnosis: Nuclear Sclerotic Cataract right    CC: Blurry Vision     HPI:  Jaxson Burch Jr. is a 63 y.o. male who presents for evaluation prior to ophthalmic surgery. No current complaints.     Past Medical History:   Diagnosis Date    Annual physical exam 2/19/2024    Arthritis     Gastritis     Hypertension, essential 2/2/2021    ITP (idiopathic thrombocytopenic purpura)      Past Surgical History:   Procedure Laterality Date    COLONOSCOPY N/A 11/29/2018    Procedure: COLONOSCOPY;  Surgeon: Brett Townsend MD;  Location: [facility] ENDO;  Service: Endoscopy;  Laterality: N/A;    COLONOSCOPY N/A 12/21/2022    Procedure: COLONOSCOPY;  Surgeon: Marisol Carrillo MD;  Location: [facility] ENDO;  Service: Endoscopy;  Laterality: N/A;    FRACTURE SURGERY  02 04 2005    Hip replacement    JOINT REPLACEMENT Right     hip     Social History     Tobacco Use    Smoking status: Every Day     Current packs/day: 0.50     Average packs/day: 0.5 packs/day for 35.2 years (17.6 ttl pk-yrs)     Types: Cigarettes     Start date: 1/10/1989     Passive exposure: Past    Smokeless tobacco: Never   Substance Use Topics    Alcohol use: Yes     Alcohol/week: 20.0 standard drinks of alcohol     Types: 10 Cans of beer, 10 Shots of liquor per week     Comment: socially     Family History   Problem Relation Age of Onset    Pancreatic cancer Mother     Cancer Mother         Pancreatic cancer    Colon cancer Neg Hx      Review of patient's allergies indicates:  No Known Allergies      Current Outpatient Medications:     amLODIPine (NORVASC) 5 MG tablet, Take 1 tablet (5 mg total) by mouth once daily., Disp: 90 tablet, Rfl: 3    azelastine (ASTELIN) 137 mcg (0.1 %) nasal spray, 1 spray (137 mcg total) by Nasal route 2 (two) times daily., Disp: 30 mL, Rfl: 0    butalbital-acetaminophen-caffeine -40 mg (FIORICET, ESGIC) -40 mg per tablet, Take 1 tablet by mouth every 6 (six) hours as needed for Headaches., Disp: 15 tablet,  Rfl: 0    loratadine (CLARITIN) 10 mg tablet, Take 10 mg by mouth once daily., Disp: , Rfl:     meloxicam (MOBIC) 7.5 MG tablet, Take 7.5 mg by mouth daily as needed for Pain., Disp: , Rfl:     multivit-calc-iron-FA-K-hb#244 -80 mg-mcg-mcg Tab, Take by mouth., Disp: , Rfl:     omeprazole (PRILOSEC) 20 MG capsule, Take 1 capsule (20 mg total) by mouth once daily., Disp: 30 capsule, Rfl: 5    oxyCODONE-acetaminophen (PERCOCET)  mg per tablet, Take 1 tablet by mouth every 6 (six) hours as needed., Disp: , Rfl:     pravastatin (PRAVACHOL) 40 MG tablet, Take 1 tablet (40 mg total) by mouth once daily., Disp: 90 tablet, Rfl: 3    prednisoLONE acetate (PRED FORTE) 1 % DrpS, Place 1 drop into the right eye 4 (four) times daily., Disp: 5 mL, Rfl: 1    sildenafil (VIAGRA) 100 MG tablet, Take 100 mg by mouth every other day., Disp: , Rfl: 6    triamcinolone acetonide 0.1% (KENALOG) 0.1 % cream, Apply topically 2 (two) times daily., Disp: 15 g, Rfl: 0    Review of Systems:  10 Pt ROS negative except as stated in HPI    Physical Exam:  General Appearance:    A&Ox3, no distress, appears stated age   Head:    Normocephalic, without obvious abnormality, atraumatic   Eyes:    PERRL, EOM's intact   Back:     Symmetric, no curvature   Lungs:     respirations unlabored   Chest Wall:    No tenderness or deformity    Heart:  Abdomen:  Extremities:  Skin:    S1 and S2 present    Soft, non-tender    Extremities normal, atraumatic    Skin color, texture, turgor normal     Patient is stable for ophthalmic surgery under local and MAC.       _

## 2024-04-09 ENCOUNTER — TELEPHONE (OUTPATIENT)
Dept: OPHTHALMOLOGY | Facility: CLINIC | Age: 64
End: 2024-04-09
Payer: MEDICARE

## 2024-04-10 ENCOUNTER — OUTSIDE PLACE OF SERVICE (OUTPATIENT)
Dept: OPHTHALMOLOGY | Facility: CLINIC | Age: 64
End: 2024-04-10
Payer: MEDICARE

## 2024-04-10 PROCEDURE — 66984 XCAPSL CTRC RMVL W/O ECP: CPT | Mod: RT,,, | Performed by: STUDENT IN AN ORGANIZED HEALTH CARE EDUCATION/TRAINING PROGRAM

## 2024-04-11 ENCOUNTER — OFFICE VISIT (OUTPATIENT)
Dept: OPHTHALMOLOGY | Facility: CLINIC | Age: 64
End: 2024-04-11
Payer: MEDICARE

## 2024-04-11 DIAGNOSIS — Z98.890 POST-OPERATIVE STATE: ICD-10-CM

## 2024-04-11 DIAGNOSIS — Z98.41 CATARACT EXTRACTION STATUS OF EYE, RIGHT: Primary | ICD-10-CM

## 2024-04-11 PROCEDURE — 99024 POSTOP FOLLOW-UP VISIT: CPT | Mod: HCNC,S$GLB,, | Performed by: STUDENT IN AN ORGANIZED HEALTH CARE EDUCATION/TRAINING PROGRAM

## 2024-04-11 PROCEDURE — 99999 PR PBB SHADOW E&M-EST. PATIENT-LVL III: CPT | Mod: PBBFAC,HCNC,, | Performed by: STUDENT IN AN ORGANIZED HEALTH CARE EDUCATION/TRAINING PROGRAM

## 2024-04-11 PROCEDURE — 1159F MED LIST DOCD IN RCRD: CPT | Mod: HCNC,CPTII,S$GLB, | Performed by: STUDENT IN AN ORGANIZED HEALTH CARE EDUCATION/TRAINING PROGRAM

## 2024-04-11 PROCEDURE — 1160F RVW MEDS BY RX/DR IN RCRD: CPT | Mod: HCNC,CPTII,S$GLB, | Performed by: STUDENT IN AN ORGANIZED HEALTH CARE EDUCATION/TRAINING PROGRAM

## 2024-04-11 NOTE — PROGRESS NOTES
HPI     Post-op Evaluation     Additional comments: POD#1 s/p CEIOL OS. Has received appropriate post-op   drops. Denies any discomfort. No new ocular complaints.             Comments    1. PCIOL OD 04/10/24  Fhx glaucoma (sister)    PRED QID OD             Last edited by Kathy Villarreal on 4/11/2024  2:31 PM.            Assessment /Plan     For exam results, see Encounter Report.    Cataract extraction status of eye, right    Post-operative state    POD#1 S/P CEIOL OD Doing well. Mild edema    Continue gtts to operative eye:  PF QID      Reinstructed in importance of absolute compliance with Post-OP instructions including medications, shield at bedtime, protective glasses during the day, and limitation of activities. Follow up appointments in approximately one and six weeks or call immediately for increased pain, redness or vision loss.     RTC 1 week. MOCT if PH worse than 20/25

## 2024-04-16 ENCOUNTER — TELEPHONE (OUTPATIENT)
Dept: OPHTHALMOLOGY | Facility: CLINIC | Age: 64
End: 2024-04-16
Payer: MEDICARE

## 2024-04-16 NOTE — TELEPHONE ENCOUNTER
Called pt, advised him to wait until he has his fu on Thursday, reminded no bending or heavy lifting. SRB      ----- Message from Annelise Mcleod sent at 4/16/2024  8:17 AM CDT -----  Pt is asking for an return call in reference to questions he has he is needing to know if he could do yard work or do he need to wait until he see's provider on Thursday for post op ,please call back at .[phone]

## 2024-04-19 ENCOUNTER — DOCUMENTATION ONLY (OUTPATIENT)
Dept: OPHTHALMOLOGY | Facility: CLINIC | Age: 64
End: 2024-04-19
Payer: MEDICARE

## 2024-04-19 ENCOUNTER — OFFICE VISIT (OUTPATIENT)
Dept: OPHTHALMOLOGY | Facility: CLINIC | Age: 64
End: 2024-04-19
Payer: MEDICARE

## 2024-04-19 DIAGNOSIS — Z98.890 POST-OPERATIVE STATE: Primary | ICD-10-CM

## 2024-04-19 DIAGNOSIS — Z98.41 CATARACT EXTRACTION STATUS OF EYE, RIGHT: ICD-10-CM

## 2024-04-19 DIAGNOSIS — H25.12 NUCLEAR SCLEROSIS OF LEFT EYE: ICD-10-CM

## 2024-04-19 PROCEDURE — 1160F RVW MEDS BY RX/DR IN RCRD: CPT | Mod: HCNC,CPTII,S$GLB, | Performed by: STUDENT IN AN ORGANIZED HEALTH CARE EDUCATION/TRAINING PROGRAM

## 2024-04-19 PROCEDURE — 99999 PR PBB SHADOW E&M-EST. PATIENT-LVL III: CPT | Mod: PBBFAC,HCNC,, | Performed by: STUDENT IN AN ORGANIZED HEALTH CARE EDUCATION/TRAINING PROGRAM

## 2024-04-19 PROCEDURE — 99024 POSTOP FOLLOW-UP VISIT: CPT | Mod: HCNC,S$GLB,, | Performed by: STUDENT IN AN ORGANIZED HEALTH CARE EDUCATION/TRAINING PROGRAM

## 2024-04-19 PROCEDURE — 92136 OPHTHALMIC BIOMETRY: CPT | Mod: 26,HCNC,LT,S$GLB | Performed by: STUDENT IN AN ORGANIZED HEALTH CARE EDUCATION/TRAINING PROGRAM

## 2024-04-19 PROCEDURE — 1159F MED LIST DOCD IN RCRD: CPT | Mod: HCNC,CPTII,S$GLB, | Performed by: STUDENT IN AN ORGANIZED HEALTH CARE EDUCATION/TRAINING PROGRAM

## 2024-04-19 RX ORDER — PREDNISOLONE ACETATE 10 MG/ML
1 SUSPENSION/ DROPS OPHTHALMIC EVERY 4 HOURS
Qty: 5 ML | Refills: 1 | Status: SHIPPED | OUTPATIENT
Start: 2024-04-19 | End: 2025-04-19

## 2024-04-19 NOTE — PROGRESS NOTES
HPI     Post-op Evaluation     Additional comments: 1 week sp pciol od 4/10/24           Comments    1. PCIOL OD 04/10/24  Fhx glaucoma (sister)    PRED QID OD             Last edited by Vimal Carrington on 4/19/2024  8:39 AM.            Assessment /Plan     For exam results, see Encounter Report.    Post-operative state  Cataract extraction status of eye, right- Impression/Plan  POW#1 S/P CEIOL OD : Doing well with no evidence of infection.     Continue gtts to operative eye:   Trace Cell. PF Taper 4-3-2-1 then stop    Pt given and instructed in one week postop instructions. Can resume normal activitites and d/c eye shield. OTC reading glasses can be used until evaluated for final MR.       Nuclear sclerosis of left eye- Patient reports decreased vision in the fellow eye consistent with the clinical amount of lenticular opacity, which reaches the level of visual significance and affects activities of daily living including reading and glare. Risks, benefits, and alternatives to cataract surgery were discussed and pt desired to schedule cataract surgery. Pt was consented and the biometry and lens options were reviewed.     Phaco left eye,   Topical  Will aim for Monofocal - Distance IOL    Intraop Kenalog 40: No    Post op gtts:   PF      Discussed that vision may be limited by:  none      RTC for POD#1

## 2024-04-29 ENCOUNTER — TELEPHONE (OUTPATIENT)
Dept: OPHTHALMOLOGY | Facility: CLINIC | Age: 64
End: 2024-04-29
Payer: MEDICARE

## 2024-05-03 ENCOUNTER — DOCUMENTATION ONLY (OUTPATIENT)
Dept: OPHTHALMOLOGY | Facility: CLINIC | Age: 64
End: 2024-05-03
Payer: MEDICARE

## 2024-05-03 NOTE — PROGRESS NOTES
Short Stay Record    Diagnosis: Nuclear Sclerotic Cataract left    CC: Blurry Vision     HPI:  Jaxson Burch Jr. is a 63 y.o. male who presents for evaluation prior to ophthalmic surgery. No current complaints.     Past Medical History:   Diagnosis Date    Annual physical exam 2/19/2024    Arthritis     Gastritis     Hypertension, essential 2/2/2021    ITP (idiopathic thrombocytopenic purpura)      Past Surgical History:   Procedure Laterality Date    COLONOSCOPY N/A 11/29/2018    Procedure: COLONOSCOPY;  Surgeon: Brett Townsend MD;  Location: [facility] ENDO;  Service: Endoscopy;  Laterality: N/A;    COLONOSCOPY N/A 12/21/2022    Procedure: COLONOSCOPY;  Surgeon: Marisol Carrillo MD;  Location: [facility] ENDO;  Service: Endoscopy;  Laterality: N/A;    FRACTURE SURGERY  02 04 2005    Hip replacement    JOINT REPLACEMENT Right     hip     Social History     Tobacco Use    Smoking status: Every Day     Current packs/day: 0.50     Average packs/day: 0.5 packs/day for 35.3 years (17.7 ttl pk-yrs)     Types: Cigarettes     Start date: 1/10/1989     Passive exposure: Past    Smokeless tobacco: Never   Substance Use Topics    Alcohol use: Yes     Alcohol/week: 20.0 standard drinks of alcohol     Types: 10 Cans of beer, 10 Shots of liquor per week     Comment: socially     Family History   Problem Relation Name Age of Onset    Pancreatic cancer Mother Eva Sanders     Cancer Mother Eva Sanders         Pancreatic cancer    Colon cancer Neg Hx       Review of patient's allergies indicates:  No Known Allergies      Current Outpatient Medications:     amLODIPine (NORVASC) 5 MG tablet, Take 1 tablet (5 mg total) by mouth once daily., Disp: 90 tablet, Rfl: 3    azelastine (ASTELIN) 137 mcg (0.1 %) nasal spray, 1 spray (137 mcg total) by Nasal route 2 (two) times daily., Disp: 30 mL, Rfl: 0    butalbital-acetaminophen-caffeine -40 mg (FIORICET, ESGIC) -40 mg per tablet, Take 1 tablet by mouth every 6 (six) hours as  needed for Headaches., Disp: 15 tablet, Rfl: 0    loratadine (CLARITIN) 10 mg tablet, Take 10 mg by mouth once daily., Disp: , Rfl:     meloxicam (MOBIC) 7.5 MG tablet, Take 7.5 mg by mouth daily as needed for Pain., Disp: , Rfl:     multivit-calc-iron-FA-K-hb#244 -80 mg-mcg-mcg Tab, Take by mouth., Disp: , Rfl:     omeprazole (PRILOSEC) 20 MG capsule, Take 1 capsule (20 mg total) by mouth once daily., Disp: 30 capsule, Rfl: 5    oxyCODONE-acetaminophen (PERCOCET)  mg per tablet, Take 1 tablet by mouth every 6 (six) hours as needed., Disp: , Rfl:     pravastatin (PRAVACHOL) 40 MG tablet, Take 1 tablet (40 mg total) by mouth once daily., Disp: 90 tablet, Rfl: 3    prednisoLONE acetate (PRED FORTE) 1 % DrpS, Place 1 drop into the right eye 4 (four) times daily., Disp: 5 mL, Rfl: 1    prednisoLONE acetate (PRED FORTE) 1 % DrpS, Place 1 drop into the left eye every 4 (four) hours., Disp: 5 mL, Rfl: 1    sildenafil (VIAGRA) 100 MG tablet, Take 100 mg by mouth every other day., Disp: , Rfl: 6    triamcinolone acetonide 0.1% (KENALOG) 0.1 % cream, Apply topically 2 (two) times daily., Disp: 15 g, Rfl: 0    Review of Systems:  10 Pt ROS negative except as stated in HPI    Physical Exam:  General Appearance:    A&Ox3, no distress, appears stated age   Head:    Normocephalic, without obvious abnormality, atraumatic   Eyes:    PERRL, EOM's intact   Back:     Symmetric, no curvature   Lungs:     respirations unlabored   Chest Wall:    No tenderness or deformity    Heart:  Abdomen:  Extremities:  Skin:    S1 and S2 present    Soft, non-tender    Extremities normal, atraumatic    Skin color, texture, turgor normal     Patient is stable for ophthalmic surgery under local and MAC.       _

## 2024-05-09 NOTE — TELEPHONE ENCOUNTER
Refills requested on Prilosec 20 mg QD; last refill given on 02/01/24 30 caps with 5 refills; last office visit on 05/17/22

## 2024-05-13 ENCOUNTER — TELEPHONE (OUTPATIENT)
Dept: OPHTHALMOLOGY | Facility: CLINIC | Age: 64
End: 2024-05-13
Payer: MEDICARE

## 2024-05-14 RX ORDER — OMEPRAZOLE 20 MG/1
20 CAPSULE, DELAYED RELEASE ORAL
Qty: 30 CAPSULE | Refills: 5 | Status: SHIPPED | OUTPATIENT
Start: 2024-05-14

## 2024-05-15 ENCOUNTER — OUTSIDE PLACE OF SERVICE (OUTPATIENT)
Dept: OPHTHALMOLOGY | Facility: CLINIC | Age: 64
End: 2024-05-15
Payer: MEDICARE

## 2024-05-15 PROCEDURE — 66984 XCAPSL CTRC RMVL W/O ECP: CPT | Mod: 79,LT,, | Performed by: STUDENT IN AN ORGANIZED HEALTH CARE EDUCATION/TRAINING PROGRAM

## 2024-05-16 ENCOUNTER — OFFICE VISIT (OUTPATIENT)
Dept: OPHTHALMOLOGY | Facility: CLINIC | Age: 64
End: 2024-05-16
Payer: MEDICARE

## 2024-05-16 DIAGNOSIS — Z98.890 POST-OPERATIVE STATE: Primary | ICD-10-CM

## 2024-05-16 DIAGNOSIS — Z98.42 CATARACT EXTRACTION STATUS OF EYE, LEFT: ICD-10-CM

## 2024-05-16 PROCEDURE — 99999 PR PBB SHADOW E&M-EST. PATIENT-LVL II: CPT | Mod: PBBFAC,HCNC,, | Performed by: STUDENT IN AN ORGANIZED HEALTH CARE EDUCATION/TRAINING PROGRAM

## 2024-05-16 PROCEDURE — 1160F RVW MEDS BY RX/DR IN RCRD: CPT | Mod: HCNC,CPTII,S$GLB, | Performed by: STUDENT IN AN ORGANIZED HEALTH CARE EDUCATION/TRAINING PROGRAM

## 2024-05-16 PROCEDURE — 1159F MED LIST DOCD IN RCRD: CPT | Mod: HCNC,CPTII,S$GLB, | Performed by: STUDENT IN AN ORGANIZED HEALTH CARE EDUCATION/TRAINING PROGRAM

## 2024-05-16 PROCEDURE — 99024 POSTOP FOLLOW-UP VISIT: CPT | Mod: HCNC,S$GLB,, | Performed by: STUDENT IN AN ORGANIZED HEALTH CARE EDUCATION/TRAINING PROGRAM

## 2024-05-16 NOTE — PROGRESS NOTES
HPI     Post-op Evaluation     Additional comments: POD#1 s/p CEIOL OS. Has received appropriate post-op   drops. Denies any discomfort. No new ocular complaints.             Last edited by Jeimy Muñoz MD on 5/16/2024 12:04 PM.            Assessment /Plan     For exam results, see Encounter Report.    Post-operative state  Cataract extraction status of eye, left- POD#1 S/P CEIOL OS Doing well. Mild edema    Continue gtts to operative eye:  PF QID      Reinstructed in importance of absolute compliance with Post-OP instructions including medications, shield at bedtime, protective glasses during the day, and limitation of activities. Follow up appointments in approximately one and six weeks or call immediately for increased pain, redness or vision loss.     RTC 1 week. MOCT if PH worse than 20/25

## 2024-05-20 PROBLEM — Z00.00 ANNUAL PHYSICAL EXAM: Status: RESOLVED | Noted: 2024-02-19 | Resolved: 2024-05-20

## 2024-05-23 ENCOUNTER — OFFICE VISIT (OUTPATIENT)
Dept: OPHTHALMOLOGY | Facility: CLINIC | Age: 64
End: 2024-05-23
Payer: MEDICARE

## 2024-05-23 DIAGNOSIS — Z96.1 PSEUDOPHAKIA OF BOTH EYES: ICD-10-CM

## 2024-05-23 DIAGNOSIS — Z98.890 POST-OPERATIVE STATE: Primary | ICD-10-CM

## 2024-05-23 PROCEDURE — 1159F MED LIST DOCD IN RCRD: CPT | Mod: HCNC,CPTII,S$GLB, | Performed by: OPTOMETRIST

## 2024-05-23 PROCEDURE — 99999 PR PBB SHADOW E&M-EST. PATIENT-LVL III: CPT | Mod: PBBFAC,HCNC,, | Performed by: OPTOMETRIST

## 2024-05-23 PROCEDURE — 99024 POSTOP FOLLOW-UP VISIT: CPT | Mod: HCNC,S$GLB,, | Performed by: OPTOMETRIST

## 2024-05-23 NOTE — PROGRESS NOTES
SUBJECTIVE  Jaxson Burch Jr. is 63 y.o. male  Uncorrected distance visual acuity was 20/20 in the right eye and 20/20 in the left eye. Uncorrected near visual acuity was J6 in the right eye and J6 in the left eye.   Chief Complaint   Patient presents with    Post-op Evaluation     1 week rtc PCIOL OS          HPI     Post-op Evaluation     Additional comments: 1 week rtc PCIOL OS           Comments    Patient states left eye doing well, no visual complaints but notice some   redness with the left eye for about 2 days now.     1. PCIOL OD 04/10/24  PCIOL OS 5/15/24  Fhx glaucoma (sister)    PRED QID OS          Last edited by Mary Corrigan on 5/23/2024 11:31 AM.         Assessment /Plan :  1. Post-operative state    2. Pseudophakia of both eyes    No cell or flare OU  Will continue to wean drops OS as with the OD    RTC 4 weeks recheck, sooner if any problems  Wants to bring spec Rx for sun glasses to [facility]

## 2024-06-14 ENCOUNTER — OFFICE VISIT (OUTPATIENT)
Dept: OPHTHALMOLOGY | Facility: CLINIC | Age: 64
End: 2024-06-14
Payer: MEDICARE

## 2024-06-14 DIAGNOSIS — Z98.890 POST-OPERATIVE STATE: Primary | ICD-10-CM

## 2024-06-14 DIAGNOSIS — Z98.42 CATARACT EXTRACTION STATUS OF EYE, LEFT: ICD-10-CM

## 2024-06-14 PROCEDURE — 99999 PR PBB SHADOW E&M-EST. PATIENT-LVL III: CPT | Mod: PBBFAC,HCNC,, | Performed by: STUDENT IN AN ORGANIZED HEALTH CARE EDUCATION/TRAINING PROGRAM

## 2024-06-14 PROCEDURE — 99024 POSTOP FOLLOW-UP VISIT: CPT | Mod: HCNC,S$GLB,, | Performed by: STUDENT IN AN ORGANIZED HEALTH CARE EDUCATION/TRAINING PROGRAM

## 2024-06-14 PROCEDURE — 1160F RVW MEDS BY RX/DR IN RCRD: CPT | Mod: HCNC,CPTII,S$GLB, | Performed by: STUDENT IN AN ORGANIZED HEALTH CARE EDUCATION/TRAINING PROGRAM

## 2024-06-14 PROCEDURE — 1159F MED LIST DOCD IN RCRD: CPT | Mod: HCNC,CPTII,S$GLB, | Performed by: STUDENT IN AN ORGANIZED HEALTH CARE EDUCATION/TRAINING PROGRAM

## 2024-06-14 RX ORDER — PREDNISOLONE ACETATE 10 MG/ML
1 SUSPENSION/ DROPS OPHTHALMIC EVERY 4 HOURS
Qty: 5 ML | Refills: 1 | Status: SHIPPED | OUTPATIENT
Start: 2024-06-14 | End: 2025-06-14

## 2024-06-14 NOTE — PROGRESS NOTES
HPI    Pt in today for a post op of both eyes. Pt states his vision is doing   well. No pain or discomfort. Pt states he last used the drops on Sunday.     1. PCIOL OD 04/10/24  PCIOL OS 5/15/24  Fhx glaucoma (sister)    PRED QID OS     Last edited by Bibi Vegas on 6/14/2024  9:53 AM.            Assessment /Plan     For exam results, see Encounter Report.    Post-operative state  Cataract extraction status of eye, left- Impression/Plan  S/P CEIOL OS : Doing well with no evidence of infection.     Continue gtts to operative eye:   Persistent inflammation. PF QID until next visit    Pt given and instructed in one week postop instructions. Can resume normal activitites and d/c eye shield. OTC reading glasses can be used until evaluated for final MR.         Return to clinic in 1M PO

## 2024-06-25 ENCOUNTER — OFFICE VISIT (OUTPATIENT)
Dept: INTERNAL MEDICINE | Facility: CLINIC | Age: 64
End: 2024-06-25
Payer: MEDICARE

## 2024-06-25 VITALS
RESPIRATION RATE: 18 BRPM | TEMPERATURE: 98 F | HEART RATE: 82 BPM | BODY MASS INDEX: 21.11 KG/M2 | DIASTOLIC BLOOD PRESSURE: 62 MMHG | WEIGHT: 134.5 LBS | SYSTOLIC BLOOD PRESSURE: 122 MMHG | OXYGEN SATURATION: 98 % | HEIGHT: 67 IN

## 2024-06-25 DIAGNOSIS — K14.8 TONGUE DISCOLORATION: Primary | ICD-10-CM

## 2024-06-25 DIAGNOSIS — F17.210 CIGARETTE NICOTINE DEPENDENCE WITHOUT COMPLICATION: ICD-10-CM

## 2024-06-25 PROCEDURE — 99213 OFFICE O/P EST LOW 20 MIN: CPT | Mod: HCNC,25,S$GLB, | Performed by: FAMILY MEDICINE

## 2024-06-25 PROCEDURE — 99406 BEHAV CHNG SMOKING 3-10 MIN: CPT | Mod: HCNC,S$GLB,, | Performed by: FAMILY MEDICINE

## 2024-06-25 PROCEDURE — 3008F BODY MASS INDEX DOCD: CPT | Mod: HCNC,CPTII,S$GLB, | Performed by: FAMILY MEDICINE

## 2024-06-25 PROCEDURE — 3078F DIAST BP <80 MM HG: CPT | Mod: HCNC,CPTII,S$GLB, | Performed by: FAMILY MEDICINE

## 2024-06-25 PROCEDURE — 3074F SYST BP LT 130 MM HG: CPT | Mod: HCNC,CPTII,S$GLB, | Performed by: FAMILY MEDICINE

## 2024-06-25 PROCEDURE — 99999 PR PBB SHADOW E&M-EST. PATIENT-LVL III: CPT | Mod: PBBFAC,HCNC,, | Performed by: FAMILY MEDICINE

## 2024-06-25 RX ORDER — AMOXICILLIN 500 MG/1
CAPSULE ORAL
COMMUNITY
Start: 2024-01-09

## 2024-06-25 RX ORDER — DOXYCYCLINE 100 MG/1
100 CAPSULE ORAL 2 TIMES DAILY
COMMUNITY
Start: 2024-06-12

## 2024-06-25 NOTE — PROGRESS NOTES
Subjective:       Patient ID: Jaxson Burch Jr. is a 64 y.o. male.    Chief Complaint: Oral Pain (Mouth discoloration)    2 weeks history of yellow tongue discoloration. He has used a tongue scraper with improvement in appearance. He denies pain, but is concerned by the color. + smoking history, denies known personal history of oral precancerous lesions. Has had multiple family members with various type of cancers.     Review of Systems   Constitutional:  Negative for chills and fever.   HENT:  Negative for congestion, dental problem, drooling, ear discharge, ear pain, facial swelling, hearing loss, mouth sores, nosebleeds, postnasal drip, rhinorrhea, sinus pressure, sinus pain, sneezing, sore throat, tinnitus, trouble swallowing and voice change.    Respiratory:  Negative for cough and shortness of breath.    Cardiovascular:  Negative for chest pain, palpitations and leg swelling.   Gastrointestinal:  Negative for abdominal pain, nausea and vomiting.   Genitourinary:  Negative for dysuria.   Neurological:  Negative for headaches.       Objective:      Physical Exam  Vitals reviewed.   HENT:      Head: Normocephalic and atraumatic.      Mouth/Throat:      Lips: Pink. No lesions.      Mouth: Mucous membranes are moist. No injury, lacerations, oral lesions or angioedema.      Dentition: Normal dentition. No dental caries or dental abscesses.      Tongue: Tongue does not deviate from midline.      Palate: No lesions.      Pharynx: Oropharynx is clear. No pharyngeal swelling, oropharyngeal exudate or posterior oropharyngeal erythema.      Tonsils: No tonsillar exudate or tonsillar abscesses.      Comments: Yellow thin scrap able discoloration  Eyes:      General: No scleral icterus.        Right eye: No discharge.         Left eye: No discharge.      Extraocular Movements: Extraocular movements intact.      Conjunctiva/sclera: Conjunctivae normal.      Pupils: Pupils are equal, round, and reactive to light.    Cardiovascular:      Rate and Rhythm: Normal rate and regular rhythm.      Pulses: Normal pulses.      Heart sounds: Normal heart sounds. No murmur heard.     No friction rub. No gallop.   Pulmonary:      Effort: Pulmonary effort is normal. No respiratory distress.      Breath sounds: Normal breath sounds. No stridor. No wheezing, rhonchi or rales.   Chest:      Chest wall: No tenderness.   Musculoskeletal:      Right lower leg: No edema.      Left lower leg: No edema.   Skin:     General: Skin is warm.      Capillary Refill: Capillary refill takes less than 2 seconds.   Neurological:      General: No focal deficit present.      Mental Status: He is alert.   Psychiatric:         Mood and Affect: Mood normal.         Behavior: Behavior normal.         Assessment:       1. Tongue discoloration    2. Cigarette nicotine dependence without complication        Plan:   1. Tongue discoloration  Comments:  Acute, advised continue ues of tongue scraper and mouthwash, dental visits Q6months, return to clinic if symptoms worsen or persists, no evidence of tumor  Advised use of antiseptic mouthwash twice daily, will continue to monitor, smoking cessation    2. Cigarette nicotine dependence without complication  Comments:  Chronic, stable, contemplative stage regarding cessation, offered additional resources and referral for smoking cessation, pt declines at this time  Discussed smoking cessation for 5 minutes, has multiple family members who passed away secondary cancer. Many cancers he referenced had an associated smoking risk factor.       RTC as scheduled/PRN    Ofelia Adam MD  Family Medicine

## 2024-07-11 ENCOUNTER — PATIENT MESSAGE (OUTPATIENT)
Dept: INTERNAL MEDICINE | Facility: CLINIC | Age: 64
End: 2024-07-11
Payer: MEDICARE

## 2024-07-11 ENCOUNTER — TELEPHONE (OUTPATIENT)
Dept: INTERNAL MEDICINE | Facility: CLINIC | Age: 64
End: 2024-07-11
Payer: MEDICARE

## 2024-07-11 RX ORDER — MELOXICAM 7.5 MG/1
7.5 TABLET ORAL DAILY PRN
Qty: 30 TABLET | Refills: 0 | Status: SHIPPED | OUTPATIENT
Start: 2024-07-11

## 2024-07-11 RX ORDER — MELOXICAM 7.5 MG/1
7.5 TABLET ORAL DAILY PRN
Qty: 30 TABLET | Refills: 0 | OUTPATIENT
Start: 2024-07-11

## 2024-07-11 RX ORDER — MELOXICAM 7.5 MG/1
7.5 TABLET ORAL DAILY PRN
Qty: 30 TABLET | Refills: 0 | Status: SHIPPED | OUTPATIENT
Start: 2024-07-11 | End: 2024-07-11 | Stop reason: SDUPTHER

## 2024-07-11 NOTE — TELEPHONE ENCOUNTER
Patient needs prescription sent to [facility] on [address] in [place].  Was sent in this morning, but patient no longer uses [facility] pharmacy.

## 2024-07-11 NOTE — TELEPHONE ENCOUNTER
----- Message from Awilda Quispe MA sent at 7/11/2024  9:34 AM CDT -----  Type: RX Refill Request    Who Called:  Pt   Have you contacted your pharmacy:    Refill    RX Name and Strength:  meloxicam (MOBIC) 7.5 MG tablet  Preferred Pharmacy with phone number:  Store #[id number]  [facility] at   [address] [place] [address] [place] [address]    [phone]  Local or Mail Order:    Would the patient rather a call back or a response via [facility]?    Best Call Back Number:  [phone]  Additional Information:    Thank you.

## 2024-07-11 NOTE — TELEPHONE ENCOUNTER
No care due was identified.  [facility] Embedded Care Due Messages. Reference number: [id number].   7/11/2024 2:47:06 PM CDT

## 2024-07-11 NOTE — TELEPHONE ENCOUNTER
----- Message from Josephine Rodriguez sent at 7/11/2024  2:31 PM CDT -----  Contact: Jaxson  Type:  RX Refill Request    Who Called: Jaxson  Refill or New Rx:Refill  RX Name and Strength:meloxicam (MOBIC) 7.5 MG tablet  How is the patient currently taking it? (ex. 1XDay):  Is this a 30 day or 90 day RX:  Preferred Pharmacy with phone number:  [facility]  [address], [place] [phone]     Local or Mail Order:Local  Ordering Provider:Kaye Thornton  Would the patient rather a call back or a response via [facility]? Callback  Best Call Back Number:[phone]  Additional Information: Sent to [facility] pharmacy

## 2024-07-12 ENCOUNTER — OFFICE VISIT (OUTPATIENT)
Dept: URGENT CARE | Facility: CLINIC | Age: 64
End: 2024-07-12
Payer: MEDICARE

## 2024-07-12 ENCOUNTER — OFFICE VISIT (OUTPATIENT)
Dept: OPHTHALMOLOGY | Facility: CLINIC | Age: 64
End: 2024-07-12
Payer: MEDICARE

## 2024-07-12 VITALS
TEMPERATURE: 98 F | WEIGHT: 136 LBS | BODY MASS INDEX: 21.35 KG/M2 | HEART RATE: 74 BPM | SYSTOLIC BLOOD PRESSURE: 123 MMHG | OXYGEN SATURATION: 99 % | DIASTOLIC BLOOD PRESSURE: 83 MMHG | HEIGHT: 67 IN | RESPIRATION RATE: 18 BRPM

## 2024-07-12 DIAGNOSIS — Z98.42 CATARACT EXTRACTION STATUS OF EYE, LEFT: ICD-10-CM

## 2024-07-12 DIAGNOSIS — M54.12 CERVICAL RADICULITIS: ICD-10-CM

## 2024-07-12 DIAGNOSIS — Z98.890 POST-OPERATIVE STATE: Primary | ICD-10-CM

## 2024-07-12 DIAGNOSIS — M54.50 ACUTE BILATERAL LOW BACK PAIN WITHOUT SCIATICA: ICD-10-CM

## 2024-07-12 DIAGNOSIS — M54.2 ACUTE NECK PAIN: Primary | ICD-10-CM

## 2024-07-12 DIAGNOSIS — G54.2 CERVICAL NEUROPATHY: ICD-10-CM

## 2024-07-12 PROCEDURE — 99999 PR PBB SHADOW E&M-EST. PATIENT-LVL III: CPT | Mod: PBBFAC,HCNC,, | Performed by: STUDENT IN AN ORGANIZED HEALTH CARE EDUCATION/TRAINING PROGRAM

## 2024-07-12 RX ORDER — PREDNISOLONE ACETATE 10 MG/ML
1 SUSPENSION/ DROPS OPHTHALMIC EVERY 4 HOURS
Qty: 5 ML | Refills: 0 | Status: SHIPPED | OUTPATIENT
Start: 2024-07-12 | End: 2024-08-11

## 2024-07-12 NOTE — PROGRESS NOTES
HPI    Pt in today for a 1 month post-op. Pt states his vision has gotten better   since his last visit. No pain or discomfort. Pt states he's still using   his drops.     1. PCIOL OD 04/10/24  PCIOL OS 5/15/24  Fhx glaucoma (sister)   Pred QID OS  Last edited by Bibi Vegas on 7/12/2024  9:37 AM.            Assessment /Plan     For exam results, see Encounter Report.    Post-operative state  Cataract extraction status of eye, left- Impression/Plan  S/P CEIOL OS : Doing well with no evidence of infection.     Continue gtts to operative eye:   Trace Cell. PF Taper 2-1 then stop    Pt given and instructed in one week postop instructions. Can resume normal activitites and d/c eye shield. OTC reading glasses can be used until evaluated for final MR. FAITH 3 mo for DFE

## 2024-07-12 NOTE — PATIENT INSTRUCTIONS
Back and Neck Pain Discharge Instructions:      - Please avoid heavy lifting and strenuous exercise for the next several days.  - Alternate heat and ice for first 48 hours then apply heat.   - You may do gently stretching within your limits of pain.  - Moist warm compresss to area several times daily.    - May use a heating pad on LOW to provide heat over a towel which was dampended with warm water. DO NOT FALL ASLEEP WITH HEATING PAD ON.    - Do not stay in one position to long.  When sleeping on your back place a pillow under knees to reduce tension on back.  If sleeping on your side, place pillow between knees to keep spine in better alinement.    - Wear supportive shoes such as tennis shoes for support of the lower back.    - Take any medication as directed.  If you were not prescribed an anti-inflammatory medication, and if you do not have any history of stomach/intestinal ulcers, or kidney disease, or are not taking a blood thinner such as Coumadin, Plavix, Pradaxa, Eloquis, or Xaralta for example, it is OK to take over the counter Ibuprofen or Advil or Motrin or Aleve or your prescribed Meloxicam as directed WHICH ARE (NSAIDS).  Do not take these medications on an empty stomach.    Please follow-up with your primary care provider if your symptoms continue.    Go to the emergency department for any new or worsening symptoms including but not limited to: loss of control of your bowel and/or bladder, sensation loss in between your legs by your genitalia and/or rectum.

## 2024-07-12 NOTE — PROGRESS NOTES
"Subjective:      Patient ID: Jaxson Burch Jr. is a 64 y.o. male.    Vitals:  height is 5' 7" (1.702 m) and weight is 61.7 kg (136 lb). His oral temperature is 98.4 °F (36.9 °C). His blood pressure is 123/83 and his pulse is 74. His respiration is 18 and oxygen saturation is 99%.     Chief Complaint: Back Pain and Neck Pain    63 yo male Pt presents with neck/bilateral lower back pain for 3 days. States he was working out on machine that works lower back but "I have been going to the gym for 10 years." He has another appt on [address] at 9:30 for his eyes and requests to have X-rays done and wants to see what's going on or if he has arthritis. He says that this flares up every now and then and wants to get it confirmed. His neck pain and back pain are 10/10 on the pain scale and the pain in his neck is more on the left side, worse with movement. Pt has not used anything for the pain/ symptoms because he wants to confirm what is wrong with him prior to doing so. Has prescription of meloxicam but needed it sent to a different pharmacy so he hasn't had a chance to  refill. Patient denies any radiating leg pain, paresthesias, weakness, saddle anesthesia or B/B dysfunction. Denies previous back surgeries, fever, chills. NKDA    Back Pain  This is a new problem. The current episode started in the past 7 days. The problem occurs constantly. The problem has been gradually worsening since onset. The quality of the pain is described as stabbing. The pain is at a severity of 10/10. The pain is severe. The pain is The same all the time. Pertinent negatives include no abdominal pain, chest pain, dysuria or fever.   Neck Pain   This is a new problem. The current episode started in the past 7 days. The problem occurs constantly. The problem has been gradually worsening. The quality of the pain is described as stabbing. The pain is at a severity of 10/10. The pain is severe. The pain is Same all the time. Pertinent negatives " include no chest pain or fever.       Constitution: Negative for chills and fever.   Neck: Positive for neck pain (left side). Negative for neck stiffness.   Cardiovascular:  Negative for chest pain.   Gastrointestinal:  Negative for abdominal pain, nausea, vomiting and diarrhea.   Genitourinary:  Negative for dysuria, frequency, urgency and flank pain.   Musculoskeletal:  Positive for back pain and history of spine disorder (cervical neuropathy and radiculitis).   Skin:  Negative for wound and abrasion.      Objective:     Vitals:    07/12/24 0812   BP: 123/83   Pulse: 74   Resp: 18   Temp: 98.4 °F (36.9 °C)       Physical Exam   Constitutional: He is oriented to person, place, and time. He appears well-developed. He is cooperative.  Non-toxic appearance. He does not appear ill. No distress.   HENT:   Head: Normocephalic and atraumatic.   Nose: Nose normal.   Mouth/Throat: Oropharynx is clear and moist and mucous membranes are normal.   Eyes: Conjunctivae and lids are normal.   Neck: Trachea normal and phonation normal. Neck supple. No torticollis present. No decreased range of motion present. pain with movement present. No spinous process tenderness present. muscular tenderness (left side with neck rotation to the right) present.   Cardiovascular: Normal rate, regular rhythm, normal heart sounds and normal pulses.   Pulmonary/Chest: Effort normal and breath sounds normal. No accessory muscle usage or stridor. No respiratory distress. He has no wheezes. He has no rhonchi. He has no rales.   Abdominal: Normal appearance and bowel sounds are normal. He exhibits no distension and no mass. Soft. There is no abdominal tenderness. There is no rebound, no guarding, no left CVA tenderness and no right CVA tenderness.   Musculoskeletal:         General: No deformity.      Cervical back: He exhibits tenderness.      Thoracic back: Normal. He exhibits no tenderness and no bony tenderness.      Lumbar back: He exhibits  tenderness. He exhibits no bony tenderness.      Comments: Back exam:   No open wounds, redness, bruising, swelling, warmth  No bony vertebral ttp of the C/T/L spine. Bilateral lumbar paraspinal ttp.   SLR R/L WNL  ROM intact. Sensation intact. Ambulated without difficulty or assistance.        Neurological: He is alert and oriented to person, place, and time. He has normal strength and normal reflexes. He displays no weakness. No sensory deficit. Gait normal.   Skin: Skin is warm, dry, intact and not diaphoretic.   Psychiatric: His speech is normal and behavior is normal. Judgment and thought content normal.   Nursing note and vitals reviewed.      Assessment:     1. Acute neck pain    2. Cervical radiculitis    3. Cervical neuropathy    4. Acute bilateral low back pain without sciatica      X-Ray Lumbar Spine 2 Or 3 Views    Result Date: 7/12/2024  EXAMINATION: XR LUMBAR SPINE 2 OR 3 VIEWS CLINICAL HISTORY: Low back pain, unspecified TECHNIQUE: AP/lateral views COMPARISON: None FINDINGS: Vertebral body heights maintained.  No spondylolisthesis.  L5-S1 degenerative disc height loss and osteophyte changes noted.  Lower lumbar spine facet arthropathy present.  No acute abnormality.     Degenerative findings Electronically signed by: Ronak Krueger MD Date:    07/12/2024 Time:    08:46    XR Cervical Spine 2 or 3 Views    Result Date: 7/12/2024  EXAMINATION: XR CERVICAL SPINE 2 OR 3 VIEWS CLINICAL HISTORY: Cervicalgia TECHNIQUE: AP, lateral and open mouth views of the cervical spine were performed. COMPARISON: 12/08/2012 FINDINGS: Vertebral body heights maintained.  No spondylolisthesis.  Multilevel degenerative disc height loss osteophyte changes noted.  Facet arthropathy present.  Soft tissues unremarkable.     Degenerative findings Electronically signed by: Ronak Krueger MD Date:    07/12/2024 Time:    08:45     Plan:       Acute neck pain  -     XR Cervical Spine 2 or 3 Views; Future; Expected date:  07/12/2024    Cervical radiculitis  -     XR Cervical Spine 2 or 3 Views; Future; Expected date: 07/12/2024    Cervical neuropathy  -     XR Cervical Spine 2 or 3 Views; Future; Expected date: 07/12/2024    Acute bilateral low back pain without sciatica  -     X-Ray Lumbar Spine 2 Or 3 Views; Future; Expected date: 07/12/2024        Patient Instructions   Back and Neck Pain Discharge Instructions:      - Please avoid heavy lifting and strenuous exercise for the next several days.  - Alternate heat and ice for first 48 hours then apply heat.   - You may do gently stretching within your limits of pain.  - Moist warm compresss to area several times daily.    - May use a heating pad on LOW to provide heat over a towel which was dampended with warm water. DO NOT FALL ASLEEP WITH HEATING PAD ON.    - Do not stay in one position to long.  When sleeping on your back place a pillow under knees to reduce tension on back.  If sleeping on your side, place pillow between knees to keep spine in better alinement.    - Wear supportive shoes such as tennis shoes for support of the lower back.    - Take any medication as directed.  If you were not prescribed an anti-inflammatory medication, and if you do not have any history of stomach/intestinal ulcers, or kidney disease, or are not taking a blood thinner such as Coumadin, Plavix, Pradaxa, Eloquis, or Xaralta for example, it is OK to take over the counter Ibuprofen or Advil or Motrin or Aleve or your prescribed Meloxicam as directed WHICH ARE (NSAIDS).  Do not take these medications on an empty stomach.    Please follow-up with your primary care provider if your symptoms continue.    Go to the emergency department for any new or worsening symptoms including but not limited to: loss of control of your bowel and/or bladder, sensation loss in between your legs by your genitalia and/or rectum.      Medical Decision Making:   History:   Old Medical Records: I decided to obtain old  medical records.  Independently Interpreted Test(s):   I have ordered and independently interpreted X-rays - see summary below.       <> Summary of X-Ray Reading(s): No slipped disc, no acute findings. Normal degenerative findings of spine. Will confirm with Radiologist.  Clinical Tests:   Radiological Study: Ordered and Reviewed  Urgent Care Management:  Patient well appearing, VSS. Back has with no midline tenderness.  Discussed imaging results with patient.  Discussed muscle strain versus pulled muscle. Advised patient on over the counter measures and home remedies. Unable to give Toradol injection.  Return to clinic or follow-up with PCP or pain management if symptoms do not resolve.  Strict ER precautions given for back pain including numbness, tingling, saddle anesthesia, bowel or bladder incontinence or worsening back pain that does not resolve with treatment regimen. Patient agreed with treatment plan and has no further questions at this time. Discharge instructions reviewed with patient and patient exits exam room in no acute distress.

## 2024-07-16 ENCOUNTER — OFFICE VISIT (OUTPATIENT)
Dept: INTERNAL MEDICINE | Facility: CLINIC | Age: 64
End: 2024-07-16
Payer: MEDICARE

## 2024-07-16 ENCOUNTER — TELEPHONE (OUTPATIENT)
Dept: INTERNAL MEDICINE | Facility: CLINIC | Age: 64
End: 2024-07-16
Payer: MEDICARE

## 2024-07-16 ENCOUNTER — PATIENT MESSAGE (OUTPATIENT)
Dept: INTERNAL MEDICINE | Facility: CLINIC | Age: 64
End: 2024-07-16

## 2024-07-16 ENCOUNTER — HOSPITAL ENCOUNTER (OUTPATIENT)
Dept: RADIOLOGY | Facility: HOSPITAL | Age: 64
Discharge: HOME | End: 2024-07-16
Payer: MEDICARE

## 2024-07-16 VITALS
OXYGEN SATURATION: 98 % | SYSTOLIC BLOOD PRESSURE: 138 MMHG | DIASTOLIC BLOOD PRESSURE: 78 MMHG | HEIGHT: 67 IN | BODY MASS INDEX: 21.56 KG/M2 | WEIGHT: 137.38 LBS | TEMPERATURE: 98 F | HEART RATE: 71 BPM

## 2024-07-16 DIAGNOSIS — M54.9 DORSALGIA: Primary | ICD-10-CM

## 2024-07-16 DIAGNOSIS — M25.551 RIGHT HIP PAIN: ICD-10-CM

## 2024-07-16 DIAGNOSIS — Z86.2 HISTORY OF ITP: ICD-10-CM

## 2024-07-16 DIAGNOSIS — I70.90 ATHEROSCLEROSIS: ICD-10-CM

## 2024-07-16 DIAGNOSIS — I10 HYPERTENSION, ESSENTIAL: ICD-10-CM

## 2024-07-16 PROCEDURE — 73502 X-RAY EXAM HIP UNI 2-3 VIEWS: CPT | Mod: TC,HCNC,RT

## 2024-07-16 PROCEDURE — 1160F RVW MEDS BY RX/DR IN RCRD: CPT | Mod: CPTII,S$GLB,,

## 2024-07-16 PROCEDURE — 3075F SYST BP GE 130 - 139MM HG: CPT | Mod: CPTII,S$GLB,,

## 2024-07-16 PROCEDURE — 3008F BODY MASS INDEX DOCD: CPT | Mod: CPTII,S$GLB,,

## 2024-07-16 PROCEDURE — 96372 THER/PROPH/DIAG INJ SC/IM: CPT | Mod: S$GLB,,,

## 2024-07-16 PROCEDURE — 99999 PR PBB SHADOW E&M-EST. PATIENT-LVL IV: CPT | Mod: PBBFAC,HCNC,,

## 2024-07-16 PROCEDURE — 1159F MED LIST DOCD IN RCRD: CPT | Mod: CPTII,S$GLB,,

## 2024-07-16 PROCEDURE — 73502 X-RAY EXAM HIP UNI 2-3 VIEWS: CPT | Mod: 26,RT,, | Performed by: RADIOLOGY

## 2024-07-16 PROCEDURE — 99214 OFFICE O/P EST MOD 30 MIN: CPT | Mod: 25,S$GLB,,

## 2024-07-16 PROCEDURE — 3078F DIAST BP <80 MM HG: CPT | Mod: CPTII,S$GLB,,

## 2024-07-16 RX ORDER — AMLODIPINE BESYLATE 5 MG/1
5 TABLET ORAL DAILY
Qty: 90 TABLET | Refills: 3 | Status: SHIPPED | OUTPATIENT
Start: 2024-07-16

## 2024-07-16 RX ORDER — KETOROLAC TROMETHAMINE 30 MG/ML
30 INJECTION, SOLUTION INTRAMUSCULAR; INTRAVENOUS ONCE
Status: COMPLETED | OUTPATIENT
Start: 2024-07-16 | End: 2024-07-16

## 2024-07-16 RX ORDER — PRAVASTATIN SODIUM 40 MG/1
40 TABLET ORAL DAILY
Qty: 90 TABLET | Refills: 3 | Status: SHIPPED | OUTPATIENT
Start: 2024-07-16

## 2024-07-16 RX ORDER — CYCLOBENZAPRINE HCL 5 MG
5 TABLET ORAL NIGHTLY
Qty: 30 TABLET | Refills: 0 | Status: SHIPPED | OUTPATIENT
Start: 2024-07-16 | End: 2024-08-15

## 2024-07-16 RX ORDER — DEXAMETHASONE SODIUM PHOSPHATE 100 MG/10ML
10 INJECTION INTRAMUSCULAR; INTRAVENOUS ONCE
Status: COMPLETED | OUTPATIENT
Start: 2024-07-16 | End: 2024-07-16

## 2024-07-16 RX ORDER — KETOROLAC TROMETHAMINE 30 MG/ML
30 INJECTION, SOLUTION INTRAMUSCULAR; INTRAVENOUS ONCE
Status: CANCELLED | OUTPATIENT
Start: 2024-07-16 | End: 2024-07-19

## 2024-07-16 RX ADMIN — KETOROLAC TROMETHAMINE 30 MG: 30 INJECTION, SOLUTION INTRAMUSCULAR; INTRAVENOUS at 10:07

## 2024-07-16 RX ADMIN — DEXAMETHASONE SODIUM PHOSPHATE 10 MG: 100 INJECTION INTRAMUSCULAR; INTRAVENOUS at 10:07

## 2024-07-16 NOTE — PROGRESS NOTES
"Jasxon Burch Jr.  07/16/2024  [id number]    Salomón Lantigua MD  Patient Care Team:  Salomón Lantigua MD as PCP - General (Family Medicine)  Margaux Martinez LPN as Care Coordinator (Internal Medicine)          Visit Type:an urgent visit for a new problem    Chief Complaint:  Chief Complaint   Patient presents with    Back Pain        History of Present Illness:    PT went to  on 7/12 for neck and back pain  From the notes    63 yo male Pt presents with neck/bilateral lower back pain for 3 days. States he was working out on machine that works lower back but "I have been going to the gym for 10 years." He [facility] for his eyes and requests to have X-rays done and wants to see what's going on or if he has arthritis. He says that this flares up every now and then and wants to get it confirmed. His neck pain and back pain are 10/10 on the pain scale and the pain in his neck is more on the left side, worse with movement. Pt has not used anything for the pain/ symptoms because he wants to confirm what is wrong with him prior to doing so. Has prescription of meloxicam but needed it sent to a different pharmacy so he hasn't had a chance to  refill. Patient denies any radiating leg pain, paresthesias, weakness, saddle anesthesia or B/B dysfunction. Denies previous back surgeries, fever, chills. NKDA     Xray lumbar  ower lumbar spine facet arthropathy present.  No acute abnormality.  Degenerative findings    Xray cervical   Multilevel degenerative disc height loss osteophyte changes noted.  Facet arthropathy present.  Soft tissues unremarkable.   Degenerative findings    Mobic has offered min relief     Pain in hip  when he raises his right leg  Unable to lift his leg much  Some pain with ambulation     History:  Past Medical History:   Diagnosis Date    Annual physical exam 2/19/2024    Arthritis     Gastritis     Hypertension, essential 2/2/2021    ITP (idiopathic " thrombocytopenic purpura)      Past Surgical History:   Procedure Laterality Date    CATARACT EXTRACTION      COLONOSCOPY N/A 11/29/2018    Procedure: COLONOSCOPY;  Surgeon: Brett Townsend MD;  Location: [facility];  Service: Endoscopy;  Laterality: N/A;    COLONOSCOPY N/A 12/21/2022    Procedure: COLONOSCOPY;  Surgeon: Marisol Carrillo MD;  Location: [facility] ENDO;  Service: Endoscopy;  Laterality: N/A;    FRACTURE SURGERY  02/04/2005    Hip replacement    JOINT REPLACEMENT Right     hip     Family History   Problem Relation Name Age of Onset    Pancreatic cancer Mother Eva Sanders     Cancer Mother Eva Sanders         Pancreatic cancer    Colon cancer Neg Hx       Social History     Socioeconomic History    Marital status:    Tobacco Use    Smoking status: Every Day     Current packs/day: 0.50     Average packs/day: 0.5 packs/day for 35.5 years (17.8 ttl pk-yrs)     Types: Cigarettes     Start date: 1/10/1989     Passive exposure: Past    Smokeless tobacco: Never   Substance and Sexual Activity    Alcohol use: Yes     Alcohol/week: 20.0 standard drinks of alcohol     Types: 10 Cans of beer, 10 Shots of liquor per week     Comment: socially    Drug use: No    Sexual activity: Yes     Partners: Female     Birth control/protection: None     Social Determinants of Health     Stress: No Stress Concern Present (10/6/2020)    [employer] - Occupational Stress Questionnaire     Feeling of Stress : Not at all     Patient Active Problem List   Diagnosis    Chronic ITP (idiopathic thrombocytopenia)    Cervical radiculitis    Erectile dysfunction    High risk medication use    Gastroesophageal reflux disease    Weight loss    Insomnia    Hyperkalemia    Numbness of left foot    Chronic pain syndrome    Tobacco use    Atherosclerosis    Hyperlipidemia    Hypertension    Benign prostatic hyperplasia with incomplete bladder emptying    Muscle cramps     Cigarette nicotine dependence without complication    Change in bowel habits    Personal history of colonic polyps    Aortic atherosclerosis    Cervical neuropathy    Hyperglycemia    Hypertension, essential    Solitary pulmonary nodule     Review of patient's allergies indicates:  No Known Allergies    The following were reviewed at this visit: active problem list, medication list, allergies, family history, social history, and health maintenance.    Medications:  Current Outpatient Medications on File Prior to Visit   Medication Sig Dispense Refill    amLODIPine (NORVASC) 5 MG tablet Take 1 tablet (5 mg total) by mouth once daily. 90 tablet 3    amoxicillin (AMOXIL) 500 MG capsule SMARTSI Capsule(s) By Mouth (Patient not taking: Reported on 2024)      azelastine (ASTELIN) 137 mcg (0.1 %) nasal spray 1 spray (137 mcg total) by Nasal route 2 (two) times daily. (Patient not taking: Reported on 2024) 30 mL 0    butalbital-acetaminophen-caffeine -40 mg (FIORICET, ESGIC) -40 mg per tablet Take 1 tablet by mouth every 6 (six) hours as needed for Headaches. (Patient not taking: Reported on 2024) 15 tablet 0    doxycycline (VIBRAMYCIN) 100 MG Cap Take 100 mg by mouth 2 (two) times daily. (Patient not taking: Reported on 2024)      loratadine (CLARITIN) 10 mg tablet Take 10 mg by mouth once daily. (Patient not taking: Reported on 2024)      meloxicam (MOBIC) 7.5 MG tablet Take 1 tablet (7.5 mg total) by mouth daily as needed for Pain. 30 tablet 0    multivit-calc-iron-FA-K-hb#244 -80 mg-mcg-mcg Tab Take by mouth.      omeprazole (PRILOSEC) 20 MG capsule TAKE 1 CAPSULE(20 MG) BY MOUTH EVERY DAY 30 capsule 5    oxyCODONE-acetaminophen (PERCOCET)  mg per tablet Take 1 tablet by mouth every 6 (six) hours as needed.      pravastatin (PRAVACHOL) 40 MG tablet Take 1 tablet (40 mg total) by mouth once daily. 90 tablet 3    prednisoLONE acetate (PRED FORTE) 1 %  DrpS Place 1 drop into the right eye 4 (four) times daily. (Patient not taking: Reported on 5/23/2024) 5 mL 1    prednisoLONE acetate (PRED FORTE) 1 % DrpS Place 1 drop into the left eye every 4 (four) hours. 5 mL 1    prednisoLONE acetate (PRED FORTE) 1 % DrpS Place 1 drop into the left eye every 4 (four) hours. (Patient not taking: Reported on 6/25/2024) 5 mL 1    prednisoLONE acetate (PRED FORTE) 1 % DrpS Place 1 drop into the left eye every 4 (four) hours. 5 mL 0    sildenafil (VIAGRA) 100 MG tablet Take 100 mg by mouth every other day.  6    triamcinolone acetonide 0.1% (KENALOG) 0.1 % cream Apply topically 2 (two) times daily. 15 g 0     No current facility-administered medications on file prior to visit.       Medications have been reviewed and reconciled with patient at this visit.  Barriers to medications reviewed with patient.    Adverse reactions to current medications reviewed with patient..    Over the counter medications reviewed and reconciled with patient.    Exam:  Wt Readings from Last 3 Encounters:   07/12/24 61.7 kg (136 lb)   06/25/24 61 kg (134 lb 7.7 oz)   02/19/24 64.1 kg (141 lb 5 oz)     Temp Readings from Last 3 Encounters:   07/12/24 98.4 °F (36.9 °C) (Oral)   06/25/24 97.5 °F (36.4 °C) (Tympanic)   02/19/24 97.7 °F (36.5 °C) (Temporal)     BP Readings from Last 3 Encounters:   07/12/24 123/83   06/25/24 122/62   02/19/24 124/76     Pulse Readings from Last 3 Encounters:   07/12/24 74   06/25/24 82   02/19/24 82     There is no height or weight on file to calculate BMI.      Review of Systems   Musculoskeletal:  Positive for back pain and joint pain.     Physical Exam  Nursing note reviewed.   HENT:      Head: Normocephalic and atraumatic.   Cardiovascular:      Rate and Rhythm: Normal rate and regular rhythm.      Heart sounds: Normal heart sounds.   Pulmonary:      Effort: Pulmonary effort is normal. No respiratory distress.      Breath sounds: Normal breath sounds.    Musculoskeletal:         General: Tenderness present.        Back:         Legs:       Comments: Pain in right hip when lifting right leg.   Neurological:      Mental Status: He is alert and oriented to person, place, and time.   Psychiatric:         Mood and Affect: Mood normal.         Behavior: Behavior normal.         Thought Content: Thought content normal.         Judgment: Judgment normal.       Laboratory Reviewed ({Yes)  Lab Results   Component Value Date    WBC 6.95 02/19/2024    HGB 14.3 02/19/2024    HCT 44.0 02/19/2024     02/19/2024    CHOL 155 02/19/2024    TRIG 43 02/19/2024    HDL 61 02/19/2024    ALT 19 02/19/2024    AST 30 02/19/2024     02/19/2024    K 4.5 02/19/2024     02/19/2024    CREATININE 0.9 02/19/2024    BUN 10 02/19/2024    CO2 28 02/19/2024    TSH 1.153 02/19/2024    PSA 3.9 05/05/2022    INR 1.0 05/05/2016    GLUF 96 08/14/2023    HGBA1C 4.7 08/14/2023     Jaxson was seen today for back pain.    Diagnoses and all orders for this visit:    Dorsalgia  -     dexAMETHasone injection 10 mg  -     cyclobenzaprine (FLEXERIL) 5 MG tablet; Take 1 tablet (5 mg total) by mouth every evening.  -     ketorolac injection 30 mg    Right hip pain  -     X-Ray Hip 2 or 3 views Right with Pelvis when performed; Future    Hypertension, essential  -     amLODIPine (NORVASC) 5 MG tablet; Take 1 tablet (5 mg total) by mouth once daily.    Atherosclerosis  -     pravastatin (PRAVACHOL) 40 MG tablet; Take 1 tablet (40 mg total) by mouth once daily.    History of ITP  Denies s/s of bleeding  Last platelet count within normal limits     Pt would like steroid and Toradol injection for pain  Xray right hip  He has an appt with his orthopedic doctor this week       Visit today included increased complexity associated with the care of the episodic problem Back and hip pain  , which was addressed while instituting co-management of the longitudinal care of the patient due to the serious and/or  complex managed problem(s) .    I have evaluated and discussed management associated with medical care services that serve as the continuing focal point for all needed health care services and/or with medical care services that are part of ongoing care related to my patient's single, serious condition or a complex condition(s).    I am providing ongoing care and I am the primary care provider for this patient, and they are being managed, monitored, and/or observed for their chronic conditions over time.     I have addressed their ongoing health maintenance requirements and needs for all health care services and reviewed co-management plans provided by specialty providers when available.    Health Maintenance Due   Topic Date Due    RSV Vaccine (Age 60+ and Pregnant patients) (1 - 1-dose 60+ series) Never done          Care Plan/Goals: Reviewed    Goals    None         Follow up: No follow-ups on file.    After visit summary was printed and given to patient upon discharge today.  Patient goals and care plan are included in After Visit Summary.

## 2024-08-26 ENCOUNTER — PATIENT MESSAGE (OUTPATIENT)
Dept: OPHTHALMOLOGY | Facility: CLINIC | Age: 64
End: 2024-08-26
Payer: MEDICARE

## 2024-08-26 ENCOUNTER — TELEPHONE (OUTPATIENT)
Dept: OPHTHALMOLOGY | Facility: CLINIC | Age: 64
End: 2024-08-26
Payer: MEDICARE

## 2024-08-28 ENCOUNTER — OFFICE VISIT (OUTPATIENT)
Dept: OPHTHALMOLOGY | Facility: CLINIC | Age: 64
End: 2024-08-28
Payer: MEDICARE

## 2024-08-28 DIAGNOSIS — Z96.1 PSEUDOPHAKIA OF BOTH EYES: ICD-10-CM

## 2024-08-28 DIAGNOSIS — H26.492 POSTERIOR CAPSULAR OPACIFICATION, LEFT EYE: Primary | ICD-10-CM

## 2024-08-28 PROCEDURE — 99999 PR PBB SHADOW E&M-EST. PATIENT-LVL I: CPT | Mod: PBBFAC,HCNC,, | Performed by: STUDENT IN AN ORGANIZED HEALTH CARE EDUCATION/TRAINING PROGRAM

## 2024-08-28 PROCEDURE — 99214 OFFICE O/P EST MOD 30 MIN: CPT | Mod: HCNC,S$GLB,, | Performed by: STUDENT IN AN ORGANIZED HEALTH CARE EDUCATION/TRAINING PROGRAM

## 2024-08-28 PROCEDURE — 1160F RVW MEDS BY RX/DR IN RCRD: CPT | Mod: HCNC,CPTII,S$GLB, | Performed by: STUDENT IN AN ORGANIZED HEALTH CARE EDUCATION/TRAINING PROGRAM

## 2024-08-28 PROCEDURE — 1159F MED LIST DOCD IN RCRD: CPT | Mod: HCNC,CPTII,S$GLB, | Performed by: STUDENT IN AN ORGANIZED HEALTH CARE EDUCATION/TRAINING PROGRAM

## 2024-08-28 NOTE — PROGRESS NOTES
HPI    . PCIOL OD 04/10/24  PCIOL OS 5/15/24  Fhx glaucoma (sister)     Last edited by Keli Haley MA on 8/28/2024  9:56 AM.            Assessment /Plan     For exam results, see Encounter Report.    Posterior capsular opacification, left eye    Pseudophakia of both eyes    Plan for YAG OS at next visit    RTC 1 mo for YAG

## 2024-08-30 ENCOUNTER — TELEPHONE (OUTPATIENT)
Dept: GASTROENTEROLOGY | Facility: CLINIC | Age: 64
End: 2024-08-30
Payer: MEDICARE

## 2024-08-30 ENCOUNTER — OFFICE VISIT (OUTPATIENT)
Dept: INTERNAL MEDICINE | Facility: CLINIC | Age: 64
End: 2024-08-30
Payer: MEDICARE

## 2024-08-30 ENCOUNTER — HOSPITAL ENCOUNTER (OUTPATIENT)
Dept: RADIOLOGY | Facility: HOSPITAL | Age: 64
Discharge: HOME | End: 2024-08-30
Payer: MEDICARE

## 2024-08-30 VITALS
OXYGEN SATURATION: 97 % | SYSTOLIC BLOOD PRESSURE: 114 MMHG | BODY MASS INDEX: 21.07 KG/M2 | WEIGHT: 134.25 LBS | DIASTOLIC BLOOD PRESSURE: 62 MMHG | HEIGHT: 67 IN | HEART RATE: 78 BPM

## 2024-08-30 DIAGNOSIS — R79.1 ABNORMAL COAGULATION PROFILE: ICD-10-CM

## 2024-08-30 DIAGNOSIS — Z01.818 PREOP EXAMINATION: ICD-10-CM

## 2024-08-30 DIAGNOSIS — I10 HYPERTENSION, ESSENTIAL: ICD-10-CM

## 2024-08-30 DIAGNOSIS — N40.0 BENIGN PROSTATIC HYPERPLASIA, UNSPECIFIED WHETHER LOWER URINARY TRACT SYMPTOMS PRESENT: ICD-10-CM

## 2024-08-30 DIAGNOSIS — R97.20 ELEVATED PSA: ICD-10-CM

## 2024-08-30 DIAGNOSIS — Z01.818 PREOP EXAMINATION: Primary | ICD-10-CM

## 2024-08-30 LAB
OHS QRS DURATION: 92 MS
OHS QRS DURATION: 96 MS
OHS QTC CALCULATION: 417 MS
OHS QTC CALCULATION: 430 MS

## 2024-08-30 PROCEDURE — 71046 X-RAY EXAM CHEST 2 VIEWS: CPT | Mod: 26,HCNC,, | Performed by: RADIOLOGY

## 2024-08-30 PROCEDURE — 99999 PR PBB SHADOW E&M-EST. PATIENT-LVL III: CPT | Mod: PBBFAC,HCNC,,

## 2024-08-30 PROCEDURE — 71046 X-RAY EXAM CHEST 2 VIEWS: CPT | Mod: TC,HCNC

## 2024-08-30 NOTE — TELEPHONE ENCOUNTER
Attempt to contact patient on [phone], but to no avail. LVM for patient to return call.      ----- Message from Chula Schroeder sent at 8/30/2024 10:52 AM CDT -----  Who Called: Pt    What is the request in detail: Requesting call back to discuss pt states his colonoscopy was done longer ago then 2 years. Please advise    Can the clinic reply by [facility]? No    Best Call Back Number: [phone]      Additional Information:

## 2024-08-30 NOTE — TELEPHONE ENCOUNTER
Spoke with patient on [phone] in regard to clarification of date for the last colonoscopy. Patient has been made aware of the last colonoscopy being in 2022. Patient voices understanding and states he will have his next colonoscopy in 2026 instead of 2027.     ----- Message from Bimal Sims sent at 8/30/2024  1:02 PM CDT -----  Contact: self   .Type: Patient Call Back        Who called:Patient         What is the request in detail:    Called in returning missed call   Can the clinic reply by [facility]?           Would the patient rather a call back or a response via [facility]?   call      Best call back number:  [phone]

## 2024-08-30 NOTE — PROGRESS NOTES
Jaxson Burch Jr.  08/30/2024  [id number]    Salomón Lantigua MD  Patient Care Team:  Salomón Lantigua MD as PCP - General (Family Medicine)  Margaux Martinez LPN as Care Coordinator (Internal Medicine)          Visit Type:a scheduled routine follow-up visit    Chief Complaint:  Chief Complaint   Patient presents with    Pre-op Exam        History of Present Illness:    History of Present Illness    CHIEF COMPLAINT:  Mr. Burch presents today for preoperative evaluation for prostate biopsy.    PROSTATE HEALTH:  He reports increasing PSA levels. His PSA was previously stable at 2.9 for approximately two years, then increased to 3.7, and has recently risen to 4.9. He denies any other specific prostate-related symptoms or concerns.    PREOPERATIVE EVALUATION:  He is scheduled for a prostate biopsy to be performed by Dr. Mohit Peralta on September 24th. Required tests include an EKG, chest XR, and labs. He denies any previous problems with anesthesia or taking any daily anticoagulants or aspirin.    CURRENT SYMPTOMS:  He reports experiencing congestion and recently took two Claritin to address these symptoms.    MEDICAL HISTORY:  He reports a history of previous surgeries without any anesthesia-related complications.    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    ROS:  General: -fever, -chills, -fatigue, -weight gain, -weight loss  Eyes: -vision changes, -redness, -discharge  ENT: -ear pain, +nasal congestion, -sore throat  Cardiovascular: -chest pain, -palpitations, -lower extremity edema  Respiratory: -cough, -shortness of breath  Gastrointestinal: -abdominal pain, -nausea, -vomiting, -diarrhea, -constipation, -blood in stool  Genitourinary: -dysuria, -hematuria, -frequency, -difficulty urinating  Musculoskeletal: -joint pain, -muscle pain  Skin: -rash, -lesion  Neurological: -headache, -dizziness, -numbness, -tingling  Psychiatric: -anxiety, -depression,  -sleep difficulty            History:  Past Medical History:   Diagnosis Date    Annual physical exam 2/19/2024    Arthritis     Gastritis     Hypertension, essential 2/2/2021    ITP (idiopathic thrombocytopenic purpura)      Past Surgical History:   Procedure Laterality Date    CATARACT EXTRACTION      COLONOSCOPY N/A 11/29/2018    Procedure: COLONOSCOPY;  Surgeon: Brett Townsend MD;  Location: [facility] ENDO;  Service: Endoscopy;  Laterality: N/A;    COLONOSCOPY N/A 12/21/2022    Procedure: COLONOSCOPY;  Surgeon: Marisol Carrillo MD;  Location: [facility] ENDO;  Service: Endoscopy;  Laterality: N/A;    FRACTURE SURGERY  02/04/2005    Hip replacement    JOINT REPLACEMENT Right     hip     Family History   Problem Relation Name Age of Onset    Pancreatic cancer Mother Eva Sanders     Cancer Mother Eva Sanders         Pancreatic cancer    Colon cancer Neg Hx       Social History     Socioeconomic History    Marital status:    Tobacco Use    Smoking status: Every Day     Current packs/day: 0.50     Average packs/day: 0.5 packs/day for 35.6 years (17.8 ttl pk-yrs)     Types: Cigarettes     Start date: 1/10/1989     Passive exposure: Past    Smokeless tobacco: Never   Substance and Sexual Activity    Alcohol use: Yes     Alcohol/week: 20.0 standard drinks of alcohol     Types: 10 Cans of beer, 10 Shots of liquor per week     Comment: socially    Drug use: No    Sexual activity: Yes     Partners: Female     Birth control/protection: None     Social Determinants of Health     Stress: No Stress Concern Present (10/6/2020)    [employer] - Occupational Stress Questionnaire     Feeling of Stress : Not at all     Patient Active Problem List   Diagnosis    Chronic ITP (idiopathic thrombocytopenia)    Cervical radiculitis    Erectile dysfunction    High risk medication use    Gastroesophageal reflux disease    Weight loss    Insomnia    Hyperkalemia    Numbness of left foot    Chronic pain  syndrome    Tobacco use    Atherosclerosis    Hyperlipidemia    Hypertension    Benign prostatic hyperplasia with incomplete bladder emptying    Muscle cramps    Cigarette nicotine dependence without complication    Change in bowel habits    Personal history of colonic polyps    Aortic atherosclerosis    Cervical neuropathy    Hyperglycemia    Hypertension, essential    Solitary pulmonary nodule    Rising PSA level     Review of patient's allergies indicates:  No Known Allergies    The following were reviewed at this visit: active problem list, medication list, allergies, family history, social history, and health maintenance.    Medications:  Current Outpatient Medications on File Prior to Visit   Medication Sig Dispense Refill    amLODIPine (NORVASC) 5 MG tablet Take 1 tablet (5 mg total) by mouth once daily. 90 tablet 3    meloxicam (MOBIC) 7.5 MG tablet Take 1 tablet (7.5 mg total) by mouth daily as needed for Pain. 30 tablet 0    multivit-calc-iron-FA-K-hb#244 -80 mg-mcg-mcg Tab Take by mouth.      omeprazole (PRILOSEC) 20 MG capsule TAKE 1 CAPSULE(20 MG) BY MOUTH EVERY DAY 30 capsule 5    oxyCODONE-acetaminophen (PERCOCET)  mg per tablet Take 1 tablet by mouth every 6 (six) hours as needed.      pravastatin (PRAVACHOL) 40 MG tablet Take 1 tablet (40 mg total) by mouth once daily. 90 tablet 3    prednisoLONE acetate (PRED FORTE) 1 % DrpS Place 1 drop into the right eye 4 (four) times daily. 5 mL 1    prednisoLONE acetate (PRED FORTE) 1 % DrpS Place 1 drop into the left eye every 4 (four) hours. 5 mL 1    prednisoLONE acetate (PRED FORTE) 1 % DrpS Place 1 drop into the left eye every 4 (four) hours. 5 mL 1    sildenafil (VIAGRA) 100 MG tablet Take 100 mg by mouth every other day.  6    triamcinolone acetonide 0.1% (KENALOG) 0.1 % cream Apply topically 2 (two) times daily. 15 g 0     No current facility-administered medications on file prior to visit.       Medications have been reviewed and  reconciled with patient at this visit.  Barriers to medications reviewed with patient.    Adverse reactions to current medications reviewed with patient..    Over the counter medications reviewed and reconciled with patient.    Exam:  Wt Readings from Last 3 Encounters:   08/30/24 60.9 kg (134 lb 4.2 oz)   07/16/24 62.3 kg (137 lb 5.6 oz)   07/12/24 61.7 kg (136 lb)     Temp Readings from Last 3 Encounters:   07/16/24 98.4 °F (36.9 °C) (Tympanic)   07/12/24 98.4 °F (36.9 °C) (Oral)   06/25/24 97.5 °F (36.4 °C) (Tympanic)     BP Readings from Last 3 Encounters:   08/30/24 114/62   07/16/24 138/78   07/12/24 123/83     Pulse Readings from Last 3 Encounters:   08/30/24 78   07/16/24 71   07/12/24 74     Body mass index is 21.03 kg/m².    Physical Exam  Nursing note reviewed.   HENT:      Head: Normocephalic and atraumatic.      Mouth/Throat:      Mouth: Mucous membranes are moist.   Cardiovascular:      Rate and Rhythm: Normal rate and regular rhythm.      Heart sounds: Normal heart sounds.   Pulmonary:      Effort: Pulmonary effort is normal. No respiratory distress.      Breath sounds: Normal breath sounds.   Abdominal:      General: Bowel sounds are normal.   Neurological:      Mental Status: He is alert and oriented to person, place, and time.   Psychiatric:         Mood and Affect: Mood normal.         Behavior: Behavior normal.         Thought Content: Thought content normal.         Judgment: Judgment normal.           Laboratory Reviewed ({Yes)  Lab Results   Component Value Date    WBC 6.95 02/19/2024    HGB 14.3 02/19/2024    HCT 44.0 02/19/2024     02/19/2024    CHOL 155 02/19/2024    TRIG 43 02/19/2024    HDL 61 02/19/2024    ALT 19 02/19/2024    AST 30 02/19/2024     02/19/2024    K 4.5 02/19/2024     02/19/2024    CREATININE 0.9 02/19/2024    BUN 10 02/19/2024    CO2 28 02/19/2024    TSH 1.153 02/19/2024    PSA 3.9 05/05/2022    INR 1.0 05/05/2016    GLUF 96 08/14/2023    HGBA1C 4.7  08/14/2023       Jaxson was seen today for pre-op exam.    Diagnoses and all orders for this visit:    Preop examination  -     X-Ray Chest PA And Lateral; Future  -     IN OFFICE EKG 12-LEAD (to Muse)  -     CBC Auto Differential; Future  -     COMPREHENSIVE METABOLIC PANEL; Future  -     APTT; Future  -     PROTIME-INR; Future    Benign prostatic hyperplasia, unspecified whether lower urinary tract symptoms present  -     X-Ray Chest PA And Lateral; Future  -     IN OFFICE EKG 12-LEAD (to Muse)  -     CBC Auto Differential; Future  -     COMPREHENSIVE METABOLIC PANEL; Future  -     APTT; Future  -     PROTIME-INR; Future    Elevated PSA  -     X-Ray Chest PA And Lateral; Future  -     IN OFFICE EKG 12-LEAD (to Muse)  -     CBC Auto Differential; Future  -     COMPREHENSIVE METABOLIC PANEL; Future  -     APTT; Future  -     PROTIME-INR; Future    Abnormal coagulation profile  -     APTT; Future  -     PROTIME-INR; Future    Hypertension, essential  -     X-Ray Chest PA And Lateral; Future  -     IN OFFICE EKG 12-LEAD (to Muse)  -     CBC Auto Differential; Future  -     COMPREHENSIVE METABOLIC PANEL; Future  -     APTT; Future  -     PROTIME-INR; Future          Assessment & Plan    PROSTATE BIOPSY:  - Conducted pre-operative evaluation for prostate biopsy scheduled with Dr. Mohit Peralta on September 24th.  - Explained the purpose of pre-operative tests: EKG to assess heart function, chest XR, and labs to ensure patient's safety during anesthesia.  -  - Follow up on September 24th as scheduled for prostate biopsy with Dr. Mohit Peralta.  PRE-OPERATIVE ASSESSMENT:  - Assessed cardiac risk prior to anesthesia administration.  - Reviewed patient's history of previous surgeries with no reported anesthesia complications.  - Noted absence of daily anticoagulants or aspirin use.  LABS:  - EKG ordered.  - Chest XR ordered.  - Blood work ordered (as required by Dr. Peralta' office).          The patient will be cleared  for upcoming surgical procedure pending labs and or imaging reports      Visit today included increased complexity associated with the care of the episodic problem Preop Clearance , which was addressed while instituting co-management of the longitudinal care of the patient due to the serious and/or complex managed problem(s) .    I have evaluated and discussed management associated with medical care services that serve as the continuing focal point for all needed health care services and/or with medical care services that are part of ongoing care related to my patient's single, serious condition or a complex condition(s).    I am providing ongoing care and I am the primary care provider for this patient, and they are being managed, monitored, and/or observed for their chronic conditions over time.     I have addressed their ongoing health maintenance requirements and needs for all health care services and reviewed co-management plans provided by specialty providers when available.    Health Maintenance Due   Topic Date Due    Shingles Vaccine (1 of 2) Never done    RSV Vaccine (Age 60+ and Pregnant patients) (1 - 1-dose 60+ series) Never done        Care Plan/Goals: Reviewed    Goals    None         Follow up: No follow-ups on file.    After visit summary was printed and given to patient upon discharge today.  Patient goals and care plan are included in After Visit Summary.

## 2024-10-01 ENCOUNTER — PATIENT MESSAGE (OUTPATIENT)
Dept: INTERNAL MEDICINE | Facility: CLINIC | Age: 64
End: 2024-10-01
Payer: MEDICARE

## 2024-10-04 ENCOUNTER — OFFICE VISIT (OUTPATIENT)
Dept: UROLOGY | Facility: CLINIC | Age: 64
End: 2024-10-04
Payer: MEDICARE

## 2024-10-04 ENCOUNTER — LAB VISIT (OUTPATIENT)
Dept: LAB | Facility: HOSPITAL | Age: 64
End: 2024-10-04
Attending: UROLOGY
Payer: MEDICARE

## 2024-10-04 VITALS
DIASTOLIC BLOOD PRESSURE: 79 MMHG | SYSTOLIC BLOOD PRESSURE: 120 MMHG | WEIGHT: 132.63 LBS | RESPIRATION RATE: 16 BRPM | HEIGHT: 67 IN | TEMPERATURE: 98 F | HEART RATE: 78 BPM | BODY MASS INDEX: 20.82 KG/M2

## 2024-10-04 DIAGNOSIS — C61 MALIGNANT NEOPLASM OF PROSTATE: ICD-10-CM

## 2024-10-04 DIAGNOSIS — C61 MALIGNANT NEOPLASM OF PROSTATE: Primary | ICD-10-CM

## 2024-10-04 LAB
BILIRUB UR QL STRIP: NEGATIVE
CREAT SERPL-MCNC: 0.9 MG/DL (ref 0.5–1.4)
EST. GFR  (NO RACE VARIABLE): >60 ML/MIN/1.73 M^2
GLUCOSE UR QL STRIP: NEGATIVE
KETONES UR QL STRIP: NEGATIVE
LEUKOCYTE ESTERASE UR QL STRIP: NEGATIVE
PH, POC UA: 5.5
POC BLOOD, URINE: POSITIVE
POC NITRATES, URINE: NEGATIVE
PROT UR QL STRIP: NEGATIVE
SP GR UR STRIP: 1.03 (ref 1–1.03)
UROBILINOGEN UR STRIP-ACNC: 0.2 (ref 0.3–2.2)

## 2024-10-04 PROCEDURE — 99999 PR PBB SHADOW E&M-EST. PATIENT-LVL IV: CPT | Mod: PBBFAC,HCNC,, | Performed by: UROLOGY

## 2024-10-04 PROCEDURE — 82565 ASSAY OF CREATININE: CPT | Mod: HCNC | Performed by: UROLOGY

## 2024-10-04 PROCEDURE — 36415 COLL VENOUS BLD VENIPUNCTURE: CPT | Mod: HCNC | Performed by: UROLOGY

## 2024-10-04 NOTE — PROGRESS NOTES
Chief Complaint:   Encounter Diagnosis   Name Primary?    Malignant neoplasm of prostate Yes       HPI:  HPI Jaxson Burch Jr. coco 64 y.o. male who presents For a 2nd opinion of prostate cancer.  He had MRI in June of 2023 that showed no evidence of disease.  His PSA obdulio to 4.9 and he underwent a prostate biopsy 2 weeks ago.  This showed multifocal 3+4 disease.  His prostate volume is only 23 g.  He does have baseline erectile dysfunction and uses Viagra.  He was able to have sex with PDE5 inhibitors.  This is very important to him.  He does smoke and drink.  He does have a remote history of ITP and was treated in the past.  His platelet count is now normal.  He does have hypertension hyperlipidemia.  He works part-time delivering auto parts.  He has no family history of prostate cancer.  He was minimal lower urinary tract symptoms.  I have reviewed all his prior chart including pathology laboratory workup and imaging reports.    History:  Social History     Tobacco Use    Smoking status: Every Day     Current packs/day: 0.50     Average packs/day: 0.5 packs/day for 35.7 years (17.9 ttl pk-yrs)     Types: Cigarettes     Start date: 1/10/1989     Passive exposure: Past    Smokeless tobacco: Never   Substance Use Topics    Alcohol use: Yes     Alcohol/week: 20.0 standard drinks of alcohol     Types: 10 Cans of beer, 10 Shots of liquor per week     Comment: socially    Drug use: No     Past Medical History:   Diagnosis Date    Annual physical exam 2/19/2024    Arthritis     Gastritis     Hypertension, essential 2/2/2021    ITP (idiopathic thrombocytopenic purpura)      Past Surgical History:   Procedure Laterality Date    CATARACT EXTRACTION      COLONOSCOPY N/A 11/29/2018    Procedure: COLONOSCOPY;  Surgeon: Brett Townsend MD;  Location: [facility];  Service: Endoscopy;  Laterality: N/A;    COLONOSCOPY N/A 12/21/2022    Procedure: COLONOSCOPY;  Surgeon: Marisol Carrillo MD;  Location: [facility];  Service: Endoscopy;  " Laterality: N/A;    FRACTURE SURGERY  02/04/2005    Hip replacement    JOINT REPLACEMENT Right     hip     Family History   Problem Relation Name Age of Onset    Pancreatic cancer Mother Eva Sanders     Cancer Mother Eva Sanders         Pancreatic cancer    Colon cancer Neg Hx         Current Outpatient Medications on File Prior to Visit   Medication Sig Dispense Refill    amLODIPine (NORVASC) 5 MG tablet Take 1 tablet (5 mg total) by mouth once daily. 90 tablet 3    meloxicam (MOBIC) 7.5 MG tablet Take 1 tablet (7.5 mg total) by mouth daily as needed for Pain. 30 tablet 0    multivit-calc-iron-FA-K-hb#244 -80 mg-mcg-mcg Tab Take by mouth.      omeprazole (PRILOSEC) 20 MG capsule TAKE 1 CAPSULE(20 MG) BY MOUTH EVERY DAY 30 capsule 5    oxyCODONE-acetaminophen (PERCOCET)  mg per tablet Take 1 tablet by mouth every 6 (six) hours as needed.      pravastatin (PRAVACHOL) 40 MG tablet Take 1 tablet (40 mg total) by mouth once daily. 90 tablet 3    prednisoLONE acetate (PRED FORTE) 1 % DrpS Place 1 drop into the right eye 4 (four) times daily. 5 mL 1    prednisoLONE acetate (PRED FORTE) 1 % DrpS Place 1 drop into the left eye every 4 (four) hours. 5 mL 1    prednisoLONE acetate (PRED FORTE) 1 % DrpS Place 1 drop into the left eye every 4 (four) hours. 5 mL 1    sildenafil (VIAGRA) 100 MG tablet Take 100 mg by mouth every other day.  6    triamcinolone acetonide 0.1% (KENALOG) 0.1 % cream Apply topically 2 (two) times daily. 15 g 0     No current facility-administered medications on file prior to visit.        Objective:     Vitals:    10/04/24 1011   BP: 120/79   BP Location: Left arm   Pulse: 78   Resp: 16   Temp: 98.1 °F (36.7 °C)   TempSrc: Temporal   Weight: 60.2 kg (132 lb 9.7 oz)   Height: 5' 7" (1.702 m)      BMI Readings from Last 1 Encounters:   10/04/24 20.77 kg/m²          Physical Exam  No acute distress alert and oriented   Respirations even unlabored   Abdomen is thin nontender  Lab " Results   Component Value Date    PSA 3.9 05/05/2022    PSA 3.1 02/07/2022    PSA 1.8 02/03/2021        Lab Results   Component Value Date    CREATININE 0.9 08/30/2024      Assessment:       1. Malignant neoplasm of prostate        Plan:     1. Malignant neoplasm of prostate       Orders Placed This Encounter    CT Abdomen Pelvis W Wo Contrast    NM Bone Scan Whole Body    Prostate Specific Antigen, Diagnostic    CREATININE, SERUM    POCT Urinalysis, Dipstick, Automated, W/O Scope      Prostate cancer favorable intermediate risk group.  Plan staging.  Discussed options including surveillance, radiation and surgery.  He does have a increased PSA density which is concerning for more significant disease.  We discussed at length with his daughter present pros and cons of various treatment options.  He wishes to go on short term surveillance.  We will repeat his PSA in 6 weeks.

## 2024-10-24 ENCOUNTER — TELEPHONE (OUTPATIENT)
Dept: OPHTHALMOLOGY | Facility: CLINIC | Age: 64
End: 2024-10-24
Payer: MEDICARE

## 2024-10-24 NOTE — TELEPHONE ENCOUNTER
Patient wanted us to look up place and call them and send his RX over. Place patient on hold to make sure it can be done and patient hung up.       ----- Message from Gisela sent at 10/24/2024  1:20 PM CDT -----  Contact: Jaxson  Jaxson would like a call back at  [phone] in regards to needing his eyeglass prescription sent over to [id number]. Patient states he's on his way over there now and they are waiting on it.  Thanks   Am

## 2024-10-25 ENCOUNTER — HOSPITAL ENCOUNTER (OUTPATIENT)
Dept: RADIOLOGY | Facility: HOSPITAL | Age: 64
Discharge: HOME | End: 2024-10-25
Attending: UROLOGY
Payer: MEDICARE

## 2024-10-25 ENCOUNTER — TELEPHONE (OUTPATIENT)
Dept: UROLOGY | Facility: CLINIC | Age: 64
End: 2024-10-25
Payer: MEDICARE

## 2024-10-25 ENCOUNTER — TELEPHONE (OUTPATIENT)
Dept: OPHTHALMOLOGY | Facility: CLINIC | Age: 64
End: 2024-10-25
Payer: MEDICARE

## 2024-10-25 DIAGNOSIS — C61 MALIGNANT NEOPLASM OF PROSTATE: ICD-10-CM

## 2024-10-25 PROCEDURE — 25500020 PHARM REV CODE 255: Mod: HCNC | Performed by: UROLOGY

## 2024-10-25 PROCEDURE — 74177 CT ABD & PELVIS W/CONTRAST: CPT | Mod: 26,HCNC,, | Performed by: RADIOLOGY

## 2024-10-25 PROCEDURE — 74177 CT ABD & PELVIS W/CONTRAST: CPT | Mod: TC,HCNC

## 2024-10-25 RX ADMIN — IOHEXOL 100 ML: 350 INJECTION, SOLUTION INTRAVENOUS at 02:10

## 2024-10-25 NOTE — TELEPHONE ENCOUNTER
I contacted patient back in regards to his concerns. Patient reports that he missed his appointment for his bone density test and he is upset. Patient was provided with updated times and dates of upcoming bone density test, lab appointment as well as follow up with urology MD. No further questions/concerns.     ----- Message from Alana sent at 10/25/2024  2:26 PM CDT -----  Contact: Jaxson Mills is calling in regards to to his Bone Density.please call back at .[phone]         Thanks  NATIVIDAD

## 2024-10-25 NOTE — TELEPHONE ENCOUNTER
Got patient schedule for Dec 6 at 9:30      ----- Message from Joanie sent at 10/25/2024  8:02 AM CDT -----    ----- Message -----  From: Siomara Soler  Sent: 10/25/2024   7:28 AM CDT  To: Toni ROGERS Staff    Type:  Needs Medical Advice    Who Called: pt  Symptoms (please be specific): na   How long has patient had these symptoms:  na  Pharmacy name and phone #:  na  Would the patient rather a call back or a response via [facility]? call  Best Call Back Number: [phone]    Additional Information: patient requesting to reschedule   SSKI Counseling:  I discussed with the patient the risks of SSKI including but not limited to thyroid abnormalities, metallic taste, GI upset, fever, headache, acne, arthralgias, paraesthesias, lymphadenopathy, easy bleeding, arrhythmias, and allergic reaction.

## 2024-11-11 DIAGNOSIS — I10 HYPERTENSION, ESSENTIAL: ICD-10-CM

## 2024-11-11 NOTE — TELEPHONE ENCOUNTER
----- Message from Yamil sent at 11/11/2024 11:43 AM CST -----  Contact: gopal  Type:  RX Refill Request    Who Called: gopal  Refill or New Rx: refill  RX Name and Strength: amLODIPine (NORVASC) 5 MG tablet   How is the patient currently taking it? (ex. 1XDay):  Is this a 30 day or 90 day RX:  Preferred Pharmacy with phone number:     [id number] - [place], [place] - [address] AT [facility] [address]  [address]  [place] [address]  Phone: [phone] Fax: [phone]  Local or Mail Order: local  Ordering Provider:  Would the patient rather a call back or a response via [facility]? call  Best Call Back Number: [phone]   Additional Information:

## 2024-11-11 NOTE — TELEPHONE ENCOUNTER
No care due was identified.  Health [facility] Embedded Care Due Messages. Reference number: [id number].   11/11/2024 1:29:39 PM CST

## 2024-11-12 RX ORDER — AMLODIPINE BESYLATE 5 MG/1
5 TABLET ORAL DAILY
Qty: 90 TABLET | Refills: 3 | Status: SHIPPED | OUTPATIENT
Start: 2024-11-12

## 2024-11-15 ENCOUNTER — HOSPITAL ENCOUNTER (OUTPATIENT)
Dept: RADIOLOGY | Facility: HOSPITAL | Age: 64
Discharge: HOME | End: 2024-11-15
Attending: UROLOGY
Payer: MEDICARE

## 2024-11-15 DIAGNOSIS — C61 MALIGNANT NEOPLASM OF PROSTATE: ICD-10-CM

## 2024-11-15 PROCEDURE — 78306 BONE IMAGING WHOLE BODY: CPT | Mod: TC,HCNC

## 2024-11-15 PROCEDURE — A9503 TC99M MEDRONATE: HCPCS | Mod: HCNC | Performed by: UROLOGY

## 2024-11-15 PROCEDURE — 78306 BONE IMAGING WHOLE BODY: CPT | Mod: 26,HCNC,, | Performed by: RADIOLOGY

## 2024-11-15 RX ADMIN — TECHNETIUM TC 99M MEDRONATE 25.1 MILLICURIE: 20 INJECTION, POWDER, LYOPHILIZED, FOR SOLUTION INTRAVENOUS at 08:11

## 2024-11-19 ENCOUNTER — OFFICE VISIT (OUTPATIENT)
Dept: UROLOGY | Facility: CLINIC | Age: 64
End: 2024-11-19
Payer: MEDICARE

## 2024-11-19 VITALS
RESPIRATION RATE: 16 BRPM | BODY MASS INDEX: 20.79 KG/M2 | WEIGHT: 132.69 LBS | DIASTOLIC BLOOD PRESSURE: 67 MMHG | HEART RATE: 78 BPM | SYSTOLIC BLOOD PRESSURE: 106 MMHG

## 2024-11-19 DIAGNOSIS — C61 MALIGNANT NEOPLASM OF PROSTATE: Primary | ICD-10-CM

## 2024-11-19 DIAGNOSIS — N52.03 COMBINED ARTERIAL INSUFFICIENCY AND CORPORO-VENOUS OCCLUSIVE ERECTILE DYSFUNCTION: ICD-10-CM

## 2024-11-19 PROCEDURE — 99999 PR PBB SHADOW E&M-EST. PATIENT-LVL III: CPT | Mod: PBBFAC,HCNC,, | Performed by: UROLOGY

## 2024-11-19 PROCEDURE — 1159F MED LIST DOCD IN RCRD: CPT | Mod: HCNC,CPTII,S$GLB, | Performed by: UROLOGY

## 2024-11-19 PROCEDURE — 99214 OFFICE O/P EST MOD 30 MIN: CPT | Mod: HCNC,S$GLB,, | Performed by: UROLOGY

## 2024-11-19 PROCEDURE — 3008F BODY MASS INDEX DOCD: CPT | Mod: HCNC,CPTII,S$GLB, | Performed by: UROLOGY

## 2024-11-19 PROCEDURE — 3074F SYST BP LT 130 MM HG: CPT | Mod: HCNC,CPTII,S$GLB, | Performed by: UROLOGY

## 2024-11-19 PROCEDURE — 3078F DIAST BP <80 MM HG: CPT | Mod: HCNC,CPTII,S$GLB, | Performed by: UROLOGY

## 2024-11-19 NOTE — PROGRESS NOTES
Chief Complaint:   Encounter Diagnoses   Name Primary?    Malignant neoplasm of prostate Yes    Combined arterial insufficiency and corporo-venous occlusive erectile dysfunction        HPI:  HPI Jaxson Burch Jr. coco 64 y.o. male who presents with follow up to favorable intermediate risk prostate cancer.  He is on a period of surveillance currently.  He was very focused on maintaining his erections.  He was like to continue on surveillance at this point.  His PSA did come down.  He asked many relevant questions today.  He was using Viagra for erectile dysfunction.    History:  Social History     Tobacco Use    Smoking status: Every Day     Current packs/day: 0.50     Average packs/day: 0.5 packs/day for 35.9 years (17.9 ttl pk-yrs)     Types: Cigarettes     Start date: 1/10/1989     Passive exposure: Past    Smokeless tobacco: Never   Substance Use Topics    Alcohol use: Yes     Alcohol/week: 20.0 standard drinks of alcohol     Types: 10 Cans of beer, 10 Shots of liquor per week     Comment: socially    Drug use: No     Past Medical History:   Diagnosis Date    Annual physical exam 2/19/2024    Arthritis     Gastritis     Hypertension, essential 2/2/2021    ITP (idiopathic thrombocytopenic purpura)      Past Surgical History:   Procedure Laterality Date    CATARACT EXTRACTION      COLONOSCOPY N/A 11/29/2018    Procedure: COLONOSCOPY;  Surgeon: Brett Townsend MD;  Location: [facility];  Service: Endoscopy;  Laterality: N/A;    COLONOSCOPY N/A 12/21/2022    Procedure: COLONOSCOPY;  Surgeon: Marisol Carrillo MD;  Location: [facility];  Service: Endoscopy;  Laterality: N/A;    FRACTURE SURGERY  02/04/2005    Hip replacement    JOINT REPLACEMENT Right     hip     Family History   Problem Relation Name Age of Onset    Pancreatic cancer Mother Eva Sanders     Cancer Mother Eva Sanders         Pancreatic cancer    Colon cancer Neg Hx         Current Outpatient Medications on File Prior to Visit   Medication Sig  Dispense Refill    amLODIPine (NORVASC) 5 MG tablet Take 1 tablet (5 mg total) by mouth once daily. 90 tablet 3    meloxicam (MOBIC) 7.5 MG tablet Take 1 tablet (7.5 mg total) by mouth daily as needed for Pain. 30 tablet 0    multivit-calc-iron-FA-K-hb#244 -80 mg-mcg-mcg Tab Take by mouth.      omeprazole (PRILOSEC) 20 MG capsule TAKE 1 CAPSULE(20 MG) BY MOUTH EVERY DAY 30 capsule 5    oxyCODONE-acetaminophen (PERCOCET)  mg per tablet Take 1 tablet by mouth every 6 (six) hours as needed.      pravastatin (PRAVACHOL) 40 MG tablet Take 1 tablet (40 mg total) by mouth once daily. 90 tablet 3    prednisoLONE acetate (PRED FORTE) 1 % DrpS Place 1 drop into the right eye 4 (four) times daily. 5 mL 1    prednisoLONE acetate (PRED FORTE) 1 % DrpS Place 1 drop into the left eye every 4 (four) hours. 5 mL 1    prednisoLONE acetate (PRED FORTE) 1 % DrpS Place 1 drop into the left eye every 4 (four) hours. 5 mL 1    sildenafil (VIAGRA) 100 MG tablet Take 100 mg by mouth every other day.  6    triamcinolone acetonide 0.1% (KENALOG) 0.1 % cream Apply topically 2 (two) times daily. 15 g 0     No current facility-administered medications on file prior to visit.        Objective:     Vitals:    11/19/24 1113   BP: 106/67   Patient Position: Sitting   Pulse: 78   Resp: 16   Weight: 60.2 kg (132 lb 11.5 oz)      BMI Readings from Last 1 Encounters:   11/19/24 20.79 kg/m²          Physical Exam    Lab Results   Component Value Date    PSADIAG 4.3 (H) 11/15/2024    PSA 3.9 05/05/2022    PSA 3.1 02/07/2022    PSA 1.8 02/03/2021        Lab Results   Component Value Date    CREATININE 0.9 10/04/2024      Assessment:       1. Malignant neoplasm of prostate    2. Combined arterial insufficiency and corporo-venous occlusive erectile dysfunction        Plan:     1. Malignant neoplasm of prostate    2. Combined arterial insufficiency and corporo-venous occlusive erectile dysfunction       Orders Placed This Encounter    Prostate  Specific Antigen, Diagnostic      Prostate cancer.  Continue surveillance protocol.  We will repeat PSA in 3 months.  Continue Viagra for erectile dysfunction.  We had an extensive discussion regarding treatment options for prostate cancer again today.  He does wish to proceed with continued surveillance.

## 2024-12-03 ENCOUNTER — TELEPHONE (OUTPATIENT)
Dept: UROLOGY | Facility: CLINIC | Age: 64
End: 2024-12-03
Payer: MEDICARE

## 2024-12-03 DIAGNOSIS — I70.90 ATHEROSCLEROSIS: ICD-10-CM

## 2024-12-03 RX ORDER — SILDENAFIL 100 MG/1
100 TABLET, FILM COATED ORAL EVERY OTHER DAY
Qty: 10 TABLET | Refills: 1 | Status: SHIPPED | OUTPATIENT
Start: 2024-12-03 | End: 2024-12-03 | Stop reason: SDUPTHER

## 2024-12-03 RX ORDER — SILDENAFIL 100 MG/1
100 TABLET, FILM COATED ORAL EVERY OTHER DAY
Refills: 6 | OUTPATIENT
Start: 2024-12-03

## 2024-12-03 RX ORDER — CYCLOBENZAPRINE HCL 5 MG
5 TABLET ORAL 3 TIMES DAILY PRN
Qty: 30 TABLET | Refills: 2 | Status: SHIPPED | OUTPATIENT
Start: 2024-12-03

## 2024-12-03 RX ORDER — SILDENAFIL 100 MG/1
100 TABLET, FILM COATED ORAL DAILY PRN
Qty: 30 TABLET | Refills: 3 | Status: SHIPPED | OUTPATIENT
Start: 2024-12-03

## 2024-12-03 RX ORDER — CYCLOBENZAPRINE HCL 5 MG
5 TABLET ORAL 3 TIMES DAILY PRN
COMMUNITY
End: 2024-12-03 | Stop reason: SDUPTHER

## 2024-12-03 NOTE — TELEPHONE ENCOUNTER
----- Message from Meirkostas sent at 12/3/2024  1:30 PM CST -----  Contact: [phone]  Type:  RX Refill Request    Who Called: SHILPA BHATT JR. [[id number]]  Refill or New Rx:REFILL  RX Name and Strength:CYCLOBENZAPRINE 5 MG  How is the patient currently taking it? (ex. 1XDay):  Is this a 30 day or 90 day RX:  Preferred Pharmacy with phone number:[phone] Fax: [phone]  Local or Mail Order:LOCAL  Ordering Provider:NATANAEL ROSSI  Would the patient rather a call back or a response via [facility]? CALL BACK  Best Call Back Number: [phone]  Additional Information: MRN [id number]          [facility] #[id number] - [place], [place] - [address] AT [facility] [address]  [address]  [place] [address]  Phone: [phone] Fax: [phone]

## 2024-12-03 NOTE — TELEPHONE ENCOUNTER
No care due was identified.  Health [facility] Embedded Care Due Messages. Reference number: [id number].   12/03/2024 3:18:05 PM CST

## 2024-12-03 NOTE — TELEPHONE ENCOUNTER
----- Message from Karunabrynkostas sent at 12/3/2024  1:28 PM CST -----  Contact: [phone]  Type:  RX Refill Request    Who Called: SHILPA BHATT JR. [[id number]]  Refill or New Rx:refill   RX Name and Strength:pravastatin (PRAVACHOL) 40 MG tablet  How is the patient currently taking it? (ex. 1XDay):  Is this a 30 day or 90 day RX:90 days supply  Preferred Pharmacy with phone number: [phone] Fax: [phone]    Local or Mail Order:local  Ordering Provider:AMARJIT  Would the patient rather a call back or a response via [facility]? Call back  Best Call Back Number: [phone]  Additional Information: [id number]              [facility] #[id number] - [place], [place] - [address] AT [facility] [address]  [address]  [place] [address]  Phone: [phone] Fax: [phone]

## 2024-12-03 NOTE — TELEPHONE ENCOUNTER
Returned pt call there was no answer       ----- Message from Dima sent at 12/3/2024  1:26 PM CST -----  Contact: [phone]  Type:  Patient Returning Call    Who Called:SHILPA BHATT JR. [[id number]]  Who Left Message for Patient:  Does the patient know what this is regarding?:Need his RN to call him... need to speak the nurse  Would the patient rather a call back or a response via [facility]? Call back  Best Call Back Number: [phone]  Additional Information: [id number]

## 2024-12-03 NOTE — TELEPHONE ENCOUNTER
Sent a message to .    ----- Message from Dima sent at 12/3/2024  1:24 PM CST -----  Contact: [phone]  Type:  RX Refill Request    Who Called: SHILPA BHATT JR. [[id number]]  Refill or New Rx:REFILL  RX Name and Strength:sildenafil (VIAGRA) 100 MG tablet  How is the patient currently taking it? (ex. 1XDay):  Is this a 30 day or 90 day RX:  Preferred Pharmacy with phone number:[phone] Fax: [phone]    Local or Mail Order:LOCAL  Ordering Provider:  Would the patient rather a call back or a response via [facility]? CALL BACK  Best Call Back Number: [phone]  Additional Information: MRN [id number]            [facility] #[id number] - [place], [place] - [address] AT [facility] [address]  [address]  [place] [address]  Phone: [phone] Fax: [phone]

## 2024-12-03 NOTE — TELEPHONE ENCOUNTER
No care due was identified.  [facility] Embedded Care Due Messages. Reference number: [id number].   12/03/2024 3:21:24 PM CST

## 2024-12-04 RX ORDER — PRAVASTATIN SODIUM 40 MG/1
40 TABLET ORAL DAILY
Qty: 90 TABLET | Refills: 3 | Status: SHIPPED | OUTPATIENT
Start: 2024-12-04

## 2024-12-05 ENCOUNTER — PATIENT MESSAGE (OUTPATIENT)
Dept: UROLOGY | Facility: CLINIC | Age: 64
End: 2024-12-05
Payer: MEDICARE

## 2024-12-06 ENCOUNTER — OFFICE VISIT (OUTPATIENT)
Dept: OPHTHALMOLOGY | Facility: CLINIC | Age: 64
End: 2024-12-06
Payer: MEDICARE

## 2024-12-06 DIAGNOSIS — H26.492 POSTERIOR CAPSULAR OPACIFICATION, LEFT EYE: Primary | ICD-10-CM

## 2024-12-06 PROCEDURE — 99999 PR PBB SHADOW E&M-EST. PATIENT-LVL II: CPT | Mod: PBBFAC,HCNC,, | Performed by: STUDENT IN AN ORGANIZED HEALTH CARE EDUCATION/TRAINING PROGRAM

## 2024-12-06 NOTE — PROGRESS NOTES
HPI     YAG OS     Additional comments: DFE OU. YAG OS. Pt states his vision has been hazy   since his last visit. No pain or discomfort. Pt states he's still using   his drops.              Comments    1. PCIOL OD 04/10/24  PCIOL OS 5/15/24  Fhx glaucoma (sister)          Last edited by Juaniot Mcleod on 12/6/2024  9:28 AM.            Assessment /Plan     For exam results, see Encounter Report.    Posterior capsular opacification, left eye- Yag Operative Procedure Note    64 y.o. year old patient experiencing a symptomatic decrease in vision OS with inability to perform activities of daily living including reading.      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 Including risks of retinal detachment (1-3%), macular edema, dislocated implant, pain, inflammation elevated intraocular pressure and vision loss. Consent signed.  Time out procedure form completed prior to laser.    Medications given:  Tetracaine    Laser energy settings:    4 energy per shot  14 bursts  1 to 1 ratio per shot     Central capsular opening created without difficulty.    RTC 4 weeks for DFE and Mrx

## 2024-12-09 ENCOUNTER — OFFICE VISIT (OUTPATIENT)
Dept: INTERNAL MEDICINE | Facility: CLINIC | Age: 64
End: 2024-12-09
Payer: MEDICARE

## 2024-12-09 VITALS
TEMPERATURE: 96 F | BODY MASS INDEX: 22.18 KG/M2 | SYSTOLIC BLOOD PRESSURE: 110 MMHG | HEIGHT: 67 IN | HEART RATE: 81 BPM | DIASTOLIC BLOOD PRESSURE: 68 MMHG | WEIGHT: 141.31 LBS | OXYGEN SATURATION: 98 %

## 2024-12-09 DIAGNOSIS — J01.00 ACUTE NON-RECURRENT MAXILLARY SINUSITIS: Primary | ICD-10-CM

## 2024-12-09 DIAGNOSIS — I10 HYPERTENSION, UNSPECIFIED TYPE: ICD-10-CM

## 2024-12-09 DIAGNOSIS — J31.0 RHINITIS, UNSPECIFIED TYPE: ICD-10-CM

## 2024-12-09 PROCEDURE — 3078F DIAST BP <80 MM HG: CPT | Mod: HCNC,CPTII,S$GLB,

## 2024-12-09 PROCEDURE — 96372 THER/PROPH/DIAG INJ SC/IM: CPT | Mod: HCNC,S$GLB,,

## 2024-12-09 PROCEDURE — 3008F BODY MASS INDEX DOCD: CPT | Mod: HCNC,CPTII,S$GLB,

## 2024-12-09 PROCEDURE — 99214 OFFICE O/P EST MOD 30 MIN: CPT | Mod: HCNC,25,S$GLB,

## 2024-12-09 PROCEDURE — 1159F MED LIST DOCD IN RCRD: CPT | Mod: HCNC,CPTII,S$GLB,

## 2024-12-09 PROCEDURE — 99999 PR PBB SHADOW E&M-EST. PATIENT-LVL IV: CPT | Mod: PBBFAC,HCNC,,

## 2024-12-09 PROCEDURE — 3074F SYST BP LT 130 MM HG: CPT | Mod: HCNC,CPTII,S$GLB,

## 2024-12-09 PROCEDURE — 1160F RVW MEDS BY RX/DR IN RCRD: CPT | Mod: HCNC,CPTII,S$GLB,

## 2024-12-09 RX ORDER — METHYLPREDNISOLONE ACETATE 40 MG/ML
40 INJECTION, SUSPENSION INTRA-ARTICULAR; INTRALESIONAL; INTRAMUSCULAR; SOFT TISSUE
Status: DISCONTINUED | OUTPATIENT
Start: 2024-12-09 | End: 2024-12-09

## 2024-12-09 RX ORDER — FLUTICASONE PROPIONATE 50 MCG
2 SPRAY, SUSPENSION (ML) NASAL DAILY
Qty: 16 G | Refills: 1 | Status: SHIPPED | OUTPATIENT
Start: 2024-12-09

## 2024-12-09 RX ORDER — METHYLPREDNISOLONE ACETATE 80 MG/ML
40 INJECTION, SUSPENSION INTRA-ARTICULAR; INTRALESIONAL; INTRAMUSCULAR; SOFT TISSUE
Status: COMPLETED | OUTPATIENT
Start: 2024-12-09 | End: 2024-12-09

## 2024-12-09 RX ADMIN — METHYLPREDNISOLONE ACETATE 40 MG: 80 INJECTION, SUSPENSION INTRA-ARTICULAR; INTRALESIONAL; INTRAMUSCULAR; SOFT TISSUE at 10:12

## 2024-12-09 NOTE — PATIENT INSTRUCTIONS
1. Drink plenty of fluids  2. Get plenty of rest.  3. Take Tylenol as directed on package for pain/fever. Do not take more than package suggests to take.   4. Go to Emergency Department if your symptoms worsen.  5. Follow up with your PCP in 1 week if symptoms persist.

## 2024-12-09 NOTE — PROGRESS NOTES
Jaxson Burch .  64 y.o. Black or  male  [id number]    Salomón Lantigua MD  Patient Care Team:  Salomón Lantigua MD as PCP - General (Family Medicine)  Margaux Martinez LPN as Care Coordinator (Internal Medicine)    Chief Complaint:   Chief Complaint   Patient presents with    Sinus Problem       History of Present Illness:  Pt is here for sinus problem and is new to me. He reports having congestion, sinus pressure,     SINUS ISSUES:  He reports persistent sinus problems for 6-7 weeks with worsening symptoms. Symptoms include a clear runny nose, pressure and occasional popping sounds in the ear, congestion, and a cough. He denies throat discomfort. He has been taking Claritin and Sudafed without significant improvement. He has a 15-20 year history of sinus problems. He notes seeing ENT in the past but not recently. Denies fever, CP, or SOB.      The following were reviewed: active problem list, medication list, allergies, family history, social history, and Health Maintenance.     History:  Past Medical History:   Diagnosis Date    Annual physical exam 2/19/2024    Arthritis     Gastritis     Hypertension, essential 2/2/2021    ITP (idiopathic thrombocytopenic purpura)      Past Surgical History:   Procedure Laterality Date    CATARACT EXTRACTION      COLONOSCOPY N/A 11/29/2018    Procedure: COLONOSCOPY;  Surgeon: Brett Townsend MD;  Location: [facility] ENDO;  Service: Endoscopy;  Laterality: N/A;    COLONOSCOPY N/A 12/21/2022    Procedure: COLONOSCOPY;  Surgeon: Marisol Carrillo MD;  Location: [facility];  Service: Endoscopy;  Laterality: N/A;    FRACTURE SURGERY  02/04/2005    Hip replacement    JOINT REPLACEMENT Right     hip     Family History   Problem Relation Name Age of Onset    Pancreatic cancer Mother Eva Sanders     Cancer Mother Eva Sanders         Pancreatic cancer    Colon cancer Neg Hx       Social History     Socioeconomic History    Marital status:    Tobacco  Use    Smoking status: Every Day     Current packs/day: 0.50     Average packs/day: 0.5 packs/day for 35.9 years (18.0 ttl pk-yrs)     Types: Cigarettes     Start date: 1/10/1989     Passive exposure: Past    Smokeless tobacco: Never   Substance and Sexual Activity    Alcohol use: Yes     Alcohol/week: 20.0 standard drinks of alcohol     Types: 10 Cans of beer, 10 Shots of liquor per week     Comment: socially    Drug use: No    Sexual activity: Yes     Partners: Female     Birth control/protection: None     Social Drivers of Health     Food Insecurity: Patient Declined (9/19/2024)    Received from [facility]    Hunger Vital Sign     Worried About Running Out of Food in the Last Year: Patient declined     Ran Out of Food in the Last Year: Patient declined   Transportation Needs: Patient Declined (9/19/2024)    Received from [facility]    PRAPARE - Transportation     Lack of Transportation (Medical): Patient declined     Lack of Transportation (Non-Medical): Patient declined   Stress: No Stress Concern Present (10/6/2020)    [employer] - Occupational Stress Questionnaire     Feeling of Stress : Not at all   Housing Stability: Patient Declined (9/19/2024)    [facility]    Housing Stability Vital Sign     Unable to Pay for Housing in the Last Year: Patient declined     Homeless in the Last Year: Patient declined       Review of patient's allergies indicates:  No Known Allergies    Medications:  Current Outpatient Medications on File Prior to Visit   Medication Sig Dispense Refill    amLODIPine (NORVASC) 5 MG tablet Take 1 tablet (5 mg total) by mouth once daily. 90 tablet 3    cyclobenzaprine (FLEXERIL) 5 MG tablet Take 1 tablet (5 mg total) by mouth 3  (three) times daily as needed for Muscle spasms. Take 5 mg by mouth 3 (three) times daily as needed for Muscle spasms. 30 tablet 2    meloxicam (MOBIC) 7.5 MG tablet Take 1 tablet (7.5 mg total) by mouth daily as needed for Pain. 30 tablet 0    multivit-calc-iron-FA-K-hb#244 -80 mg-mcg-mcg Tab Take by mouth.      omeprazole (PRILOSEC) 20 MG capsule TAKE 1 CAPSULE(20 MG) BY MOUTH EVERY DAY 30 capsule 5    oxyCODONE-acetaminophen (PERCOCET)  mg per tablet Take 1 tablet by mouth every 6 (six) hours as needed.      pravastatin (PRAVACHOL) 40 MG tablet Take 1 tablet (40 mg total) by mouth once daily. 90 tablet 3    prednisoLONE acetate (PRED FORTE) 1 % DrpS Place 1 drop into the right eye 4 (four) times daily. 5 mL 1    prednisoLONE acetate (PRED FORTE) 1 % DrpS Place 1 drop into the left eye every 4 (four) hours. 5 mL 1    prednisoLONE acetate (PRED FORTE) 1 % DrpS Place 1 drop into the left eye every 4 (four) hours. 5 mL 1    sildenafiL (VIAGRA) 100 MG tablet Take 1 tablet (100 mg total) by mouth daily as needed for Erectile Dysfunction. 30 tablet 3    triamcinolone acetonide 0.1% (KENALOG) 0.1 % cream Apply topically 2 (two) times daily. 15 g 0     No current facility-administered medications on file prior to visit.       Medications have been reviewed and reconciled with patient at visit today.    Laboratory Reviewed: (Yes)  Lab Results   Component Value Date    WBC 6.60 08/30/2024    HGB 13.9 (L) 08/30/2024    HCT 45.1 08/30/2024     (H) 08/30/2024    CHOL 155 02/19/2024    TRIG 43 02/19/2024    HDL 61 02/19/2024    ALT 33 08/30/2024    AST 42 (H) 08/30/2024     08/30/2024    K 5.1 08/30/2024     08/30/2024    CREATININE 0.9 10/04/2024    BUN 13 08/30/2024    CO2 27 08/30/2024    TSH 1.153 02/19/2024    PSA 3.9 05/05/2022    INR 1.0 08/30/2024    HGBA1C 4.7 08/14/2023       ROS:  Review of Systems   Constitutional:  Negative for chills and fever.   HENT:  Positive for congestion, ear  pain and sinus pain. Negative for sore throat.    Respiratory:  Positive for cough. Negative for shortness of breath.    Cardiovascular:  Negative for chest pain, palpitations and leg swelling.   Gastrointestinal:  Negative for abdominal pain, constipation, diarrhea and vomiting.   Skin:  Negative for rash.   Neurological:  Negative for dizziness, weakness and headaches.       Exam:  Vitals:    12/09/24 0921   BP: 110/68   Pulse: 81   Temp: 96.3 °F (35.7 °C)     Weight: 64.1 kg (141 lb 5 oz)   Body mass index is 22.13 kg/m².    Physical Exam  Constitutional:       General: He is awake. He is not in acute distress.  HENT:      Right Ear: A middle ear effusion (clear) is present. Tympanic membrane is not perforated, erythematous, retracted or bulging.      Left Ear: A middle ear effusion (clear) is present. Tympanic membrane is not perforated, erythematous, retracted or bulging.      Nose: Congestion present. No nasal tenderness.      Right Turbinates: Swollen.      Left Turbinates: Swollen.      Right Sinus: Maxillary sinus tenderness present. No frontal sinus tenderness.      Left Sinus: Maxillary sinus tenderness present. No frontal sinus tenderness.      Mouth/Throat:      Pharynx: Postnasal drip (clear) present. No pharyngeal swelling, oropharyngeal exudate, posterior oropharyngeal erythema or uvula swelling.   Eyes:      Conjunctiva/sclera: Conjunctivae normal.   Cardiovascular:      Rate and Rhythm: Normal rate and regular rhythm.      Heart sounds: Normal heart sounds. No murmur heard.  Pulmonary:      Effort: Pulmonary effort is normal. No respiratory distress.      Breath sounds: Normal breath sounds.   Musculoskeletal:         General: No swelling or deformity.   Skin:     General: Skin is warm and dry.   Neurological:      General: No focal deficit present.      Mental Status: He is alert.   Psychiatric:         Mood and Affect: Mood normal.         Behavior: Behavior normal.           Assessment:  The  primary encounter diagnosis was Acute non-recurrent maxillary sinusitis. Diagnoses of Rhinitis, unspecified type and Hypertension, unspecified type were also pertinent to this visit.    Plan:  Assessment & Plan    IMPRESSION:  - Patient presenting with sinus symptoms persisting for 6-7 weeks, possibly worsening  - Will administer steroid injection due to prolonged symptoms  - Considered potential allergy component contributing to symptoms    1. Acute non-recurrent maxillary sinusitis  -     methylPREDNISolone acetate injection 40 mg  -     fluticasone propionate (FLONASE) 50 mcg/actuation nasal spray; 2 sprays (100 mcg total) by Each Nostril route once daily.  Dispense: 16 g; Refill: 1  - Patient desires steroid injection over PO steroids. Explained risks of steroid injection, including potential blood pressure elevation and temporary blood sugar increase.  - Discussed importance of consistent use of nasal spray for 3-4 days before noticing effects.  - Educated on proper hydration when using decongestants to prevent mucus from hardening.  - Started Flonase (nasal spray): 2 sprays in each nostril daily.  - Continued Claritin.  - Administered steroid injection in office.    2. Rhinitis, unspecified type    3. Hypertension, unspecified type  - Controlled today in office. Continue amlodipine 5 mg. Discontinue Sudafed use.    Care plan/goals: reviewed    - Follow up on January 21st at 7:00 a.m. as scheduled for checkup.  - Contact the office if symptoms persist or worsen.     Follow up: Follow up if symptoms worsen or fail to improve.

## 2024-12-12 ENCOUNTER — PATIENT MESSAGE (OUTPATIENT)
Dept: INTERNAL MEDICINE | Facility: CLINIC | Age: 64
End: 2024-12-12
Payer: MEDICARE

## 2025-01-08 ENCOUNTER — TELEPHONE (OUTPATIENT)
Dept: GASTROENTEROLOGY | Facility: CLINIC | Age: 65
End: 2025-01-08
Payer: MEDICARE

## 2025-01-08 NOTE — TELEPHONE ENCOUNTER
Spoke with patient on [phone] in regard to his inquiry of the next colonoscopy. Patient states that was his only true concern. Patient has been advised the next colonoscopy is not due until December of 2026. Patient voices understanding and will reach out to the Endoscopy team to schedule when it closer to the time to schedule it.    ----- Message from Jammin Java sent at 1/8/2025  3:08 PM CST -----  Contact: Jaxson Puri is calling in regards to the date of his colonoscopy and other concerns. Please call him at [phone].

## 2025-01-10 ENCOUNTER — OFFICE VISIT (OUTPATIENT)
Dept: OPHTHALMOLOGY | Facility: CLINIC | Age: 65
End: 2025-01-10
Payer: MEDICARE

## 2025-01-10 DIAGNOSIS — H02.132 SENILE ECTROPION OF BOTH LOWER EYELIDS: ICD-10-CM

## 2025-01-10 DIAGNOSIS — Z96.1 PSEUDOPHAKIA OF BOTH EYES: Primary | ICD-10-CM

## 2025-01-10 DIAGNOSIS — H02.135 SENILE ECTROPION OF BOTH LOWER EYELIDS: ICD-10-CM

## 2025-01-10 DIAGNOSIS — H16.223 KERATITIS SICCA, BILATERAL: ICD-10-CM

## 2025-01-10 PROCEDURE — 99999 PR PBB SHADOW E&M-EST. PATIENT-LVL III: CPT | Mod: PBBFAC,HCNC,, | Performed by: STUDENT IN AN ORGANIZED HEALTH CARE EDUCATION/TRAINING PROGRAM

## 2025-01-10 RX ORDER — CYCLOSPORINE 0.5 MG/ML
1 EMULSION OPHTHALMIC 2 TIMES DAILY
Qty: 60 EACH | Refills: 3 | Status: SHIPPED | OUTPATIENT
Start: 2025-01-10 | End: 2026-01-10

## 2025-01-10 NOTE — PROGRESS NOTES
HPI     Eye Exam     Additional comments: Pt reports for 4 weeks DFE and Mrx. Pt states his   vision has been hazy   since his last visit. Pt states he's still using his drops. The patient   states his left eye is hazy with oncoming traffic and his eyes are   uncomfortable when waking up.              Comments    1. PCIOL OD 04/10/24  PCIOL OS 5/15/24  Fhx glaucoma (sister)          Last edited by Jeimy Muñoz MD on 1/10/2025  8:42 AM.            Assessment /Plan     For exam results, see Encounter Report.    Pseudophakia of both eyes- Stable   VA 20/20 OU, IOP WNL   Discussed importance of dilation after YAG laser and patient defers, discussed S/S of RT/RD    Keratitis Sicca, bilateral-   Ectropion, bilateral, lower lid- likely worsening dry eye symptoms but still mild ectropion, will follow  ATs QID and lid hygiene w/ baby shampoo  Start Restasis BID OU      Patient requests second opinion will refer patient to , referral sent today

## 2025-01-24 ENCOUNTER — PATIENT MESSAGE (OUTPATIENT)
Dept: INTERNAL MEDICINE | Facility: CLINIC | Age: 65
End: 2025-01-24

## 2025-01-24 ENCOUNTER — OFFICE VISIT (OUTPATIENT)
Dept: INTERNAL MEDICINE | Facility: CLINIC | Age: 65
End: 2025-01-24
Payer: MEDICARE

## 2025-01-24 ENCOUNTER — HOSPITAL ENCOUNTER (OUTPATIENT)
Dept: RADIOLOGY | Facility: HOSPITAL | Age: 65
Discharge: HOME | End: 2025-01-24
Attending: FAMILY MEDICINE
Payer: MEDICARE

## 2025-01-24 VITALS
TEMPERATURE: 97 F | DIASTOLIC BLOOD PRESSURE: 70 MMHG | BODY MASS INDEX: 22.63 KG/M2 | OXYGEN SATURATION: 98 % | WEIGHT: 144.19 LBS | SYSTOLIC BLOOD PRESSURE: 120 MMHG | HEART RATE: 98 BPM | HEIGHT: 67 IN

## 2025-01-24 DIAGNOSIS — I10 PRIMARY HYPERTENSION: ICD-10-CM

## 2025-01-24 DIAGNOSIS — F17.210 CIGARETTE NICOTINE DEPENDENCE WITHOUT COMPLICATION: ICD-10-CM

## 2025-01-24 DIAGNOSIS — E78.5 HYPERLIPIDEMIA, UNSPECIFIED HYPERLIPIDEMIA TYPE: ICD-10-CM

## 2025-01-24 DIAGNOSIS — Z00.00 ANNUAL PHYSICAL EXAM: Primary | ICD-10-CM

## 2025-01-24 DIAGNOSIS — Z28.39 IMMUNIZATION DEFICIENCY: ICD-10-CM

## 2025-01-24 DIAGNOSIS — D69.3 CHRONIC ITP (IDIOPATHIC THROMBOCYTOPENIA): ICD-10-CM

## 2025-01-24 DIAGNOSIS — C61 CANCER OF PROSTATE: ICD-10-CM

## 2025-01-24 PROCEDURE — 71271 CT THORAX LUNG CANCER SCR C-: CPT | Mod: 26,HCNC,, | Performed by: RADIOLOGY

## 2025-01-24 PROCEDURE — 3074F SYST BP LT 130 MM HG: CPT | Mod: HCNC,CPTII,S$GLB, | Performed by: FAMILY MEDICINE

## 2025-01-24 PROCEDURE — 99999 PR PBB SHADOW E&M-EST. PATIENT-LVL IV: CPT | Mod: PBBFAC,HCNC,, | Performed by: FAMILY MEDICINE

## 2025-01-24 PROCEDURE — 71271 CT THORAX LUNG CANCER SCR C-: CPT | Mod: TC,HCNC

## 2025-01-24 PROCEDURE — 3078F DIAST BP <80 MM HG: CPT | Mod: HCNC,CPTII,S$GLB, | Performed by: FAMILY MEDICINE

## 2025-01-24 PROCEDURE — 3008F BODY MASS INDEX DOCD: CPT | Mod: HCNC,CPTII,S$GLB, | Performed by: FAMILY MEDICINE

## 2025-01-24 PROCEDURE — 99214 OFFICE O/P EST MOD 30 MIN: CPT | Mod: HCNC,S$GLB,, | Performed by: FAMILY MEDICINE

## 2025-01-24 PROCEDURE — 1159F MED LIST DOCD IN RCRD: CPT | Mod: HCNC,CPTII,S$GLB, | Performed by: FAMILY MEDICINE

## 2025-01-24 NOTE — PROGRESS NOTES
Patient ID: Jaxson Burch Jr. is a 64 y.o. male.    Chief Complaint: Follow-up    History of Present Illness    Mr. Burch presents today for follow up of annual exam hypertension hyperlipidemia idiopathic thrombocytopenia nicotine dependence    He follows with urologist every 3 months for prostate cancer management. PSA was 4.3 on . He has history of positive prostate biopsy and takes herbal supplements for prostate health. He reports good urine flow but experiences nocturia.    He currently smokes but is attempting to reduce consumption. He has a family history of emphysema, with an uncle currently affected and the uncle's twin  from the condition. He expresses motivation to quit smoking due to this family history.    He reports new onset increased nasal congestion requiring more frequent nose blowing.      ROS:  General: -fever, -chills, -fatigue, -weight gain, -weight loss  Eyes: -vision changes, -redness, -discharge  ENT: -ear pain, +nasal congestion, -sore throat  Cardiovascular: -chest pain, -palpitations, -lower extremity edema  Respiratory: -cough, -shortness of breath  Gastrointestinal: -abdominal pain, -nausea, -vomiting, -diarrhea, -constipation, -blood in stool  Genitourinary: -dysuria, -hematuria, -frequency, +nocturia  Musculoskeletal: -joint pain, -muscle pain  Skin: -rash, -lesion  Neurological: -headache, -dizziness, -numbness, -tingling         Physical Exam    General: In no acute distress. Not ill-appearing or diaphoretic.  Neck: Normal thyroid. No cervical lymphadenopathy. 2+ carotid pulses.  Cardiovascular: Normal rate and regular  rhythm. No murmur heard. No gallop.  Pulmonary: Pulmonary effort is normal. No respiratory distress. No wheezing. No rhonchi. No rales.  Abdominal: Soft. Nontender. Nondistended. No mass.  Male Genitourinary: Normal external genitalia. Testes nontender. No testicular masses.  Musculoskeletal: No swelling. No tenderness. No  deformity.  Neurological: Alert.  Psychiatric: Mood normal. Behavior normal.         Assessment & Plan    MALIGNANT NEOPLASM OF PROSTATE:  - Reviewed patient's prostate cancer history and monitored PSA count.  - Recent test on November 15th showed a level of 4.3, down from 4.9.  - Ordered a new PSA test to evaluate current status.  - Mr. Burch is taking herbal supplements for prostate health but not following recommended fasting protocol.  - Acknowledged patient's request for more frequent follow-ups; scheduled appointment with urologist in 6 weeks.    CHRONIC ITP (IDIOPATHIC THROMBOCYTOPENIA):  - Ordered a CBC (Complete Blood Count), which includes platelet count, as requested by the patient.    PROSTATE CANCER:  - Scheduled the patient to see urologist every 3 months for PSA monitoring.  - Noted the patient's previous biopsy was positive for prostate cancer.    LUNG CANCER SCREENING:  - Ordered CT chest (lung cancer screening) for February 20th, a full year after the previous scan.  - Encountered insurance coverage issues; advised the patient to contact their insurance company regarding coverage.  - Assessed need for screening based on smoking history.    SMOKING CESSATION:  - Confirmed the patient's continued smoking but noted reduced frequency.  - Mr. Burch acknowledged family history of smoking-related health issues and will continue efforts to quit smoking.    RSV VACCINATION:  - Administered RSV vaccine in office after explaining it as a virus similar to flu.  - Discussed potential arm soreness as a side effect.    PNEUMONIA VACCINATION:  - Recommend pneumonia vaccination due to continued smoking, to be administered at a later date.              No follow-ups on file.    This note was generated with the assistance of ambient listening technology. Verbal consent was obtained by the patient and accompanying visitor(s) for the recording of patient appointment to facilitate this note. I attest to having reviewed  and edited the generated note for accuracy, though some syntax or spelling errors may persist. Please contact the author of this note for any clarification.

## 2025-01-30 DIAGNOSIS — Z00.00 ENCOUNTER FOR MEDICARE ANNUAL WELLNESS EXAM: ICD-10-CM

## 2025-02-17 ENCOUNTER — TELEPHONE (OUTPATIENT)
Dept: OPHTHALMOLOGY | Facility: CLINIC | Age: 65
End: 2025-02-17
Payer: MEDICARE

## 2025-02-17 NOTE — TELEPHONE ENCOUNTER
Spoke with patient, he is requesting PAL glasses clear at the top and reading for the bottom portion, patient is aware of his appointment tomorrow for a second opinon per his request but he would like glasses Rx, MRx printed and faxed to [facility]

## 2025-02-17 NOTE — TELEPHONE ENCOUNTER
Called pt & let him know Dr. Muñoz wasn't here to finalize his prescription, stated that I will let him know about it Tuesday.     ----- Message from Domi sent at 2/17/2025  8:10 AM CST -----  Regarding: medical advice  Contact: Jaxson  Type:  Needs Medical AdviceWho Called: Jaxson Symptoms (please be specific):  How long has patient had these symptoms:  Pharmacy name and phone #:  Would the patient rather a call back or a response via [facility]? [facility] Call Back Number: [phone] (home)  Additional Information:  Jaxson is requesting his prescription for glasses be emailed to this office for him. Please email a copy to him and the provider: Provider email: [email or web address] Patient email: [email or web address]

## 2025-02-17 NOTE — TELEPHONE ENCOUNTER
----- Message from Jeimy Muñoz MD sent at 2/17/2025  9:57 AM CST -----  Contact: [phone]    ----- Message -----  From: Yulissa Martini  Sent: 2/17/2025   9:52 AM CST  To: Jeimy ROGERS. MD Toni      ----- Message -----  From: Angela Santos  Sent: 2/17/2025   9:34 AM CST  To: Toni ROGERS Staff    .[email or web address] Patient is calling for Medical Advice regarding:speak to the nurse How long has patient had these symptoms:Pharmacy name and phone#:Patient wants a call back or [facility]:call back Comments:He states this is regarding needing prescription strength for up close please advise he states he wants to get his glasses today +250He states you can email him or send it to the location you all have the info for Please advise patient replies from provider may take up to 48 hours.

## 2025-02-18 ENCOUNTER — OFFICE VISIT (OUTPATIENT)
Dept: OPHTHALMOLOGY | Facility: CLINIC | Age: 65
End: 2025-02-18
Payer: MEDICARE

## 2025-02-18 DIAGNOSIS — H16.223 KERATITIS SICCA, BILATERAL: ICD-10-CM

## 2025-02-18 DIAGNOSIS — H02.135 SENILE ECTROPION OF BOTH LOWER EYELIDS: ICD-10-CM

## 2025-02-18 DIAGNOSIS — H02.132 SENILE ECTROPION OF BOTH LOWER EYELIDS: ICD-10-CM

## 2025-02-18 NOTE — PROGRESS NOTES
HPI    Here for 2nd opinion.  Has hazy vision that clears on blinking OS.  C/o   frequent tearing OS.   Restasis prn (whenever I need it).  Not using tears or lid hygiene.  Says   he was not informed about these.     Had YAG laser OS 12/06/24.    Pt. Requests Rx for reading glasses +2.50.  He has a frame he wants to use   to get made into reading glasses.  Last edited by Keli Haley MA on 2/18/2025  8:24 AM.            Assessment /Plan     For exam results, see Encounter Report.    Keratitis sicca, bilateral  Patient with symptoms and exam consistent with dry eye. Start artificial tears 3-4 times daily with warm compresses. Instructed to continue Restasis BID OU.      Return to clinic as needed

## 2025-02-21 ENCOUNTER — LAB VISIT (OUTPATIENT)
Dept: LAB | Facility: HOSPITAL | Age: 65
End: 2025-02-21
Attending: UROLOGY
Payer: MEDICARE

## 2025-02-21 DIAGNOSIS — C61 MALIGNANT NEOPLASM OF PROSTATE: ICD-10-CM

## 2025-02-21 LAB — COMPLEXED PSA SERPL-MCNC: 3.9 NG/ML (ref 0–4)

## 2025-02-21 PROCEDURE — 84153 ASSAY OF PSA TOTAL: CPT | Mod: HCNC | Performed by: UROLOGY

## 2025-02-21 PROCEDURE — 36415 COLL VENOUS BLD VENIPUNCTURE: CPT | Mod: HCNC | Performed by: UROLOGY

## 2025-02-25 ENCOUNTER — OFFICE VISIT (OUTPATIENT)
Dept: UROLOGY | Facility: CLINIC | Age: 65
End: 2025-02-25
Payer: MEDICARE

## 2025-02-25 VITALS
RESPIRATION RATE: 16 BRPM | HEART RATE: 80 BPM | DIASTOLIC BLOOD PRESSURE: 78 MMHG | WEIGHT: 144.19 LBS | SYSTOLIC BLOOD PRESSURE: 119 MMHG | BODY MASS INDEX: 22.58 KG/M2

## 2025-02-25 DIAGNOSIS — C61 MALIGNANT NEOPLASM OF PROSTATE: Primary | ICD-10-CM

## 2025-02-25 PROCEDURE — 99999 PR PBB SHADOW E&M-EST. PATIENT-LVL IV: CPT | Mod: PBBFAC,HCNC,, | Performed by: UROLOGY

## 2025-02-25 PROCEDURE — 99214 OFFICE O/P EST MOD 30 MIN: CPT | Mod: HCNC,S$GLB,, | Performed by: UROLOGY

## 2025-02-25 PROCEDURE — 3078F DIAST BP <80 MM HG: CPT | Mod: HCNC,CPTII,S$GLB, | Performed by: UROLOGY

## 2025-02-25 PROCEDURE — 3074F SYST BP LT 130 MM HG: CPT | Mod: HCNC,CPTII,S$GLB, | Performed by: UROLOGY

## 2025-02-25 PROCEDURE — 3008F BODY MASS INDEX DOCD: CPT | Mod: HCNC,CPTII,S$GLB, | Performed by: UROLOGY

## 2025-02-25 PROCEDURE — 1159F MED LIST DOCD IN RCRD: CPT | Mod: HCNC,CPTII,S$GLB, | Performed by: UROLOGY

## 2025-02-25 NOTE — PROGRESS NOTES
Chief Complaint:   Encounter Diagnosis   Name Primary?    Malignant neoplasm of prostate Yes       HPI:  HPI Jaxson Burch Jr. coco 64 y.o. male who presents with follow up to prostate cancer.  He was diagnosed with [place] 3 + 4 prostate cancer by another provider.  He elected to undergo surveillance after transferring his care to me.  He recently had PSAs checked which were stable.  He is here with his son today once again discuss treatment options.  He is considering radiation at some point.  He is interested in preserving sexual function.    History:  Social History[1]  Past Medical History:   Diagnosis Date    Annual physical exam 2/19/2024    Arthritis     Gastritis     Hypertension, essential 2/2/2021    ITP (idiopathic thrombocytopenic purpura)      Past Surgical History:   Procedure Laterality Date    CATARACT EXTRACTION      COLONOSCOPY N/A 11/29/2018    Procedure: COLONOSCOPY;  Surgeon: Brett Townsend MD;  Location: [facility] ENDO;  Service: Endoscopy;  Laterality: N/A;    COLONOSCOPY N/A 12/21/2022    Procedure: COLONOSCOPY;  Surgeon: Marisol Carrillo MD;  Location: [facility] ENDO;  Service: Endoscopy;  Laterality: N/A;    FRACTURE SURGERY  02/04/2005    Hip replacement    JOINT REPLACEMENT Right     hip     Family History   Problem Relation Name Age of Onset    Pancreatic cancer Mother Eva Sanders     Cancer Mother Eva Sanders         Pancreatic cancer    Colon cancer Neg Hx         Medications Ordered Prior to Encounter[2]     Objective:     Vitals:    02/25/25 1128   BP: 119/78   Patient Position: Sitting   Pulse: 80   Resp: 16   Weight: 65.4 kg (144 lb 2.9 oz)      BMI Readings from Last 1 Encounters:   02/25/25 22.58 kg/m²          Physical Exam  No acute distress alert and oriented   Respirations even unlabored   Abdomen is soft nontender  Lab Results   Component Value Date    PSADIAG 3.9 02/21/2025    PSADIAG 4.3 (H) 11/15/2024    PSAFREEPCT 10.00 01/24/2025    PSA 3.9 05/05/2022    PSA 3.1  02/07/2022    PSA 1.8 02/03/2021        Lab Results   Component Value Date    CREATININE 0.9 01/24/2025      Assessment:       1. Malignant neoplasm of prostate        Plan:     1. Malignant neoplasm of prostate       Orders Placed This Encounter    Prostate Specific Antigen, Diagnostic    Ambulatory referral/consult to Radiation Oncology      Prostate cancer on surveillance.  Have extensive discussion today with him and his son regarding treatment options once again.  They elect to undergo continued surveillance.  We will repeat PSA in a few months.  They do wished to sit down with Radiation Oncology for further discussion.       [1]   Social History  Tobacco Use    Smoking status: Every Day     Current packs/day: 0.50     Average packs/day: 0.5 packs/day for 36.1 years (18.1 ttl pk-yrs)     Types: Cigarettes     Start date: 1/10/1989     Passive exposure: Past    Smokeless tobacco: Never   Substance Use Topics    Alcohol use: Yes     Alcohol/week: 20.0 standard drinks of alcohol     Types: 10 Cans of beer, 10 Shots of liquor per week     Comment: socially    Drug use: No   [2]   Current Outpatient Medications on File Prior to Visit   Medication Sig Dispense Refill    amLODIPine (NORVASC) 5 MG tablet Take 1 tablet (5 mg total) by mouth once daily. 90 tablet 3    cyclobenzaprine (FLEXERIL) 5 MG tablet Take 1 tablet (5 mg total) by mouth 3 (three) times daily as needed for Muscle spasms. Take 5 mg by mouth 3 (three) times daily as needed for Muscle spasms. 30 tablet 2    cycloSPORINE (RESTASIS) 0.05 % ophthalmic emulsion Place 1 drop into both eyes 2 (two) times daily. 60 each 3    fluticasone propionate (FLONASE) 50 mcg/actuation nasal spray 2 sprays (100 mcg total) by Each Nostril route once daily. 16 g 1    meloxicam (MOBIC) 7.5 MG tablet Take 1 tablet (7.5 mg total) by mouth daily as needed for Pain. 30 tablet 0    multivit-calc-iron-FA-K-hb#244 -80 mg-mcg-mcg Tab Take by mouth.      omeprazole (PRILOSEC)  20 MG capsule TAKE 1 CAPSULE(20 MG) BY MOUTH EVERY DAY 30 capsule 5    oxyCODONE-acetaminophen (PERCOCET)  mg per tablet Take 1 tablet by mouth every 6 (six) hours as needed.      pravastatin (PRAVACHOL) 40 MG tablet Take 1 tablet (40 mg total) by mouth once daily. 90 tablet 3    prednisoLONE acetate (PRED FORTE) 1 % DrpS Place 1 drop into the right eye 4 (four) times daily. (Patient not taking: Reported on 2/25/2025) 5 mL 1    prednisoLONE acetate (PRED FORTE) 1 % DrpS Place 1 drop into the left eye every 4 (four) hours. (Patient not taking: Reported on 2/25/2025) 5 mL 1    prednisoLONE acetate (PRED FORTE) 1 % DrpS Place 1 drop into the left eye every 4 (four) hours. (Patient not taking: Reported on 2/25/2025) 5 mL 1    RSVPreF3 antigen-AS01E, PF, (AREXVY, PF,) 120 mcg/0.5 mL SusR vaccine Inject into the muscle. (Patient not taking: Reported on 2/25/2025) 1 each 0    sildenafiL (VIAGRA) 100 MG tablet Take 1 tablet (100 mg total) by mouth daily as needed for Erectile Dysfunction. 30 tablet 3    triamcinolone acetonide 0.1% (KENALOG) 0.1 % cream Apply topically 2 (two) times daily. (Patient not taking: Reported on 2/25/2025) 15 g 0     No current facility-administered medications on file prior to visit.

## 2025-03-07 RX ORDER — SILDENAFIL 100 MG/1
100 TABLET, FILM COATED ORAL EVERY OTHER DAY
Qty: 10 TABLET | Refills: 1 | Status: SHIPPED | OUTPATIENT
Start: 2025-03-07

## 2025-03-28 ENCOUNTER — TELEPHONE (OUTPATIENT)
Dept: RADIATION ONCOLOGY | Facility: CLINIC | Age: 65
End: 2025-03-28
Payer: MEDICARE

## 2025-03-28 DIAGNOSIS — I70.90 ATHEROSCLEROSIS: ICD-10-CM

## 2025-03-28 DIAGNOSIS — I10 HYPERTENSION, ESSENTIAL: ICD-10-CM

## 2025-03-28 RX ORDER — AMLODIPINE BESYLATE 5 MG/1
5 TABLET ORAL DAILY
Qty: 90 TABLET | Refills: 3 | Status: SHIPPED | OUTPATIENT
Start: 2025-03-28

## 2025-03-28 RX ORDER — PRAVASTATIN SODIUM 40 MG/1
40 TABLET ORAL DAILY
Qty: 90 TABLET | Refills: 3 | Status: SHIPPED | OUTPATIENT
Start: 2025-03-28

## 2025-03-28 NOTE — TELEPHONE ENCOUNTER
----- Message from Annika sent at 3/28/2025  7:17 AM CDT -----  .Type:  RX Refill RequestWho Called: Adrian Burch Jr. Refill or New Rx:refillRX Name and Strength:amLODIPine (NORVASC) 5 MG tablet and pravastatin (PRAVACHOL) 40 MG tabletHow is the patient currently taking it? (ex. 1XDay):dailyIs this a 30 day or 90 day RX:Preferred Pharmacy with phone number:.[facility] - [address] , [place] [address]Mgahz: [phone] Fax: [phone] Local or Mail Order:localOrdering Provider:Would the patient rather a call back or a response via [facility]? Call backBest Call Back Number:.[phone]  Additional Information: Please send a message in my chart once the prescription is called in. x.  ----- Message -----  From: Annika Acharya  Sent: 3/28/2025   7:19 AM CDT  To: Rhina Monge    .Type:  RX Refill RequestWho Called: Adrian Burch Jr. Refill or New Rx:refillRX Name and Strength:amLODIPine (NORVASC) 5 MG tablet and pravastatin (PRAVACHOL) 40 MG tabletHow is the patient currently taking it? (ex. 1XDay):dailyIs this a 30 day or 90 day RX:Preferred Pharmacy with phone number:.[facility] - [address] , [place] [address]Jmvei: [phone] Fax: [phone] Local or Mail Order:localOrdering Provider:Would the patient rather a call back or a response via [facility]? Call backBest Call Back Number:.[phone]  Additional Information:

## 2025-03-28 NOTE — TELEPHONE ENCOUNTER
No care due was identified.  [facility] Embedded Care Due Messages. Reference number: [id number].   3/28/2025 7:39:48 AM CDT

## 2025-03-28 NOTE — TELEPHONE ENCOUNTER
Called patient to reschedule Rad Onc consult but the patient said he does not want to reschedule. He said he will continue taking his 'herbs & things & hopefully his cancer will stay down.' Informed him if he changes his mind to call back & we will get him scheduled, he verbalized understanding.

## 2025-03-28 NOTE — TELEPHONE ENCOUNTER
Refill Decision Note   Jaxson Burch  is requesting a refill authorization.  Brief Assessment and Rationale for Refill:  Approve     Medication Therapy Plan:        Comments:     Note composed:1:43 PM 03/28/2025

## 2025-04-09 ENCOUNTER — TELEPHONE (OUTPATIENT)
Dept: INTERNAL MEDICINE | Facility: CLINIC | Age: 65
End: 2025-04-09
Payer: MEDICARE

## 2025-04-09 DIAGNOSIS — E55.9 VITAMIN D DEFICIENCY, UNSPECIFIED: ICD-10-CM

## 2025-04-09 DIAGNOSIS — Z00.00 ANNUAL PHYSICAL EXAM: Primary | ICD-10-CM

## 2025-04-09 NOTE — TELEPHONE ENCOUNTER
----- Message from Summer sent at 4/9/2025 10:48 AM CDT -----  Contact: Jaxson  Type:  Patient Call Back Request Who Called: Jaxson Message for Patient: Nurse What this is regarding?: pt refuse to tell the me the reason Would the patient rather a call back or a response via [facility]? Call back Best Call Back Number: [phone] Information: Pt want the nurse to call not the MA

## 2025-04-09 NOTE — TELEPHONE ENCOUNTER
----- Message from Summer sent at 4/9/2025 10:48 AM CDT -----  Contact: Jaxson  Type:  Patient Call Back Request Who Called: Jaxson Message for Patient: Nurse What this is regarding?: pt refuse to tell the me the reason Would the patient rather a call back or a response via [facility]? Call back Best Call Back Number: [phone] Information: Pt want the nurse to call not the MA

## 2025-04-11 ENCOUNTER — LAB VISIT (OUTPATIENT)
Dept: LAB | Facility: HOSPITAL | Age: 65
End: 2025-04-11
Attending: FAMILY MEDICINE
Payer: MEDICARE

## 2025-04-11 DIAGNOSIS — E55.9 VITAMIN D DEFICIENCY, UNSPECIFIED: ICD-10-CM

## 2025-04-11 DIAGNOSIS — Z00.00 ANNUAL PHYSICAL EXAM: ICD-10-CM

## 2025-04-11 LAB
25(OH)D3+25(OH)D2 SERPL-MCNC: 111 NG/ML (ref 30–96)
HIV 1+2 AB+HIV1 P24 AG SERPL QL IA: NORMAL

## 2025-04-11 PROCEDURE — 87389 HIV-1 AG W/HIV-1&-2 AB AG IA: CPT | Mod: HCNC

## 2025-04-11 PROCEDURE — 82306 VITAMIN D 25 HYDROXY: CPT | Mod: HCNC

## 2025-04-11 PROCEDURE — 36415 COLL VENOUS BLD VENIPUNCTURE: CPT | Mod: HCNC

## 2025-04-14 ENCOUNTER — TELEPHONE (OUTPATIENT)
Dept: INTERNAL MEDICINE | Facility: CLINIC | Age: 65
End: 2025-04-14
Payer: MEDICARE

## 2025-04-14 ENCOUNTER — RESULTS FOLLOW-UP (OUTPATIENT)
Dept: INTERNAL MEDICINE | Facility: CLINIC | Age: 65
End: 2025-04-14

## 2025-04-14 NOTE — TELEPHONE ENCOUNTER
Talked to pt gave test resuult he wants to know if he should continue taking his vit d meds since was a little elevated        ----- Message from Salomón Lantigua MD sent at 4/14/2025  8:35 AM CDT -----  HIV was normal vitamin-D was slightly above normal continue as planned  ----- Message -----  From: Lab, Background User  Sent: 4/11/2025   5:58 PM CDT  To: Salomón Lantigua MD

## 2025-05-23 ENCOUNTER — LAB VISIT (OUTPATIENT)
Dept: LAB | Facility: HOSPITAL | Age: 65
End: 2025-05-23
Attending: UROLOGY
Payer: MEDICARE

## 2025-05-23 DIAGNOSIS — C61 MALIGNANT NEOPLASM OF PROSTATE: ICD-10-CM

## 2025-05-23 PROCEDURE — 84153 ASSAY OF PSA TOTAL: CPT | Mod: HCNC

## 2025-05-23 PROCEDURE — 36415 COLL VENOUS BLD VENIPUNCTURE: CPT | Mod: HCNC,PN

## 2025-05-24 LAB — PSA SERPL-MCNC: 4.5 NG/ML

## 2025-05-26 ENCOUNTER — OFFICE VISIT (OUTPATIENT)
Dept: UROLOGY | Facility: CLINIC | Age: 65
End: 2025-05-26
Payer: MEDICARE

## 2025-05-26 VITALS
DIASTOLIC BLOOD PRESSURE: 71 MMHG | BODY MASS INDEX: 22.58 KG/M2 | HEART RATE: 87 BPM | RESPIRATION RATE: 18 BRPM | SYSTOLIC BLOOD PRESSURE: 114 MMHG | HEIGHT: 67 IN

## 2025-05-26 DIAGNOSIS — N52.03 COMBINED ARTERIAL INSUFFICIENCY AND CORPORO-VENOUS OCCLUSIVE ERECTILE DYSFUNCTION: ICD-10-CM

## 2025-05-26 DIAGNOSIS — C61 MALIGNANT NEOPLASM OF PROSTATE: Primary | ICD-10-CM

## 2025-05-26 PROCEDURE — 99999 PR PBB SHADOW E&M-EST. PATIENT-LVL III: CPT | Mod: PBBFAC,HCNC,, | Performed by: UROLOGY

## 2025-05-26 PROCEDURE — 3078F DIAST BP <80 MM HG: CPT | Mod: CPTII,HCNC,S$GLB, | Performed by: UROLOGY

## 2025-05-26 PROCEDURE — 3074F SYST BP LT 130 MM HG: CPT | Mod: CPTII,HCNC,S$GLB, | Performed by: UROLOGY

## 2025-05-26 PROCEDURE — 1159F MED LIST DOCD IN RCRD: CPT | Mod: CPTII,HCNC,S$GLB, | Performed by: UROLOGY

## 2025-05-26 PROCEDURE — 3008F BODY MASS INDEX DOCD: CPT | Mod: CPTII,HCNC,S$GLB, | Performed by: UROLOGY

## 2025-05-26 PROCEDURE — 99214 OFFICE O/P EST MOD 30 MIN: CPT | Mod: HCNC,S$GLB,, | Performed by: UROLOGY

## 2025-05-26 NOTE — PROGRESS NOTES
Chief Complaint:   Encounter Diagnoses   Name Primary?    Malignant neoplasm of prostate Yes    Combined arterial insufficiency and corporo-venous occlusive erectile dysfunction        HPI:  HPI Jaxson Burch Jr. coco 64-year-old male with known history of prostate cancer.  He does have a fairly high PSA density.  His PSA is slowly rising.  He is here for discussion about surveillance versus procedure.      64 y.o. male who presents For a 2nd opinion of prostate cancer.  He had MRI in June of 2023 that showed no evidence of disease.  His PSA obdulio to 4.9 and he underwent a prostate biopsy 2 weeks ago.  This showed multifocal 3+4 disease.  His prostate volume is only 23 g.  He does have baseline erectile dysfunction and uses Viagra.  He was able to have sex with PDE5 inhibitors.  This is very important to him.  He does smoke and drink.  He does have a remote history of ITP and was treated in the past.  His platelet count is now normal.  He does have hypertension hyperlipidemia.  He works part-time delivering auto parts.  He has no family history of prostate cancer.  He was minimal lower urinary tract symptoms.  I have reviewed all his prior chart including pathology laboratory workup and imaging       History:  Social History[1]  Past Medical History:   Diagnosis Date    Annual physical exam 2/19/2024    Arthritis     Gastritis     Hypertension, essential 2/2/2021    ITP (idiopathic thrombocytopenic purpura)      Past Surgical History:   Procedure Laterality Date    CATARACT EXTRACTION      COLONOSCOPY N/A 11/29/2018    Procedure: COLONOSCOPY;  Surgeon: Brett Townsend MD;  Location: [facility] ENDO;  Service: Endoscopy;  Laterality: N/A;    COLONOSCOPY N/A 12/21/2022    Procedure: COLONOSCOPY;  Surgeon: Marisol Carrillo MD;  Location: [facility] ENDO;  Service: Endoscopy;  Laterality: N/A;    FRACTURE SURGERY  02/04/2005    Hip replacement    JOINT REPLACEMENT Right     hip     Family History   Problem Relation Name Age of Onset  "   Pancreatic cancer Mother Eva Sanders     Cancer Mother Eva Sanders         Pancreatic cancer    Colon cancer Neg Hx         Medications Ordered Prior to Encounter[2]     Objective:     Vitals:    05/26/25 1116   BP: 114/71   Pulse: 87   Resp: 18   Height: 5' 7" (1.702 m)      BMI Readings from Last 1 Encounters:   05/26/25 22.58 kg/m²          Physical Exam  No acute distress alert and oriented   Respirations even unlabored   Abdomen is soft nontender          Lab Results   Component Value Date    PSADIAG 3.9 02/21/2025    PSADIAG 4.3 (H) 11/15/2024    PSAFREEPCT 10.00 01/24/2025    PSA 4.50 (H) 05/23/2025    PSA 3.9 05/05/2022    PSA 3.1 02/07/2022    PSA 1.8 02/03/2021        Lab Results   Component Value Date    CREATININE 0.9 01/24/2025      Assessment:       1. Malignant neoplasm of prostate    2. Combined arterial insufficiency and corporo-venous occlusive erectile dysfunction        Plan:     1. Malignant neoplasm of prostate    2. Combined arterial insufficiency and corporo-venous occlusive erectile dysfunction       Orders Placed This Encounter    Prostate Specific Antigen, Diagnostic      Discussed again with the patient as well as his 2 daughters today.  Did discuss recommending proceeding with treatment.  We did discuss option of continued surveillance.  He does not wish to proceed with any treatment at this time.  He is considering a repeat MRI and biopsy in 4 months.       [1]   Social History  Tobacco Use    Smoking status: Every Day     Current packs/day: 0.50     Average packs/day: 0.5 packs/day for 36.4 years (18.2 ttl pk-yrs)     Types: Cigarettes     Start date: 1/10/1989     Passive exposure: Past    Smokeless tobacco: Never   Substance Use Topics    Alcohol use: Yes     Alcohol/week: 20.0 standard drinks of alcohol     Types: 10 Cans of beer, 10 Shots of liquor per week     Comment: socially    Drug use: No   [2]   Current Outpatient Medications on File Prior to Visit "   Medication Sig Dispense Refill    amLODIPine (NORVASC) 5 MG tablet Take 1 tablet (5 mg total) by mouth once daily. 90 tablet 3    cyclobenzaprine (FLEXERIL) 5 MG tablet Take 1 tablet (5 mg total) by mouth 3 (three) times daily as needed for Muscle spasms. Take 5 mg by mouth 3 (three) times daily as needed for Muscle spasms. 30 tablet 2    cycloSPORINE (RESTASIS) 0.05 % ophthalmic emulsion Place 1 drop into both eyes 2 (two) times daily. 60 each 3    fluticasone propionate (FLONASE) 50 mcg/actuation nasal spray 2 sprays (100 mcg total) by Each Nostril route once daily. 16 g 1    meloxicam (MOBIC) 7.5 MG tablet Take 1 tablet (7.5 mg total) by mouth daily as needed for Pain. 30 tablet 0    multivit-calc-iron-FA-K-hb#244 -80 mg-mcg-mcg Tab Take by mouth.      omeprazole (PRILOSEC) 20 MG capsule TAKE 1 CAPSULE(20 MG) BY MOUTH EVERY DAY 30 capsule 5    oxyCODONE-acetaminophen (PERCOCET)  mg per tablet Take 1 tablet by mouth every 6 (six) hours as needed.      pravastatin (PRAVACHOL) 40 MG tablet Take 1 tablet (40 mg total) by mouth once daily. 90 tablet 3    prednisoLONE acetate (PRED FORTE) 1 % DrpS Place 1 drop into the right eye 4 (four) times daily. 5 mL 1    prednisoLONE acetate (PRED FORTE) 1 % DrpS Place 1 drop into the left eye every 4 (four) hours. 5 mL 1    RSVPreF3 antigen-AS01E, PF, (AREXVY, PF,) 120 mcg/0.5 mL SusR vaccine Inject into the muscle. 1 each 0    sildenafiL (VIAGRA) 100 MG tablet TAKE ONE TABLET BY MOUTH EVERY OTHER DAY 10 tablet 1    triamcinolone acetonide 0.1% (KENALOG) 0.1 % cream Apply topically 2 (two) times daily. 15 g 0     No current facility-administered medications on file prior to visit.

## 2025-06-06 DIAGNOSIS — N52.03 COMBINED ARTERIAL INSUFFICIENCY AND CORPORO-VENOUS OCCLUSIVE ERECTILE DYSFUNCTION: Primary | ICD-10-CM

## 2025-06-06 RX ORDER — SILDENAFIL 100 MG/1
100 TABLET, FILM COATED ORAL EVERY OTHER DAY
Qty: 10 TABLET | Refills: 1 | Status: SHIPPED | OUTPATIENT
Start: 2025-06-06

## 2025-07-24 ENCOUNTER — OFFICE VISIT (OUTPATIENT)
Dept: INTERNAL MEDICINE | Facility: CLINIC | Age: 65
End: 2025-07-24
Payer: MEDICARE

## 2025-07-24 VITALS
WEIGHT: 148.13 LBS | HEART RATE: 80 BPM | SYSTOLIC BLOOD PRESSURE: 120 MMHG | HEIGHT: 67 IN | OXYGEN SATURATION: 95 % | TEMPERATURE: 98 F | DIASTOLIC BLOOD PRESSURE: 70 MMHG | BODY MASS INDEX: 23.25 KG/M2

## 2025-07-24 DIAGNOSIS — E78.5 HYPERLIPIDEMIA, UNSPECIFIED HYPERLIPIDEMIA TYPE: ICD-10-CM

## 2025-07-24 DIAGNOSIS — I10 HYPERTENSION, ESSENTIAL: Primary | ICD-10-CM

## 2025-07-24 DIAGNOSIS — F17.210 CIGARETTE NICOTINE DEPENDENCE WITHOUT COMPLICATION: ICD-10-CM

## 2025-07-24 PROCEDURE — 1159F MED LIST DOCD IN RCRD: CPT | Mod: CPTII,HCNC,S$GLB, | Performed by: FAMILY MEDICINE

## 2025-07-24 PROCEDURE — 3074F SYST BP LT 130 MM HG: CPT | Mod: CPTII,HCNC,S$GLB, | Performed by: FAMILY MEDICINE

## 2025-07-24 PROCEDURE — 99999 PR PBB SHADOW E&M-EST. PATIENT-LVL III: CPT | Mod: PBBFAC,HCNC,, | Performed by: FAMILY MEDICINE

## 2025-07-24 PROCEDURE — 3078F DIAST BP <80 MM HG: CPT | Mod: CPTII,HCNC,S$GLB, | Performed by: FAMILY MEDICINE

## 2025-07-24 PROCEDURE — 3008F BODY MASS INDEX DOCD: CPT | Mod: CPTII,HCNC,S$GLB, | Performed by: FAMILY MEDICINE

## 2025-07-24 PROCEDURE — 99214 OFFICE O/P EST MOD 30 MIN: CPT | Mod: HCNC,S$GLB,, | Performed by: FAMILY MEDICINE

## 2025-07-24 RX ORDER — TADALAFIL 20 MG/1
20 TABLET ORAL DAILY PRN
COMMUNITY
Start: 2024-10-02

## 2025-07-24 RX ORDER — CYCLOBENZAPRINE HCL 10 MG
10 TABLET ORAL
COMMUNITY
Start: 2025-03-08

## 2025-07-24 NOTE — PROGRESS NOTES
Patient ID: Jaxson Burch Jr. is a 65 y.o. male.    Chief Complaint: Follow-up    History of Present Illness    Mr. Burch presents today for follow up.    He reports recent diagnosis of very mild emphysema confirmed via CT. He has not purchased cigarettes since diagnosis but occasionally uses 1-2 cigarettes. He is actively working on cessation and is motivated to improve his respiratory health.    He is under active surveillance for prostate cancer with his urologist. Biopsy confirmed three cancer spots in prostate. PSA level is 4.5, slightly above normal range. He takes herbal supplements including black seed and soursop liquid to maintain PSA levels. He expresses concern about potential sexual function impacts of future treatment and is focused on preventing cancer progression.    He maintains an active exercise routine consisting of fast walking for 30 minutes on the treadmill, including five minutes of running at full speed. He notes mild discomfort when lifting heavy objects at work but denies any significant symptoms or limitations during normal walking activities.      ROS:  General: -fever, -chills, -fatigue, -weight gain, -weight loss  Eyes: -vision changes, -redness, -discharge  ENT: -ear pain, -nasal congestion, -sore throat  Cardiovascular: -chest pain, -palpitations, -lower extremity edema  Respiratory: -cough, -shortness of breath, +exertional dyspnea  Gastrointestinal: -abdominal pain, -nausea, -vomiting, -diarrhea, -constipation, -blood in stool  Genitourinary: -dysuria, -hematuria, -frequency  Musculoskeletal: -joint pain, -muscle pain  Skin: -rash, -lesion  Neurological: -headache, -dizziness, -numbness, -tingling         Physical Exam    General: In no acute distress. Not ill-appearing or diaphoretic.  Neck: Normal thyroid. No cervical lymphadenopathy. 2+ carotid pulses.  Cardiovascular: Normal rate and regular  rhythm. No murmur heard. No gallop.  Pulmonary: Pulmonary effort is normal. No  respiratory distress. No wheezing. No rhonchi. No rales.  Abdominal: Soft. Nontender. Nondistended. No mass.  Musculoskeletal: No swelling. No tenderness. No deformity.  Neurological: Alert.  Psychiatric: Mood normal. Behavior normal.         Assessment & Plan    PROSTATE CANCER:  - Monitor PSA level currently at 4.5, which is slightly elevated but not significantly concerning.  - Continue watchful waiting approach at this time.  - Mr. Burch is taking herbal supplements including black seed and soursop liquid to maintain low numbers.  - Advised to continue monitoring prostate cancer with 3 spots confirmed by biopsy and consider treatment options like radiation or removal if necessary in the future.    EMPHYSEMA:  - Mild emphysema detected on CT chest, though not visible on chest XR.  - Current exam shows normal lung sounds upon auscultation.  - Explained that emphysema falls under the broader category of COPD, which also includes chronic bronchitis.  - Discussed that emphysema progression is primarily linked to continued smoking, and cessation can halt or potentially improve the condition.  - Recommend pneumonia vaccination due to increased risk of complications with emphysema.  - Advised continuing annual CTs for monitoring.    NICOTINE DEPENDENCE:  - Mr. Burch is making progress in smoking cessation efforts, currently weaning off cigarettes and occasionally smoking only 1-2.  - Emphasized the importance of completely quitting smoking to prevent worsening of emphysema.  - Will continue to evaluate cessation progress at follow-up visits.              Follow up in about 6 months (around 1/24/2026).    This note was generated with the assistance of ambient listening technology. Verbal consent was obtained by the patient and accompanying visitor(s) for the recording of patient appointment to facilitate this note. I attest to having reviewed and edited the generated note for accuracy, though some syntax or spelling  errors may persist. Please contact the author of this note for any clarification.

## 2025-07-31 DIAGNOSIS — I70.90 ATHEROSCLEROSIS: ICD-10-CM

## 2025-07-31 DIAGNOSIS — I10 HYPERTENSION, ESSENTIAL: ICD-10-CM

## 2025-07-31 RX ORDER — AMLODIPINE BESYLATE 5 MG/1
5 TABLET ORAL DAILY
Qty: 90 TABLET | Refills: 3 | Status: SHIPPED | OUTPATIENT
Start: 2025-07-31

## 2025-07-31 RX ORDER — PRAVASTATIN SODIUM 40 MG/1
40 TABLET ORAL DAILY
Qty: 90 TABLET | Refills: 1 | Status: SHIPPED | OUTPATIENT
Start: 2025-07-31

## 2025-07-31 NOTE — TELEPHONE ENCOUNTER
No care due was identified.  [facility] [place] Embedded Care Due Messages. Reference number: [id number].   7/31/2025 8:01:03 AM CDT

## 2025-08-01 NOTE — TELEPHONE ENCOUNTER
Refill Decision Note   Jaxson Burch  is requesting a refill authorization.  Brief Assessment and Rationale for Refill:  Approve     Medication Therapy Plan:        Comments:     Note composed:10:48 PM 07/31/2025